# Patient Record
Sex: FEMALE | Race: WHITE | Employment: OTHER | URBAN - METROPOLITAN AREA
[De-identification: names, ages, dates, MRNs, and addresses within clinical notes are randomized per-mention and may not be internally consistent; named-entity substitution may affect disease eponyms.]

---

## 2017-01-05 ENCOUNTER — OFFICE VISIT (OUTPATIENT)
Dept: FAMILY MEDICINE | Facility: CLINIC | Age: 82
End: 2017-01-05

## 2017-01-05 VITALS
OXYGEN SATURATION: 98 % | WEIGHT: 111 LBS | TEMPERATURE: 98.5 F | BODY MASS INDEX: 17.92 KG/M2 | HEART RATE: 64 BPM | DIASTOLIC BLOOD PRESSURE: 90 MMHG | SYSTOLIC BLOOD PRESSURE: 130 MMHG

## 2017-01-05 DIAGNOSIS — I10 BENIGN ESSENTIAL HYPERTENSION: ICD-10-CM

## 2017-01-05 DIAGNOSIS — R26.89 POOR BALANCE: ICD-10-CM

## 2017-01-05 DIAGNOSIS — R00.2 PALPITATIONS: Primary | ICD-10-CM

## 2017-01-05 PROCEDURE — 93000 ELECTROCARDIOGRAM COMPLETE: CPT | Performed by: FAMILY MEDICINE

## 2017-01-05 PROCEDURE — 99214 OFFICE O/P EST MOD 30 MIN: CPT | Performed by: FAMILY MEDICINE

## 2017-01-05 RX ORDER — LISINOPRIL 10 MG/1
10 TABLET ORAL DAILY
Qty: 90 TABLET | Refills: 1 | Status: SHIPPED | OUTPATIENT
Start: 2017-01-05 | End: 2017-03-01

## 2017-01-05 NOTE — MR AVS SNAPSHOT
After Visit Summary   1/5/2017    Juliana Leal    MRN: [id number]           Patient Information     Date Of Birth          2/10/1923        Visit Information        Provider Department      1/5/2017 3:45 PM Gladys Christiansen MD [facility]        Today's Diagnoses     Palpitations    -  1     Encounter for medication refill         Benign essential hypertension            Follow-ups after your visit        Who to contact     If you have questions or need follow up information about today's clinic visit or your schedule please contact [facility] directly at [phone].  Normal or non-critical lab and imaging results will be communicated to you by [employer], letter or phone within 4 business days after the clinic has received the results. If you do not hear from us within 7 days, please contact the clinic through [employer]wel[employer] or phone. If you have a critical or abnormal lab result, we will notify you by phone as soon as possible.  Submit refill requests through Dorm[employer] or call your pharmacy and they will forward the refill request to us. Please allow 3 business days for your refill to be completed.          Additional Information About Your Visit        MyChart Information     [employer] gives you secure access to your electronic health record. If you see a primary care provider, you can also send messages to your care team and make appointments. If you have questions, please call your primary care clinic.  If you do not have a primary care provider, please call [phone] and they will assist you.        Care EveryWhere ID     This is your Care EveryWhere ID. This could be used by other organizations to access your [facility] medical records  [id number]        Your Vitals Were     Pulse Temperature Pulse Oximetry             64 98.5  F (36.9  C) (Oral) 98%          Blood Pressure from Last 3 Encounters:   01/05/17 130/90   12/05/16 150/90   11/08/16 102/76    Weight from Last 3  Encounters:   01/05/17 50.349 kg (111 lb)   12/05/16 51.256 kg (113 lb)   11/08/16 49.986 kg (110 lb 3.2 oz)              We Performed the Following     EKG 12-lead complete w/read - Clinics          Today's Medication Changes          These changes are accurate as of: 1/5/17  5:02 PM.  If you have any questions, ask your nurse or doctor.               These medicines have changed or have updated prescriptions.        Dose/Directions    lisinopril 10 MG tablet   Commonly known as:  PRINIVIL/ZESTRIL   This may have changed:  See the new instructions.   Used for:  Benign essential hypertension   Changed by:  Gladys Christiansen MD        Dose:  10 mg   Take 1 tablet (10 mg) by mouth daily   Quantity:  90 tablet   Refills:  1         Stop taking these medicines if you haven't already. Please contact your care team if you have questions.     metoprolol 100 MG 24 hr tablet   Commonly known as:  TOPROL-XL   Stopped by:  Gladys Christiansen MD                Where to get your medicines      These medications were sent to [facility] [address] [place], [place] [address] AT [facility] [address]  [address], [facility] [address]    Hours:  24-hours Phone:  [phone]    - lisinopril 10 MG tablet             Primary Care Provider Office Phone # Fax #    Davey Hunter [phone]       [facility] [address]  [facility] [place] [address]        Thank you!     Thank you for choosing [facility]  for your care. Our goal is always to provide you with excellent care. Hearing back from our patients is one way we can continue to improve our services. Please take a few minutes to complete the written survey that you may receive in the mail after your visit with us. Thank you!             Your Updated Medication List - Protect others around you: Learn how to safely use, store and throw away your medicines at www.disposemymeds.org.          This list is  accurate as of: 1/5/17  5:02 PM.  Always use your most recent med list.                   Brand Name Dispense Instructions for use    cetirizine 10 MG tablet    zyrTEC     Take 5 mg by mouth daily       hydrochlorothiazide 25 MG tablet    HYDRODIURIL    90 tablet    TAKE 1 TABLET BY MOUTH EVERY DAY       levothyroxine 75 MCG tablet    SYNTHROID/LEVOTHROID    90 tablet    TAKE 1 TABLET BY MOUTH EVERY DAY       lisinopril 10 MG tablet    PRINIVIL/ZESTRIL    90 tablet    Take 1 tablet (10 mg) by mouth daily

## 2017-01-05 NOTE — PROGRESS NOTES
"Problem(s) Oriented visit        SUBJECTIVE:                                                    Juliana Leal is a 89 year old female who presents to clinic today for a few issues. She has long history of itching. She finally pinpointed the cause with eliminating things a bit at a time. She recently stopped her metoprolol and thinks the itching is better. She complains of having occasional palpitations, but they are not long lasting and occasionally feel like a stronger beat. She denies any lightheadedness or dizziness, although she does note that she feels less steady on her feet than she used to. No chest pain or pressure. No dyspnea.      Problem list, Medication list, Allergies, and Medical/Social/Surgical histories reviewed in [facility] and updated as appropriate.   Additional history: as documented    ROS:  C: NEGATIVE for fever, chills, change in weight  I: NEGATIVE for worrisome rashes, moles or lesions  E: NEGATIVE for vision changes or irritation  E/M: NEGATIVE for ear, mouth and throat problems  R: NEGATIVE for significant cough or SOB  B: NEGATIVE for masses, tenderness or discharge  CV: her heart will occasionally race and \"pound\"  GI: NEGATIVE for nausea, abdominal pain, heartburn, or change in bowel habits  : NEGATIVE for frequency, dysuria, or hematuria  M: NEGATIVE for significant arthralgias or myalgia  NEURO: she can feel unsteady, no tremor. She doesn't feel as strong or stable as she had not that long ago.   E: NEGATIVE for temperature intolerance, skin/hair changes  H: NEGATIVE for bleeding problems  P: NEGATIVE for changes in mood or affect    Histories:   Patient Active Problem List   Diagnosis     HTN (hypertension)     Hypothyroid     Migraine     Health Care Home     Vertigo     Headache     Muscle tension headache     Pruritic disorder     Past Surgical History   Procedure Laterality Date     Colectomy       diverticulitis     Incise finger tendon sheath       Repair atrial septal defect   "     Phacoemulsification clear cornea with standard intraocular lens implant  2/29/2012     Procedure:PHACOEMULSIFICATION CLEAR CORNEA WITH STANDARD INTRAOCULAR LENS IMPLANT; RIGHT PHACOEMULSIFICATION CLEAR CORNEA WITH STANDARD INTRAOCULAR LENS IMPLANT; Surgeon:MANJIT LYONS; Location: [facility]     Phacoemulsification clear cornea with standard intraocular lens implant  3/14/2012     Procedure:PHACOEMULSIFICATION CLEAR CORNEA WITH STANDARD INTRAOCULAR LENS IMPLANT; LEFT PHACOEMULSIFICATION CLEAR CORNEA WITH STANDARD INTRAOCULAR LENS IMPLANT ; Surgeon:MANJIT LYONS; Location: [facility]     Hc ligation or biopsy temporal artery  5/10/2012     Procedure:BIOPSY ARTERY TEMPORAL; Surgeon:RUI LOREDO; Location: OR       Social History   Substance Use Topics     Smoking status: Former Smoker     Types: Cigarettes     Quit date: 01/01/1960     Smokeless tobacco: Never Used     Alcohol Use: Yes      Comment:  1 beer maybe in 6 months     History reviewed. No pertinent family history.        OBJECTIVE:                                                    /90 mmHg  Pulse 64  Temp(Src) 98.5  F (36.9  C) (Oral)  Wt 50.349 kg (111 lb)  SpO2 98%  Body mass index is 17.92 kg/(m^2).   APPEARANCE: = Relaxed and in no distress  Conj/Eyelids = noninjected and lids and lashes are without inflammation  PERRLA/Irises = Pupils Round Reactive to Light and Irisis without inflammation  Neck = No anterior or posterior adenopathy appreciated.  Thyroid = Not enlarged and no masses felt  Resp effort = Calm regular breathing  Breath Sounds = Good air movement with no rales or rhonchi in any lung fields  Heart Rate, Rythym, & sounds (no Murm)  = Regular rate and rythym with occasional skipped beats appreciated.  Carotid Art's = Pulses full and equal and no bruits appreciated  Ext (edema) = No pretibial edema noted or elsewhere  Musculsktl: strength is globally diminished at 4/5 in upper and lower extremities  Recent/Remote Memory = Alert  and Oriented x 3  Mood/Affect = Cooperative and interested   Labs Resulted Today:   Results for orders placed or performed in visit on 12/05/16   Vitamin B12 ([facility])   Result Value Ref Range    Vitamin B12 622 211 - 946 pg/mL    Narrative    Performed at:  01 - [facility] [address], [place], [place]  [id number]  : Sloan Fernandez MD, Phone:  [phone]   Sed Rate Westergren ([facility])   Result Value Ref Range    Sed Rate 20 0 - 20 mm/hr   Creatine Kinase Total Serum ([facility])   Result Value Ref Range    CK Total 37 24 - 173 U/L    Narrative    Performed at:  01 - [facility] [address], [place], [place]  [id number]  : Sloan Fernandez MD, Phone:  [phone]   C-Reactive Protein  Quant ([facility])   Result Value Ref Range    C-Reactive Protein 1.8 0.0 - 4.9 mg/L    Narrative    Performed at:  01 - [facility] [address], [place], [place]  [id number]  : Sloan Fernandez MD, Phone:  [phone]     ASSESSMENT/PLAN:                                                        Juliana was seen today for recheck medication and derm problem.    Diagnoses and all orders for this visit:    Palpitations  -     EKG 12-lead complete w/read - Clinics  Sinus arrhythmia noted. She is off her betablocker and prefers to stay off. She will contact us if the palpitations feel worse off this.     Benign essential hypertension  -     lisinopril (PRINIVIL/ZESTRIL) 10 MG tablet; Take 1 tablet (10 mg) by mouth daily, increased from 5 mg now that she is off metoprolol.    Poor balance  -     HOME CARE NURSING REFERRAL for PT to work on strength and balance and for  evaluation of the home for safety.      There are no Patient Instructions on file for this visit.    The following health maintenance items are reviewed in Epic and correct as of today:  Health Maintenance   Topic Date Due     ADVANCE DIRECTIVE PLANNING Q5 YRS (NO INBASKET)  02/10/1941     FALL RISK ASSESSMENT  03/21/2017     INFLUENZA VACCINE  (SYSTEM ASSIGNED)  09/01/2017     TETANUS IMMUNIZATION (SYSTEM ASSIGNED)  11/19/2022     MIGRAINE ACTION PLAN,ONE TIME,NO INBASKET  Completed     PNEUMOCOCCAL  Completed       Gladys Christiansen MD  [facility]  [phone]    For any issues my office # is [phone]

## 2017-01-19 PROCEDURE — G0180 MD CERTIFICATION HHA PATIENT: HCPCS | Performed by: FAMILY MEDICINE

## 2017-01-24 ENCOUNTER — MEDICAL CORRESPONDENCE (OUTPATIENT)
Dept: FAMILY MEDICINE | Facility: CLINIC | Age: 82
End: 2017-01-24

## 2017-01-26 ENCOUNTER — MEDICAL CORRESPONDENCE (OUTPATIENT)
Dept: FAMILY MEDICINE | Facility: CLINIC | Age: 82
End: 2017-01-26

## 2017-03-01 ENCOUNTER — OFFICE VISIT (OUTPATIENT)
Dept: FAMILY MEDICINE | Facility: CLINIC | Age: 82
End: 2017-03-01

## 2017-03-01 VITALS
HEART RATE: 89 BPM | RESPIRATION RATE: 16 BRPM | BODY MASS INDEX: 18.4 KG/M2 | DIASTOLIC BLOOD PRESSURE: 88 MMHG | SYSTOLIC BLOOD PRESSURE: 126 MMHG | OXYGEN SATURATION: 90 % | TEMPERATURE: 97.7 F | WEIGHT: 114 LBS

## 2017-03-01 DIAGNOSIS — R21 RASH: Primary | ICD-10-CM

## 2017-03-01 DIAGNOSIS — L29.9 ITCHING: ICD-10-CM

## 2017-03-01 DIAGNOSIS — I10 BENIGN ESSENTIAL HYPERTENSION: ICD-10-CM

## 2017-03-01 PROCEDURE — 99213 OFFICE O/P EST LOW 20 MIN: CPT | Performed by: FAMILY MEDICINE

## 2017-03-01 RX ORDER — EMOLLIENT BASE
CREAM (GRAM) TOPICAL PRN
COMMUNITY

## 2017-03-01 RX ORDER — LISINOPRIL 10 MG/1
5 TABLET ORAL DAILY
Qty: 90 TABLET | Refills: 1 | COMMUNITY
Start: 2017-03-01 | End: 2017-03-09

## 2017-03-01 NOTE — MR AVS SNAPSHOT
After Visit Summary   3/1/2017    Juliana Leal    MRN: [id number]           Patient Information     Date Of Birth          2/10/1923        Visit Information        Provider Department      3/1/2017 12:45 PM Federico Manning MD [facility]        Today's Diagnoses     Rash    -  1    Benign essential hypertension        Itching           Follow-ups after your visit        Additional Services     [facility] to [facility]       Referral to [facility]    [phone], Fax [phone]    Reason for Referral:  Persistent itching    Please be aware that coverage of these services is subject to the terms and limitations of your health insurance plan.  Call member services at your health plan with any benefit or coverage questions.            Referral to [facility]       Referral to [facility]  Phone:  [phone]  Reason for Referral:  New rash on legs      Please be aware that coverage of these services is subject to the terms and limitations of your health insurance plan.  Call member services at your health plan with any benefit or coverage questions.                  Who to contact     If you have questions or need follow up information about today's clinic visit or your schedule please contact [facility] directly at [phone].  Normal or non-critical lab and imaging results will be communicated to you by MyChart, letter or phone within 4 business days after the clinic has received the results. If you do not hear from us within 7 days, please contact the clinic through MyChart or phone. If you have a critical or abnormal lab result, we will notify you by phone as soon as possible.  Submit refill requests through [employer] or call your pharmacy and they will forward the refill request to us. Please allow 3 business days for your refill to be completed.          Additional  Information About Your Visit        [employer]Room[employer]art Information     [employer] gives you secure access to your electronic health record. If you see a primary care provider, you can also send messages to your care team and make appointments. If you have questions, please call your primary care clinic.  If you do not have a primary care provider, please call [phone] and they will assist you.        Care EveryWhere ID     This is your Care EveryWhere ID. This could be used by other organizations to access your [place] medical records  [id number]        Your Vitals Were     Pulse Temperature Respirations Pulse Oximetry BMI (Body Mass Index)       89 97.7  F (36.5  C) (Oral) 16 90% 18.4 kg/m2        Blood Pressure from Last 3 Encounters:   03/01/17 126/88   01/05/17 130/90   12/05/16 150/90    Weight from Last 3 Encounters:   03/01/17 51.7 kg (114 lb)   01/05/17 50.3 kg (111 lb)   12/05/16 51.3 kg (113 lb)              We Performed the Following     Referral to [facility]     Referral to [facility]          Today's Medication Changes          These changes are accurate as of: 3/1/17  1:17 PM.  If you have any questions, ask your nurse or doctor.               These medicines have changed or have updated prescriptions.        Dose/Directions    lisinopril 10 MG tablet   Commonly known as:  PRINIVIL/ZESTRIL   This may have changed:  how much to take   Used for:  Benign essential hypertension   Changed by:  Federico Manning MD        Dose:  5 mg   Take 0.5 tablets (5 mg) by mouth daily   Quantity:  90 tablet   Refills:  1                Primary Care Provider Office Phone # Fax #    Davey Hunter [phone]       [facility] [address]  [facility] [address]        Thank you!     Thank you for choosing [facility]  for your care. Our goal is always to provide you with excellent care. Hearing back from our patients is one way  we can continue to improve our services. Please take a few minutes to complete the written survey that you may receive in the mail after your visit with us. Thank you!             Your Updated Medication List - Protect others around you: Learn how to safely use, store and throw away your medicines at www.disposemymeds.org.          This list is accurate as of: 3/1/17  1:17 PM.  Always use your most recent med list.                   Brand Name Dispense Instructions for use    cetirizine 10 MG tablet    zyrTEC     Take 5 mg by mouth daily       emollient cream      Apply topically as needed for other (leg rash)       hydrochlorothiazide 25 MG tablet    HYDRODIURIL    90 tablet    TAKE 1 TABLET BY MOUTH EVERY DAY       levothyroxine 75 MCG tablet    SYNTHROID/LEVOTHROID    90 tablet    TAKE 1 TABLET BY MOUTH EVERY DAY       lisinopril 10 MG tablet    PRINIVIL/ZESTRIL    90 tablet    Take 0.5 tablets (5 mg) by mouth daily

## 2017-03-01 NOTE — PROGRESS NOTES
Subjective:  Here to discuss the status of the following problem(s):(Unless noted the problem(s) is/are stable and if applicable the medicine(s) being used is/are causing no side effects or problems.)    CC: Derm Problem (rash on both legs, itching getting worse  - saw dermatologist before) and Medication Problem (possible reaction off zyrtec x 1 day)      1. Benign essential hypertension  htn   Needs refill, no dizziness or lightheadedness    Rash and itching  Long standing itching  Zyrtec 3 months for the itching      Rash on legs for two days  No new soaps or lotions  Red rash not itchy  Not on torso    Tried stopping the HCTZ for itching and no change in itching and legs swelled          ROS:  General:  NEGATIVE for fever, chills, change in weight    Past Medical History   Diagnosis Date     Arthritis      Gout      Headaches      Hypertension      Thyroid disease      Current Outpatient Prescriptions   Medication     emollient (VANICREAM) cream     lisinopril (PRINIVIL/ZESTRIL) 10 MG tablet     cetirizine (ZYRTEC) 10 MG tablet     hydrochlorothiazide (HYDRODIURIL) 25 MG tablet     levothyroxine (SYNTHROID, LEVOTHROID) 75 MCG tablet     [DISCONTINUED] lisinopril (PRINIVIL/ZESTRIL) 10 MG tablet     No current facility-administered medications for this visit.      Facility-Administered Medications Ordered in Other Visits   Medication     lidocaine (XYLOCAINE) 2 % jelly     balanced salts (BSS) ophthalmic solution      mL with EPInephrine 1:1000 (PF) 0.3mL     lidocaine (PF) (XYLOCAINE) 1 % injection     moxifloxacin (VIGAMOX) 0.5 % ophthalmic solution     sod hyaluronate-sod chondroitin-sod hyaluronate (DUOVISC) intra-ocular kit     Lidocaine 1% injection 1 mL     lactated ringers infusion       reports that she quit smoking about 57 years ago. Her smoking use included Cigarettes. She has never used smokeless tobacco. She reports that she drinks alcohol.      EXAM:  BP: 126/88   Pulse: 89      Wt  Readings from Last 2 Encounters:   03/01/17 51.7 kg (114 lb)   01/05/17 50.3 kg (111 lb)       BMI= Body mass index is 18.4 kg/(m^2).    EXAM:  APPEARANCE: = alert and no distress  PERRLA/Irises = Nrl  Oropharynx = Nrl  Resp effort = Nrl  Breath Sounds = Nrl  Heart Rate, Rythym, & sounds (no Murm)  = Nrl  Ext (edema) =  Trace to +1 ankles  SKIN: pos dermatographism  Thighs with macular rash that is spotty over anterior thighs, pink red color no papules blisters or urticaria    Mood/Affect = Nrl    Assessment/Plan:  Juliana was seen today for derm problem and medication problem.    Diagnoses and all orders for this visit:    Rash  -     Referral to [facility]    Benign essential hypertension  bp okay    Itching  -     [facility]erral to [facility] JOE Manning  [facility]

## 2017-03-09 ENCOUNTER — TELEPHONE (OUTPATIENT)
Dept: FAMILY MEDICINE | Facility: CLINIC | Age: 82
End: 2017-03-09

## 2017-03-09 ENCOUNTER — TRANSFERRED RECORDS (OUTPATIENT)
Dept: FAMILY MEDICINE | Facility: CLINIC | Age: 82
End: 2017-03-09

## 2017-03-09 ENCOUNTER — CARE COORDINATION (OUTPATIENT)
Dept: CASE MANAGEMENT | Facility: CLINIC | Age: 82
End: 2017-03-09

## 2017-03-09 NOTE — LETTER
[facility] - CARE MANAGEMENT DEPT  [address] [facility] [address]  [facility] [address]  Phone: [phone]    March 10, 2017  Juliana Leal  [address]   [facility] [address]    Dear Juliana,  I am the Clinic Care Coordinator that works at [facility] [employer]. I wanted to thank you for spending the time to talk with me on Friday 3/10.  Below is a description of what  I can help you with.     The Clinic Care Coordinator role is a Registered Nurse and/or  who understands the health care system. The goal of Clinic Care Coordination is to help you manage your health and improve access to the [facility] system in the most efficient manner.  The Registered Nurse can assist you in meeting your health care goals by providing education, coordinating services, and strengthening the communication among your providers. The  can assist you with financial, behavioral, psychosocial, and chemical dependency and counseling/psychiatric resources.    Please feel free to keep this letter and contact information to contact me at [phone] with any further questions or concerns that may arise. We at [facility] are focused on providing you with the highest-quality healthcare experience possible and that all starts with you.       Sincerely,     Sabrina Polk    Enclosed: I have enclosed a copy of a 24 Hour Access Plan. This has helpful phone numbers for you to call when needed. Please keep this in an easy to access place to use as needed., I have enclosed an Authorization to Discuss Protected Health Information form. Please review, fill out, sign and send back to me so I can make sure we have this on file to be able to talk to family/friends if you would like us to be able to  and an Advanced Care Plan please review with your kids and bring into the clinic to have copied so that your health care professionals know your wishes.  Call with questions!!!

## 2017-03-09 NOTE — LETTER
[facility] - CARE [facility]NAG[facility] DEPT  [address] [facility] [address]  [facility] [address] [phone]      March 10, 2017      Juliana Leal  [address]   [facility] [address]      EMERGENCY CARE PLAN  Presenting Problem Treatment Plan   Questions or concerns during clinic hours I will call the clinic directly:    [facility]  [address] [place] [address] [phone]   Questions or concerns outside clinic hours I will call the after-hours on-call line at [phone]   Patient needs to schedule an appointment I will call the scheduling team during business hours at [phone]   Same day treatment  I will call the clinic first, then urgent care and express care if needed   Clinic Care Coordinators Sabrina Polk SW:  [phone]  Rosa Baker RN:  [phone]   Crisis Services:  Behavioral or Mental Health [facility] (Behavioral Health Providers)   [phone]   Emergency treatment--Immediately CALL [phone]

## 2017-03-10 NOTE — PROGRESS NOTES
3/9/17 Yanira faxed 3/1/17, 1/5/17, 12/5/16 and 11/8/16 office notes faxed to Allergy & Asthma @ [phone]    Arnav Dave,   [facility]  [phone]

## 2017-03-10 NOTE — PROGRESS NOTES
"Clinic Care Coordination Contact  OUTREACH    Referral Information:  Referral Source: PCP  Reason for Contact: Initial Assessment for support services  Care Conference: No     Universal Utilization:   ED Visits in last year: 0  Hospital visits in last year: 0  Last PCP appointment: 03/01/17  Missed Appointments: 0  Concerns: Proper Utilization  Multiple Providers or Specialists: Dermatology    Clinical Concerns:  Current Medical Concerns:   Patient Active Problem List   Diagnosis     HTN (hypertension)     Hypothyroid     Migraine     Health Care Home     Vertigo     Headache     Muscle tension headache     Pruritic disorder       Current Behavioral Concerns: NA    Education Provided to patient: Care Coordination Role, Access Plan, Housekeeping/Chore Agencies   Clinical Pathway: None    Medication Management:  Patient understands and is adherent     Functional Status:  Mobility Status: Independent  Equipment Currently Used at Home: none  Transportation: Pt drives     Psychosocial:  Current living arrangement:: I live in a private home (co-op)  Financial/Insurance: [employer], No financial concerns     Resources and Interventions:  Current Resources:  (NA);  (NA)  PAS Number:  (NA)  Senior Linkage Line Referral Placed:  (NA)  Advanced Care Plans/Directives on file:: No  Referrals Placed:  (NA)     Goals: None established    Patient/Caregiver understanding: ADRIÁN WILLS reached out to patient due to reports from Dermatologist that patient appeared \"a bit overwhelmed with life.\" Pt reports no major concerns at this time. Pt has two children in the area that assist as needed. Pt is still driving and can successfully get to the grocery store, pharmacy, and medical appointments. Pt lives in a co-op. Pt states that she worked with home care briefly but was discharged because there was no more skilled needs. Pt reports experiencing various health ailments such as a rash and swelling in her lip as of late which may have " contributed to her stress level. Pt did request some resources on housekeeping as she does get winded using the vacuum . SW CC will mail care coordination information, Advanced Care Planning materials, MILANA for family members, CC flyer and follow-up in 1-2 weeks to reassess patient's needs.   Frequency of Care Coordination: 2-3 weeks  Upcoming appointment:  (NA)     Plan: Pt to review paperwork. ADRIÁN CC to follow-up in 1-2 weeks.    Sabrina Polk, [facility]  Clinic Care Coordinator  [facility]/ [facility]  [phone]

## 2017-03-14 ENCOUNTER — TELEPHONE (OUTPATIENT)
Dept: FAMILY MEDICINE | Facility: CLINIC | Age: 82
End: 2017-03-14

## 2017-03-14 DIAGNOSIS — I10 HTN (HYPERTENSION): Primary | ICD-10-CM

## 2017-03-14 RX ORDER — DILTIAZEM HYDROCHLORIDE 300 MG/1
300 CAPSULE, COATED, EXTENDED RELEASE ORAL DAILY
Qty: 30 CAPSULE | Refills: 3 | Status: SHIPPED | OUTPATIENT
Start: 2017-03-14 | End: 2017-06-06

## 2017-03-14 NOTE — TELEPHONE ENCOUNTER
Patient calls, seen by allergist 3/9/17 for her chronic hives that she has suffered with for years. Dr. Mahmood spoke with Dr. Goode that day and due to patient's new symptoms of swollen lip, patient's lisinopril was dc'd and Dr. Mahmood recommends avoid ACE's and beta-blockers in future due to this episode of angiodema.     Patient calls today reporting BP is elevated. Last pm 179/100. This aw=942/109 and at emzv=876/97. Patient states between 3/9 and 3/13 BP was running in 150's systolic and can't remember diastolics.  Feeling ok.  Plan: per Dr. Hunter - start diltiazem 300mg qd and f/u 1 month or sooner prn. Patient agrees. Rx sent to pharmacy.    Bette Moeller RN

## 2017-03-22 DIAGNOSIS — Z76.0 ENCOUNTER FOR MEDICATION REFILL: ICD-10-CM

## 2017-03-23 ENCOUNTER — TRANSFERRED RECORDS (OUTPATIENT)
Dept: FAMILY MEDICINE | Facility: CLINIC | Age: 82
End: 2017-03-23

## 2017-03-24 RX ORDER — LEVOTHYROXINE SODIUM 75 UG/1
TABLET ORAL
Qty: 90 TABLET | Refills: 0 | Status: SHIPPED | OUTPATIENT
Start: 2017-03-24 | End: 2017-06-09

## 2017-03-29 ENCOUNTER — OFFICE VISIT (OUTPATIENT)
Dept: FAMILY MEDICINE | Facility: CLINIC | Age: 82
End: 2017-03-29

## 2017-03-29 VITALS
TEMPERATURE: 97.6 F | BODY MASS INDEX: 18.27 KG/M2 | WEIGHT: 113.2 LBS | SYSTOLIC BLOOD PRESSURE: 142 MMHG | OXYGEN SATURATION: 98 % | DIASTOLIC BLOOD PRESSURE: 76 MMHG | HEART RATE: 72 BPM

## 2017-03-29 DIAGNOSIS — I10 ESSENTIAL HYPERTENSION: ICD-10-CM

## 2017-03-29 DIAGNOSIS — R21 RASH AND NONSPECIFIC SKIN ERUPTION: ICD-10-CM

## 2017-03-29 DIAGNOSIS — L29.9 PRURITIC DISORDER: Primary | ICD-10-CM

## 2017-03-29 DIAGNOSIS — L50.3 DERMATOGRAPHIA: ICD-10-CM

## 2017-03-29 DIAGNOSIS — Z23 NEED FOR PNEUMOCOCCAL VACCINATION: ICD-10-CM

## 2017-03-29 PROCEDURE — 99213 OFFICE O/P EST LOW 20 MIN: CPT | Performed by: FAMILY MEDICINE

## 2017-03-29 RX ORDER — MULTIVIT WITH MINERALS/LUTEIN
1 TABLET ORAL DAILY
Status: ON HOLD | COMMUNITY
End: 2019-05-13

## 2017-03-29 RX ORDER — CETIRIZINE HYDROCHLORIDE 10 MG/1
10 TABLET ORAL 2 TIMES DAILY
Qty: 30 TABLET | Status: ON HOLD | COMMUNITY
Start: 2017-03-29 | End: 2019-05-13

## 2017-03-29 RX ORDER — HYDROXYZINE HCL 10 MG/5 ML
1 SOLUTION, ORAL ORAL EVERY EVENING
Refills: 11 | COMMUNITY
Start: 2017-03-23 | End: 2017-06-06

## 2017-03-29 RX ORDER — TRIAMCINOLONE ACETONIDE 1 MG/G
OINTMENT TOPICAL
Refills: 6 | COMMUNITY
Start: 2017-03-07 | End: 2017-04-05

## 2017-03-29 NOTE — PATIENT INSTRUCTIONS
This is a place you can go to be measured for Jobst or other type of compression stockings for edema.     [facility]:          [address], [address]                                    [place] [address]                                    Phone:  [phone]                                    Fax:  [phone]                                    Billing office:  [phone]    STOCKINGS 20 MMHG KNEE HIGH    STOP THE HCTZ

## 2017-03-29 NOTE — PROGRESS NOTES
Subjective:  Here to discuss the status of the following problem(s):(Unless noted the problem(s) is/are stable and if applicable the medicine(s) being used is/are causing no side effects or problems.)    CC: Hives (itching  & burning, scalp, legs) and Medication Problem (???)      1. Pruritic disorder  HIVES  Has seen Dr KOHURY    Once per day at night  Very itchy hurts  It lasts 1-2 hours  Was given the specific regimen of Zyrtec 10 mg twice a day ranitidine 150 mg twice a day and Atarax at night.  She is following that regimen.  She does remain on hydrochlorothiazide which per my discussion with Dr. Khoury is unlikely to be related.    HTN  hctz 25 mg / and diltiazem 300mg  BBLK can make the hives worse     Had angioedema from lisinopril       ROS:  5 point ROS completed and negative except noted above, including Gen, CV, Resp, GI, MS    Past Medical History:   Diagnosis Date     Arthritis      Gout      Headaches      Hypertension      Thyroid disease      Current Outpatient Prescriptions   Medication     ranitidine (ZANTAC) 150 MG tablet     hydrOXYzine (ATARAX) 10 MG/5ML syrup     triamcinolone (KENALOG) 0.1 % ointment     Multiple Vitamins-Minerals (CENTRUM SILVER) per tablet     levothyroxine (SYNTHROID/LEVOTHROID) 75 MCG tablet     diltiazem 300 MG 24 hr capsule     emollient (VANICREAM) cream     cetirizine (ZYRTEC) 10 MG tablet     hydrochlorothiazide (HYDRODIURIL) 25 MG tablet     No current facility-administered medications for this visit.      Facility-Administered Medications Ordered in Other Visits   Medication     lidocaine (XYLOCAINE) 2 % jelly     balanced salts (BSS) ophthalmic solution      mL with EPInephrine 1:1000 (PF) 0.3mL     lidocaine (PF) (XYLOCAINE) 1 % injection     moxifloxacin (VIGAMOX) 0.5 % ophthalmic solution     sod hyaluronate-sod chondroitin-sod hyaluronate (DUOVISC) intra-ocular kit     Lidocaine 1% injection 1 mL     lactated ringers infusion       reports that she quit  smoking about 57 years ago. Her smoking use included Cigarettes. She has never used smokeless tobacco. She reports that she drinks alcohol.      EXAM:  BP: 142/76   Pulse: 72      Wt Readings from Last 2 Encounters:   03/29/17 51.3 kg (113 lb 3.2 oz)   03/01/17 51.7 kg (114 lb)       BMI= Body mass index is 18.27 kg/(m^2).    EXAM:   APPEARANCE: = Nrl  Conj/Eyelids = Nrl  Ears/Nose = Nrl  Neck = Nrl  Resp effort = Nrl  Breath Sounds = Nrl  Heart Rate, Rythym, & sounds (no Murm)  = Nrl  SKIN: no suspicious lesions or rashes, no hives    Ext (edema) = Nrl  Recent/Remote Memory = Nrl  Mood/Affect =  worried    Assessment/Plan:  Juliana was seen today for hives and medication problem.    Diagnoses and all orders for this visit:  (L29.9) Pruritic disorder  (primary encounter diagnosis)  HIVES  LABS RECOMMENDED BY ALLERGIST TO BE DRAWN  TODAY. 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 She swelled last time so I will have her get jobst and follow upn 2-3 weeks        Federico Manning  [facility]

## 2017-03-29 NOTE — MR AVS SNAPSHOT
After Visit Summary   3/29/2017    Juliana Leal    MRN: [id number]           Patient Information     Date Of Birth          2/10/1923        Visit Information        Provider Department      3/29/2017 11:00 AM Federico Manning MD [facility]        Today's Diagnoses     Pruritic disorder    -  1    Rash and nonspecific skin eruption        Dermatographia        Need for pneumococcal vaccination          Care Instructions    This is a place you can go to be measured for Jobst or other type of compression stockings for edema.     [facility]:          [facility]                                    [address] [place] [address], [address]                                    [place] [address]                                    Phone:  [phone]                                    Fax:  [phone]                                    Billing office:  [phone]    STOCKINGS 20 MMHG KNEE HIGH    STOP THE HCTZ          Follow-ups after your visit        Who to contact     If you have questions or need follow up information about today's clinic visit or your schedule please contact [facility] directly at [phone].  Normal or non-critical lab and imaging results will be communicated to you by Celsensehart, letter or phone within 4 business days after the clinic has received the results. If you do not hear from us within 7 days, please contact the clinic through Ancestryt or phone. If you have a critical or abnormal lab result, we will notify you by phone as soon as possible.  Submit refill requests through [employer] or call your pharmacy and they will forward the refill request to us. Please allow 3 business days for your refill to be completed.          Additional Information About Your Visit        [email or web address] Information     [employer] gives you secure access to your electronic health record. If you see a primary care provider, you can also send  messages to your care team and make appointments. If you have questions, please call your primary care clinic.  If you do not have a primary care provider, please call [phone] and they will assist you.        Care EveryWhere ID     This is your Care EveryWhere ID. This could be used by other organizations to access your [place] medical records  [id number]        Your Vitals Were     Pulse Temperature Pulse Oximetry BMI (Body Mass Index)          72 97.6  F (36.4  C) (Oral) 98% 18.27 kg/m2         Blood Pressure from Last 3 Encounters:   03/29/17 142/76   03/01/17 126/88   01/05/17 130/90    Weight from Last 3 Encounters:   03/29/17 51.3 kg (113 lb 3.2 oz)   03/01/17 51.7 kg (114 lb)   01/05/17 50.3 kg (111 lb)              We Performed the Following     C1 Esterase inhibitor total     HCL C1-ESTERASE INHIBITOR FUNCTION          Today's Medication Changes          These changes are accurate as of: 3/29/17 11:37 AM.  If you have any questions, ask your nurse or doctor.               These medicines have changed or have updated prescriptions.        Dose/Directions    cetirizine 10 MG tablet   Commonly known as:  zyrTEC   This may have changed:    - how much to take  - when to take this   Used for:  Need for pneumococcal vaccination        Dose:  10 mg   Take 1 tablet (10 mg) by mouth 2 times daily   Quantity:  30 tablet   Refills:  0         Stop taking these medicines if you haven't already. Please contact your care team if you have questions.     hydrochlorothiazide 25 MG tablet   Commonly known as:  HYDRODIURIL                    Primary Care Provider Office Phone # Fax #    Davey Hunter [phone]       [facility] [address]  [facility] [address]        Thank you!     Thank you for choosing [facility]  for your care. Our goal is always to provide you with excellent care. Hearing back from our patients is one way we can continue to improve our  services. Please take a few minutes to complete the written survey that you may receive in the mail after your visit with us. Thank you!             Your Updated Medication List - Protect others around you: Learn how to safely use, store and throw away your medicines at www.disposemymeds.org.          This list is accurate as of: 3/29/17 11:37 AM.  Always use your most recent med list.                   Brand Name Dispense Instructions for use    CENTRUM SILVER per tablet      Take 1 tablet by mouth daily       cetirizine 10 MG tablet    zyrTEC    30 tablet    Take 1 tablet (10 mg) by mouth 2 times daily       diltiazem 300 MG 24 hr capsule     30 capsule    Take 1 capsule (300 mg) by mouth daily       emollient cream      Apply topically as needed for other (leg rash)       hydrOXYzine 10 MG/5ML syrup    ATARAX     Take 1 mL by mouth every evening With zrtec       levothyroxine 75 MCG tablet    SYNTHROID/LEVOTHROID    90 tablet    TAKE 1 TABLET BY MOUTH EVERY DAY       triamcinolone 0.1 % ointment    KENALOG     APPLY TO BODY RASH BID OVER VANICREAM       ZANTAC 150 MG tablet   Generic drug:  ranitidine      Take 150 mg by mouth 2 times daily

## 2017-04-05 ENCOUNTER — OFFICE VISIT (OUTPATIENT)
Dept: FAMILY MEDICINE | Facility: CLINIC | Age: 82
End: 2017-04-05

## 2017-04-05 VITALS
BODY MASS INDEX: 18.56 KG/M2 | HEART RATE: 72 BPM | SYSTOLIC BLOOD PRESSURE: 130 MMHG | OXYGEN SATURATION: 97 % | WEIGHT: 115 LBS | DIASTOLIC BLOOD PRESSURE: 70 MMHG | TEMPERATURE: 98 F

## 2017-04-05 DIAGNOSIS — J01.90 ACUTE SINUSITIS WITH SYMPTOMS > 10 DAYS: Primary | ICD-10-CM

## 2017-04-05 PROCEDURE — 99213 OFFICE O/P EST LOW 20 MIN: CPT | Performed by: FAMILY MEDICINE

## 2017-04-05 RX ORDER — SULFAMETHOXAZOLE/TRIMETHOPRIM 800-160 MG
1 TABLET ORAL 2 TIMES DAILY
Qty: 20 TABLET | Refills: 0 | Status: SHIPPED | OUTPATIENT
Start: 2017-04-05 | End: 2017-04-05

## 2017-04-05 RX ORDER — HYDROCHLOROTHIAZIDE 25 MG/1
TABLET ORAL
Refills: 0 | COMMUNITY
Start: 2017-03-23 | End: 2017-04-05

## 2017-04-05 RX ORDER — AZITHROMYCIN 250 MG/1
TABLET, FILM COATED ORAL
Qty: 6 TABLET | Refills: 0 | Status: SHIPPED | OUTPATIENT
Start: 2017-04-05 | End: 2017-04-26

## 2017-04-05 NOTE — PROGRESS NOTES
14 days of runny nose, cough and eye irritation, sore throat. No fever or chills. No SOB or chest pain.  Nonsmoker.  ROS: no nausea or vomiting  OBJECTIVE: /70  Pulse 72  Temp 98  F (36.7  C)  Wt 52.2 kg (115 lb)  SpO2 97%  BMI 18.56 kg/m2   NAD except fatigue. TM's OK. Turbinates red and swollen. Pharynx injected. No nodes. Lungs are clear, and cor RRR.  (J01.90) Acute sinusitis with symptoms > 10 days  (primary encounter diagnosis)  Comment:   Plan: sulfamethoxazole-trimethoprim (BACTRIM         DS/SEPTRA DS) 800-160 MG per tablet

## 2017-04-05 NOTE — MR AVS SNAPSHOT
After Visit Summary   4/5/2017    Juliana Leal    MRN: [id number]           Patient Information     Date Of Birth          2/10/1923        Visit Information        Provider Department      4/5/2017 1:45 PM Davey Hunter MD [facility]        Today's Diagnoses     Acute sinusitis with symptoms > 10 days    -  1       Follow-ups after your visit        Who to contact     If you have questions or need follow up information about today's clinic visit or your schedule please contact [facility] directly at [phone].  Normal or non-critical lab and imaging results will be communicated to you by DxO Labshart, letter or phone within 4 business days after the clinic has received the results. If you do not hear from us within 7 days, please contact the clinic through QoL Medst or phone. If you have a critical or abnormal lab result, we will notify you by phone as soon as possible.  Submit refill requests through Taplet or call your pharmacy and they will forward the refill request to us. Please allow 3 business days for your refill to be completed.          Additional Information About Your Visit        MyChart Information     Tap[employer] gives you secure access to your electronic health record. If you see a primary care provider, you can also send messages to your care team and make appointments. If you have questions, please call your primary care clinic.  If you do not have a primary care provider, please call [phone] and they will assist you.        Care EveryWhere ID     This is your Care EveryWhere ID. This could be used by other organizations to access your [place] medical records  [id number]        Your Vitals Were     Pulse Temperature Pulse Oximetry BMI (Body Mass Index)          72 98  F (36.7  C) 97% 18.56 kg/m2         Blood Pressure from Last 3 Encounters:   04/05/17 130/70   03/29/17 142/76   03/01/17 126/88    Weight from Last 3 Encounters:   04/05/17 52.2 kg  (115 lb)   03/29/17 51.3 kg (113 lb 3.2 oz)   03/01/17 51.7 kg (114 lb)              Today, you had the following     No orders found for display         Today's Medication Changes          These changes are accurate as of: 4/5/17  2:11 PM.  If you have any questions, ask your nurse or doctor.               Start taking these medicines.        Dose/Directions    azithromycin 250 MG tablet   Commonly known as:  ZITHROMAX   Used for:  Acute sinusitis with symptoms > 10 days   Started by:  Davey Hunter MD        Two tablets first day, then one tablet daily for four days.   Quantity:  6 tablet   Refills:  0         Stop taking these medicines if you haven't already. Please contact your care team if you have questions.     hydrochlorothiazide 25 MG tablet   Commonly known as:  HYDRODIURIL   Stopped by:  Davey Hunter MD                Where to get your medicines      These medications were sent to [facility] [address] [place] [address] [address] AT [place] [address] [address] [facility] [address]    Hours:  24-hours Phone:  [phone]     azithromycin 250 MG tablet                Primary Care Provider Office Phone # Fax #    Davey Hunter [phone]       [facility] [address]  [facility] [address]        Thank you!     Thank you for choosing [facility]  for your care. Our goal is always to provide you with excellent care. Hearing back from our patients is one way we can continue to improve our services. Please take a few minutes to complete the written survey that you may receive in the mail after your visit with us. Thank you!             Your Updated Medication List - Protect others around you: Learn how to safely use, store and throw away your medicines at www.disposemymeds.org.          This list is accurate as of: 4/5/17  2:11 PM.  Always use your most recent med list.                   Brand Name Dispense  Instructions for use    azithromycin 250 MG tablet    ZITHROMAX    6 tablet    Two tablets first day, then one tablet daily for four days.       CENTRUM SILVER per tablet      Take 1 tablet by mouth daily       cetirizine 10 MG tablet    zyrTEC    30 tablet    Take 1 tablet (10 mg) by mouth 2 times daily       diltiazem 300 MG 24 hr capsule     30 capsule    Take 1 capsule (300 mg) by mouth daily       emollient cream      Apply topically as needed for other (leg rash)       hydrOXYzine 10 MG/5ML syrup    ATARAX     Take 1 mL by mouth every evening Reported on 4/5/2017       levothyroxine 75 MCG tablet    SYNTHROID/LEVOTHROID    90 tablet    TAKE 1 TABLET BY MOUTH EVERY DAY       ZANTAC 150 MG tablet   Generic drug:  ranitidine      Take 150 mg by mouth 2 times daily

## 2017-04-07 LAB
Lab: 30 MG/DL (ref 21–39)
Lab: 95 %MEAN NORMAL

## 2017-04-21 NOTE — PROGRESS NOTES
Order(s) created erroneously. Erroneous order ID: [id number]   Order canceled by: GALINDO GORE   Order cancel date/time: 04/21/2017 1:35 PM

## 2017-04-21 NOTE — PROGRESS NOTES
Order(s) created erroneously. Erroneous order ID: [id number]   Order canceled by: GALINDO GORE   Order cancel date/time: 04/21/2017 1:35 PM

## 2017-04-26 ENCOUNTER — TELEPHONE (OUTPATIENT)
Dept: FAMILY MEDICINE | Facility: CLINIC | Age: 82
End: 2017-04-26

## 2017-04-26 DIAGNOSIS — J01.90 ACUTE SINUSITIS WITH SYMPTOMS > 10 DAYS: ICD-10-CM

## 2017-04-26 RX ORDER — AZITHROMYCIN 250 MG/1
TABLET, FILM COATED ORAL
Qty: 6 TABLET | Refills: 0 | Status: SHIPPED | OUTPATIENT
Start: 2017-04-26 | End: 2017-06-06

## 2017-04-26 NOTE — TELEPHONE ENCOUNTER
Patient calls complains of head/sinus congestion. Was treated 4/5 with zpack and got better but not 100% well. Since traveling over Easter symptoms relapsing and requesting refill on zpack.   Denies chest congestion/cough/fever/ill.   Plan:per Dr. Christiansen (oncall for Dr. Hunter) ok for refill on zpack and RTC if symptoms persist or worsen. Patient agrees and rx sent to pharmacy.  Bette Moeller RN

## 2017-05-02 ENCOUNTER — TRANSFERRED RECORDS (OUTPATIENT)
Dept: FAMILY MEDICINE | Facility: CLINIC | Age: 82
End: 2017-05-02

## 2017-05-26 ENCOUNTER — CARE COORDINATION (OUTPATIENT)
Dept: CASE MANAGEMENT | Facility: CLINIC | Age: 82
End: 2017-05-26

## 2017-05-26 NOTE — PROGRESS NOTES
Clinic Care Coordination Contact  OUTREACH    Referral Information:  Referral Source: PCP  Reason for Contact: Homemaking Resources    Functional Status:  Mobility Status: Independent  Equipment Currently Used at Home: none  Transportation: Family provides     Psychosocial:  Current living arrangement:: I live in a private home (co-op)  Financial/Insurance: [employer], No concerns     Resources and Interventions:  Current Resources: No formal Supports  Advanced Care Plans/Directives on file:: No- Will mail out Honoring Choices packet  Referrals Placed:  Homemaking      Patient/Caregiver understanding: Patient reports that severe itching is causing difficulty in her ability to complete household tasks. Will mail out resources again. Pt continues to work with PCP, Allergist, and Dermatologist to resolve chronic itching.   Frequency of Care Coordination: 2-3 weeks     Plan: ADRIÁN CC to mail out resources and remain available to answer any questions.    HUBER Hoffmann  Clinic Care Coordinator  [facility]/ [facility]  [phone]

## 2017-06-06 ENCOUNTER — OFFICE VISIT (OUTPATIENT)
Dept: FAMILY MEDICINE | Facility: CLINIC | Age: 82
End: 2017-06-06

## 2017-06-06 VITALS
SYSTOLIC BLOOD PRESSURE: 138 MMHG | BODY MASS INDEX: 19.05 KG/M2 | WEIGHT: 118 LBS | TEMPERATURE: 97.8 F | OXYGEN SATURATION: 96 % | HEART RATE: 76 BPM | DIASTOLIC BLOOD PRESSURE: 70 MMHG

## 2017-06-06 DIAGNOSIS — I10 BENIGN ESSENTIAL HYPERTENSION: ICD-10-CM

## 2017-06-06 DIAGNOSIS — I10 ESSENTIAL HYPERTENSION: ICD-10-CM

## 2017-06-06 DIAGNOSIS — L29.9 PRURITIC DISORDER: ICD-10-CM

## 2017-06-06 DIAGNOSIS — E03.9 ACQUIRED HYPOTHYROIDISM: Primary | ICD-10-CM

## 2017-06-06 DIAGNOSIS — R94.6 THYROID FUNCTION TEST ABNORMAL: Primary | ICD-10-CM

## 2017-06-06 PROCEDURE — 36415 COLL VENOUS BLD VENIPUNCTURE: CPT | Performed by: FAMILY MEDICINE

## 2017-06-06 PROCEDURE — 99214 OFFICE O/P EST MOD 30 MIN: CPT | Performed by: FAMILY MEDICINE

## 2017-06-06 RX ORDER — DILTIAZEM HYDROCHLORIDE 300 MG/1
300 CAPSULE, COATED, EXTENDED RELEASE ORAL DAILY
Qty: 90 CAPSULE | Refills: 3 | Status: SHIPPED | OUTPATIENT
Start: 2017-06-06 | End: 2018-06-07

## 2017-06-06 RX ORDER — SPIRONOLACTONE 25 MG/1
25 TABLET ORAL DAILY
Qty: 30 TABLET | Refills: 1 | Status: SHIPPED | OUTPATIENT
Start: 2017-06-06 | End: 2017-06-06

## 2017-06-06 RX ORDER — HYDROCHLOROTHIAZIDE 25 MG/1
TABLET ORAL
Qty: 30 TABLET | Refills: 0 | COMMUNITY
Start: 2017-06-06 | End: 2018-02-01

## 2017-06-06 RX ORDER — SPIRONOLACTONE 25 MG/1
TABLET ORAL
Qty: 90 TABLET | Refills: 1 | Status: SHIPPED | OUTPATIENT
Start: 2017-06-06 | End: 2017-06-13

## 2017-06-06 NOTE — PROGRESS NOTES
Itchy eyes for several weeks. She has been taking Zyrtec for chronic pruritus. She is not using anything for this. She is still taking her HCTZ, as she had foot swelling when she stopped it; so she restarted it. Her worse involvement is her hips to feet. She has no rash, except dermatographism.   Sleep is terrible, because of itching/burning.  Current Outpatient Prescriptions   Medication     hydrochlorothiazide (HYDRODIURIL) 25 MG tablet     Multiple Vitamins-Minerals (CENTRUM SILVER) per tablet     cetirizine (ZYRTEC) 10 MG tablet     levothyroxine (SYNTHROID/LEVOTHROID) 75 MCG tablet     diltiazem 300 MG 24 hr capsule     emollient (VANICREAM) cream     No current facility-administered medications for this visit.      Facility-Administered Medications Ordered in Other Visits   Medication     lidocaine (XYLOCAINE) 2 % jelly     balanced salts (BSS) ophthalmic solution      mL with EPInephrine 1:1000 (PF) 0.3mL     lidocaine (PF) (XYLOCAINE) 1 % injection     moxifloxacin (VIGAMOX) 0.5 % ophthalmic solution     sod hyaluronate-sod chondroitin-sod hyaluronate (DUOVISC) intra-ocular kit     Lidocaine 1% injection 1 mL     lactated ringers infusion      OBJECTIVE: /70  Pulse 76  Temp 97.8  F (36.6  C) (Oral)  Wt 53.5 kg (118 lb)  SpO2 96%  BMI 19.05 kg/m2 anxious, but pleasant. Her insight is OK. No rash seen, no edema.    (E03.9) Acquired hypothyroidism  (primary encounter diagnosis)  Comment:   Plan: TSH ([facility])            (L29.9) Pruritic disorder  Comment: unknown, but I think we need to get her off of HCTZ  Plan: stop HCTZ for a month.    (I10) Benign essential hypertension  Comment:   Plan: spironolactone (ALDACTONE) 25 MG tablet        Hopefully help fluid retention, HTN.  Continue diltiazem 300 MG 24 hr capsule

## 2017-06-06 NOTE — MR AVS SNAPSHOT
After Visit Summary   6/6/2017    Juliana Leal    MRN: [id number]           Patient Information     Date Of Birth          2/10/1923        Visit Information        Provider Department      6/6/2017 9:00 AM Davey Hunter MD [facility]        Today's Diagnoses     Acquired hypothyroidism    -  1    Pruritic disorder        Benign essential hypertension        Essential hypertension           Follow-ups after your visit        Who to contact     If you have questions or need follow up information about today's clinic visit or your schedule please contact [facility] directly at [phone].  Normal or non-critical lab and imaging results will be communicated to you by TaxiPixihart, letter or phone within 4 business days after the clinic has received the results. If you do not hear from us within 7 days, please contact the clinic through BERDt or phone. If you have a critical or abnormal lab result, we will notify you by phone as soon as possible.  Submit refill requests through Pinevio or call your pharmacy and they will forward the refill request to us. Please allow 3 business days for your refill to be completed.          Additional Information About Your Visit        MyChart Information     Pinevio gives you secure access to your electronic health record. If you see a primary care provider, you can also send messages to your care team and make appointments. If you have questions, please call your primary care clinic.  If you do not have a primary care provider, please call [phone] and they will assist you.        Care EveryWhere ID     This is your Care EveryWhere ID. This could be used by other organizations to access your [place] medical records  [id number]        Your Vitals Were     Pulse Temperature Pulse Oximetry BMI (Body Mass Index)          76 97.8  F (36.6  C) (Oral) 96% 19.05 kg/m2         Blood Pressure from Last 3 Encounters:   06/06/17 138/70    04/05/17 130/70   03/29/17 142/76    Weight from Last 3 Encounters:   06/06/17 53.5 kg (118 lb)   04/05/17 52.2 kg (115 lb)   03/29/17 51.3 kg (113 lb 3.2 oz)              We Performed the Following     TSH ([facility])     VENOUS COLLECTION          Today's Medication Changes          These changes are accurate as of: 6/6/17 10:11 AM.  If you have any questions, ask your nurse or doctor.               Start taking these medicines.        Dose/Directions    spironolactone 25 MG tablet   Commonly known as:  ALDACTONE   Used for:  Benign essential hypertension   Started by:  Davey Hunter MD        Dose:  25 mg   Take 1 tablet (25 mg) by mouth daily   Quantity:  30 tablet   Refills:  1            Where to get your medicines      These medications were sent to [facility] [id number] [place] [address] [address] AT [facility]  [address] [facility] [address]    Hours:  24-hours Phone:  [phone]     diltiazem 300 MG 24 hr capsule    spironolactone 25 MG tablet                Primary Care Provider Office Phone # Fax #    Davey Hunter [phone]       [facility] [address]  [facility] [address]        Thank you!     Thank you for choosing [facility]  for your care. Our goal is always to provide you with excellent care. Hearing back from our patients is one way we can continue to improve our services. Please take a few minutes to complete the written survey that you may receive in the mail after your visit with us. Thank you!             Your Updated Medication List - Protect others around you: Learn how to safely use, store and throw away your medicines at www.disposemymeds.org.          This list is accurate as of: 6/6/17 10:11 AM.  Always use your most recent med list.                   Brand Name Dispense Instructions for use    CENTRUM SILVER per tablet      Take 1 tablet by mouth daily       cetirizine 10 MG tablet     zyrTEC    30 tablet    Take 1 tablet (10 mg) by mouth 2 times daily       diltiazem 300 MG 24 hr capsule     90 capsule    Take 1 capsule (300 mg) by mouth daily       emollient cream      Apply topically as needed for other (leg rash)       hydrochlorothiazide 25 MG tablet    HYDRODIURIL    30 tablet    TK 1 T PO QD       levothyroxine 75 MCG tablet    SYNTHROID/LEVOTHROID    90 tablet    TAKE 1 TABLET BY MOUTH EVERY DAY       spironolactone 25 MG tablet    ALDACTONE    30 tablet    Take 1 tablet (25 mg) by mouth daily

## 2017-06-07 LAB — TSH BLD-ACNC: 37.1 UIU/ML (ref 0.45–4.5)

## 2017-06-09 DIAGNOSIS — Z76.0 ENCOUNTER FOR MEDICATION REFILL: ICD-10-CM

## 2017-06-09 LAB — T4 FREE SERPL-MCNC: 0.94 NG/DL (ref 0.82–1.77)

## 2017-06-09 RX ORDER — LEVOTHYROXINE SODIUM 100 UG/1
100 TABLET ORAL DAILY
Qty: 90 TABLET | Refills: 1 | Status: SHIPPED | OUTPATIENT
Start: 2017-06-09 | End: 2017-06-21

## 2017-06-12 ENCOUNTER — TELEPHONE (OUTPATIENT)
Dept: FAMILY MEDICINE | Facility: CLINIC | Age: 82
End: 2017-06-12

## 2017-06-12 DIAGNOSIS — E03.9 ACQUIRED HYPOTHYROIDISM: Primary | ICD-10-CM

## 2017-06-12 NOTE — TELEPHONE ENCOUNTER
----- Message from Davey Hunter MD sent at 6/9/2017  9:36 AM CDT -----  Slightly low thyroid function. Change synthroid to 100 mcg daily, and recheck in 2 months. This has been called in.  Dr. Hunter

## 2017-06-12 NOTE — TELEPHONE ENCOUNTER
Called patient with lab results.  New dose levothyroxine previously sent to pharmacy.  Patient told to RTC in 2 months for repeat TSH level, this order is entered.  Dolores Jenkisn

## 2017-06-13 NOTE — TELEPHONE ENCOUNTER
Patient requests to stop aldactone as she feels she is having side effects.  Per Dr. Hunter ok to stop and f/u as needed.  Dolores Jenkins

## 2017-06-21 ENCOUNTER — TELEPHONE (OUTPATIENT)
Dept: FAMILY MEDICINE | Facility: CLINIC | Age: 82
End: 2017-06-21

## 2017-06-21 DIAGNOSIS — E03.9 ACQUIRED HYPOTHYROIDISM: Primary | ICD-10-CM

## 2017-06-21 DIAGNOSIS — Z76.0 ENCOUNTER FOR MEDICATION REFILL: ICD-10-CM

## 2017-06-21 DIAGNOSIS — E03.9 ACQUIRED HYPOTHYROIDISM: ICD-10-CM

## 2017-06-21 RX ORDER — LEVOTHYROXINE SODIUM 88 UG/1
88 TABLET ORAL DAILY
Qty: 30 TABLET | Refills: 3 | Status: SHIPPED | OUTPATIENT
Start: 2017-06-21 | End: 2017-06-21

## 2017-06-21 NOTE — TELEPHONE ENCOUNTER
Patient called stating that she did not tolerate 100mcg levothyroxine.  She reports extremity weakness, swollen lips, tongue and throat as well as general fatigue.  Patient had previously tolerated 75mcg and dose was increased based on labs.  Per Dr. Hunter ok for patient to take 88mcg and see if this dose is better tolerated.  Patient agrees to try, prescription sent for #30 at patient request.  Dolores Jenkins

## 2017-06-22 NOTE — TELEPHONE ENCOUNTER
Pending Prescriptions:                       Disp   Refills    levothyroxine (SYNTHROID/LEVOTHROID) 88 MC*90 tab*3        Sig: TAKE 1 TABLET(88 MCG) BY MOUTH DAILY    Last OV 6/6/17  TSH 6/6/17  CBC, CMP 9/6/16

## 2017-06-23 RX ORDER — LEVOTHYROXINE SODIUM 88 UG/1
TABLET ORAL
Qty: 90 TABLET | Refills: 3 | Status: SHIPPED | OUTPATIENT
Start: 2017-06-23 | End: 2018-07-06

## 2017-08-14 DIAGNOSIS — Z76.0 ENCOUNTER FOR MEDICATION REFILL: ICD-10-CM

## 2017-08-14 RX ORDER — HYDROCHLOROTHIAZIDE 25 MG/1
TABLET ORAL
Qty: 90 TABLET | Refills: 0 | Status: SHIPPED | OUTPATIENT
Start: 2017-08-14 | End: 2017-12-10

## 2017-09-14 ENCOUNTER — TRANSFERRED RECORDS (OUTPATIENT)
Dept: FAMILY MEDICINE | Facility: CLINIC | Age: 82
End: 2017-09-14

## 2017-09-14 ENCOUNTER — MEDICAL CORRESPONDENCE (OUTPATIENT)
Dept: HEALTH INFORMATION MANAGEMENT | Facility: CLINIC | Age: 82
End: 2017-09-14

## 2017-09-14 ENCOUNTER — TRANSFERRED RECORDS (OUTPATIENT)
Dept: HEALTH INFORMATION MANAGEMENT | Facility: CLINIC | Age: 82
End: 2017-09-14

## 2017-09-20 ENCOUNTER — TRANSFERRED RECORDS (OUTPATIENT)
Dept: FAMILY MEDICINE | Facility: CLINIC | Age: 82
End: 2017-09-20

## 2017-09-21 PROBLEM — L50.1 IDIOPATHIC URTICARIA: Status: ACTIVE | Noted: 2017-09-21

## 2017-09-29 ENCOUNTER — INFUSION THERAPY VISIT (OUTPATIENT)
Dept: INFUSION THERAPY | Facility: CLINIC | Age: 82
End: 2017-09-29
Attending: ALLERGY & IMMUNOLOGY
Payer: MEDICARE

## 2017-09-29 VITALS — HEART RATE: 109 BPM | DIASTOLIC BLOOD PRESSURE: 80 MMHG | SYSTOLIC BLOOD PRESSURE: 149 MMHG

## 2017-09-29 DIAGNOSIS — L50.1 IDIOPATHIC URTICARIA: Primary | ICD-10-CM

## 2017-09-29 PROCEDURE — 96372 THER/PROPH/DIAG INJ SC/IM: CPT

## 2017-09-29 PROCEDURE — 25000128 H RX IP 250 OP 636: Performed by: ALLERGY & IMMUNOLOGY

## 2017-09-29 RX ADMIN — OMALIZUMAB 150 MG: 202.5 INJECTION, SOLUTION SUBCUTANEOUS at 13:05

## 2017-09-29 NOTE — PROGRESS NOTES
"Infusion Nursing Note:  Juliana Leal presents today for 1st Xolair.    Patient seen by provider today: No   present during visit today: Not Applicable.    Note: Discussed purpose of Xolair and potential side effects/potential allergic reaction/purpose of observation period.  Pt/dtr verbalized understanding.     Zyrtec, Benadryl, Hydroxizine, Ranitidine haven't been effective in controlling hives/urticaria.      Pt expressed feeling \"warm in the face\" approx 5 minutes after injection. No visible flushing/redness noted.  Symptom lasted briefly.  No further incident throughout observation period.     Intravenous Access:  No Intravenous access/labs at this visit.    Treatment Conditions:  Not Applicable.      Post Infusion Assessment:  Patient tolerated injection without incident.  Patient observed for 120 minutes post Xolair per protocol.  Site patent and intact, free from redness, edema or discomfort.    Discharge Plan:   Discharge instructions reviewed with: Patient and daughter.  Patient and/or family verbalized understanding of discharge instructions and all questions answered.  Copy of AVS reviewed with patient and/or family.  Patient will return monthly for Xolair injections for next appointment.  Patient discharged in stable condition accompanied by: daughter.  Departure Mode: Ambulatory.    Alva Squires RN                        "

## 2017-09-29 NOTE — MR AVS SNAPSHOT
After Visit Summary   9/29/2017    Juliana Leal    MRN: [id number]           Patient Information     Date Of Birth          2/10/1923        Visit Information        Provider Department      9/29/2017 1:00 PM RH INFUSION CHAIR 8 [facility] Infusion Services        Today's Diagnoses     Idiopathic urticaria    -  1       Follow-ups after your visit        Who to contact     If you have questions or need follow up information about today's clinic visit or your schedule please contact [facility] INFUSION SERVICES directly at [phone].  Normal or non-critical lab and imaging results will be communicated to you by PAAYhart, letter or phone within 4 business days after the clinic has received the results. If you do not hear from us within 7 days, please contact the clinic through Quincy Biosciencet or phone. If you have a critical or abnormal lab result, we will notify you by phone as soon as possible.  Submit refill requests through Euclid Media or call your pharmacy and they will forward the refill request to us. Please allow 3 business days for your refill to be completed.          Additional Information About Your Visit        MyChart Information     Euclid Media gives you secure access to your electronic health record. If you see a primary care provider, you can also send messages to your care team and make appointments. If you have questions, please call your primary care clinic.  If you do not have a primary care provider, please call [phone] and they will assist you.        Care EveryWhere ID     This is your Care EveryWhere ID. This could be used by other organizations to access your [place] medical records  [id number]        Your Vitals Were     Pulse                   109            Blood Pressure from Last 3 Encounters:   09/29/17 149/80   06/06/17 138/70   04/05/17 130/70    Weight from Last 3 Encounters:   06/06/17 53.5 kg (118 lb)   04/05/17 52.2 kg (115 lb)    03/29/17 51.3 kg (113 lb 3.2 oz)              Today, you had the following     No orders found for display       Primary Care Provider Office Phone # Fax #    Davey Hunter [phone] [address] [facility] [address]        Equal Access to Services     [facility]: Hadii aad ku hadasho Soomaali, waaxda luqadaha, qaybta kaalmada adeegyada, waxay idiin hayaan adeeg kharash la'aan . So [facility] [phone].    ATENCIÓN: Si habla español, tiene a workman disposición servicios gratuitos de asistencia lingüística. Llame al [phone].    We comply with applicable federal civil rights laws and [place] laws. We do not discriminate on the basis of race, color, national origin, age, disability, sex, sexual orientation, or gender identity.            Thank you!     Thank you for choosing [facility] INFUSION SERVICES  for your care. Our goal is always to provide you with excellent care. Hearing back from our patients is one way we can continue to improve our services. Please take a few minutes to complete the written survey that you may receive in the mail after your visit with us. Thank you!             Your Updated Medication List - Protect others around you: Learn how to safely use, store and throw away your medicines at www.disposemymeds.org.          This list is accurate as of: 9/29/17  3:00 PM.  Always use your most recent med list.                   Brand Name Dispense Instructions for use Diagnosis    CENTRUM SILVER per tablet      Take 1 tablet by mouth daily        cetirizine 10 MG tablet    zyrTEC    30 tablet    Take 1 tablet (10 mg) by mouth 2 times daily    Need for pneumococcal vaccination       diltiazem 300 MG 24 hr capsule     90 capsule    Take 1 capsule (300 mg) by mouth daily    Essential hypertension       emollient cream      Apply topically as needed for other (leg rash)        * hydrochlorothiazide 25 MG tablet    HYDRODIURIL    30 tablet    TK 1 T  PO QD        * hydrochlorothiazide 25 MG tablet    HYDRODIURIL    90 tablet    TAKE 1 TABLET BY MOUTH EVERY DAY    Encounter for medication refill       levothyroxine 88 MCG tablet    SYNTHROID/LEVOTHROID    90 tablet    TAKE 1 TABLET(88 MCG) BY MOUTH DAILY    Encounter for medication refill       * Notice:  This list has 2 medication(s) that are the same as other medications prescribed for you. Read the directions carefully, and ask your doctor or other care provider to review them with you.

## 2017-10-26 ENCOUNTER — OFFICE VISIT (OUTPATIENT)
Dept: FAMILY MEDICINE | Facility: CLINIC | Age: 82
End: 2017-10-26

## 2017-10-26 VITALS
RESPIRATION RATE: 16 BRPM | TEMPERATURE: 98 F | OXYGEN SATURATION: 98 % | HEART RATE: 80 BPM | WEIGHT: 122 LBS | SYSTOLIC BLOOD PRESSURE: 124 MMHG | DIASTOLIC BLOOD PRESSURE: 84 MMHG | BODY MASS INDEX: 19.69 KG/M2

## 2017-10-26 DIAGNOSIS — H61.23 BILATERAL IMPACTED CERUMEN: Primary | ICD-10-CM

## 2017-10-26 DIAGNOSIS — H91.93 BILATERAL HEARING LOSS, UNSPECIFIED HEARING LOSS TYPE: ICD-10-CM

## 2017-10-26 PROCEDURE — 99213 OFFICE O/P EST LOW 20 MIN: CPT | Mod: 25 | Performed by: FAMILY MEDICINE

## 2017-10-26 PROCEDURE — 69209 REMOVE IMPACTED EAR WAX UNI: CPT | Mod: 50 | Performed by: FAMILY MEDICINE

## 2017-10-26 NOTE — MR AVS SNAPSHOT
After Visit Summary   10/26/2017    Juliana Leal    MRN: [id number]           Patient Information     Date Of Birth          2/10/1923        Visit Information        Provider Department      10/26/2017 1:00 PM Radha Arnold MD [facility]        Today's Diagnoses     Bilateral impacted cerumen    -  1    Bilateral hearing loss, unspecified hearing loss type          Care Instructions    Bilateral ear lavage done for wax. One ear require curette.          Follow-ups after your visit        Follow-up notes from your care team     Return if symptoms worsen or fail to improve.      Your next 10 appointments already scheduled     Oct 30, 2017  1:00 PM CDT   Level 1 with RH INFUSION CHAIR 6   [facility] Infusion Services ([facility])    [facility]  [address]  [address]  [facility] [address] [phone]              Who to contact     If you have questions or need follow up information about today's clinic visit or your schedule please contact [facility] directly at [phone].  Normal or non-critical lab and imaging results will be communicated to you by PerBluehart, letter or phone within 4 business days after the clinic has received the results. If you do not hear from us within 7 days, please contact the clinic through Sanergyt or phone. If you have a critical or abnormal lab result, we will notify you by phone as soon as possible.  Submit refill requests through Off-Grid Solutions or call your pharmacy and they will forward the refill request to us. Please allow 3 business days for your refill to be completed.          Additional Information About Your Visit        PerBluehart Information     Off-Grid Solutions gives you secure access to your electronic health record. If you see a primary care provider, you can also send messages to your care team and make appointments. If you have questions, please call your primary care clinic.  If  you do not have a primary care provider, please call [phone] and they will assist you.        Care EveryWhere ID     This is your Care EveryWhere ID. This could be used by other organizations to access your [place] medical records  [id number]        Your Vitals Were     Pulse Temperature Respirations Pulse Oximetry BMI (Body Mass Index)       80 98  F (36.7  C) (Oral) 16 98% 19.69 kg/m2        Blood Pressure from Last 3 Encounters:   10/26/17 124/84   09/29/17 149/80   06/06/17 138/70    Weight from Last 3 Encounters:   10/26/17 55.3 kg (122 lb)   06/06/17 53.5 kg (118 lb)   04/05/17 52.2 kg (115 lb)              We Performed the Following     Removal Impacted Cerumen Irrigation Unilateral (Ear Wash)        Primary Care Provider Office Phone # Fax #    Davey Hunter [phone] [address] [facility] [address]        Equal Access to Services     [facility] : Hadii aad ku hadasho Soomaali, waaxda luqadaha, qaybta kaalmada adeegyada, waxay idiin hayaan tayler khmyke yancey . So [facility] [phone].    ATENCIÓN: Si habla español, tiene a workman disposición servicios gratuitos de asistencia lingüística. Llame al [phone].    We comply with applicable federal civil rights laws and [place] laws. We do not discriminate on the basis of race, color, national origin, age, disability, sex, sexual orientation, or gender identity.            Thank you!     Thank you for choosing [facility]  for your care. Our goal is always to provide you with excellent care. Hearing back from our patients is one way we can continue to improve our services. Please take a few minutes to complete the written survey that you may receive in the mail after your visit with us. Thank you!             Your Updated Medication List - Protect others around you: Learn how to safely use, store and throw away your medicines at www.disposemymeds.org.          This list is accurate as of: 10/26/17 11:59  PM.  Always use your most recent med list.                   Brand Name Dispense Instructions for use Diagnosis    CENTRUM SILVER per tablet      Take 1 tablet by mouth daily        cetirizine 10 MG tablet    zyrTEC    30 tablet    Take 1 tablet (10 mg) by mouth 2 times daily    Need for pneumococcal vaccination       diltiazem 300 MG 24 hr capsule     90 capsule    Take 1 capsule (300 mg) by mouth daily    Essential hypertension       emollient cream      Apply topically as needed for other (leg rash)        * hydrochlorothiazide 25 MG tablet    HYDRODIURIL    30 tablet    TK 1 T PO QD        * hydrochlorothiazide 25 MG tablet    HYDRODIURIL    90 tablet    TAKE 1 TABLET BY MOUTH EVERY DAY    Encounter for medication refill       levothyroxine 88 MCG tablet    SYNTHROID/LEVOTHROID    90 tablet    TAKE 1 TABLET(88 MCG) BY MOUTH DAILY    Encounter for medication refill       * Notice:  This list has 2 medication(s) that are the same as other medications prescribed for you. Read the directions carefully, and ask your doctor or other care provider to review them with you.

## 2017-10-30 ENCOUNTER — INFUSION THERAPY VISIT (OUTPATIENT)
Dept: INFUSION THERAPY | Facility: CLINIC | Age: 82
End: 2017-10-30
Attending: ALLERGY & IMMUNOLOGY
Payer: MEDICARE

## 2017-10-30 VITALS — DIASTOLIC BLOOD PRESSURE: 71 MMHG | SYSTOLIC BLOOD PRESSURE: 130 MMHG | RESPIRATION RATE: 16 BRPM | HEART RATE: 83 BPM

## 2017-10-30 DIAGNOSIS — L50.1 IDIOPATHIC URTICARIA: Primary | ICD-10-CM

## 2017-10-30 PROCEDURE — 96372 THER/PROPH/DIAG INJ SC/IM: CPT

## 2017-10-30 PROCEDURE — 25000128 H RX IP 250 OP 636: Performed by: ALLERGY & IMMUNOLOGY

## 2017-10-30 RX ADMIN — OMALIZUMAB 150 MG: 202.5 INJECTION, SOLUTION SUBCUTANEOUS at 13:08

## 2017-10-30 NOTE — PROGRESS NOTES
Infusion Nursing Note:  Julianaandrei Garciaford presents today for Xolair.    Patient seen by provider today: No   present during visit today: Not Applicable.    Note: N/A.    Intravenous Access:  No Intravenous access/labs at this visit.    Treatment Conditions:  Not Applicable.      Post Infusion Assessment:  Patient tolerated injection without incident.  Patient observed for 120 minutes post Xolair per protocol.    Discharge Plan:   Discharge instructions reviewed with: Patient.  Patient and/or family verbalized understanding of discharge instructions and all questions answered.  AVS to patient via Pixim.  Patient will return 11/27/17 for next appointment.   Patient discharged in stable condition accompanied by: daughter.  Departure Mode: Ambulatory.    Angi Vargas RN

## 2017-10-30 NOTE — MR AVS SNAPSHOT
After Visit Summary   10/30/2017    Juliana Leal    MRN: [id number]           Patient Information     Date Of Birth          2/10/1923        Visit Information        Provider Department      10/30/2017 1:00 PM RH INFUSION CHAIR 6 [facility] Infusion Services        Today's Diagnoses     Idiopathic urticaria    -  1       Follow-ups after your visit        Your next 10 appointments already scheduled     Nov 27, 2017  1:00 PM CST   Level 3 with RH INFUSION CHAIR 3   [facility] Infusion Services ([facility])    [facility]  [address]  [address]  [facility] [id number] [phone]              Who to contact     If you have questions or need follow up information about today's clinic visit or your schedule please contact [facility] INFUSION SERVICES directly at [phone].  Normal or non-critical lab and imaging results will be communicated to you by Everspringhart, letter or phone within 4 business days after the clinic has received the results. If you do not hear from us within 7 days, please contact the clinic through Everspringhart or phone. If you have a critical or abnormal lab result, we will notify you by phone as soon as possible.  Submit refill requests through UpTap or call your pharmacy and they will forward the refill request to us. Please allow 3 business days for your refill to be completed.          Additional Information About Your Visit        MyChart Information     UpTap gives you secure access to your electronic health record. If you see a primary care provider, you can also send messages to your care team and make appointments. If you have questions, please call your primary care clinic.  If you do not have a primary care provider, please call [phone] and they will assist you.        Care EveryWhere ID     This is your Care EveryWhere ID. This could be used by other organizations to  access your [place] medical records  [id number]        Your Vitals Were     Pulse Respirations                83 16           Blood Pressure from Last 3 Encounters:   10/30/17 130/71   10/26/17 124/84   09/29/17 149/80    Weight from Last 3 Encounters:   10/26/17 55.3 kg (122 lb)   06/06/17 53.5 kg (118 lb)   04/05/17 52.2 kg (115 lb)              Today, you had the following     No orders found for display       Primary Care Provider Office Phone # Fax #    Davey Hunter [phone] [address] [facility] [address]        Equal Access to Services     [facility] : Hadii nathaly ele hadasho Soomaali, waaxda luqadaha, qaybta kaalmada adeegyada, cynthia yancey . So [facility] [phone].    ATENCIÓN: Si habla español, tiene a workman disposición servicios gratuitos de asistencia lingüística. Llame al [phone].    We comply with applicable federal civil rights laws and [place] laws. We do not discriminate on the basis of race, color, national origin, age, disability, sex, sexual orientation, or gender identity.            Thank you!     Thank you for choosing [facility] INFUSION SERVICES  for your care. Our goal is always to provide you with excellent care. Hearing back from our patients is one way we can continue to improve our services. Please take a few minutes to complete the written survey that you may receive in the mail after your visit with us. Thank you!             Your Updated Medication List - Protect others around you: Learn how to safely use, store and throw away your medicines at www.disposemymeds.org.          This list is accurate as of: 10/30/17  3:05 PM.  Always use your most recent med list.                   Brand Name Dispense Instructions for use Diagnosis    CENTRUM SILVER per tablet      Take 1 tablet by mouth daily        cetirizine 10 MG tablet    zyrTEC    30 tablet    Take 1 tablet (10 mg) by mouth 2 times daily     Need for pneumococcal vaccination       diltiazem 300 MG 24 hr capsule     90 capsule    Take 1 capsule (300 mg) by mouth daily    Essential hypertension       emollient cream      Apply topically as needed for other (leg rash)        * hydrochlorothiazide 25 MG tablet    HYDRODIURIL    30 tablet    TK 1 T PO QD        * hydrochlorothiazide 25 MG tablet    HYDRODIURIL    90 tablet    TAKE 1 TABLET BY MOUTH EVERY DAY    Encounter for medication refill       levothyroxine 88 MCG tablet    SYNTHROID/LEVOTHROID    90 tablet    TAKE 1 TABLET(88 MCG) BY MOUTH DAILY    Encounter for medication refill       * Notice:  This list has 2 medication(s) that are the same as other medications prescribed for you. Read the directions carefully, and ask your doctor or other care provider to review them with you.

## 2017-11-27 ENCOUNTER — INFUSION THERAPY VISIT (OUTPATIENT)
Dept: INFUSION THERAPY | Facility: CLINIC | Age: 82
End: 2017-11-27
Attending: ALLERGY & IMMUNOLOGY
Payer: MEDICARE

## 2017-11-27 VITALS
OXYGEN SATURATION: 98 % | SYSTOLIC BLOOD PRESSURE: 129 MMHG | DIASTOLIC BLOOD PRESSURE: 76 MMHG | TEMPERATURE: 98.1 F | RESPIRATION RATE: 16 BRPM | HEART RATE: 76 BPM

## 2017-11-27 DIAGNOSIS — L50.1 IDIOPATHIC URTICARIA: Primary | ICD-10-CM

## 2017-11-27 PROCEDURE — 25000128 H RX IP 250 OP 636: Performed by: ALLERGY & IMMUNOLOGY

## 2017-11-27 PROCEDURE — 96372 THER/PROPH/DIAG INJ SC/IM: CPT

## 2017-11-27 RX ADMIN — OMALIZUMAB 150 MG: 202.5 INJECTION, SOLUTION SUBCUTANEOUS at 13:39

## 2017-11-27 NOTE — PROGRESS NOTES
Infusion Nursing Note:  Juliana Leal presents today for Xolair.    Patient seen by provider today: No   present during visit today: Not Applicable.    Note: Hasn't had any relief from these injections.  Injection in left upper arm.  Next injection will be 30 min      Intravenous Access:  No Intravenous access/labs at this visit.    Treatment Conditions:  Not Applicable.      Post Infusion Assessment:  Patient tolerated injection without incident.  Patient observed for 120 minutes minutes post 1st 3 doses for Xolair per protocol.  Site patent and intact, free from redness, edema or discomfort.    Discharge Plan:   Discharge instructions reviewed with: Patient.  Patient and/or family verbalized understanding of discharge instructions and all questions answered.  Copy of AVS reviewed with patient and/or family.  Patient will return 12/29 for next appointment.  Patient discharged in stable condition accompanied by: self and daughter.  Departure Mode: Ambulatory.    Ella Cornelius RN

## 2017-11-27 NOTE — MR AVS SNAPSHOT
After Visit Summary   11/27/2017    Juliana Leal    MRN: [id number]           Patient Information     Date Of Birth          2/10/1923        Visit Information        Provider Department      11/27/2017 1:00 PM RH INFUSION CHAIR 3 [facility] Infusion [facility]        Today's Diagnoses     Idiopathic urticaria    -  1       Follow-ups after your visit        Your next 10 appointments already scheduled     Dec 29, 2017 11:00 AM CST   Level 1 with RH INFUSION CHAIR 3   [facility] Infusion Services ([facility])    [facility]  [address]  [facility] [id number] [phone]              Who to contact     If you have questions or need follow up information about today's clinic visit or your schedule please contact [facility] INFUSION SERVICES directly at [phone].  Normal or non-critical lab and imaging results will be communicated to you by Dynamic Signalhart, letter or phone within 4 business days after the clinic has received the results. If you do not hear from us within 7 days, please contact the clinic through Dynamic Signalhart or phone. If you have a critical or abnormal lab result, we will notify you by phone as soon as possible.  Submit refill requests through Seeonic or call your pharmacy and they will forward the refill request to us. Please allow 3 business days for your refill to be completed.          Additional Information About Your Visit        MyChart Information     Seeonic gives you secure access to your electronic health record. If you see a primary care provider, you can also send messages to your care team and make appointments. If you have questions, please call your primary care clinic.  If you do not have a primary care provider, please call [phone] and they will assist you.        Care EveryWhere ID     This is your Care EveryWhere ID. This could be used by other organizations to  access your [place] medical records  [id number]        Your Vitals Were     Pulse Temperature Respirations Pulse Oximetry          76 98.1  F (36.7  C) 16 98%         Blood Pressure from Last 3 Encounters:   11/27/17 129/76   10/30/17 130/71   10/26/17 124/84    Weight from Last 3 Encounters:   10/26/17 55.3 kg (122 lb)   06/06/17 53.5 kg (118 lb)   04/05/17 52.2 kg (115 lb)              Today, you had the following     No orders found for display       Primary Care Provider Office Phone # Fax #    Davey Hunter [phone] [address] [facility] [address]        Equal Access to Services     LOGAN GATES : Hadii nathaly lee hadasho Soomaali, waaxda luqadaha, qaybta kaalmada adeegyada, waxjael arreguin hayelaine yancey . So [facility] [phone].    ATENCIÓN: Si habla español, tiene a workman disposición servicios gratuitos de asistencia lingüística. [facility] [phone].    We comply with applicable federal civil rights laws and [place] laws. We do not discriminate on the basis of race, color, national origin, age, disability, sex, sexual orientation, or gender identity.            Thank you!     Thank you for choosing [facility] INFUSION SERVICES  for your care. Our goal is always to provide you with excellent care. Hearing back from our patients is one way we can continue to improve our services. Please take a few minutes to complete the written survey that you may receive in the mail after your visit with us. Thank you!             Your Updated Medication List - Protect others around you: Learn how to safely use, store and throw away your medicines at www.disposemymeds.org.          This list is accurate as of: 11/27/17  3:22 PM.  Always use your most recent med list.                   Brand Name Dispense Instructions for use Diagnosis    CENTRUM SILVER per tablet      Take 1 tablet by mouth daily        cetirizine 10 MG tablet    zyrTEC    30 tablet    Take 1  tablet (10 mg) by mouth 2 times daily    Need for pneumococcal vaccination       diltiazem 300 MG 24 hr capsule     90 capsule    Take 1 capsule (300 mg) by mouth daily    Essential hypertension       emollient cream      Apply topically as needed for other (leg rash)        * hydrochlorothiazide 25 MG tablet    HYDRODIURIL    30 tablet    TK 1 T PO QD        * hydrochlorothiazide 25 MG tablet    HYDRODIURIL    90 tablet    TAKE 1 TABLET BY MOUTH EVERY DAY    Encounter for medication refill       levothyroxine 88 MCG tablet    SYNTHROID/LEVOTHROID    90 tablet    TAKE 1 TABLET(88 MCG) BY MOUTH DAILY    Encounter for medication refill       * Notice:  This list has 2 medication(s) that are the same as other medications prescribed for you. Read the directions carefully, and ask your doctor or other care provider to review them with you.

## 2017-12-28 ENCOUNTER — INFUSION THERAPY VISIT (OUTPATIENT)
Dept: INFUSION THERAPY | Facility: CLINIC | Age: 82
End: 2017-12-28
Attending: ALLERGY & IMMUNOLOGY
Payer: MEDICARE

## 2017-12-28 VITALS
RESPIRATION RATE: 18 BRPM | TEMPERATURE: 97.5 F | HEART RATE: 81 BPM | SYSTOLIC BLOOD PRESSURE: 153 MMHG | OXYGEN SATURATION: 97 % | DIASTOLIC BLOOD PRESSURE: 78 MMHG

## 2017-12-28 DIAGNOSIS — L50.1 IDIOPATHIC URTICARIA: Primary | ICD-10-CM

## 2017-12-28 PROCEDURE — 25000128 H RX IP 250 OP 636: Performed by: ALLERGY & IMMUNOLOGY

## 2017-12-28 PROCEDURE — 96372 THER/PROPH/DIAG INJ SC/IM: CPT

## 2017-12-28 RX ADMIN — OMALIZUMAB 300 MG: 202.5 INJECTION, SOLUTION SUBCUTANEOUS at 13:35

## 2017-12-28 NOTE — PROGRESS NOTES
Infusion Nursing Note:  Juliana Leal presents today for xolair.    Patient seen by provider today: No   present during visit today: Not Applicable.    Note: doesn't feel like the xolair has helped her urticaria.  This is dose 4, dose increased today to 300 mg..    Intravenous Access:  No Intravenous access/labs at this visit.    Treatment Conditions:  Not Applicable.      Post Infusion Assessment:  Patient tolerated injection without incident.  Patient observed for 30 minutes post xolair per protocol.  Site patent and intact, free from redness, edema or discomfort.    Discharge Plan:   Discharge instructions reviewed with: Patient and friend.   Patient has no more appts scheduled;will do so when she leaves.  Patient discharged in stable condition accompanied by: self and friend.  Departure Mode: Ambulatory.    Gayathri Urbina RN

## 2017-12-28 NOTE — MR AVS SNAPSHOT
After Visit Summary   12/28/2017    Juliana Leal    MRN: [id number]           Patient Information     Date Of Birth          2/10/1923        Visit Information        Provider Department      12/28/2017 1:00 PM RH INFUSION CHAIR 7 [facility] Infusion [facility]        Today's Diagnoses     Idiopathic urticaria    -  1       Follow-ups after your visit        Your next 10 appointments already scheduled     Feb 01, 2018  1:00 PM CST   Level 1 with RH INFUSION CHAIR 12   [facility] Infusion Services ([facility])    [facility]  [address]  [facility] [id number] [phone]              Who to contact     If you have questions or need follow up information about today's clinic visit or your schedule please contact [facility] INFUSION SERVICES directly at [phone].  Normal or non-critical lab and imaging results will be communicated to you by [email or web address], letter or phone within 4 business days after the clinic has received the results. If you do not hear from us within 7 days, please contact the clinic through [email or web address] or phone. If you have a critical or abnormal lab result, we will notify you by phone as soon as possible.  Submit refill requests through Zounds or call your pharmacy and they will forward the refill request to us. Please allow 3 business days for your refill to be completed.          Additional Information About Your Visit        MyChart Information     Zounds gives you secure access to your electronic health record. If you see a primary care provider, you can also send messages to your care team and make appointments. If you have questions, please call your primary care clinic.  If you do not have a primary care provider, please call [phone] and they will assist you.        Care EveryWhere ID     This is your Care EveryWhere ID. This could be used by other organizations to  access your [place] medical records  [id number]        Your Vitals Were     Pulse Temperature Respirations Pulse Oximetry          81 97.5  F (36.4  C) (Oral) 18 97%         Blood Pressure from Last 3 Encounters:   12/28/17 153/78   11/27/17 129/76   10/30/17 130/71    Weight from Last 3 Encounters:   10/26/17 55.3 kg (122 lb)   06/06/17 53.5 kg (118 lb)   04/05/17 52.2 kg (115 lb)              Today, you had the following     No orders found for display       Primary Care Provider Office Phone # Fax #    Davey Hunter [phone] [address] [facility] [address]        Equal Access to Services     LOGAN GATES : Hadii nathaly lee hadasho Soomaali, waaxda luqadaha, qaybta kaalmada adeegyada, waxay idiin hayelaine yancey . So [facility] [phone].    ATENCIÓN: Si habla español, tiene a workman disposición servicios gratuitos de asistencia lingüística. [facility] [phone].    We comply with applicable federal civil rights laws and [place] laws. We do not discriminate on the basis of race, color, national origin, age, disability, sex, sexual orientation, or gender identity.            Thank you!     Thank you for choosing [facility] INFUSION SERVICES  for your care. Our goal is always to provide you with excellent care. Hearing back from our patients is one way we can continue to improve our services. Please take a few minutes to complete the written survey that you may receive in the mail after your visit with us. Thank you!             Your Updated Medication List - Protect others around you: Learn how to safely use, store and throw away your medicines at www.disposemymeds.org.          This list is accurate as of: 12/28/17  2:09 PM.  Always use your most recent med list.                   Brand Name Dispense Instructions for use Diagnosis    CENTRUM SILVER per tablet      Take 1 tablet by mouth daily        cetirizine 10 MG tablet    zyrTEC    30 tablet     Take 1 tablet (10 mg) by mouth 2 times daily    Need for pneumococcal vaccination       diltiazem 300 MG 24 hr capsule     90 capsule    Take 1 capsule (300 mg) by mouth daily    Essential hypertension       emollient cream      Apply topically as needed for other (leg rash)        * hydrochlorothiazide 25 MG tablet    HYDRODIURIL    30 tablet    TK 1 T PO QD        * hydrochlorothiazide 25 MG tablet    HYDRODIURIL    90 tablet    TAKE 1 TABLET BY MOUTH EVERY DAY    Encounter for medication refill, Essential hypertension       levothyroxine 88 MCG tablet    SYNTHROID/LEVOTHROID    90 tablet    TAKE 1 TABLET(88 MCG) BY MOUTH DAILY    Encounter for medication refill       * Notice:  This list has 2 medication(s) that are the same as other medications prescribed for you. Read the directions carefully, and ask your doctor or other care provider to review them with you.

## 2018-01-24 ENCOUNTER — OFFICE VISIT (OUTPATIENT)
Dept: FAMILY MEDICINE | Facility: CLINIC | Age: 83
End: 2018-01-24

## 2018-01-24 VITALS
HEART RATE: 72 BPM | WEIGHT: 124.6 LBS | BODY MASS INDEX: 20.11 KG/M2 | DIASTOLIC BLOOD PRESSURE: 82 MMHG | SYSTOLIC BLOOD PRESSURE: 124 MMHG | TEMPERATURE: 97.2 F | RESPIRATION RATE: 30 BRPM

## 2018-01-24 DIAGNOSIS — R20.2 PARESTHESIAS: Primary | ICD-10-CM

## 2018-01-24 PROCEDURE — 99213 OFFICE O/P EST LOW 20 MIN: CPT | Performed by: FAMILY MEDICINE

## 2018-01-24 NOTE — PROGRESS NOTES
Please see previous notes regarding burning paresthesias. She has declined medications for this, as she felt they caused the problem. She is not willing to try. She had tried a short course of gabapentin, but it was not effective, and probably inadequate dosage.  OBJECTIVE: /82  Pulse 72  Temp 97.2  F (36.2  C) (Oral)  Resp 30  Wt 56.5 kg (124 lb 9.6 oz)  BMI 20.11 kg/m2 alert, oriented, anxious.  (R20.2) Paresthesias  (primary encounter diagnosis)  Comment: neuropathy, poss CNS  Plan: trial of Lyrica 50 mg bid for 3 days then tid, poss duloxetine, or haloperidol. Neuro consult?

## 2018-01-24 NOTE — MR AVS SNAPSHOT
After Visit Summary   1/24/2018    Juliana Leal    MRN: [id number]           Patient Information     Date Of Birth          2/10/1923        Visit Information        Provider Department      1/24/2018 2:30 PM Davey Hunter MD [facility]        Today's Diagnoses     Paresthesias    -  1       Follow-ups after your visit        Your next 10 appointments already scheduled     Feb 01, 2018  1:00 PM CST   Level 1 with RH INFUSION CHAIR 12   [facility] Infusion Services ([facility])    [facility]  [address]  [facility] [id number] [phone]              Who to contact     If you have questions or need follow up information about today's clinic visit or your schedule please contact [facility] directly at [phone].  Normal or non-critical lab and imaging results will be communicated to you by MyChart, letter or phone within 4 business days after the clinic has received the results. If you do not hear from us within 7 days, please contact the clinic through Boomrathart or phone. If you have a critical or abnormal lab result, we will notify you by phone as soon as possible.  Submit refill requests through AlphaSights or call your pharmacy and they will forward the refill request to us. Please allow 3 business days for your refill to be completed.          Additional Information About Your Visit        MyChart Information     AlphaSights gives you secure access to your electronic health record. If you see a primary care provider, you can also send messages to your care team and make appointments. If you have questions, please call your primary care clinic.  If you do not have a primary care provider, please call [phone] and they will assist you.        Care EveryWhere ID     This is your Care EveryWhere ID. This could be used by other organizations to access your [place] medical records  [id number]         Your Vitals Were     Pulse Temperature Respirations BMI (Body Mass Index)          72 97.2  F (36.2  C) (Oral) 30 20.11 kg/m2         Blood Pressure from Last 3 Encounters:   01/24/18 124/82   12/28/17 153/78   11/27/17 129/76    Weight from Last 3 Encounters:   01/24/18 56.5 kg (124 lb 9.6 oz)   10/26/17 55.3 kg (122 lb)   06/06/17 53.5 kg (118 lb)              Today, you had the following     No orders found for display       Primary Care Provider Office Phone # Fax #    Davey Hunter [phone] [address]  [facility] [address]        Equal Access to Services     MARIA D GATES : Hadii aad ku hadasho Soomaali, waaxda luqadaha, qaybta kaalmada adeegyada, cynthia yancey . So [facility] [phone].    ATENCIÓN: Si habla español, tiene a workman disposición servicios gratuitos de asistencia lingüística. Llame al [phone].    We comply with applicable federal civil rights laws and [place] laws. We do not discriminate on the basis of race, color, national origin, age, disability, sex, sexual orientation, or gender identity.            Thank you!     Thank you for choosing [facility]  for your care. Our goal is always to provide you with excellent care. Hearing back from our patients is one way we can continue to improve our services. Please take a few minutes to complete the written survey that you may receive in the mail after your visit with us. Thank you!             Your Updated Medication List - Protect others around you: Learn how to safely use, store and throw away your medicines at www.disposemymeds.org.          This list is accurate as of 1/24/18  4:52 PM.  Always use your most recent med list.                   Brand Name Dispense Instructions for use Diagnosis    CENTRUM SILVER per tablet      Take 1 tablet by mouth daily        cetirizine 10 MG tablet    zyrTEC    30 tablet    Take 1 tablet (10 mg) by mouth 2 times daily    Need for  pneumococcal vaccination       diltiazem 300 MG 24 hr capsule     90 capsule    Take 1 capsule (300 mg) by mouth daily    Essential hypertension       emollient cream      Apply topically as needed for other (leg rash)        * hydrochlorothiazide 25 MG tablet    HYDRODIURIL    30 tablet    TK 1 T PO QD        * hydrochlorothiazide 25 MG tablet    HYDRODIURIL    90 tablet    TAKE 1 TABLET BY MOUTH EVERY DAY    Encounter for medication refill, Essential hypertension       levothyroxine 88 MCG tablet    SYNTHROID/LEVOTHROID    90 tablet    TAKE 1 TABLET(88 MCG) BY MOUTH DAILY    Encounter for medication refill       * Notice:  This list has 2 medication(s) that are the same as other medications prescribed for you. Read the directions carefully, and ask your doctor or other care provider to review them with you.

## 2018-02-01 ENCOUNTER — INFUSION THERAPY VISIT (OUTPATIENT)
Dept: INFUSION THERAPY | Facility: CLINIC | Age: 83
End: 2018-02-01
Attending: ALLERGY & IMMUNOLOGY
Payer: MEDICARE

## 2018-02-01 VITALS — TEMPERATURE: 98.3 F | RESPIRATION RATE: 18 BRPM | SYSTOLIC BLOOD PRESSURE: 136 MMHG | DIASTOLIC BLOOD PRESSURE: 77 MMHG

## 2018-02-01 DIAGNOSIS — L50.1 IDIOPATHIC URTICARIA: Primary | ICD-10-CM

## 2018-02-01 PROCEDURE — 96372 THER/PROPH/DIAG INJ SC/IM: CPT

## 2018-02-01 PROCEDURE — 25000128 H RX IP 250 OP 636: Performed by: ALLERGY & IMMUNOLOGY

## 2018-02-01 RX ADMIN — OMALIZUMAB 300 MG: 202.5 INJECTION, SOLUTION SUBCUTANEOUS at 13:54

## 2018-02-01 NOTE — PROGRESS NOTES
Infusion Nursing Note:  Juliana Leal presents today for Xolair.    Patient seen by provider today: No   present during visit today: Not Applicable.    Note: Hives are gone,.    Intravenous Access:  No Intravenous access/labs at this visit.    Treatment Conditions:  Not Applicable.      Post Infusion Assessment:  Patient tolerated injection without incident.  Patient observed for 30 minutes post Xolair per protocol.  Site patent and intact, free from redness, edema or discomfort.    Discharge Plan:   Discharge instructions reviewed with: Patient and Family.  Patient and/or family verbalized understanding of discharge instructions and all questions answered.  Copy of AVS reviewed with patient and/or family.  Patient will return 4 weeks for next appointment.  Patient discharged in stable condition accompanied by: self and daughter.  Departure Mode: Ambulatory.    Ella Cornelius RN

## 2018-02-12 ENCOUNTER — TELEPHONE (OUTPATIENT)
Dept: FAMILY MEDICINE | Facility: CLINIC | Age: 83
End: 2018-02-12

## 2018-02-12 DIAGNOSIS — R20.2 PARESTHESIA: Primary | ICD-10-CM

## 2018-02-12 RX ORDER — PREGABALIN 50 MG/1
CAPSULE ORAL
Qty: 180 CAPSULE | Refills: 3 | COMMUNITY
End: 2018-04-12

## 2018-02-12 NOTE — TELEPHONE ENCOUNTER
Patient called to say she wants a rx sent to [facility] pharmacy for Lyrica.  She had samples and would like rx sent for 1-2 tabs per day.  She can not tolerate taking 3 per day.  Called rx over to [facility].

## 2018-03-08 DIAGNOSIS — Z79.899 ENCOUNTER FOR LONG-TERM (CURRENT) USE OF MEDICATIONS: Primary | ICD-10-CM

## 2018-03-08 DIAGNOSIS — I10 ESSENTIAL HYPERTENSION: ICD-10-CM

## 2018-03-09 PROBLEM — Z79.899 ENCOUNTER FOR LONG-TERM (CURRENT) USE OF MEDICATIONS: Status: ACTIVE | Noted: 2018-03-09

## 2018-03-09 RX ORDER — HYDROCHLOROTHIAZIDE 25 MG/1
TABLET ORAL
Qty: 90 TABLET | Refills: 2 | Status: ON HOLD | OUTPATIENT
Start: 2018-03-09 | End: 2018-08-05

## 2018-03-09 NOTE — TELEPHONE ENCOUNTER
hydrochlorothiazide (HYDRODIURIL) 25 MG tablet   LAST  Med check 6/6/17   last labs(for RX) 9/6/2016  Next  appt scheduled =  none  Liz Goodwin MA    BP Readings from Last 3 Encounters:   02/01/18 136/77   01/24/18 124/82   12/28/17 153/78     Last Basic Metabolic Panel:  Lab Results   Component Value Date     09/06/2016      Lab Results   Component Value Date    POTASSIUM 4.8 09/06/2016     Lab Results   Component Value Date    CHLORIDE 102 09/06/2016     Lab Results   Component Value Date    DINORAH 10.1 09/06/2016     Lab Results   Component Value Date    CO2 27 04/13/2015     Lab Results   Component Value Date    BUN 24 09/06/2016    BUN 20 09/06/2016     Lab Results   Component Value Date    CR 1.23 09/06/2016     Lab Results   Component Value Date     09/06/2016

## 2018-04-12 ENCOUNTER — OFFICE VISIT (OUTPATIENT)
Dept: FAMILY MEDICINE | Facility: CLINIC | Age: 83
End: 2018-04-12

## 2018-04-12 VITALS
BODY MASS INDEX: 20.82 KG/M2 | DIASTOLIC BLOOD PRESSURE: 72 MMHG | HEART RATE: 70 BPM | OXYGEN SATURATION: 97 % | SYSTOLIC BLOOD PRESSURE: 134 MMHG | WEIGHT: 129 LBS | RESPIRATION RATE: 16 BRPM

## 2018-04-12 DIAGNOSIS — L50.1 IDIOPATHIC URTICARIA: Primary | ICD-10-CM

## 2018-04-12 DIAGNOSIS — R60.0 PERIPHERAL EDEMA: ICD-10-CM

## 2018-04-12 DIAGNOSIS — L50.9 HIVES: ICD-10-CM

## 2018-04-12 DIAGNOSIS — G62.9 PERIPHERAL POLYNEUROPATHY: ICD-10-CM

## 2018-04-12 DIAGNOSIS — E03.4 HYPOTHYROIDISM DUE TO ACQUIRED ATROPHY OF THYROID: ICD-10-CM

## 2018-04-12 PROCEDURE — 99214 OFFICE O/P EST MOD 30 MIN: CPT | Performed by: FAMILY MEDICINE

## 2018-04-12 PROCEDURE — 36415 COLL VENOUS BLD VENIPUNCTURE: CPT | Performed by: FAMILY MEDICINE

## 2018-04-12 RX ORDER — TRIAMCINOLONE ACETONIDE 1 MG/G
OINTMENT TOPICAL
Qty: 60 G | Refills: 3 | Status: SHIPPED | OUTPATIENT
Start: 2018-04-12 | End: 2018-07-11

## 2018-04-12 NOTE — PROGRESS NOTES
"Problem(s) Oriented visit        SUBJECTIVE:                                                    Juliana Leal is a 89 year old female who presents to clinic today for hives that have occurred since starting Lyrica. She wants to stop this but wants something in its place for neuropathy. She has a long history of itching and intolerance of multiple medications. She is typically seen by Dr. Hunter. She was started on Lyrica about 2 months ago due to itching on her feet and legs that was attributed to her neuropathy. She complains of her legs feeling like \"they are dead.\" She wonders what more she can do. Her legs are swollen, L>R. She notes that the left has always been more swollen than the right.    She takes levothyroxine and reports that her dose has changed commonly. Her last TSH was 37.1 done in June 2017.      Problem list, Medication list, Allergies, and Medical/Social/Surgical histories reviewed in [facility] and updated as appropriate.   Additional history: as documented    ROS:  5 point ROS completed and negative except noted above, including Gen, CV, Resp, GI, MS      Histories:   Patient Active Problem List   Diagnosis     Migraine     Health Care Home     Vertigo     Muscle tension headache     Pruritic disorder     Essential hypertension     Acquired hypothyroidism     Idiopathic urticaria     Encounter for long-term (current) use of medications     Past Surgical History:   Procedure Laterality Date     COLECTOMY      diverticulitis     HC LIGATION OR BIOPSY TEMPORAL ARTERY  5/10/2012    Procedure:BIOPSY ARTERY TEMPORAL; Surgeon:RUI LOREDO; Location: OR     INCISE FINGER TENDON SHEATH       PHACOEMULSIFICATION CLEAR CORNEA WITH STANDARD INTRAOCULAR LENS IMPLANT  2/29/2012    Procedure:PHACOEMULSIFICATION CLEAR CORNEA WITH STANDARD INTRAOCULAR LENS IMPLANT; RIGHT PHACOEMULSIFICATION CLEAR CORNEA WITH STANDARD INTRAOCULAR LENS IMPLANT; Surgeon:MANJIT LYONS; Location: [facility]     PHACOEMULSIFICATION " CLEAR CORNEA WITH STANDARD INTRAOCULAR LENS IMPLANT  3/14/2012    Procedure:PHACOEMULSIFICATION CLEAR CORNEA WITH STANDARD INTRAOCULAR LENS IMPLANT; LEFT PHACOEMULSIFICATION CLEAR CORNEA WITH STANDARD INTRAOCULAR LENS IMPLANT ; Surgeon:MANJIT LYONS; Location: [facility]     REPAIR ATRIAL SEPTAL DEFECT         Social History   Substance Use Topics     Smoking status: Former Smoker     Types: Cigarettes     Quit date: 1/1/1960     Smokeless tobacco: Never Used     Alcohol use Yes      Comment:  1 beer maybe in 6 months     No family history on file.        OBJECTIVE:                                                    /72  Pulse 70  Resp 16  Wt 58.5 kg (129 lb)  SpO2 97%  BMI 20.82 kg/m2  Body mass index is 20.82 kg/(m^2).   GENERAL APPEARANCE: Alert, no acute distress  EYES: PERRL, EOM normal, conjunctiva and lids normal  NECK: No adenopathy,masses or thyromegaly  RESP: lungs clear to auscultation   CV: normal rate, regular rhythm, no murmur or gallop  MS:   SKIN:  NEURO: Alert, oriented, speech and mentation normal  PSYCH: mentation appears normal, affect and mood normal   Labs Resulted Today:   Results for orders placed or performed in visit on 04/12/18   Comp. Metabolic Panel (14) ([facility])   Result Value Ref Range    Glucose 122 (H) 65 - 99 mg/dL    Urea Nitrogen 43 (H) 10 - 36 mg/dL    Creatinine 1.58 (H) 0.57 - 1.00 mg/dL    eGFR If NonAfricn Am 28 (L) >59 mL/min/1.73    eGFR If Africn Am 32 (L) >59 mL/min/1.73    BUN/Creatinine Ratio 27 12 - 28    Sodium 145 (H) 134 - 144 mmol/L    Potassium 4.6 3.5 - 5.2 mmol/L    Chloride 102 96 - 106 mmol/L    Total CO2 29 (H) 18 - 28 mmol/L    Calcium 10.3 8.7 - 10.3 mg/dL    Protein Total 7.3 6.0 - 8.5 g/dL    Albumin 4.2 3.2 - 4.6 g/dL    Globulin, Total 3.1 1.5 - 4.5 g/dL    A/G Ratio 1.4 1.2 - 2.2    Bilirubin Total 0.2 0.0 - 1.2 mg/dL    Alkaline Phosphatase 69 39 - 117 IU/L    AST 16 0 - 40 IU/L    ALT 8 0 - 32 IU/L    Narrative    Performed at:  01 - [facility]  [place] [address], [place], [place]  [id number]  : Sloan Fernandez MD, Phone:  [phone]   TSH ([facility])   Result Value Ref Range    TSH 1.480 0.450 - 4.500 uIU/mL    Narrative    Performed at:  01 - [facility] [place] [address], [place], [place]  [id number]  : Sloan Fernandez MD, Phone:  [phone]   Thyroxine (T4) Free  Direct  S ([facility])   Result Value Ref Range    T4 Free 1.21 0.82 - 1.77 ng/dL    Narrative    Performed at:  01 - [facility] [place] [address], [place], [place]  [id number]  : Sloan Fernandez MD, Phone:  [phone]   Triiodothyronine (T3) ([facility])   Result Value Ref Range    Triiodothyronine (T3) 79 71 - 180 ng/dL    Narrative    Performed at:  01 - [facility] [place] [address], [place], [place]  [id number]  : Sloan Fernandez MD, Phone:  [phone]     ASSESSMENT/PLAN:                                                        Juliana was seen today for hives.    Diagnoses and all orders for this visit:    Idiopathic urticaria  Continue daily non-sedating antihistamine, hydrate well, lotion skin well, and avoid clothing that is too binding.     Peripheral edema  -     Comp. Metabolic Panel (14) ([facility])  Needs to wear compression stockings regularly. See below.    Peripheral polyneuropathy  -     Comp. Metabolic Panel (14) ([facility])  Options for treatment are limited given her age and intolerance of the Lyrica.     Hypothyroidism due to acquired atrophy of thyroid  -     TSH ([facility])  -     Thyroxine (T4) Free  Direct  S ([facility])  -     Triiodothyronine (T3) ([facility])  Need to verify that levothyroxine dose is correct as this could be contributing to her skin issues.    Hives  -     triamcinolone (KENALOG) 0.1 % ointment; Apply sparingly to affected area three times daily as needed.       Patient Instructions   Recommend using a gardening glove with rubber palms to help put on compression stockings.       The following health maintenance items are  reviewed in Epic and correct as of today:  Health Maintenance   Topic Date Due     ADVANCE DIRECTIVE PLANNING Q5 YRS  02/10/1978     FALL RISK ASSESSMENT  06/06/2018     INFLUENZA VACCINE (SYSTEM ASSIGNED)  09/01/2018     TETANUS IMMUNIZATION (SYSTEM ASSIGNED)  11/19/2022     MIGRAINE ACTION PLAN  Completed     PNEUMOCOCCAL  Completed       Gladys Christiansen MD  [facility]  [phone]    For any issues my office # is [phone]

## 2018-04-12 NOTE — MR AVS SNAPSHOT
After Visit Summary   4/12/2018    Juliana Leal    MRN: [id number]           Patient Information     Date Of Birth          2/10/1923        Visit Information        Provider Department      4/12/2018 3:00 PM Gladys Christiansen MD [facility]        Today's Diagnoses     Idiopathic urticaria    -  1    Peripheral edema        Peripheral polyneuropathy        Hypothyroidism due to acquired atrophy of thyroid        Hives          Care Instructions    Recommend using a gardening glove with rubber palms to help put on compression stockings.           Follow-ups after your visit        Who to contact     If you have questions or need follow up information about today's clinic visit or your schedule please contact [facility] directly at [phone].  Normal or non-critical lab and imaging results will be communicated to you by Downtownhart, letter or phone within 4 business days after the clinic has received the results. If you do not hear from us within 7 days, please contact the clinic through Downtownhart or phone. If you have a critical or abnormal lab result, we will notify you by phone as soon as possible.  Submit refill requests through iSOCO or call your pharmacy and they will forward the refill request to us. Please allow 3 business days for your refill to be completed.          Additional Information About Your Visit        MyChart Information     iSOCO gives you secure access to your electronic health record. If you see a primary care provider, you can also send messages to your care team and make appointments. If you have questions, please call your primary care clinic.  If you do not have a primary care provider, please call [phone] and they will assist you.        Care EveryWhere ID     This is your Care EveryWhere ID. This could be used by other organizations to access your [facility] medical records  [id number]        Your Vitals Were     Pulse Respirations  Pulse Oximetry BMI (Body Mass Index)          70 16 97% 20.82 kg/m2         Blood Pressure from Last 3 Encounters:   04/12/18 134/72   02/01/18 136/77   01/24/18 124/82    Weight from Last 3 Encounters:   04/12/18 58.5 kg (129 lb)   01/24/18 56.5 kg (124 lb 9.6 oz)   10/26/17 55.3 kg (122 lb)              We Performed the Following     Comp. Metabolic Panel (14) ([facility])     Thyroxine (T4) Free  Direct  S ([facility])     Triiodothyronine (T3) ([facility])     TSH ([facility])     VENOUS COLLECTION          Today's Medication Changes          These changes are accurate as of 4/12/18 11:59 PM.  If you have any questions, ask your nurse or doctor.               Start taking these medicines.        Dose/Directions    triamcinolone 0.1 % ointment   Commonly known as:  KENALOG   Used for:  Hives   Started by:  Gladys Christiansen MD        Apply sparingly to affected area three times daily for 14 days.   Quantity:  60 g   Refills:  3         Stop taking these medicines if you haven't already. Please contact your care team if you have questions.     LYRICA 50 MG capsule   Generic drug:  pregabalin   Stopped by:  Gladys Christiansen MD                Where to get your medicines      [facility] [address] [place] [address] [address] AT [facility] [address]  [address] [facility] [address]    Hours:  24-hours Phone:  [phone]     triamcinolone 0.1 % ointment                Primary Care Provider Office Phone # Fax #    Davey Hunter [phone] [address]  [facility] [address]        Equal Access to Services     MARIA D GATES AH: Maegan Sauceda, waaxda luqadaha, qaybta kaalmada oneyda, cynthia krishnamurthy. So [facility] [phone].    ATENCIÓN: Si habla español, tiene a workman disposición servicios gratuitos de asistencia lingüística. Diane al [phone].    We comply with applicable federal civil  rights laws and [place] laws. We do not discriminate on the basis of race, color, national origin, age, disability, sex, sexual orientation, or gender identity.            Thank you!     Thank you for choosing [facility]  for your care. Our goal is always to provide you with excellent care. Hearing back from our patients is one way we can continue to improve our services. Please take a few minutes to complete the written survey that you may receive in the mail after your visit with us. Thank you!             Your Updated Medication List - Protect others around you: Learn how to safely use, store and throw away your medicines at www.disposemymeds.org.          This list is accurate as of 4/12/18 11:59 PM.  Always use your most recent med list.                   Brand Name Dispense Instructions for use Diagnosis    CENTRUM SILVER per tablet      Take 1 tablet by mouth daily        cetirizine 10 MG tablet    zyrTEC    30 tablet    Take 1 tablet (10 mg) by mouth 2 times daily    Need for pneumococcal vaccination       diltiazem 300 MG 24 hr capsule     90 capsule    Take 1 capsule (300 mg) by mouth daily    Essential hypertension       emollient cream      Apply topically as needed for other (leg rash)        hydrochlorothiazide 25 MG tablet    HYDRODIURIL    90 tablet    TAKE 1 TABLET BY MOUTH ONCE DAILY    Essential hypertension, Encounter for long-term (current) use of medications       levothyroxine 88 MCG tablet    SYNTHROID/LEVOTHROID    90 tablet    TAKE 1 TABLET(88 MCG) BY MOUTH DAILY    Encounter for medication refill       triamcinolone 0.1 % ointment    KENALOG    60 g    Apply sparingly to affected area three times daily for 14 days.    Hives

## 2018-04-13 LAB
ALBUMIN SERPL-MCNC: 4.2 G/DL (ref 3.2–4.6)
ALBUMIN/GLOB SERPL: 1.4 {RATIO} (ref 1.2–2.2)
ALP SERPL-CCNC: 69 IU/L (ref 39–117)
ALT SERPL-CCNC: 8 IU/L (ref 0–32)
AST SERPL-CCNC: 16 IU/L (ref 0–40)
BILIRUB SERPL-MCNC: 0.2 MG/DL (ref 0–1.2)
BUN SERPL-MCNC: 43 MG/DL (ref 10–36)
BUN/CREATININE RATIO: 27 (ref 12–28)
CALCIUM SERPL-MCNC: 10.3 MG/DL (ref 8.7–10.3)
CHLORIDE SERPLBLD-SCNC: 102 MMOL/L (ref 96–106)
CREAT SERPL-MCNC: 1.58 MG/DL (ref 0.57–1)
EGFR IF AFRICN AM: 32 ML/MIN/1.73
EGFR IF NONAFRICN AM: 28 ML/MIN/1.73
GLOBULIN, TOTAL: 3.1 G/DL (ref 1.5–4.5)
GLUCOSE SERPL-MCNC: 122 MG/DL (ref 65–99)
POTASSIUM SERPL-SCNC: 4.6 MMOL/L (ref 3.5–5.2)
PROT SERPL-MCNC: 7.3 G/DL (ref 6–8.5)
SODIUM SERPL-SCNC: 145 MMOL/L (ref 134–144)
T3 SERPL-MCNC: 79 NG/DL (ref 71–180)
T4 FREE SERPL-MCNC: 1.21 NG/DL (ref 0.82–1.77)
TOTAL CO2: 29 MMOL/L (ref 18–28)
TSH BLD-ACNC: 1.48 UIU/ML (ref 0.45–4.5)

## 2018-04-16 ENCOUNTER — INFUSION THERAPY VISIT (OUTPATIENT)
Dept: INFUSION THERAPY | Facility: CLINIC | Age: 83
End: 2018-04-16
Attending: ALLERGY & IMMUNOLOGY
Payer: MEDICARE

## 2018-04-16 VITALS — DIASTOLIC BLOOD PRESSURE: 72 MMHG | SYSTOLIC BLOOD PRESSURE: 162 MMHG | HEART RATE: 83 BPM

## 2018-04-16 DIAGNOSIS — L50.1 IDIOPATHIC URTICARIA: Primary | ICD-10-CM

## 2018-04-16 PROCEDURE — 96372 THER/PROPH/DIAG INJ SC/IM: CPT

## 2018-04-16 PROCEDURE — 25000128 H RX IP 250 OP 636: Performed by: ALLERGY & IMMUNOLOGY

## 2018-04-16 RX ADMIN — OMALIZUMAB 300 MG: 202.5 INJECTION, SOLUTION SUBCUTANEOUS at 13:45

## 2018-04-16 NOTE — MR AVS SNAPSHOT
After Visit Summary   4/16/2018    Juliana Leal    MRN: [id number]           Patient Information     Date Of Birth          2/10/1923        Visit Information        Provider Department      4/16/2018 1:00 PM RH INFUSION CHAIR 12 [facility]        Today's Diagnoses     Idiopathic urticaria    -  1       Follow-ups after your visit        Your next 10 appointments already scheduled     May 15, 2018  1:30 PM CDT   Level 1 with RH INFUSION CHAIR 4   [facility] ([facility])    [facility] [place] [facility]  [address]  [facility] [address]   [phone]            Jun 12, 2018  1:30 PM CDT   Level 1 with RH INFUSION CHAIR 2   [facility] ([facility])    [facility] [place] [facility]  [address]  [facility] [address]   [phone]            Jul 10, 2018  1:30 PM CDT   Level 1 with RH INFUSION CHAIR 10   [facility] ([facility])    [facility]  [address]  [facility] [address]   [phone]            Aug 07, 2018  1:30 PM CDT   Level 1 with RH INFUSION CHAIR 10   [facility] ([facility])    [facility]  [address]  [facility] [address]   [phone]              Who to contact     If you have questions or need follow up information about today's clinic visit or your schedule please contact [facility] directly at [phone].  Normal or non-critical lab and imaging results will be communicated to you by MyChart, letter or phone within 4 business days after the clinic has received the results. If you do not hear from us within 7 days, please contact the clinic through MyChart or phone. If you have a critical or abnormal  lab result, we will notify you by phone as soon as possible.  Submit refill requests through [employer] or call your pharmacy and they will forward the refill request to us. Please allow 3 business days for your refill to be completed.          Additional Information About Your Visit        Information Assurancehart Information     [employer] gives you secure access to your electronic health record. If you see a primary care provider, you can also send messages to your care team and make appointments. If you have questions, please call your primary care clinic.  If you do not have a primary care provider, please call [phone] and they will assist you.        Care EveryWhere ID     This is your Care EveryWhere ID. This could be used by other organizations to access your [facility] medical records  [id number]        Your Vitals Were     Pulse                   83            Blood Pressure from Last 3 Encounters:   04/16/18 162/72   04/12/18 134/72   02/01/18 136/77    Weight from Last 3 Encounters:   04/12/18 58.5 kg (129 lb)   01/24/18 56.5 kg (124 lb 9.6 oz)   10/26/17 55.3 kg (122 lb)              Today, you had the following     No orders found for display       Primary Care Provider Office Phone # Fax #    Davey Hunter [phone] [address] [facility] [address]        Equal Access to Services     [facility] HospitalWANG : Hadii aad ku hadasho Soomaali, waaxda luqadaha, qaybta kaalmada adeegyada, waxjael arreguin hayelaine yancey . So [facility] [phone].    ATENCIÓN: Si habla español, tiene a workman disposición servicios gratuitos de asistencia lingüística. Llame al [phone].    We comply with applicable federal civil rights laws and [place] laws. We do not discriminate on the basis of race, color, national origin, age, disability, sex, sexual orientation, or gender identity.            Thank you!     Thank you for choosing [facility]  for your care. Our goal is  always to provide you with excellent care. Hearing back from our patients is one way we can continue to improve our services. Please take a few minutes to complete the written survey that you may receive in the mail after your visit with us. Thank you!             Your Updated Medication List - Protect others around you: Learn how to safely use, store and throw away your medicines at www.disposemymeds.org.          This list is accurate as of 4/16/18  1:27 PM.  Always use your most recent med list.                   Brand Name Dispense Instructions for use Diagnosis    CENTRUM SILVER per tablet      Take 1 tablet by mouth daily        cetirizine 10 MG tablet    zyrTEC    30 tablet    Take 1 tablet (10 mg) by mouth 2 times daily    Need for pneumococcal vaccination       diltiazem 300 MG 24 hr capsule     90 capsule    Take 1 capsule (300 mg) by mouth daily    Essential hypertension       emollient cream      Apply topically as needed for other (leg rash)        hydrochlorothiazide 25 MG tablet    HYDRODIURIL    90 tablet    TAKE 1 TABLET BY MOUTH ONCE DAILY    Essential hypertension, Encounter for long-term (current) use of medications       levothyroxine 88 MCG tablet    SYNTHROID/LEVOTHROID    90 tablet    TAKE 1 TABLET(88 MCG) BY MOUTH DAILY    Encounter for medication refill       triamcinolone 0.1 % ointment    KENALOG    60 g    Apply sparingly to affected area three times daily for 14 days.    Hives

## 2018-04-16 NOTE — PROGRESS NOTES
Infusion Nursing Note:  Julainaandrei Garciaford presents today for Xolair.    Patient seen by provider today: No   present during visit today: Not Applicable.    Note: N/A.    Intravenous Access:  No Intravenous access/labs at this visit.    Treatment Conditions:  Not Applicable.      Post Infusion Assessment:  Patient tolerated injection without incident.  Patient observed for 30 minutes post Xolair per protocol.    Discharge Plan:   Discharge instructions reviewed with: Patient and Family.  Patient and/or family verbalized understanding of discharge instructions and all questions answered.  AVS to patient via Sprout Route.  Patient will return 5/15/18 for next appointment.   Patient discharged in stable condition accompanied by: self.  Departure Mode: Ambulatory.    Angi Vargas RN

## 2018-05-15 ENCOUNTER — ALLIED HEALTH/NURSE VISIT (OUTPATIENT)
Dept: INFUSION THERAPY | Facility: CLINIC | Age: 83
End: 2018-05-15
Attending: ALLERGY & IMMUNOLOGY
Payer: MEDICARE

## 2018-05-15 VITALS
TEMPERATURE: 97.1 F | RESPIRATION RATE: 18 BRPM | HEART RATE: 72 BPM | DIASTOLIC BLOOD PRESSURE: 77 MMHG | SYSTOLIC BLOOD PRESSURE: 127 MMHG | OXYGEN SATURATION: 95 %

## 2018-05-15 DIAGNOSIS — L50.1 IDIOPATHIC URTICARIA: Primary | ICD-10-CM

## 2018-05-15 PROCEDURE — 25000128 H RX IP 250 OP 636: Performed by: ALLERGY & IMMUNOLOGY

## 2018-05-15 PROCEDURE — 96372 THER/PROPH/DIAG INJ SC/IM: CPT

## 2018-05-15 RX ADMIN — OMALIZUMAB 300 MG: 202.5 INJECTION, SOLUTION SUBCUTANEOUS at 13:29

## 2018-05-15 NOTE — PROGRESS NOTES
Infusion Nursing Note:  Juliana Leal presents today for xolair.    Patient seen by provider today: No   present during visit today: Not Applicable.    Note: still has some itching, but is less than previously.    Intravenous Access:  No Intravenous access/labs at this visit.    Treatment Conditions:  Not Applicable.      Post Infusion Assessment:  Patient tolerated injection without incident.  Patient observed for 30 minutes post Xolair per protocol.  Site patent and intact, free from redness, edema or discomfort.    Discharge Plan:   Discharge instructions reviewed with: Patient and Family.  AVS to patient via TravelLineT.  Patient will return 6/12 for next appointment.   Patient discharged in stable condition accompanied by: self and daughter.  Departure Mode: Ambulatory.    Gayathri Urbina RN

## 2018-05-15 NOTE — MR AVS SNAPSHOT
After Visit Summary   5/15/2018    Juliana Leal    MRN: [id number]           Patient Information     Date Of Birth          2/10/1923        Visit Information        Provider Department      5/15/2018 1:30 PM RH LAB DRAW 1 [facility] Infusion [facility]        Today's Diagnoses     Idiopathic urticaria    -  1       Follow-ups after your visit        Your next 10 appointments already scheduled     Jun 12, 2018  1:30 PM CDT   Level 1 with RH INFUSION CHAIR 2   [facility]fusion [facility] ([facility])    [place] [address]  [facility] [address]   [phone]            Jul 10, 2018  1:30 PM CDT   Level 1 with RH INFUSION CHAIR 10   [facility] ([facility])    [facility]  [address]  [facility] [address]   [phone]            Aug 07, 2018  1:30 PM CDT   Level 1 with RH INFUSION CHAIR 10   [facility] ([facility])    [facility]  [address]  [facility] [address]   [phone]              Who to contact     If you have questions or need follow up information about today's clinic visit or your schedule please contact [facility] INFUSION SERVICES directly at [phone].  Normal or non-critical lab and imaging results will be communicated to you by MyChart, letter or phone within 4 business days after the clinic has received the results. If you do not hear from us within 7 days, please contact the clinic through MyChart or phone. If you have a critical or abnormal lab result, we will notify you by phone as soon as possible.  Submit refill requests through Nanameue or call your pharmacy and they will forward the refill request to us. Please allow 3 business days for your refill to be completed.          Additional  Information About Your Visit        MyChart Information     Sendoid gives you secure access to your electronic health record. If you see a primary care provider, you can also send messages to your care team and make appointments. If you have questions, please call your primary care clinic.  If you do not have a primary care provider, please call [phone] and they will assist you.        Care EveryWhere ID     This is your Care EveryWhere ID. This could be used by other organizations to access your [place] medical records  [id number]        Your Vitals Were     Pulse Temperature Respirations Pulse Oximetry          72 97.1  F (36.2  C) (Oral) 18 95%         Blood Pressure from Last 3 Encounters:   05/15/18 127/77   04/16/18 162/72   04/12/18 134/72    Weight from Last 3 Encounters:   04/12/18 58.5 kg (129 lb)   01/24/18 56.5 kg (124 lb 9.6 oz)   10/26/17 55.3 kg (122 lb)              Today, you had the following     No orders found for display       Primary Care Provider Office Phone # Fax #    Davey Hunter [phone] [address] [facility] [address]        Equal Access to Services     MARIA D GATES : Hadii aad ku hadasho Soomaali, waaxda luqadaha, qaybta kaalmada adeegyada, cynthia santanan tayler krishnamurthy. So [facility] [phone].    ATENCIÓN: Si habla español, tiene a workman disposición servicios gratuitos de asistencia lingüística. Llame al [phone].    We comply with applicable federal civil rights laws and [place] laws. We do not discriminate on the basis of race, color, national origin, age, disability, sex, sexual orientation, or gender identity.            Thank you!     Thank you for choosing [facility] INFUSION SERVICES  for your care. Our goal is always to provide you with excellent care. Hearing back from our patients is one way we can continue to improve our services. Please take a few minutes to complete the written survey that you may  receive in the mail after your visit with us. Thank you!             Your Updated Medication List - Protect others around you: Learn how to safely use, store and throw away your medicines at www.disposemymeds.org.          This list is accurate as of 5/15/18  2:33 PM.  Always use your most recent med list.                   Brand Name Dispense Instructions for use Diagnosis    CENTRUM SILVER per tablet      Take 1 tablet by mouth daily        cetirizine 10 MG tablet    zyrTEC    30 tablet    Take 1 tablet (10 mg) by mouth 2 times daily    Need for pneumococcal vaccination       diltiazem 300 MG 24 hr capsule     90 capsule    Take 1 capsule (300 mg) by mouth daily    Essential hypertension       emollient cream      Apply topically as needed for other (leg rash)        hydrochlorothiazide 25 MG tablet    HYDRODIURIL    90 tablet    TAKE 1 TABLET BY MOUTH ONCE DAILY    Essential hypertension, Encounter for long-term (current) use of medications       levothyroxine 88 MCG tablet    SYNTHROID/LEVOTHROID    90 tablet    TAKE 1 TABLET(88 MCG) BY MOUTH DAILY    Encounter for medication refill       triamcinolone 0.1 % ointment    KENALOG    60 g    Apply sparingly to affected area three times daily for 14 days.    Hives

## 2018-06-04 ENCOUNTER — OFFICE VISIT (OUTPATIENT)
Dept: INTERNAL MEDICINE | Facility: CLINIC | Age: 83
End: 2018-06-04
Payer: COMMERCIAL

## 2018-06-04 VITALS
DIASTOLIC BLOOD PRESSURE: 80 MMHG | TEMPERATURE: 97.8 F | HEART RATE: 82 BPM | HEIGHT: 68 IN | BODY MASS INDEX: 19.25 KG/M2 | SYSTOLIC BLOOD PRESSURE: 128 MMHG | RESPIRATION RATE: 16 BRPM | WEIGHT: 127 LBS

## 2018-06-04 DIAGNOSIS — M25.512 CHRONIC PAIN OF BOTH SHOULDERS: ICD-10-CM

## 2018-06-04 DIAGNOSIS — M25.511 CHRONIC PAIN OF BOTH SHOULDERS: ICD-10-CM

## 2018-06-04 DIAGNOSIS — E03.9 ACQUIRED HYPOTHYROIDISM: ICD-10-CM

## 2018-06-04 DIAGNOSIS — G62.9 NEUROPATHY: ICD-10-CM

## 2018-06-04 DIAGNOSIS — I10 ESSENTIAL HYPERTENSION: ICD-10-CM

## 2018-06-04 DIAGNOSIS — N18.30 CKD (CHRONIC KIDNEY DISEASE) STAGE 3, GFR 30-59 ML/MIN (H): ICD-10-CM

## 2018-06-04 DIAGNOSIS — G89.29 CHRONIC PAIN OF BOTH SHOULDERS: ICD-10-CM

## 2018-06-04 LAB
ANION GAP SERPL CALCULATED.3IONS-SCNC: 8 MMOL/L (ref 3–14)
BUN SERPL-MCNC: 35 MG/DL (ref 7–30)
CALCIUM SERPL-MCNC: 10.5 MG/DL (ref 8.5–10.1)
CHLORIDE SERPL-SCNC: 107 MMOL/L (ref 94–109)
CO2 SERPL-SCNC: 27 MMOL/L (ref 20–32)
CREAT SERPL-MCNC: 1.4 MG/DL (ref 0.52–1.04)
ERYTHROCYTE [SEDIMENTATION RATE] IN BLOOD BY WESTERGREN METHOD: 16 MM/H (ref 0–30)
GFR SERPL CREATININE-BSD FRML MDRD: 35 ML/MIN/1.7M2
GLUCOSE SERPL-MCNC: 118 MG/DL (ref 70–99)
POTASSIUM SERPL-SCNC: 4.6 MMOL/L (ref 3.4–5.3)
SODIUM SERPL-SCNC: 142 MMOL/L (ref 133–144)

## 2018-06-04 PROCEDURE — 99203 OFFICE O/P NEW LOW 30 MIN: CPT | Performed by: INTERNAL MEDICINE

## 2018-06-04 PROCEDURE — 36415 COLL VENOUS BLD VENIPUNCTURE: CPT | Performed by: INTERNAL MEDICINE

## 2018-06-04 PROCEDURE — 80048 BASIC METABOLIC PNL TOTAL CA: CPT | Performed by: INTERNAL MEDICINE

## 2018-06-04 PROCEDURE — 85652 RBC SED RATE AUTOMATED: CPT | Performed by: INTERNAL MEDICINE

## 2018-06-04 ASSESSMENT — PAIN SCALES - GENERAL: PAINLEVEL: NO PAIN (0)

## 2018-06-04 NOTE — PROGRESS NOTES
"  SUBJECTIVE:   Juliana Leal is a 89 year old female who presents to clinic today for the following health issues:    Chief Complaint   Patient presents with     [facility], Under the care of Dr. Davey Hunter     Pt's past medical history, family history, habits, medications and allergies were reviewed with the patient today.  . Most recent lab results reviewed with pt. Problem list and histories reviewed & adjusted, as indicated.  Additional history as below:    Review of Systems:  CONSTITUTIONAL: NEGATIVE for fever, chills, change in weight  INTEGUMENTARY/SKIN: NEGATIVE for worrisome rashes, moles or lesions. Hx chronic itching, better with meds and Xolair  EYES: NEGATIVE for vision changes or irritation. Has glasses. Last eye exam > 1 year ago  ENT/MOUTH: NEGATIVE for ear, mouth and throat problems. HAs hearing loss. Has hearing aids  RESP: NEGATIVE for significant cough or SOB with use of Xolair infusions.  followed by allergist   BREAST: NEGATIVE for masses, tenderness or discharge  CV: NEGATIVE for chest pain, palpitations or peripheral edema. BP stable  GI: NEGATIVE for nausea, abdominal pain, heartburn. Rare constipation  : NEGATIVE for frequency, dysuria, or hematuria. Hx CKD  MUSCULOSKELETAL:  POSITIVE for bilateral shoulder pain  and arm pain  since Easter 2018. Bilateral thumb surgery in past   NEURO: NEGATIVE for weakness, dizziness. Numbness in feet bilaterally.  Minimal in hands. Didn't tolerate Lyrica trial before but pt doesn't remember reasons and notes in chart from April 2018 only say that pt had side effects. Had Gabapentin in 2012 that was also stopped with out reason  ENDOCRINE: NEGATIVE for temperature intolerance.  On thyroid replacement. TSH UTD  HEME: NEGATIVE for bleeding problems  PSYCHIATRIC: NEGATIVE for changes in mood or affect    OBJECTIVE:  /80  Pulse 82  Temp 97.8  F (36.6  C) (Oral)  Resp 16  Ht 5' 8\" (1.727 m)  Wt 127 lb (57.6 kg) " " BMI 19.31 kg/m2   Estimated body mass index is 19.31 kg/(m^2) as calculated from the following:    Height as of this encounter: 5' 8\" (1.727 m).    Weight as of this encounter: 127 lb (57.6 kg).  Eye: PERRL, EOMI  HENT: ear canals and TM's normal and nose and mouth without ulcers or lesions   Neck: no adenopathy. Thyroid normal to palpation. No bruits  Pulm: Lungs clear to auscultation   CV: Regular rates and rhythm  GI: Soft, nontender, Normal active bowel sounds, No hepatosplenomegaly or masses palpable  Ext: Peripheral pulses intact. Trace BLE ankle  edema.  Mild tenderness to abduction bilateral shoulders to 90 .  No tenderness to internal/external rotation  Neuro: Normal strength and tone, sensory exam  Minimally reduced to LTS distal BLE    Assessment/Plan: (See plan discussion below for further details)  1. CKD (chronic kidney disease) stage 3, GFR 30-59 ml/min  Lab as ordered to assess current status. Counseled to avoid NSAIDS  - Basic metabolic panel       2. Essential hypertension  Controlled with current meds    3. Acquired hypothyroidism  TSH normal April 2018. Repeat lab 1 year later. Continue meds  - TSH with free T4 reflex; Future    4. Chronic pain of both shoulders  Likely MSK related. Pt declines ortho or PT referral. ESR lab ordered to r/o PMR component  - ESR: Erythrocyte sedimentation rate    5. Neuropathy  Intolerant of Lyrica but reaction unknown. Pt declines other med at this time. Possible future trial of Nottriptyline. Pt declines for now. Prior B12 level normal    Plan discussion:  Continue current meds for now  Labs today as ordered  Continue Xolair infusions per allergist   Possible trial of Nortriptyline in future. Intolerant of Lyrica (reaction unknown) and previously on Gabapentin that was also stopped for unclear reason per chart       Erick Ruano MD  Internal Medicine Department  [facility]        "

## 2018-06-04 NOTE — MR AVS SNAPSHOT
After Visit Summary   6/4/2018    Juliana Leal    MRN: [id number]           Patient Information     Date Of Birth          2/10/1923        Visit Information        Provider Department      6/4/2018 1:30 PM Erick Ruano MD [facility]        Today's Diagnoses     CKD (chronic kidney disease) stage 3, GFR 30-59 ml/min        Essential hypertension        Acquired hypothyroidism        Chronic pain of both shoulders        Neuropathy           Follow-ups after your visit        Your next 10 appointments already scheduled     Jul 10, 2018  1:30 PM CDT   Level 1 with RH INFUSION CHAIR 10   [facility] Infusion Services ([facility])    [facility]  [address]  [facility] [id number] [phone]            Aug 07, 2018  1:30 PM CDT   Level 1 with RH INFUSION CHAIR 10   [facility] ([facility])    [facility]  [address]  [facility] [id number] [phone]              Who to contact     If you have questions or need follow up information about today's clinic visit or your schedule please contact [facility] directly at [phone].  Normal or non-critical lab and imaging results will be communicated to you by MyChart, letter or phone within 4 business days after the clinic has received the results. If you do not hear from us within 7 days, please contact the clinic through MyChart or phone. If you have a critical or abnormal lab result, we will notify you by phone as soon as possible.  Submit refill requests through [employer] or call your pharmacy and they will forward the refill request to us. Please allow 3 business days for your refill to be completed.          Additional Information About Your Visit        Fatsomahart Information     [employer] gives you secure access to your electronic health record. If  "you see a primary care provider, you can also send messages to your care team and make appointments. If you have questions, please call your primary care clinic.  If you do not have a primary care provider, please call [phone] and they will assist you.        Care EveryWhere ID     This is your Care EveryWhere ID. This could be used by other organizations to access your [place] medical records  [id number]        Your Vitals Were     Pulse Temperature Respirations Height BMI (Body Mass Index)       82 97.8  F (36.6  C) (Oral) 16 5' 8\" (1.727 m) 19.31 kg/m2        Blood Pressure from Last 3 Encounters:   06/12/18 147/83   06/04/18 128/80   05/15/18 127/77    Weight from Last 3 Encounters:   06/12/18 129 lb 12.8 oz (58.9 kg)   06/04/18 127 lb (57.6 kg)   04/12/18 129 lb (58.5 kg)              We Performed the Following     Basic metabolic panel     ESR: Erythrocyte sedimentation rate        Primary Care Provider Office Phone # Fax #    Davey Hunter [phone]       [address] [facility] [place] [place] [address]        Equal Access to Services     LOGAN GATES AH: Hadii nathaly lee hadasho Soomaali, waaxda luqadaha, qaybta kaalmada adeegyada, cynthia santanan tayler krishnamurthy. So [facility] [phone].    ATENCIÓN: Si habla español, tiene a workman disposición servicios gratuitos de asistencia lingüística. Jordiname al [phone].    We comply with applicable federal civil rights laws and [place] laws. We do not discriminate on the basis of race, color, national origin, age, disability, sex, sexual orientation, or gender identity.            Thank you!     Thank you for choosing [facility]  for your care. Our goal is always to provide you with excellent care. Hearing back from our patients is one way we can continue to improve our services. Please take a few minutes to complete the written survey that you may receive in the mail after your visit with us. Thank you!             Your " Updated Medication List - Protect others around you: Learn how to safely use, store and throw away your medicines at www.disposemymeds.org.          This list is accurate as of 6/4/18 11:59 PM.  Always use your most recent med list.                   Brand Name Dispense Instructions for use Diagnosis    CENTRUM SILVER per tablet      Take 1 tablet by mouth daily        cetirizine 10 MG tablet    zyrTEC    30 tablet    Take 1 tablet (10 mg) by mouth 2 times daily    Need for pneumococcal vaccination       emollient cream      Apply topically as needed for other (leg rash)        hydrochlorothiazide 25 MG tablet    HYDRODIURIL    90 tablet    TAKE 1 TABLET BY MOUTH ONCE DAILY    Essential hypertension, Encounter for long-term (current) use of medications       levothyroxine 88 MCG tablet    SYNTHROID/LEVOTHROID    90 tablet    TAKE 1 TABLET(88 MCG) BY MOUTH DAILY    Encounter for medication refill       triamcinolone 0.1 % ointment    KENALOG    60 g    Apply sparingly to affected area three times daily for 14 days.    Hives

## 2018-06-04 NOTE — LETTER
"[facility]  [address] [address]  [phone]      6/13/2018       Juliana Leal  [address]   [facility] [address]        Dear Juliana,  Here are your most recent lab results. Unless commented on below, mild variation of results  outside the normal range are not clinically signicant.    Resulted Orders   Basic metabolic panel   Result Value Ref Range    Sodium 142 133 - 144 mmol/L    Potassium 4.6 3.4 - 5.3 mmol/L    Chloride 107 94 - 109 mmol/L    Carbon Dioxide 27 20 - 32 mmol/L    Anion Gap 8 3 - 14 mmol/L    Glucose 118 (H) 70 - 99 mg/dL    Urea Nitrogen 35 (H) 7 - 30 mg/dL    Creatinine 1.40 (H) 0.52 - 1.04 mg/dL    GFR Estimate 35 (L) >60 mL/min/1.7m2      Comment:      Non  GFR Calc    GFR Estimate If Black 42 (L) >60 mL/min/1.7m2      Comment:       GFR Calc    Calcium 10.5 (H) 8.5 - 10.1 mg/dL   ESR: Erythrocyte sedimentation rate   Result Value Ref Range    Sed Rate 16 0 - 30 mm/h       Glucose/Sugar (nonfasting), Potassium and Sed rate (marker of inflammation) lab results were normal.   Calcium lab results were  mildly elevated. This is likely related to some dehydration. Use of hydrochlorothiazide can also raise the calcium level but you have had normal calcium levels in the past while taking hydrochlorothiazde  Kidney function lab results were still abnormal but have improved compared to previous studies.  No evidence of polymyalgia rheumatica as the cause of your shoulder pain. 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  AS we discussed by phone tonight:  Increase water intake by 2 glasses per day  If you are taking any calcium supplements, them stop them  Continue other medications  Call our appointment desk at [phone] to schedule a \"lab only\" to have your Basic Metabolic Panel rechecked " non-fasting in 1 month.  If your shoulders remain bothersome, then I would recommend you be seen at [facility] in [place]. Their telephone number is  [phone]    If you have further questions/concerns regarding the results, I would ask that you bring them to your next follow-up appointment with me and I would be happy to review them with you further.      Sincerely,      Erick Ruano MD  Internal Medicine

## 2018-06-07 DIAGNOSIS — I10 ESSENTIAL HYPERTENSION: ICD-10-CM

## 2018-06-08 ENCOUNTER — TELEPHONE (OUTPATIENT)
Dept: INTERNAL MEDICINE | Facility: CLINIC | Age: 83
End: 2018-06-08

## 2018-06-08 RX ORDER — DILTIAZEM HYDROCHLORIDE 300 MG/1
CAPSULE, EXTENDED RELEASE ORAL
Qty: 90 CAPSULE | Refills: 0 | Status: SHIPPED | OUTPATIENT
Start: 2018-06-08 | End: 2018-09-11

## 2018-06-08 NOTE — TELEPHONE ENCOUNTER
Reason for Call:  Request for results:    Name of test or procedure: blood tests    Date of test of procedure: Monday 6/4/18    Location of the test or procedure: [facility] to leave the result message on voice mail or with a family member? Not Applicable no voicemail    Phone number Patient can be reached at:  Home number on file [phone] (home)    Additional comments: Call as soon as possible    Call taken on 6/8/2018 at 12:37 PM by Danielle Martins

## 2018-06-12 ENCOUNTER — INFUSION THERAPY VISIT (OUTPATIENT)
Dept: INFUSION THERAPY | Facility: CLINIC | Age: 83
End: 2018-06-12
Attending: ALLERGY & IMMUNOLOGY
Payer: MEDICARE

## 2018-06-12 VITALS
SYSTOLIC BLOOD PRESSURE: 147 MMHG | BODY MASS INDEX: 19.74 KG/M2 | WEIGHT: 129.8 LBS | DIASTOLIC BLOOD PRESSURE: 83 MMHG | HEART RATE: 79 BPM | RESPIRATION RATE: 16 BRPM | TEMPERATURE: 98 F

## 2018-06-12 DIAGNOSIS — L50.1 IDIOPATHIC URTICARIA: Primary | ICD-10-CM

## 2018-06-12 PROCEDURE — 25000128 H RX IP 250 OP 636: Performed by: ALLERGY & IMMUNOLOGY

## 2018-06-12 PROCEDURE — 96372 THER/PROPH/DIAG INJ SC/IM: CPT

## 2018-06-12 RX ADMIN — OMALIZUMAB 300 MG: 202.5 INJECTION, SOLUTION SUBCUTANEOUS at 13:39

## 2018-06-12 NOTE — MR AVS SNAPSHOT
After Visit Summary   6/12/2018    Juliana Leal    MRN: [id number]           Patient Information     Date Of Birth          2/10/1923        Visit Information        Provider Department      6/12/2018 1:30 PM RH INFUSION CHAIR 2 [facility] Infusion Services        Today's Diagnoses     Idiopathic urticaria    -  1       Follow-ups after your visit        Your next 10 appointments already scheduled     Jul 10, 2018  1:30 PM CDT   Level 1 with RH INFUSION CHAIR 10   [facility] Infusion [facility] ([facility])    [facility]  [address]  [facility] [address]   [phone]            Aug 07, 2018  1:30 PM CDT   Level 1 with RH INFUSION CHAIR 10   [facility] Infusion [facility] ([facility])    [facility]  [address]  [facility] [address] [phone]              Who to contact     If you have questions or need follow up information about today's clinic visit or your schedule please contact [facility] INFUSION SERVICES directly at [phone].  Normal or non-critical lab and imaging results will be communicated to you by Couchbasehart, letter or phone within 4 business days after the clinic has received the results. If you do not hear from us within 7 days, please contact the clinic through Couchbasehart or phone. If you have a critical or abnormal lab result, we will notify you by phone as soon as possible.  Submit refill requests through [employer] or call your pharmacy and they will forward the refill request to us. Please allow 3 business days for your refill to be completed.          Additional Information About Your Visit        Couchbasehart Information     [employer] gives you secure access to your electronic health record. If you see a primary care provider, you can also send messages to your care team and make appointments. If you have  questions, please call your primary care clinic.  If you do not have a primary care provider, please call [phone] and they will assist you.        Care EveryWhere ID     This is your Care EveryWhere ID. This could be used by other organizations to access your [place] medical records  [id number]        Your Vitals Were     Pulse Temperature Respirations BMI (Body Mass Index)          79 98  F (36.7  C) (Oral) 16 19.74 kg/m2         Blood Pressure from Last 3 Encounters:   06/12/18 147/83   06/04/18 128/80   05/15/18 127/77    Weight from Last 3 Encounters:   06/12/18 58.9 kg (129 lb 12.8 oz)   06/04/18 57.6 kg (127 lb)   04/12/18 58.5 kg (129 lb)              Today, you had the following     No orders found for display       Primary Care Provider Office Phone # Fax #    Davey Hunter [phone] [address] [facility] [address]        Equal Access to Services     [facility]: Hadii aad ku hadasho Soomaali, waaxda luqadaha, qaybta kaalmada adeegyada, waxjael arreguin hayelaine yancey . So [facility] [phone].    ATENCIÓN: Si habla español, tiene a workman disposición servicios gratuitos de asistencia lingüística. Llame al [phone].    We comply with applicable federal civil rights laws and [place] laws. We do not discriminate on the basis of race, color, national origin, age, disability, sex, sexual orientation, or gender identity.            Thank you!     Thank you for choosing [facility] INFUSION SERVICES  for your care. Our goal is always to provide you with excellent care. Hearing back from our patients is one way we can continue to improve our services. Please take a few minutes to complete the written survey that you may receive in the mail after your visit with us. Thank you!             Your Updated Medication List - Protect others around you: Learn how to safely use, store and throw away your medicines at www.disposemymeds.org.          This  list is accurate as of 6/12/18  2:30 PM.  Always use your most recent med list.                   Brand Name Dispense Instructions for use Diagnosis    CARTIA  MG 24 hr capsule   Generic drug:  diltiazem     90 capsule    TAKE 1 CAPSULE(300 MG) BY MOUTH DAILY    Essential hypertension       CENTRUM SILVER per tablet      Take 1 tablet by mouth daily        cetirizine 10 MG tablet    zyrTEC    30 tablet    Take 1 tablet (10 mg) by mouth 2 times daily    Need for pneumococcal vaccination       emollient cream      Apply topically as needed for other (leg rash)        hydrochlorothiazide 25 MG tablet    HYDRODIURIL    90 tablet    TAKE 1 TABLET BY MOUTH ONCE DAILY    Essential hypertension, Encounter for long-term (current) use of medications       levothyroxine 88 MCG tablet    SYNTHROID/LEVOTHROID    90 tablet    TAKE 1 TABLET(88 MCG) BY MOUTH DAILY    Encounter for medication refill       triamcinolone 0.1 % ointment    KENALOG    60 g    Apply sparingly to affected area three times daily for 14 days.    Hives

## 2018-06-12 NOTE — PROGRESS NOTES
Infusion Nursing Note:  Juliana Leal presents today for xolair.    Patient seen by provider today: No   present during visit today: Not Applicable.    Note: Idiopathic urticaria resolved. Occasional itching. Much improved. Injection into left arm today.    Intravenous Access:  No Intravenous access/labs at this visit.    Treatment Conditions:  Not Applicable.      Post Infusion Assessment:  Patient tolerated injection without incident.  Site patent and intact, free from redness, edema or discomfort.  Patient observed for 30 min post xolair per protocol.    Discharge Plan:   Discharge instructions reviewed with: Patient and family.  Patient and/or family verbalized understanding of discharge instructions and all questions answered.  Patient discharged in stable condition accompanied by: .  Departure Mode: Ambulatory.    Sharon Do RN

## 2018-07-06 DIAGNOSIS — Z76.0 ENCOUNTER FOR MEDICATION REFILL: ICD-10-CM

## 2018-07-06 RX ORDER — LEVOTHYROXINE SODIUM 88 UG/1
TABLET ORAL
Qty: 90 TABLET | Refills: 0 | Status: SHIPPED | OUTPATIENT
Start: 2018-07-06 | End: 2018-10-01

## 2018-07-10 ENCOUNTER — INFUSION THERAPY VISIT (OUTPATIENT)
Dept: INFUSION THERAPY | Facility: CLINIC | Age: 83
End: 2018-07-10
Attending: ALLERGY & IMMUNOLOGY
Payer: MEDICARE

## 2018-07-10 VITALS
SYSTOLIC BLOOD PRESSURE: 145 MMHG | RESPIRATION RATE: 16 BRPM | DIASTOLIC BLOOD PRESSURE: 82 MMHG | TEMPERATURE: 98.2 F | HEART RATE: 70 BPM

## 2018-07-10 DIAGNOSIS — L50.1 IDIOPATHIC URTICARIA: Primary | ICD-10-CM

## 2018-07-10 PROCEDURE — 25000128 H RX IP 250 OP 636: Performed by: ALLERGY & IMMUNOLOGY

## 2018-07-10 PROCEDURE — 96372 THER/PROPH/DIAG INJ SC/IM: CPT

## 2018-07-10 RX ADMIN — OMALIZUMAB 300 MG: 202.5 INJECTION, SOLUTION SUBCUTANEOUS at 13:44

## 2018-07-10 NOTE — MR AVS SNAPSHOT
After Visit Summary   7/10/2018    Juliana Leal    MRN: [id number]           Patient Information     Date Of Birth          2/10/1923        Visit Information        Provider Department      7/10/2018 1:30 PM RH INFUSION CHAIR 10 [facility]fusion [facility]        Today's Diagnoses     Idiopathic urticaria    -  1       Follow-ups after your visit        Your next 10 appointments already scheduled     Aug 07, 2018  1:30 PM CDT   Level 1 with RH INFUSION CHAIR 10   [facility] Infusion Services ([facility])    [facility]  [address]  [facility] [address] [phone]              Who to contact     If you have questions or need follow up information about today's clinic visit or your schedule please contact [facility] INFUSION [facility] directly at [phone].  Normal or non-critical lab and imaging results will be communicated to you by Servato Corphart, letter or phone within 4 business days after the clinic has received the results. If you do not hear from us within 7 days, please contact the clinic through Servato Corphart or phone. If you have a critical or abnormal lab result, we will notify you by phone as soon as possible.  Submit refill requests through CARDFREE or call your pharmacy and they will forward the refill request to us. Please allow 3 business days for your refill to be completed.          Additional Information About Your Visit        MyChart Information     CARDFREE gives you secure access to your electronic health record. If you see a primary care provider, you can also send messages to your care team and make appointments. If you have questions, please call your primary care clinic.  If you do not have a primary care provider, please call [phone] and they will assist you.        Care EveryWhere ID     This is your Care EveryWhere ID. This could be used by other organizations to  access your [place] medical records  [id number]        Your Vitals Were     Pulse Temperature Respirations             70 98.2  F (36.8  C) (Oral) 16          Blood Pressure from Last 3 Encounters:   07/10/18 145/82   06/12/18 147/83   06/04/18 128/80    Weight from Last 3 Encounters:   06/12/18 58.9 kg (129 lb 12.8 oz)   06/04/18 57.6 kg (127 lb)   04/12/18 58.5 kg (129 lb)              Today, you had the following     No orders found for display       Primary Care Provider Office Phone # Fax #    Erick Ruano [phone]       [address] [facility] [address]        Equal Access to Services     LOGAN G[place] : Hadii aad ku hadasho Soomaali, waaxda luqadaha, qaybta kaalmada adeegyada, cynthia krishnamurthy. So [facility] [phone].    ATENCIÓN: Si habla español, tiene a workman disposición servicios gratuitos de asistencia lingüística. [facility] [phone].    We comply with applicable federal civil rights laws and [place] laws. We do not discriminate on the basis of race, color, national origin, age, disability, sex, sexual orientation, or gender identity.            Thank you!     Thank you for choosing [facility] INFUSION SERVICES  for your care. Our goal is always to provide you with excellent care. Hearing back from our patients is one way we can continue to improve our services. Please take a few minutes to complete the written survey that you may receive in the mail after your visit with us. Thank you!             Your Updated Medication List - Protect others around you: Learn how to safely use, store and throw away your medicines at www.disposemymeds.org.          This list is accurate as of 7/10/18  3:49 PM.  Always use your most recent med list.                   Brand Name Dispense Instructions for use Diagnosis    CARTIA  MG 24 hr capsule   Generic drug:  diltiazem     90 capsule    TAKE 1 CAPSULE(300 MG) BY MOUTH DAILY    Essential  hypertension       CENTRUM SILVER per tablet      Take 1 tablet by mouth daily        cetirizine 10 MG tablet    zyrTEC    30 tablet    Take 1 tablet (10 mg) by mouth 2 times daily    Need for pneumococcal vaccination       emollient cream      Apply topically as needed for other (leg rash)        hydrochlorothiazide 25 MG tablet    HYDRODIURIL    90 tablet    TAKE 1 TABLET BY MOUTH ONCE DAILY    Essential hypertension, Encounter for long-term (current) use of medications       levothyroxine 88 MCG tablet    SYNTHROID/LEVOTHROID    90 tablet    TAKE 1 TABLET BY MOUTH EVERY DAY    Encounter for medication refill       triamcinolone 0.1 % ointment    KENALOG    60 g    Apply sparingly to affected area three times daily for 14 days.    Hives

## 2018-07-10 NOTE — PROGRESS NOTES
Infusion Nursing Note:  Juliana Leal presents today for Xolair.    Patient seen by provider today: No   present during visit today: Not Applicable.    Note: Juliana denies any problems .2 shots in left arm    Intravenous Access:  No Intravenous access/labs at this visit.    Treatment Conditions:  Not Applicable.      Post Infusion Assessment:  Patient tolerated injection without incident.  Patient observed for 30 minutes post xolair per protocol.  Blood return noted pre and post infusion.    Discharge Plan:   Discharge instructions reviewed with: Patient and Family.  Patient and/or family verbalized understanding of discharge instructions and all questions answered.  Copy of AVS reviewed with patient and/or family.  Patient will return 8/7 for next appointment.  Patient discharged in stable condition accompanied by: self.  Departure Mode: Ambulatory.    Ella Cornelius RN

## 2018-07-11 ENCOUNTER — OFFICE VISIT (OUTPATIENT)
Dept: URGENT CARE | Facility: URGENT CARE | Age: 83
End: 2018-07-11
Payer: COMMERCIAL

## 2018-07-11 VITALS
WEIGHT: 129.06 LBS | DIASTOLIC BLOOD PRESSURE: 89 MMHG | TEMPERATURE: 97.6 F | SYSTOLIC BLOOD PRESSURE: 170 MMHG | HEART RATE: 78 BPM | BODY MASS INDEX: 19.62 KG/M2

## 2018-07-11 DIAGNOSIS — R39.15 URINARY URGENCY: Primary | ICD-10-CM

## 2018-07-11 LAB
ALBUMIN UR-MCNC: NEGATIVE MG/DL
APPEARANCE UR: CLEAR
BACTERIA #/AREA URNS HPF: ABNORMAL /HPF
BILIRUB UR QL STRIP: NEGATIVE
CASTS #/AREA URNS LPF: ABNORMAL /LPF (ref 0–2)
COLOR UR AUTO: YELLOW
GLUCOSE UR STRIP-MCNC: NEGATIVE MG/DL
HGB UR QL STRIP: NEGATIVE
KETONES UR STRIP-MCNC: NEGATIVE MG/DL
LEUKOCYTE ESTERASE UR QL STRIP: NEGATIVE
NITRATE UR QL: NEGATIVE
NON-SQ EPI CELLS #/AREA URNS LPF: ABNORMAL /LPF
PH UR STRIP: 5.5 PH (ref 5–7)
RBC #/AREA URNS AUTO: ABNORMAL /HPF
SOURCE: ABNORMAL
SP GR UR STRIP: <=1.005 (ref 1–1.03)
UROBILINOGEN UR STRIP-ACNC: 0.2 EU/DL (ref 0.2–1)
WBC #/AREA URNS AUTO: ABNORMAL /HPF

## 2018-07-11 PROCEDURE — 87086 URINE CULTURE/COLONY COUNT: CPT | Performed by: PHYSICIAN ASSISTANT

## 2018-07-11 PROCEDURE — 81001 URINALYSIS AUTO W/SCOPE: CPT | Performed by: PHYSICIAN ASSISTANT

## 2018-07-11 PROCEDURE — 99213 OFFICE O/P EST LOW 20 MIN: CPT | Performed by: PHYSICIAN ASSISTANT

## 2018-07-11 NOTE — PROGRESS NOTES
SUBJECTIVE:   Juliana Leal is a 89 year old female who  presents today for a possible UTI. Symptoms of urgency and frequency have been going on since last night.    Hematuria no.  sudden onset and mild.  There is no history of fever, chills, nausea or vomiting.  No history of vaginal discharge. This patient does have a history of urinary tract infections.  Patient denies long duration, rigors, flank pain, temperature > 101 degrees F. and Vomiting, significant nausea or diarrhea.       SH: patient is going up north to [place] to the cabin with her children and grandchildren for the next week.  Leaves tomorrow.    Please call daughter or granddaughter's cell with urine culture results.         Past Medical History:   Diagnosis Date     Angioedema 2017    Due to ACE      Arthritis      Gout      Headaches      Hives 02/2017    Dr KHOURY, avoid BBLK due to this condition     Hypertension      Thyroid disease      Patient Active Problem List   Diagnosis     Migraine     Health Care Home     Vertigo     Muscle tension headache     Pruritic disorder     Essential hypertension     Acquired hypothyroidism     Idiopathic urticaria     Social History   Substance Use Topics     Smoking status: Former Smoker     Types: Cigarettes     Quit date: 1/1/1960     Smokeless tobacco: Never Used     Alcohol use Yes      Comment:  1 beer maybe in 6 months       ROS:   CONSTITUTIONAL:NEGATIVE for fever, chills, change in weight  INTEGUMENTARY/SKIN: NEGATIVE for worrisome rashes, moles or lesions  : as per HPI  Review of systems negative except as stated above.    OBJECTIVE:  /89  Pulse 78  Temp 97.6  F (36.4  C) (Oral)  Wt 129 lb 1 oz (58.5 kg)  BMI 19.62 kg/m2  GENERAL APPEARANCE: healthy, alert and no distress  ABDOMEN:  soft, nontender, no HSM or masses and bowel sounds normal  BACK: No CVA tenderness  SKIN: no suspicious lesions or rashes    (R39.15) Urinary urgency  (primary encounter diagnosis)  Comment:   Plan: UA with  Microscopic reflex to Culture, Urine         Culture Aerobic Bacterial            Urine culture is pending and will be back in 3 days.      Patient expresses understanding and agreement with the assessment and plan as above.

## 2018-07-11 NOTE — PATIENT INSTRUCTIONS
(R39.15) Urinary urgency  (primary encounter diagnosis)  Comment:   Plan: UA with Microscopic reflex to Culture, Urine         Culture Aerobic Bacterial            Urine culture is pending and will be back in 3 days.

## 2018-07-11 NOTE — MR AVS SNAPSHOT
After Visit Summary   7/11/2018    Juliana Leal    MRN: [id number]           Patient Information     Date Of Birth          2/10/1923        Visit Information        Provider Department      7/11/2018 12:25 PM Gayathri Nava PA-C [facility]        Today's Diagnoses     Urinary urgency    -  1      Care Instructions    (R39.15) Urinary urgency  (primary encounter diagnosis)  Comment:   Plan: UA with Microscopic reflex to Culture, Urine         Culture Aerobic Bacterial            Urine culture is pending and will be back in 3 days.                  Follow-ups after your visit        Your next 10 appointments already scheduled     Aug 07, 2018  1:30 PM CDT   Level 1 with RH INFUSION CHAIR 10   [facility] Infusion Services ([facility])    [facility]  [address]  [facility] [address] [phone]              Who to contact     If you have questions or need follow up information about today's clinic visit or your schedule please contact [facility] directly at [phone].  Normal or non-critical lab and imaging results will be communicated to you by Ataxionhart, letter or phone within 4 business days after the clinic has received the results. If you do not hear from us within 7 days, please contact the clinic through Picapicat or phone. If you have a critical or abnormal lab result, we will notify you by phone as soon as possible.  Submit refill requests through Yones or call your pharmacy and they will forward the refill request to us. Please allow 3 business days for your refill to be completed.          Additional Information About Your Visit        MyChart Information     Yones gives you secure access to your electronic health record. If you see a primary care provider, you can also send messages to your care team and make appointments. If you have questions,  please call your primary care clinic.  If you do not have a primary care provider, please call [phone] and they will assist you.        Care EveryWhere ID     This is your Care EveryWhere ID. This could be used by other organizations to access your [place] medical records  [id number]        Your Vitals Were     Pulse Temperature BMI (Body Mass Index)             78 97.6  F (36.4  C) (Oral) 19.62 kg/m2          Blood Pressure from Last 3 Encounters:   07/11/18 170/89   07/10/18 145/82   06/12/18 147/83    Weight from Last 3 Encounters:   07/11/18 129 lb 1 oz (58.5 kg)   06/12/18 129 lb 12.8 oz (58.9 kg)   06/04/18 127 lb (57.6 kg)              We Performed the Following     UA with Microscopic reflex to Culture     Urine Culture Aerobic Bacterial          Today's Medication Changes          These changes are accurate as of 7/11/18  1:44 PM.  If you have any questions, ask your nurse or doctor.               Stop taking these medicines if you haven't already. Please contact your care team if you have questions.     triamcinolone 0.1 % ointment   Commonly known as:  KENALOG   Stopped by:  Gayathri Nava, PA-C                    Primary Care Provider Office Phone # Fax #    Erick Ruano [phone]       [address] [facility] [address]        Equal Access to Services     [facility]: Hadii nathaly ku hadasho Solarryali, waaxda luqadaha, qaybta kaalmada oneyda, cynthia yancey . So [facility] [phone].    ATENCIÓN: Si habla español, tiene a workman disposición servicios gratuitos de asistencia lingüística. Llame al [phone].    We comply with applicable federal civil rights laws and [place] laws. We do not discriminate on the basis of race, color, national origin, age, disability, sex, sexual orientation, or gender identity.            Thank you!     Thank you for choosing [facility]  for your care. Our goal is always to provide  you with excellent care. Hearing back from our patients is one way we can continue to improve our services. Please take a few minutes to complete the written survey that you may receive in the mail after your visit with us. Thank you!             Your Updated Medication List - Protect others around you: Learn how to safely use, store and throw away your medicines at www.disposemymeds.org.          This list is accurate as of 7/11/18  1:44 PM.  Always use your most recent med list.                   Brand Name Dispense Instructions for use Diagnosis    CARTIA  MG 24 hr capsule   Generic drug:  diltiazem     90 capsule    TAKE 1 CAPSULE(300 MG) BY MOUTH DAILY    Essential hypertension       CENTRUM SILVER per tablet      Take 1 tablet by mouth daily        cetirizine 10 MG tablet    zyrTEC    30 tablet    Take 1 tablet (10 mg) by mouth 2 times daily    Need for pneumococcal vaccination       emollient cream      Apply topically as needed for other (leg rash)        hydrochlorothiazide 25 MG tablet    HYDRODIURIL    90 tablet    TAKE 1 TABLET BY MOUTH ONCE DAILY    Essential hypertension, Encounter for long-term (current) use of medications       levothyroxine 88 MCG tablet    SYNTHROID/LEVOTHROID    90 tablet    TAKE 1 TABLET BY MOUTH EVERY DAY    Encounter for medication refill

## 2018-07-12 LAB
BACTERIA SPEC CULT: NORMAL
SPECIMEN SOURCE: NORMAL

## 2018-07-23 ENCOUNTER — OFFICE VISIT (OUTPATIENT)
Dept: INTERNAL MEDICINE | Facility: CLINIC | Age: 83
End: 2018-07-23
Payer: COMMERCIAL

## 2018-07-23 VITALS
BODY MASS INDEX: 19.01 KG/M2 | HEART RATE: 88 BPM | WEIGHT: 125 LBS | SYSTOLIC BLOOD PRESSURE: 120 MMHG | RESPIRATION RATE: 16 BRPM | OXYGEN SATURATION: 98 % | DIASTOLIC BLOOD PRESSURE: 70 MMHG | TEMPERATURE: 97.7 F

## 2018-07-23 DIAGNOSIS — K21.9 GASTROESOPHAGEAL REFLUX DISEASE, ESOPHAGITIS PRESENCE NOT SPECIFIED: Primary | ICD-10-CM

## 2018-07-23 DIAGNOSIS — N18.30 CKD (CHRONIC KIDNEY DISEASE) STAGE 3, GFR 30-59 ML/MIN (H): ICD-10-CM

## 2018-07-23 PROCEDURE — 99214 OFFICE O/P EST MOD 30 MIN: CPT | Performed by: INTERNAL MEDICINE

## 2018-07-23 NOTE — PROGRESS NOTES
"  SUBJECTIVE:   Juliana Leal is a 89 year old female who presents to clinic today for the following health issues:      ED/UC Followup:    Facility:  [facility]  Date of visit: 07/11/2018  Reason for visit: Urinary Urgency  Current Status: A lot of issues, urine is fine, but there is some symptoms of Acid Reflux     Pt's past medical history, family history, habits, medications and allergies were reviewed with the patient today.  See snap shot for  HCM status. Most recent lab results reviewed with pt. Problem list and histories reviewed & adjusted, as indicated.  Additional history as below:    Seen in UC 7/11 with urine frequency. UA neg for infection.  HAS nocturia x1. Using Depends   Has one cup coffee  Per day  BP and thyroid  well controlled with meds. Hx CKD  With stable GFR. Labs UTD and due again  In Dec for BMP  Had some gassiness 2 weeks ago. Took Rolaids and then got  Some pain in chest after taking the pill. Ended up sitting up that night  Since then, has been passing flatulence the past 10 days and better some since got home 2 days ago from cabin.  Taking TUMS will help  Has long walk in hallway to get to apt.   No chest pain or shortness of breath  with walking that multiple times per day including today  Chest worse with tomato juice   No cough.    No F/C   No dysphagia with swallowing toast for breakfast today  No discomfort in chest or epigastric area today       Additional ROS:   Constitutional, HEENT, Cardiovascular, Pulmonary, GI and , Neuro, MSK and Psych review of systems/symptoms are otherwise negative or unchanged from previous, except as noted above.      OBJECTIVE:  /70  Pulse 88  Temp 97.7  F (36.5  C) (Oral)  Resp 16  Wt 125 lb (56.7 kg)  SpO2 98%  BMI 19.01 kg/m2   Estimated body mass index is 19.01 kg/(m^2) as calculated from the following:    Height as of 6/4/18: 5' 8\" (1.727 m).    Weight as of this encounter: 125 lb (56.7 kg).    Neck: no adenopathy. Thyroid " normal to palpation. No bruits  Pulm: Lungs clear to auscultation   CV: Regular rates and rhythm  GI: Soft, thin, nontender, Normal active bowel sounds, No hepatosplenomegaly or masses palpable  Ext: Peripheral pulses intact. No edema.  Neuro: Normal strength and tone, sensory exam grossly normal    Assessment/Plan: (See plan discussion below for further details)  1. Gastroesophageal reflux disease, esophagitis presence not specified  See plan discussion below.   - omeprazole (PRILOSEC) 20 MG CR capsule; Take 1 capsule (20 mg) by mouth daily  Dispense: 30 capsule; Refill: 0    2. CKD (chronic kidney disease) stage 3, GFR 30-59 ml/min  Has tammy stable. Repeat lab in December    Plan discussion:   Omeprazole 20mg capsule,  1 capsule daily in AM about 30 min before breakfast for acid in stomach for 30 days.   If chest  symptoms not improved with the Omeprazole or get better but come back when stopping the medication, then contact clinic   Flu shot in the Fall  Nonfasting kidney lab in December         Erick Ruano MD  Internal Medicine Department  [facility]

## 2018-07-23 NOTE — MR AVS SNAPSHOT
After Visit Summary   7/23/2018    Juliana Leal    MRN: [id number]           Patient Information     Date Of Birth          2/10/1923        Visit Information        Provider Department      7/23/2018 10:00 AM Erick Ruano MD [facility]        Care Instructions     Omeprazole 20mg capsule,  1 capsule daily in AM about 30 min before breakfast for acid in stomach for 30 days.   If chest  symptoms not improved with the Omeprazole or get better but come back when stopping the medication, then contact clinic   Flu shot in the Fall  Nonfasting kidney lab in December          Follow-ups after your visit        Your next 10 appointments already scheduled     Aug 07, 2018  1:30 PM CDT   Level 1 with RH INFUSION CHAIR 10   [facility] Infusion Services ([facility])    [facility]  [address]  [facility] [address] [phone]              Who to contact     If you have questions or need follow up information about today's clinic visit or your schedule please contact [facility] directly at [phone].  Normal or non-critical lab and imaging results will be communicated to you by Lab7 Systemshart, letter or phone within 4 business days after the clinic has received the results. If you do not hear from us within 7 days, please contact the clinic through Lab7 Systemshart or phone. If you have a critical or abnormal lab result, we will notify you by phone as soon as possible.  Submit refill requests through MATIvision or call your pharmacy and they will forward the refill request to us. Please allow 3 business days for your refill to be completed.          Additional Information About Your Visit        MyChart Information     MATIvision gives you secure access to your electronic health record. If you see a primary care provider, you can also send messages to your care team and make appointments. If you have  questions, please call your primary care clinic.  If you do not have a primary care provider, please call [phone] and they will assist you.        Care EveryWhere ID     This is your Care EveryWhere ID. This could be used by other organizations to access your [place] medical records  [id number]        Your Vitals Were     Pulse Temperature Respirations Pulse Oximetry BMI (Body Mass Index)       88 97.7  F (36.5  C) (Oral) 16 98% 19.01 kg/m2        Blood Pressure from Last 3 Encounters:   07/23/18 120/70   07/11/18 170/89   07/10/18 145/82    Weight from Last 3 Encounters:   07/23/18 125 lb (56.7 kg)   07/11/18 129 lb 1 oz (58.5 kg)   06/12/18 129 lb 12.8 oz (58.9 kg)              Today, you had the following     No orders found for display       Primary Care Provider Office Phone # Fax #    Erick Ruano [phone]       [address] [place] [address]        Equal Access to Services     [facility]: Hadii aad ku hadasho Soomaali, waaxda luqadaha, qaybta kaalmada adeegyada, cynthia yancey . So [facility] [phone].    ATENCIÓN: Si habla español, tiene a workman disposición servicios gratuitos de asistencia lingüística. Llame al [phone].    We comply with applicable federal civil rights laws and [place] laws. We do not discriminate on the basis of race, color, national origin, age, disability, sex, sexual orientation, or gender identity.            Thank you!     Thank you for choosing [facility]  for your care. Our goal is always to provide you with excellent care. Hearing back from our patients is one way we can continue to improve our services. Please take a few minutes to complete the written survey that you may receive in the mail after your visit with us. Thank you!             Your Updated Medication List - Protect others around you: Learn how to safely use, store and throw away your medicines at www.disposemymeds.org.           This list is accurate as of 7/23/18 10:38 AM.  Always use your most recent med list.                   Brand Name Dispense Instructions for use Diagnosis    CARTIA  MG 24 hr capsule   Generic drug:  diltiazem     90 capsule    TAKE 1 CAPSULE(300 MG) BY MOUTH DAILY    Essential hypertension       CENTRUM SILVER per tablet      Take 1 tablet by mouth daily        cetirizine 10 MG tablet    zyrTEC    30 tablet    Take 1 tablet (10 mg) by mouth 2 times daily    Need for pneumococcal vaccination       emollient cream      Apply topically as needed for other (leg rash)        hydrochlorothiazide 25 MG tablet    HYDRODIURIL    90 tablet    TAKE 1 TABLET BY MOUTH ONCE DAILY    Essential hypertension, Encounter for long-term (current) use of medications       levothyroxine 88 MCG tablet    SYNTHROID/LEVOTHROID    90 tablet    TAKE 1 TABLET BY MOUTH EVERY DAY    Encounter for medication refill

## 2018-07-23 NOTE — PATIENT INSTRUCTIONS
Omeprazole 20mg capsule,  1 capsule daily in AM about 30 min before breakfast for acid in stomach for 30 days.   If chest  symptoms not improved with the Omeprazole or get better but come back when stopping the medication, then contact clinic   Flu shot in the Fall  Nonfasting kidney lab in December

## 2018-08-01 ENCOUNTER — OFFICE VISIT (OUTPATIENT)
Dept: URGENT CARE | Facility: URGENT CARE | Age: 83
End: 2018-08-01
Payer: COMMERCIAL

## 2018-08-01 VITALS
OXYGEN SATURATION: 96 % | TEMPERATURE: 98.2 F | BODY MASS INDEX: 18.84 KG/M2 | DIASTOLIC BLOOD PRESSURE: 82 MMHG | WEIGHT: 123.9 LBS | HEART RATE: 99 BPM | SYSTOLIC BLOOD PRESSURE: 142 MMHG

## 2018-08-01 DIAGNOSIS — K12.2 UVULITIS: Primary | ICD-10-CM

## 2018-08-01 DIAGNOSIS — R35.0 URINARY FREQUENCY: ICD-10-CM

## 2018-08-01 DIAGNOSIS — R50.9 FEVER AND CHILLS: ICD-10-CM

## 2018-08-01 LAB
ALBUMIN UR-MCNC: ABNORMAL MG/DL
APPEARANCE UR: CLEAR
BACTERIA #/AREA URNS HPF: ABNORMAL /HPF
BILIRUB UR QL STRIP: ABNORMAL
CASTS #/AREA URNS LPF: ABNORMAL /LPF (ref 0–2)
COLOR UR AUTO: YELLOW
DEPRECATED S PYO AG THROAT QL EIA: NORMAL
GLUCOSE UR STRIP-MCNC: NEGATIVE MG/DL
HGB UR QL STRIP: ABNORMAL
KETONES UR STRIP-MCNC: NEGATIVE MG/DL
LEUKOCYTE ESTERASE UR QL STRIP: NEGATIVE
NITRATE UR QL: NEGATIVE
NON-SQ EPI CELLS #/AREA URNS LPF: ABNORMAL /LPF
PH UR STRIP: 6 PH (ref 5–7)
RBC #/AREA URNS AUTO: ABNORMAL /HPF
SOURCE: ABNORMAL
SP GR UR STRIP: 1.01 (ref 1–1.03)
SPECIMEN SOURCE: NORMAL
UROBILINOGEN UR STRIP-ACNC: 0.2 EU/DL (ref 0.2–1)
WBC #/AREA URNS AUTO: ABNORMAL /HPF

## 2018-08-01 PROCEDURE — 99214 OFFICE O/P EST MOD 30 MIN: CPT | Performed by: PHYSICIAN ASSISTANT

## 2018-08-01 PROCEDURE — 87086 URINE CULTURE/COLONY COUNT: CPT | Performed by: PHYSICIAN ASSISTANT

## 2018-08-01 PROCEDURE — 81001 URINALYSIS AUTO W/SCOPE: CPT | Performed by: PHYSICIAN ASSISTANT

## 2018-08-01 PROCEDURE — 87880 STREP A ASSAY W/OPTIC: CPT | Performed by: PHYSICIAN ASSISTANT

## 2018-08-01 PROCEDURE — 87081 CULTURE SCREEN ONLY: CPT | Mod: 59 | Performed by: PHYSICIAN ASSISTANT

## 2018-08-01 RX ORDER — CEFUROXIME AXETIL 500 MG/1
500 TABLET ORAL 2 TIMES DAILY
Qty: 20 TABLET | Refills: 0 | Status: SHIPPED | OUTPATIENT
Start: 2018-08-01 | End: 2018-08-09

## 2018-08-01 NOTE — PATIENT INSTRUCTIONS
(K12.2) Uvulitis  (primary encounter diagnosis)  Comment:   Plan: cefuroxime (CEFTIN) 500 MG tablet            (R50.9) Fever and chills  Comment:   Plan: Strep, Rapid Screen, UA with Microscopic reflex        to Culture, Urine Culture Aerobic Bacterial,         Beta strep group A culture            (R35.0) Urinary frequency  Comment:   Plan: cefuroxime (CEFTIN) 500 MG tablet            Salt water gargles.      Tylenol as needed.

## 2018-08-01 NOTE — MR AVS SNAPSHOT
After Visit Summary   8/1/2018    Juliana Leal    MRN: [id number]           Patient Information     Date Of Birth          2/10/1923        Visit Information        Provider Department      8/1/2018 9:20 AM Gayathri Nava PA-C [facility]        Today's Diagnoses     Uvulitis    -  1    Fever and chills        Urinary frequency          Care Instructions    (K12.2) Uvulitis  (primary encounter diagnosis)  Comment:   Plan: cefuroxime (CEFTIN) 500 MG tablet            (R50.9) Fever and chills  Comment:   Plan: Strep, Rapid Screen, UA with Microscopic reflex        to Culture, Urine Culture Aerobic Bacterial,         Beta strep group A culture            (R35.0) Urinary frequency  Comment:   Plan: cefuroxime (CEFTIN) 500 MG tablet            Salt water gargles.      Tylenol as needed.              Follow-ups after your visit        Your next 10 appointments already scheduled     Aug 07, 2018  1:30 PM CDT   Level 1 with  INFUSION CHAIR 10   [facility] ([facility])    [facility]  [address]  [facility] [address]  [facility] [id number] [phone]              Who to contact     If you have questions or need follow up information about today's clinic visit or your schedule please contact [facility] directly at [phone].  Normal or non-critical lab and imaging results will be communicated to you by MyChart, letter or phone within 4 business days after the clinic has received the results. If you do not hear from us within 7 days, please contact the clinic through MyChart or phone. If you have a critical or abnormal lab result, we will notify you by phone as soon as possible.  Submit refill requests through [employer] or call your pharmacy and they will forward the refill request to us. Please allow 3 business days for your refill to be completed.          Additional  Information About Your Visit        [email or web address] Information     [employer] gives you secure access to your electronic health record. If you see a primary care provider, you can also send messages to your care team and make appointments. If you have questions, please call your primary care clinic.  If you do not have a primary care provider, please call [phone] and they will assist you.        Care EveryWhere ID     This is your Care EveryWhere ID. This could be used by other organizations to access your [place] medical records  [id number]        Your Vitals Were     Pulse Temperature Pulse Oximetry BMI (Body Mass Index)          99 98.2  F (36.8  C) (Oral) 96% 18.84 kg/m2         Blood Pressure from Last 3 Encounters:   08/01/18 142/82   07/23/18 120/70   07/11/18 170/89    Weight from Last 3 Encounters:   08/01/18 123 lb 14.4 oz (56.2 kg)   07/23/18 125 lb (56.7 kg)   07/11/18 129 lb 1 oz (58.5 kg)              We Performed the Following     Beta strep group A culture     Strep, Rapid Screen     UA with Microscopic reflex to Culture     Urine Culture Aerobic Bacterial          Today's Medication Changes          These changes are accurate as of 8/1/18 11:44 AM.  If you have any questions, ask your nurse or doctor.               Start taking these medicines.        Dose/Directions    cefuroxime 500 MG tablet   Commonly known as:  CEFTIN   Used for:  Uvulitis, Urinary frequency   Started by:  Gayathri Nava PA-C        Dose:  500 mg   Take 1 tablet (500 mg) by mouth 2 times daily   Quantity:  20 tablet   Refills:  0            Where to get your medicines      These medications were sent to [facility] [address]  [address] [address]     Phone:  [phone]     cefuroxime 500 MG tablet                Primary Care Provider Office Phone # Fax #    Erick Ruano [phone]       [address] [place] [address]        Equal  Access to Services     [facility]: Hadii aad ku hadnaeandrew Andreephoenix, wajereda luqadaha, qaybta kaalmacynthia new. So [facility] [phone].    ATENCIÓN: Si habla español, tiene a workman disposición servicios gratuitos de asistencia lingüística. Llame al [phone].    We comply with applicable federal civil rights laws and [place] laws. We do not discriminate on the basis of race, color, national origin, age, disability, sex, sexual orientation, or gender identity.            Thank you!     Thank you for choosing [facility]  for your care. Our goal is always to provide you with excellent care. Hearing back from our patients is one way we can continue to improve our services. Please take a few minutes to complete the written survey that you may receive in the mail after your visit with us. Thank you!             Your Updated Medication List - Protect others around you: Learn how to safely use, store and throw away your medicines at www.disposemymeds.org.          This list is accurate as of 8/1/18 11:44 AM.  Always use your most recent med list.                   Brand Name Dispense Instructions for use Diagnosis    CARTIA  MG 24 hr capsule   Generic drug:  diltiazem     90 capsule    TAKE 1 CAPSULE(300 MG) BY MOUTH DAILY    Essential hypertension       cefuroxime 500 MG tablet    CEFTIN    20 tablet    Take 1 tablet (500 mg) by mouth 2 times daily    Uvulitis, Urinary frequency       CENTRUM SILVER per tablet      Take 1 tablet by mouth daily        cetirizine 10 MG tablet    zyrTEC    30 tablet    Take 1 tablet (10 mg) by mouth 2 times daily    Need for pneumococcal vaccination       emollient cream      Apply topically as needed for other (leg rash)        hydrochlorothiazide 25 MG tablet    HYDRODIURIL    90 tablet    TAKE 1 TABLET BY MOUTH ONCE DAILY    Essential hypertension, Encounter for long-term (current) use of medications        levothyroxine 88 MCG tablet    SYNTHROID/LEVOTHROID    90 tablet    TAKE 1 TABLET BY MOUTH EVERY DAY    Encounter for medication refill       omeprazole 20 MG CR capsule    priLOSEC    30 capsule    Take 1 capsule (20 mg) by mouth daily    Gastroesophageal reflux disease, esophagitis presence not specified

## 2018-08-01 NOTE — PROGRESS NOTES
SUBJECTIVE:   Juliana Leal is a 89 year old female presenting with a chief complaint of   1) sore throat for the past week, worsening in the past 2 days, very painful to swallow.   2) slight runny nose  3) fatigue and just not feeling well in the past few days, slightly off balance, no headache.    4) bilateral ear discomfort.    5) some urinary frequency.      Onset of symptoms was as above.    Course of illness is worsening.    Severity moderate  Current and Associated symptoms: as above  Treatment measures tried include none.  Predisposing factors include h/o UTI.    SH: patient was recently on vacation with her grandchildren    Past Medical History:   Diagnosis Date     Angioedema 2017    Due to ACE      Arthritis      CKD (chronic kidney disease) stage 3, GFR 30-59 ml/min 7/23/2018     Gout      Headaches      Hives 02/2017    Dr KHOURY, avoid BBLK due to this condition     Hypertension      Thyroid disease      Patient Active Problem List   Diagnosis     Health Care Home     Muscle tension headache     Essential hypertension     Acquired hypothyroidism     Idiopathic urticaria     CKD (chronic kidney disease) stage 3, GFR 30-59 ml/min     Social History   Substance Use Topics     Smoking status: Former Smoker     Types: Cigarettes     Quit date: 1/1/1960     Smokeless tobacco: Never Used     Alcohol use Yes      Comment:  1 beer maybe in 6 months       ROS:  CONSTITUTIONAL:NEGATIVE for fever, chills, change in weight  INTEGUMENTARY/SKIN: NEGATIVE for worrisome rashes, moles or lesions  ENT/MOUTH: as per HPI  RESP:NEGATIVE for significant cough or SOB  CV: NEGATIVE for chest pain, palpitations or peripheral edema  GI: NEGATIVE for nausea, abdominal pain, heartburn, or change in bowel habits  : as per HPI  MUSCULOSKELETAL: NEGATIVE for significant arthralgias or myalgia  NEURO: NEGATIVE for weakness, dizziness or paresthesias  Review of systems is erythematous and ednegative except as stated  above.    OBJECTIVE  :/82  Pulse 99  Temp 98.2  F (36.8  C) (Oral)  Wt 123 lb 14.4 oz (56.2 kg)  SpO2 96%  BMI 18.84 kg/m2  GENERAL APPEARANCE: healthy, alert and no distress  EYES: EOMI,  PERRL, conjunctiva clear  HENT: ear canals and TM's normal.  Nose and mouth without ulcers, erythema or lesions  HENT: uvula is erythematous and edematous  NECK: supple, with anterior cervical lymphadenopathy  RESP: lungs clear to auscultation - no rales, rhonchi or wheezes  CV: regular rates and rhythm, normal S1 S2, no murmur noted  ABDOMEN:  soft, nontender, no HSM or masses and bowel sounds normal  NEURO: Normal strength and tone, sensory exam grossly normal,  normal speech and mentation  SKIN: no suspicious lesions or rashes    (K12.2) Uvulitis  (primary encounter diagnosis)  Comment:   Plan: cefuroxime (CEFTIN) 500 MG tablet            (R50.9) Fever and chills  Comment:   Plan: Strep, Rapid Screen, UA with Microscopic reflex        to Culture, Urine Culture Aerobic Bacterial,         Beta strep group A culture            (R35.0) Urinary frequency  Comment:   Plan: cefuroxime (CEFTIN) 500 MG tablet            Salt water gargles.      Tylenol as needed.      F/U with PCP should symptoms persist or worsen.      Patient expresses understanding and agreement with the assessment and plan as above.

## 2018-08-02 LAB
BACTERIA SPEC CULT: NO GROWTH
BACTERIA SPEC CULT: NORMAL
SPECIMEN SOURCE: NORMAL
SPECIMEN SOURCE: NORMAL

## 2018-08-03 ENCOUNTER — APPOINTMENT (OUTPATIENT)
Dept: CT IMAGING | Facility: CLINIC | Age: 83
End: 2018-08-03
Attending: EMERGENCY MEDICINE
Payer: MEDICARE

## 2018-08-03 ENCOUNTER — TELEPHONE (OUTPATIENT)
Dept: INTERNAL MEDICINE | Facility: CLINIC | Age: 83
End: 2018-08-03

## 2018-08-03 ENCOUNTER — HOSPITAL ENCOUNTER (OUTPATIENT)
Facility: CLINIC | Age: 83
Setting detail: OBSERVATION
Discharge: HOME | End: 2018-08-05
Attending: EMERGENCY MEDICINE | Admitting: HOSPITALIST
Payer: MEDICARE

## 2018-08-03 DIAGNOSIS — R53.1 GENERALIZED WEAKNESS: ICD-10-CM

## 2018-08-03 DIAGNOSIS — J02.9 PHARYNGITIS, UNSPECIFIED ETIOLOGY: ICD-10-CM

## 2018-08-03 DIAGNOSIS — E86.0 DEHYDRATION: ICD-10-CM

## 2018-08-03 DIAGNOSIS — N17.9 AKI (ACUTE KIDNEY INJURY) (H): ICD-10-CM

## 2018-08-03 LAB
ANION GAP SERPL CALCULATED.3IONS-SCNC: 10 MMOL/L (ref 3–14)
BASOPHILS # BLD AUTO: 0 10E9/L (ref 0–0.2)
BASOPHILS NFR BLD AUTO: 0.3 %
BUN SERPL-MCNC: 39 MG/DL (ref 7–30)
CALCIUM SERPL-MCNC: 9.1 MG/DL (ref 8.5–10.1)
CHLORIDE SERPL-SCNC: 100 MMOL/L (ref 94–109)
CO2 SERPL-SCNC: 26 MMOL/L (ref 20–32)
CREAT SERPL-MCNC: 1.82 MG/DL (ref 0.52–1.04)
DIFFERENTIAL METHOD BLD: NORMAL
EOSINOPHIL # BLD AUTO: 0.2 10E9/L (ref 0–0.7)
EOSINOPHIL NFR BLD AUTO: 2.7 %
ERYTHROCYTE [DISTWIDTH] IN BLOOD BY AUTOMATED COUNT: 13.8 % (ref 10–15)
GFR SERPL CREATININE-BSD FRML MDRD: 26 ML/MIN/1.7M2
GLUCOSE SERPL-MCNC: 170 MG/DL (ref 70–99)
HCT VFR BLD AUTO: 41.5 % (ref 35–47)
HGB BLD-MCNC: 14 G/DL (ref 11.7–15.7)
IMM GRANULOCYTES # BLD: 0.1 10E9/L (ref 0–0.4)
IMM GRANULOCYTES NFR BLD: 0.8 %
LACTATE BLD-SCNC: 2.2 MMOL/L (ref 0.7–2)
LACTATE SERPL-SCNC: 1.2 MMOL/L (ref 0.4–2)
LYMPHOCYTES # BLD AUTO: 0.8 10E9/L (ref 0.8–5.3)
LYMPHOCYTES NFR BLD AUTO: 9.1 %
MCH RBC QN AUTO: 31.1 PG (ref 26.5–33)
MCHC RBC AUTO-ENTMCNC: 33.7 G/DL (ref 31.5–36.5)
MCV RBC AUTO: 92 FL (ref 78–100)
MONOCYTES # BLD AUTO: 0.8 10E9/L (ref 0–1.3)
MONOCYTES NFR BLD AUTO: 9.4 %
NEUTROPHILS # BLD AUTO: 6.9 10E9/L (ref 1.6–8.3)
NEUTROPHILS NFR BLD AUTO: 77.7 %
NRBC # BLD AUTO: 0 10*3/UL
NRBC BLD AUTO-RTO: 0 /100
PLATELET # BLD AUTO: 235 10E9/L (ref 150–450)
POTASSIUM SERPL-SCNC: 3.9 MMOL/L (ref 3.4–5.3)
RBC # BLD AUTO: 4.5 10E12/L (ref 3.8–5.2)
SODIUM SERPL-SCNC: 136 MMOL/L (ref 133–144)
WBC # BLD AUTO: 8.8 10E9/L (ref 4–11)

## 2018-08-03 PROCEDURE — 80048 BASIC METABOLIC PNL TOTAL CA: CPT | Performed by: EMERGENCY MEDICINE

## 2018-08-03 PROCEDURE — 99285 EMERGENCY DEPT VISIT HI MDM: CPT | Mod: 25

## 2018-08-03 PROCEDURE — A9270 NON-COVERED ITEM OR SERVICE: HCPCS | Mod: GY | Performed by: PHYSICIAN ASSISTANT

## 2018-08-03 PROCEDURE — 99219 ZZC INITIAL OBSERVATION CARE,LEVL II: CPT | Performed by: PHYSICIAN ASSISTANT

## 2018-08-03 PROCEDURE — 25000125 ZZHC RX 250: Performed by: PHYSICIAN ASSISTANT

## 2018-08-03 PROCEDURE — G0378 HOSPITAL OBSERVATION PER HR: HCPCS

## 2018-08-03 PROCEDURE — 96361 HYDRATE IV INFUSION ADD-ON: CPT

## 2018-08-03 PROCEDURE — 25000128 H RX IP 250 OP 636: Performed by: PHYSICIAN ASSISTANT

## 2018-08-03 PROCEDURE — 25000128 H RX IP 250 OP 636: Performed by: EMERGENCY MEDICINE

## 2018-08-03 PROCEDURE — 96360 HYDRATION IV INFUSION INIT: CPT

## 2018-08-03 PROCEDURE — 25000132 ZZH RX MED GY IP 250 OP 250 PS 637: Mod: GY | Performed by: PHYSICIAN ASSISTANT

## 2018-08-03 PROCEDURE — 83605 ASSAY OF LACTIC ACID: CPT | Mod: 91 | Performed by: EMERGENCY MEDICINE

## 2018-08-03 PROCEDURE — 85025 COMPLETE CBC W/AUTO DIFF WBC: CPT | Performed by: EMERGENCY MEDICINE

## 2018-08-03 PROCEDURE — 70490 CT SOFT TISSUE NECK W/O DYE: CPT

## 2018-08-03 RX ORDER — ONDANSETRON 4 MG/1
4 TABLET, ORALLY DISINTEGRATING ORAL EVERY 6 HOURS PRN
Status: DISCONTINUED | OUTPATIENT
Start: 2018-08-03 | End: 2018-08-05 | Stop reason: HOSPADM

## 2018-08-03 RX ORDER — LEVOTHYROXINE SODIUM 88 UG/1
88 TABLET ORAL DAILY
Status: DISCONTINUED | OUTPATIENT
Start: 2018-08-04 | End: 2018-08-05 | Stop reason: HOSPADM

## 2018-08-03 RX ORDER — ACETAMINOPHEN 500 MG
500 TABLET ORAL 2 TIMES DAILY PRN
COMMUNITY
End: 2019-04-30

## 2018-08-03 RX ORDER — SODIUM CHLORIDE 9 MG/ML
INJECTION, SOLUTION INTRAVENOUS CONTINUOUS
Status: ACTIVE | OUTPATIENT
Start: 2018-08-03 | End: 2018-08-04

## 2018-08-03 RX ORDER — CEFUROXIME AXETIL 250 MG/1
500 TABLET ORAL 2 TIMES DAILY WITH MEALS
Status: DISCONTINUED | OUTPATIENT
Start: 2018-08-03 | End: 2018-08-05 | Stop reason: HOSPADM

## 2018-08-03 RX ORDER — LIDOCAINE 40 MG/G
CREAM TOPICAL
Status: DISCONTINUED | OUTPATIENT
Start: 2018-08-03 | End: 2018-08-05 | Stop reason: HOSPADM

## 2018-08-03 RX ORDER — NALOXONE HYDROCHLORIDE 0.4 MG/ML
.1-.4 INJECTION, SOLUTION INTRAMUSCULAR; INTRAVENOUS; SUBCUTANEOUS
Status: DISCONTINUED | OUTPATIENT
Start: 2018-08-03 | End: 2018-08-05 | Stop reason: HOSPADM

## 2018-08-03 RX ORDER — DEXAMETHASONE SODIUM PHOSPHATE 10 MG/ML
10 INJECTION, SOLUTION INTRAMUSCULAR; INTRAVENOUS ONCE
Status: COMPLETED | OUTPATIENT
Start: 2018-08-03 | End: 2018-08-03

## 2018-08-03 RX ORDER — ACETAMINOPHEN 325 MG/1
650 TABLET ORAL EVERY 4 HOURS PRN
Status: DISCONTINUED | OUTPATIENT
Start: 2018-08-03 | End: 2018-08-05 | Stop reason: HOSPADM

## 2018-08-03 RX ORDER — POLYETHYLENE GLYCOL 3350 17 G/17G
17 POWDER, FOR SOLUTION ORAL DAILY PRN
Status: DISCONTINUED | OUTPATIENT
Start: 2018-08-03 | End: 2018-08-05 | Stop reason: HOSPADM

## 2018-08-03 RX ORDER — HYDROCHLOROTHIAZIDE 25 MG/1
25 TABLET ORAL DAILY
Status: DISCONTINUED | OUTPATIENT
Start: 2018-08-04 | End: 2018-08-04

## 2018-08-03 RX ORDER — ONDANSETRON 2 MG/ML
4 INJECTION INTRAMUSCULAR; INTRAVENOUS EVERY 6 HOURS PRN
Status: DISCONTINUED | OUTPATIENT
Start: 2018-08-03 | End: 2018-08-05 | Stop reason: HOSPADM

## 2018-08-03 RX ORDER — AMOXICILLIN 250 MG
1 CAPSULE ORAL 2 TIMES DAILY PRN
Status: DISCONTINUED | OUTPATIENT
Start: 2018-08-03 | End: 2018-08-05 | Stop reason: HOSPADM

## 2018-08-03 RX ORDER — AMOXICILLIN 250 MG
2 CAPSULE ORAL 2 TIMES DAILY PRN
Status: DISCONTINUED | OUTPATIENT
Start: 2018-08-03 | End: 2018-08-05 | Stop reason: HOSPADM

## 2018-08-03 RX ADMIN — LIDOCAINE HYDROCHLORIDE 30 ML: 20 SOLUTION ORAL; TOPICAL at 16:59

## 2018-08-03 RX ADMIN — SODIUM CHLORIDE 1000 ML: 9 INJECTION, SOLUTION INTRAVENOUS at 12:12

## 2018-08-03 RX ADMIN — SODIUM CHLORIDE: 9 INJECTION, SOLUTION INTRAVENOUS at 17:05

## 2018-08-03 RX ADMIN — CEFUROXIME AXETIL 500 MG: 250 TABLET, FILM COATED ORAL at 18:34

## 2018-08-03 RX ADMIN — DEXAMETHASONE SODIUM PHOSPHATE 10 MG: 10 INJECTION, SOLUTION INTRAMUSCULAR; INTRAVENOUS at 12:11

## 2018-08-03 RX ADMIN — SODIUM CHLORIDE, POTASSIUM CHLORIDE, SODIUM LACTATE AND CALCIUM CHLORIDE 1000 ML: 600; 310; 30; 20 INJECTION, SOLUTION INTRAVENOUS at 14:19

## 2018-08-03 ASSESSMENT — ENCOUNTER SYMPTOMS
SORE THROAT: 1
WEAKNESS: 1
FEVER: 1
NAUSEA: 0
TROUBLE SWALLOWING: 1
UNEXPECTED WEIGHT CHANGE: 1
SINUS PAIN: 1
SHORTNESS OF BREATH: 0
VOMITING: 0
DIAPHORESIS: 1

## 2018-08-03 NOTE — ED NOTES
"[facility]  ED Nurse Handoff Report    Juliana Leal is a 89 year old female   ED Chief complaint: No chief complaint on file.  . ED Diagnosis:   Final diagnoses:   None     Allergies:   Allergies   Allergen Reactions     Lisinopril Other (See Comments) and Anaphylaxis     Swelling around mouth     Prednisone Unknown     \"every side effect listed\"     Allegra [Fexofenadine] Swelling     Amoxicillin Itching     Cipro [Quinolones] Itching     Codeine Sulfate Other (See Comments)     Felt out of it when took it yrs ago     Iodine [Gnp Iodides Decolorized]      Lyrica [Pregabalin]      Reaction unknown     Norvasc [Amlodipine Besylate]      Mouth felt funny     Tramadol      Felt loopy     Zaditor Other (See Comments) and Swelling     Tongue swelled  Didn't feel right  Eye drops     Penicillins Rash       Code Status: Full Code  Activity level - Baseline/Home:  Stand with Assist. Activity Level - Current:   Stand with Assist. Lift room needed: No. Bariatric: No   Needed: No   Isolation: No. Infection: Not Applicable.     Vital Signs:   Vitals:    08/03/18 1300 08/03/18 1315 08/03/18 1416 08/03/18 1421   BP: 120/75 (!) 102/91 122/73    Pulse:       Resp:       Temp:       TempSrc:       SpO2: 94% 94% 95% 92%   Weight:       Height:           Cardiac Rhythm:  ,      Pain level: 0-10 Pain Scale: 8  Patient confused: No. Patient Falls Risk: Yes.   Elimination Status: Yet to void   Patient Report - Initial Complaint: Pain/infection of throat. Focused Assessment: Juliana Leal is a 89 year old female with a medical history of chronic kidney disease, hypertension, and thyroid disease who presents with sore throat. The patient reports an onset of sore throat about 1 week ago and was seen at Urgent care 2 days ago for worsening pain. Rapid strep and urinalysis obtained, and were negative.  She was diagnosed with uvulitis and discharged home with a prescription for cefuroxime. She is currently on day 3 " "of cefuroxime. However patient continues to experience persistent throat pain with associated sinus, bilateral ear pain, jaw pain, and difficulty swallowing. She also notes that she lost about 6 lbs and had a fever of approximately 100F, where she woke up one morning \"in sweats.\" She only has been taking cefuroxime, and states she has been too weak to get to her other medications. Denies nausea, vomiting, shortness of breath, dental pain when eating. Patient lives independently. Daughters note patient has been experiencing bilateral ear pain since having cerumen removal 4 months ago.    Tests Performed:   Labs Ordered and Resulted from Time of ED Arrival Up to the Time of Departure from the ED   BASIC METABOLIC PANEL - Abnormal; Notable for the following:        Result Value    Glucose 170 (*)     Urea Nitrogen 39 (*)     Creatinine 1.82 (*)     GFR Estimate 26 (*)     GFR Estimate If Black 31 (*)     All other components within normal limits   LACTIC ACID WHOLE BLOOD - Abnormal; Notable for the following:     Lactic Acid 2.2 (*)     All other components within normal limits   CBC WITH PLATELETS DIFFERENTIAL   LACTIC ACID   PERIPHERAL IV CATHETER   CARDIAC CONTINUOUS MONITORING   PULSE OXIMETRY NURSING   . Abnormal Results: see results.   Treatments provided: IVF, Decadron po  Family Comments: Daughter and granddaughter at bedside.   OBS brochure/video discussed/provided to patient:  Yes  ED Medications:   Medications   lactated ringers BOLUS 1,000 mL (1,000 mLs Intravenous New Bag 8/3/18 1419)   0.9% sodium chloride BOLUS (0 mLs Intravenous Stopped 8/3/18 1418)   dexamethasone PF (DECADRON) injection 10 mg (10 mg Intravenous Given 8/3/18 1211)     Drips infusing:  Yes  For the majority of the shift, the patient's behavior Green. Interventions performed were n/a.     Severe Sepsis OR Septic Shock Diagnosis Present: No      ED Nurse Name/Phone Number: Sergio Hong,   2:24 PM    RECEIVING UNIT ED HANDOFF " REVIEW    Above ED Nurse Handoff Report was reviewed: Yes  Reviewed by: Mariella Drew on August 3, 2018 at 3:30 PM

## 2018-08-03 NOTE — H&P
[facility] Outpatient / Observation Unit  History and Physical Exam     Juliana Leal MRN# [id number]   YOB: 1923 Age: 89 year old      Date of Admission:  8/3/2018    Primary care provider: Erick Ruano          Assessment:   Juliana Leal is a 89 year old female with a PMH significant for HTN, CKD and hypothyroidism, who presents with severe sore throat, poor PO intake and dehydration.   Work up in the ED reveals: VSS. BMP shows an elevated Cr of 1.82 and elevated BUN of 39, glucose 170, electrolytes are normal. Lactic acid is elevated at 2.2. CBC is unremarkable. UA from 2 days ago was not concerning for infection and urine culture was negative. She was seen in  2 days ago for a sore throat and strep culture was negative. CT of the neck was obtained today and showed nothing acute.   Patient will be registered to Observation for further evaluation and symptom management.     1. Acute on chronic kidney disease - due to dehydration. Baseline Cr 1.2-1.4, its 1.82 today. Poor PO intake for several days due to pharyngitis. IVFs and supportive cares  2. Pharyngitis - non specific viral process. Seen at  2 days ago, strep negative, started on Ceftin. CT of the neck negative for abscess or significant inflammation. Although likely a viral process, will complete abx course since this has already been started. Continue supportive cares.   3. HTN - on hydrochlorothiazide and diltiazem, resume with parameters  4. Hypothyroidism - resume home meds         Plan:     1. [place] to Observation  2. Obtain orthostatics  3. IV hydration with Normal saline, @, 100 ml/hr  4. Supportive care with anti-emetics, pain meds PRN  5. Regular diet as tolerated  6. Throat lozenges and chloraseptic spray PRN, trial PO lidocaine  7. Follow labs  8. DVT prophylaxis: pt at low risk, encourage ambulation.    9. Continue antibiotics                  Chief Complaint:   Pharyngitis, weakness         History of Present Illness:   Juliana TREADWELL  Elvis is a 89 year old female with a PMH significant for HTN, CKD and hypothyroidism, who presents with severe sore throat, poor PO intake and dehydration.  Patient reports 1 week of sore throat and sinus congestion.  Her sore throat has been severe enough that it has been difficult or painful to swallow, therefore she has had poor p.o. intake.  She presented to urgent care 2 days ago for evaluation and strep culture was negative.  She has become weak due to dehydration.  She endorses a fever of over 100 but unsure of the exact number.  She denies chest pain, shortness of breath, abdominal pain, nausea, vomiting, diarrhea, constipation or dysuria.  Of note, she does live independently and does fairly well at baseline.            Past Medical History:     Past Medical History:   Diagnosis Date     Angioedema 2017    Due to ACE      Arthritis      CKD (chronic kidney disease) stage 3, GFR 30-59 ml/min 7/23/2018     Gout      Headaches      Hives 02/2017    Dr KHOURY, avoid BBLK due to this condition     Hypertension      Thyroid disease                Past Surgical History:     Past Surgical History:   Procedure Laterality Date     COLECTOMY      diverticulitis     HC LIGATION OR BIOPSY TEMPORAL ARTERY  5/10/2012    Procedure:BIOPSY ARTERY TEMPORAL; Surgeon:RUI LOREDO; Location: OR     INCISE FINGER TENDON SHEATH       PHACOEMULSIFICATION CLEAR CORNEA WITH STANDARD INTRAOCULAR LENS IMPLANT  2/29/2012    Procedure:PHACOEMULSIFICATION CLEAR CORNEA WITH STANDARD INTRAOCULAR LENS IMPLANT; RIGHT PHACOEMULSIFICATION CLEAR CORNEA WITH STANDARD INTRAOCULAR LENS IMPLANT; Surgeon:MANJIT LYONS; Location: EC     PHACOEMULSIFICATION CLEAR CORNEA WITH STANDARD INTRAOCULAR LENS IMPLANT  3/14/2012    Procedure:PHACOEMULSIFICATION CLEAR CORNEA WITH STANDARD INTRAOCULAR LENS IMPLANT; LEFT PHACOEMULSIFICATION CLEAR CORNEA WITH STANDARD INTRAOCULAR LENS IMPLANT ; Surgeon:MANJIT LYONS; Location: EC     REPAIR ATRIAL  "SEPTAL DEFECT                 Social History:     Social History     Social History     Marital status:      Spouse name: N/A     Number of children: N/A     Years of education: N/A     Occupational History     Not on file.     Social History Main Topics     Smoking status: Former Smoker     Types: Cigarettes     Quit date: 1/1/1960     Smokeless tobacco: Never Used     Alcohol use Yes      Comment:  1 beer maybe in 6 months     Drug use: Not on file     Sexual activity: Not on file     Other Topics Concern     Not on file     Social History Narrative               Family History:   Reviewed and non contributory          Allergies:      Allergies   Allergen Reactions     Lisinopril Other (See Comments) and Anaphylaxis     Swelling around mouth     Prednisone Unknown     \"every side effect listed\"     Allegra [Fexofenadine] Swelling     Amoxicillin Itching     Cipro [Quinolones] Itching     Codeine Sulfate Other (See Comments)     Felt out of it when took it yrs ago     Iodine [Gnp Iodides Decolorized]      Lyrica [Pregabalin]      Reaction unknown     Norvasc [Amlodipine Besylate]      Mouth felt funny     Tramadol      Felt loopy     Zaditor Other (See Comments) and Swelling     Tongue swelled  Didn't feel right  Eye drops     Penicillins Rash               Medications:     Prior to Admission medications    Medication Sig Last Dose Taking? Auth Provider   CARTIA  MG 24 hr capsule TAKE 1 CAPSULE(300 MG) BY MOUTH DAILY  Yes Radha Arnold MD   cefuroxime (CEFTIN) 500 MG tablet Take 1 tablet (500 mg) by mouth 2 times daily  Yes Gayathri Nava, PA-C   cetirizine (ZYRTEC) 10 MG tablet Take 1 tablet (10 mg) by mouth 2 times daily  Yes Federico Manning MD   emollient (VANICREAM) cream Apply topically as needed for other (leg rash)  Yes Reported, Patient   hydrochlorothiazide (HYDRODIURIL) 25 MG tablet TAKE 1 TABLET BY MOUTH ONCE DAILY  Yes Davey Hunter MD   levothyroxine " "(SYNTHROID/LEVOTHROID) 88 MCG tablet TAKE 1 TABLET BY MOUTH EVERY DAY  Yes Yue Wooten MD   Multiple Vitamins-Minerals (CENTRUM SILVER) per tablet Take 1 tablet by mouth daily  Yes Reported, Patient   omeprazole (PRILOSEC) 20 MG CR capsule Take 1 capsule (20 mg) by mouth daily  Yes Erick Ruano MD              Review of Systems:   A Comprehensive greater than 10 system review of systems was carried out.  Pertinent positives and negatives are noted above.  Otherwise negative for contributory information.     Constitutional, neuro, ENT, endocrine, pulmonary, cardiac, gastrointestinal, genitourinary, musculoskeletal, integument and psychiatric systems are negative, except as otherwise noted.         Physical Exam:   Blood pressure 122/73, pulse 90, temperature 97.7  F (36.5  C), temperature source Oral, resp. rate 20, height 1.702 m (5' 7\"), weight 55.8 kg (123 lb), SpO2 92 %, not currently breastfeeding.  Orthostatic Vitals: pending    GENERAL:  Comfortable.  PSYCH: pleasant, oriented, No acute distress.  HEENT:  Atraumatic, normocephalic. Normal conjunctiva, normal hearing, and oropharynx is normal.  NECK:  Supple, no neck vein distention  HEART:  Normal S1, S2 with murmur, no pericardial rub, gallops or S3 or S4.  LUNGS:  Clear to auscultation, normal Respiratory effort. No wheezing, rales or ronchi.  GI:  Soft, normal bowel sounds. Non-tender, non distended.   EXTREMITIES:  No pedal edema, +2 pulses bilateral and equal.  SKIN:  Dry to touch, No rash, wound or ulcerations.  NEUROLOGIC:  CN 2-12 intact, BL 5/5 symmetric upper and lower extremity strength, sensation is intact with no focal deficits.              Data:       Recent Labs  Lab 08/03/18  1146   WBC 8.8   HGB 14.0   HCT 41.5   MCV 92          Recent Labs  Lab 08/03/18  1146      POTASSIUM 3.9   CHLORIDE 100   CO2 26   ANIONGAP 10   *   BUN 39*   CR 1.82*   GFRESTIMATED 26*   GFRESTBLACK 31*   DINORAH 9.1       Recent Labs  Lab " 08/03/18  1417 08/03/18  1146   LACT 1.2 2.2*         Recent Results (from the past 48 hour(s))   CT Soft Tissue Neck w/o Contrast    Narrative    CT SCAN OF THE NECK WITHOUT CONTRAST  8/3/2018 1:43 PM     HISTORY: sore throat worsening despite antibiotics;     TECHNIQUE:  Axial images and coronal reformations. No IV contrast.  Radiation dose for this scan was reduced using automated exposure  control, adjustment of the mA and/or kV according to patient size, or  iterative reconstruction technique.    COMPARISON: None.    FINDINGS: Longus colli muscles appear negative. Styloid processes are  normal in length.  Visualized sinuses, nasopharynx and orbits: Normal.      Tongue, oral cavity and oropharynx:  Normal.      Hypopharynx: Normal.      Larynx and trachea: Normal.      Thyroid: Normal.    Submandibular glands: There is a tiny calcification in the hilum of  the right submandibular gland. There is fatty infiltration of the left  submandibular gland.      Parotid glands: Normal.        Lymph nodes: Normal.      Vasculature: Mild atherosclerotic plaque is seen at the left carotid  bifurcation.      Upper mediastinum and lungs: Fibrocalcific changes are seen in the  lung apices. This would be compatible with a previous granulomatous  infection..      Bones: Normal.      Impression    IMPRESSION:     1. No acute neck pathology is identified. No evidence for any soft  tissue abscess. The palatine tonsils are normal in size.    2. Tiny nonobstructing calculus in the right submandibular gland.  3. Atrophic appearing left subdural mandibular gland.    MD Denia LO PA-C

## 2018-08-03 NOTE — ED PROVIDER NOTES
"  History     Chief Complaint:  Sore throat    The history is provided by the patient and a relative (and daughter, granddaughter).      Juliana Leal is a 89 year old female with a history of hypertension who lives independently who presents with sore throat. The patient sore throat for about 1 week. She was seen at Urgent Care 2 days ago for worsening pain. Rapid strep and urinalysis obtained, and were negative.  She was diagnosed with uvulitis and discharged home with a prescription for cefuroxime which she has been taking. However patient continues to experience persistent throat pain with associated sinus and face/jaw pain, bilateral otalgia, and difficulty swallowing secondary to pain. She also notes that she lost about 6 lbs due to decreased PO intake and has had a fever of approximately 100F, noting she woke up one morning \"in sweats.\" She only has been taking cefuroxime, and states she has feeling been too weak to get up to take any analgesics. Denies nausea, vomiting, shortness of breath, tooth pain.      Allergies:  Lisinopril  Prednisone  Allegra  Amoxicillin  Cipro  Codeine sulfate  Iodine  Lyrica  Norvasc  Tramadol  Zaditor  Penicillins      Medications:    Cartia XT  Zyrtec  Hydrodiuril  Synthroid/Levothroid  Centrum silver  Prilosec   Ceftin - as prescribed 2 days ago    Past Medical History:    Arthritis  Chronic kidney disease  Gout  Hypertension  Thyroid disease     Past Surgical History:    Colectomy  HC ligation   Incise finger tendon sheath  Phacoemulsification clear cornea with standard intraocular lens implant x2  Repair atrial septal defect     Family History:    History reviewed. No pertinent family history.      Social History:  Smoking status: Former smoker, 1960  Alcohol use: Yes   Marital Status:    Lives alone  PCP: Erick Ruano   Daughter and granddaughter at bedside.      Review of Systems   Constitutional: Positive for diaphoresis, fever and unexpected weight change.   HENT: " "Positive for ear pain, sinus pain, sore throat and trouble swallowing. Negative for dental problem.    Respiratory: Negative for shortness of breath.    Gastrointestinal: Negative for nausea and vomiting.   Neurological: Positive for weakness (generalized).   All other systems reviewed and are negative.    Physical Exam   Patient Vitals for the past 24 hrs:   BP Temp Temp src Pulse Heart Rate Resp SpO2 Height Weight   08/03/18 1530 115/67 - - - 75 - 91 % - -   08/03/18 1515 133/71 - - - - - - - -   08/03/18 1500 (!) 128/93 - - - 78 - 94 % - -   08/03/18 1445 (!) 156/124 - - - - - - - -   08/03/18 1430 131/71 - - - 75 - 93 % - -   08/03/18 1421 - - - - 75 - 92 % - -   08/03/18 1416 122/73 - - - 78 - 95 % - -   08/03/18 1315 (!) 102/91 - - - - - 94 % - -   08/03/18 1300 120/75 - - - 81 - 94 % - -   08/03/18 1245 117/80 - - - 77 - 91 % - -   08/03/18 1230 125/77 - - - 82 - 93 % - -   08/03/18 1215 120/90 - - - 93 - 91 % - -   08/03/18 1200 124/78 - - - 107 - - - -   08/03/18 1145 109/76 - - - - - - - -   08/03/18 1131 (!) 123/99 97.7  F (36.5  C) Oral 90 90 20 99 % 1.702 m (5' 7\") 55.8 kg (123 lb)   08/03/18 1130 - - - - - - 95 % - -      Physical Exam  General: Well-developed and well-nourished. Well appearing elderly  woman. Cooperative.  Head:  Atraumatic.  Eyes:  Conjunctivae, lids, and sclerae are normal.  ENT:    Normal nose. Somewhat dry mucous membranes.  Notably erythematous posterior oropharynx without edema or exudates.  No focal swelling or evidence of abscess.  Visualized portions of TMs clear bilaterally.  Portions obscured by cerumen.  Neck:  Supple. Normal range of motion.  CV:  Regular rate and rhythm. Normal heart sounds with no murmurs, rubs, or gallops detected.  Resp:  No respiratory distress. Clear to auscultation bilaterally without decreased breath sounds, wheezing, rales, or rhonchi.  GI:  Soft. Non-distended. Non-tender.    MS:  Normal ROM. No bilateral lower extremity " edema.  Skin:  Warm. Non-diaphoretic. No pallor.  Neuro:  Awake. A&Ox3. Normal strength.  Psych: Normal mood and affect. Normal speech.  Vitals reviewed.    Emergency Department Course   Imaging:  Radiographic findings were communicated with the patient who voiced understanding of the findings.  CT Soft Tissue Neck w/o Contrast  1. No acute neck pathology is identified. No evidence for any soft  tissue abscess. The palatine tonsils are normal in size.    2. Tiny nonobstructing calculus in the right submandibular gland.  3. Atrophic appearing left subdural mandibular gland.  As read by Radiology.     Laboratory:  CBC: WNL (WBC 8.8, HGB 14.0, )  BMP: Glucose 170 (H), BUN 39 (H), Creatinine 1.82 (H), GFR estimate 26 (L)  Lactic acid (1146): 2.2 (H)  Lactic acid (1417): 1.2    Interventions:  1211: Decadron 10 mg  IV  1212: NS 1L IV Bolus   1419: Lactated ringers bolus 1L IV      Emergency Department Course:  1146: IV inserted and blood drawn for basic laboratory. Lactic acid obtained. Results as noted above.    Past medical records, nursing notes, and vitals reviewed.  1150: I performed an exam of the patient and obtained history, as documented above.    Patient was given the above interventions while here in the emergency department.   1221: Lactic acid returned elevated at 2.2.   The patient was sent for a CT soft tissue neck while in the emergency department, findings above.   1400: Patient is feeling a little better, but she is agreeable to stay for observation.   1419: Lactated ringers bolus 1L IV given here while in the emergency department.  1424: I discussed the case with Denia Ohara PA-C, hospitalist service, who accepts the patient for further care, monitoring, and treatment.     Impression & Plan    CMS Diagnoses: The Lactic acid level is elevated due to dehydration, at this time there is no sign of severe sepsis or septic shock.    Medical Decision Making:  Juliana is a 89-year-old female who lives  [place] who presents with sore throat.  Patient was seen for this pain in Urgent Care 2 days ago and had a negative rapid strep.  She was started on Ceftin and recommended to use salt water gargles and Tylenol.  However, since that time patient has had worsening of symptoms.  She has significant throat pain causing a odynophagia resulting in decreased PO intake.  Reportedly she has lost 6 pounds and she describes generalized weakness such that she cannot even walk to get pain medications.  She does state, however, that she has been taking her antibiotics.  Her exam is overall reassuring.  She is well-appearing and has no hypotension or tachycardia.  Her posterior oropharynx is very erythematous and irritated though there is no edema, exudate, or evidence of focal infection/abscess.  She was given PO Decadron and IV fluids during her workup.  In fact, on repeat evaluation she states she is already feeling improved and family agrees she seems improved. There is no leukocytosis and patient's electrolytes are overall reassuring.  However, she does have a creatinine of 1.82 increased from her baseline of 1.2-1.5 due to decreased PO intake causing dehydration.  Initial lactic acid is elevated at 2.2 which is likely also due to dehydration.  There is no evidence of severe sepsis or septic shock. No indication for IV antibiotics. After 1 L bolus, lactate improved to 1.2 and a second liter was initiated.  Fortunately, CT of the neck performed without contrast secondary to her SHANAE, reveals no focal abscess or other concerning pathologies. Pharyngitis is likely viral in etiology. No suppurative infection. However, I believe she requires admission to the hospital given her pharyngitis has resulted in generalized weakness, weight loss, SHANAE, and dehydration - particularly given her age.   On reevaluation, as stated above, she states she is feeling improved though she agrees with plan for admission stating she is concerned  about her weakness.  I answered all the patient and family's questions and they verbalized understanding.  I discussed patient's case with Denia Ohara, hospitalist PA, who accepts admission and has no further orders.    Diagnosis:    ICD-10-CM   1. Pharyngitis, unspecified etiology J02.9   2. SHANAE (acute kidney injury) (H) N17.9   3. Dehydration E86.0   4. Generalized weakness R53.1     Disposition:  Admitted to the hospitalist service.    Amanda Edmonds  8/3/2018   [facility] EMERGENCY DEPARTMENT  I, Amanda Edmonds, am serving as a scribe at 11:50 AM on 8/3/2018 to document services personally performed by Ruth Lozada MD based on my observations and the provider's statements to me.       Ruth Lozada MD  08/03/18 210

## 2018-08-03 NOTE — IP AVS SNAPSHOT
[facility] Observation Department    [address]    [facility] [address]    Phone:  [phone]                                       After Visit Summary   8/3/2018    Juliana eLal    MRN: [id number]           After Visit Summary Signature Page     I have received my discharge instructions, and my questions have been answered. I have discussed any challenges I see with this plan with the nurse or doctor.    ..........................................................................................................................................  Patient/Patient Representative Signature      ..........................................................................................................................................  Patient Representative Print Name and Relationship to Patient    ..................................................               ................................................  Date                                            Time    ..........................................................................................................................................  Reviewed by Signature/Title    ...................................................              ..............................................  Date                                                            Time

## 2018-08-03 NOTE — PLAN OF CARE
Problem: Patient Care Overview  Goal: Plan of Care/Patient Progress Review  Outcome: No Change  OBSERVATION patient IN TIME: 1625    ROOM # 213-2    Living Situation (if not independent, order SW consult): [facility]  Facility name:  : [facility] [phone] daughter    Activity level at baseline: Independent   Activity level on admit: SBA      Patient registered to observation; given Patient Bill of Rights; given the opportunity to ask questions about observation status and their plan of care.  Patient has been oriented to the observation room, bathroom and call light is in place.    Discussed discharge goals and expectations with patient/family.

## 2018-08-03 NOTE — TELEPHONE ENCOUNTER
Juliana Leal is a 89 year old female who calls with follow-up from urgent care visit on wed 8/1.    NURSING ASSESSMENT:  Description:  Pt's daughter is calling asking about the lab results that were done in  on Wed 8/1. Pt was seen for fever, chills, urinary frequency. And started on cefuroxime (CEFTIN) 500 MG tablet.  provider diagnosed her with Uvulitis but to f/u if symptoms got worse. Daughter says that pt's symptoms have gotten worse. She continues to have a fever and chills. She is very weak and is unable to get out of bed by herself. She has a lot of pain and pressure in her head and she feels dizzy and lightheaded. Due to worsening of symptoms, daughter is going to bring pt in to the ED to be seen for additional evaluation and treatment.    MEDICATIONS:   cefuroxime (CEFTIN) 500 MG tablet - BID (started Wed for urinary frequency).      RECOMMENDED DISPOSITION:  To ED, another person to drive - daughter is bringing pt in to the ED to be seen.  Will comply with recommendation: Yes  If further questions/concerns or if symptoms do not improve, worsen or new symptoms develop, call your PCP or [facility] as soon as possible.      Guideline used:  Telephone Triage Protocols for Nurses, Fifth Edition, Gayathri Contreras RN

## 2018-08-03 NOTE — IP AVS SNAPSHOT
MRN:[id number]                      After Visit Summary   8/3/2018    Juliana Leal    MRN: [id number]           Thank you!     Thank you for choosing [facility] for your care. Our goal is always to provide you with excellent care. Hearing back from our patients is one way we can continue to improve our services. Please take a few minutes to complete the written survey that you may receive in the mail after you visit. If you would like to speak to someone directly about your visit please contact Patient Relations at [phone]. Thank you!          Patient Information     Date Of Birth          2/10/1923        About your hospital stay     You were admitted on:  August 3, 2018 You last received care in the:  [facility] Observation Department    You were discharged on:  August 5, 2018        Reason for your hospital stay       You were admitted for concerns of acute kidney injury and sore throat. We treated you with IV fluids and your creatinine improved. Your sore throat also improved. We would like you to complete your course of of antibiotics ending on 8/10 (you will have a few pills left in the bottle).     Given your higher creatinine over the last few months we do not believe your Hydrochlorothiazide is effective and would like you to stop this. Your blood pressure was good here despite not getting it so you can follow up with your primary care doctor if you need another medication.                  Who to Call     For medical emergencies, please call [phone].  For non-urgent questions about your medical care, please call your primary care provider or clinic, [phone]          Attending Provider     Provider Specialty    Ruth Lozada MD Emergency Medicine    Hector Mcpherson MD Internal Medicine       Primary Care Provider Office Phone # Fax #    Erick Ruano [phone]      After Care Instructions     Activity       Your activity upon  "discharge: activity as tolerated            Diet       Follow this diet upon discharge: Regular                  Follow-up Appointments     Follow-up and recommended labs and tests        Follow up with primary care provider, Erick Ruano, within 7 days to evaluate medication change and for hospital follow- up.  The following labs/tests are recommended: check blood pressure and creatinine level.                  Your next 10 appointments already scheduled     Aug 07, 2018  1:30 PM CDT   Level 1 with RH INFUSION CHAIR 10   [facility] ([facility])    [facility]  [address]  [facility] [address]   [phone]                         Pending Results     No orders found from 8/1/2018 to 8/4/2018.            Statement of Approval     Ordered          08/05/18 1034  I have reviewed and agree with all the recommendations and orders detailed in this document.  EFFECTIVE NOW     Approved and electronically signed by:  Audrey Mejia PA-C             Admission Information     Date & Time Provider Department Dept. Phone    8/3/2018 Hector Mcpherson MD [facility] Observation Department [phone]      Your Vitals Were     Blood Pressure Pulse Temperature Respirations Height Weight    135/65 (BP Location: Left arm) 68 97.3  F (36.3  C) (Oral) 24 1.702 m (5' 7\") 55.8 kg (123 lb)    Pulse Oximetry BMI (Body Mass Index)                96% 19.26 kg/m2          MyChart Information     [employer] gives you secure access to your electronic health record. If you see a primary care provider, you can also send messages to your care team and make appointments. If you have questions, please call your primary care clinic.  If you do not have a primary care provider, please call [phone] and they will assist you.        Care EveryWhere ID     This is your Care EveryWhere ID. This could be used by other organizations to access " your [facility] medical records  [id number]        Equal Access to Services     LOGAN GATES : Hadii nathaly Sauceda, wajereda justen, qaybta kaalmapaulo call, cynthia krishnamurthy. So [facility] [phone].    ATENCIÓN: Si habla español, tiene a workman disposición servicios gratuitos de asistencia lingüística. Llame al [phone].    We comply with applicable federal civil rights laws and [place] laws. We do not discriminate on the basis of race, color, national origin, age, disability, sex, sexual orientation, or gender identity.               Review of your medicines      CONTINUE these medicines which have NOT CHANGED        Dose / Directions    acetaminophen 500 MG tablet   Commonly known as:  TYLENOL        Dose:  500 mg   Take 500 mg by mouth 2 times daily as needed for mild pain   Refills:  0       CARTIA  MG 24 hr capsule   Used for:  Essential hypertension   Generic drug:  diltiazem        TAKE 1 CAPSULE(300 MG) BY MOUTH DAILY   Quantity:  90 capsule   Refills:  0       cefuroxime 500 MG tablet   Commonly known as:  CEFTIN   Used for:  Uvulitis, Urinary frequency        Dose:  500 mg   Take 1 tablet (500 mg) by mouth 2 times daily   Quantity:  20 tablet   Refills:  0       CENTRUM SILVER per tablet        Dose:  1 tablet   Take 1 tablet by mouth daily   Refills:  0       cetirizine 10 MG tablet   Commonly known as:  zyrTEC   Used for:  Need for pneumococcal vaccination        Dose:  10 mg   Take 1 tablet (10 mg) by mouth 2 times daily   Quantity:  30 tablet   Refills:  0       emollient cream        Apply topically as needed for other (leg rash)   Refills:  0       levothyroxine 88 MCG tablet   Commonly known as:  SYNTHROID/LEVOTHROID   Used for:  Encounter for medication refill        TAKE 1 TABLET BY MOUTH EVERY DAY   Quantity:  90 tablet   Refills:  0       omeprazole 20 MG CR capsule   Commonly known as:  priLOSEC   Used for:  Gastroesophageal reflux disease,  esophagitis presence not specified        Dose:  20 mg   Take 1 capsule (20 mg) by mouth daily   Quantity:  30 capsule   Refills:  0         STOP taking     hydrochlorothiazide 25 MG tablet   Commonly known as:  HYDRODIURIL                    Protect others around you: Learn how to safely use, store and throw away your medicines at www.disposemymeds.org.             Medication List: This is a list of all your medications and when to take them. Check marks below indicate your daily home schedule. Keep this list as a reference.      Medications           Morning Afternoon Evening Bedtime As Needed    acetaminophen 500 MG tablet   Commonly known as:  TYLENOL   Take 500 mg by mouth 2 times daily as needed for mild pain                                   CARTIA  MG 24 hr capsule   TAKE 1 CAPSULE(300 MG) BY MOUTH DAILY   Last time this was given:  300 mg on 8/5/2018  8:44 AM   Generic drug:  diltiazem                                   cefuroxime 500 MG tablet   Commonly known as:  CEFTIN   Take 1 tablet (500 mg) by mouth 2 times daily   Last time this was given:  500 mg on 8/5/2018  8:44 AM                                      CENTRUM SILVER per tablet   Take 1 tablet by mouth daily                                   cetirizine 10 MG tablet   Commonly known as:  zyrTEC   Take 1 tablet (10 mg) by mouth 2 times daily                                      emollient cream   Apply topically as needed for other (leg rash)                                   levothyroxine 88 MCG tablet   Commonly known as:  SYNTHROID/LEVOTHROID   TAKE 1 TABLET BY MOUTH EVERY DAY   Last time this was given:  88 mcg on 8/5/2018  8:44 AM                                   omeprazole 20 MG CR capsule   Commonly known as:  priLOSEC   Take 1 capsule (20 mg) by mouth daily   Last time this was given:  20 mg on 8/5/2018  8:44 AM                                             More Information        Self-Care for Sore Throats    Sore throats happen for many  reasons, such as colds, allergies, and infections caused by viruses or bacteria. In any case, your throat becomes red and sore. Your goal for self-care is to reduce your discomfort while giving your throat a chance to heal.  Moisten and soothe your throat  Tips include the following:    Try a sip of water first thing after waking up.    Keep your throat moist by drinking 6 or more glasses of clear liquids every day.    Run a cool-air humidifier in your room overnight.    Avoid cigarette smoke.     Suck on throat lozenges, cough drops, hard candy, ice chips, or frozen fruit-juice bars. Use the sugar-free versions if your diet or medical condition requires them.  Gargle to ease irritation  Gargling every hour or 2 can ease irritation. Try gargling with 1 of these solutions:    1/4 teaspoon of salt in 1/2 cup of warm water    An over-the-counter anesthetic gargle  Use medicine for more relief  Over-the-counter medicine can reduce sore throat symptoms. Ask your pharmacist if you have questions about which medicine to use:    Ease pain with anesthetic sprays. Aspirin or an aspirin substitute also helps. Remember, never give aspirin to anyone 18 or younger, or if you are already taking blood thinners.     For sore throats caused by allergies, try antihistamines to block the allergic reaction.    Remember: unless a sore throat is caused by a bacterial infection, antibiotics won t help you.  Prevent future sore throats  Prevention tips include the following:    Stop smoking or reduce contact with secondhand smoke. Smoke irritates the tender throat lining.    Limit contact with pets and with allergy-causing substances, such as pollen and mold.    When you re around someone with a sore throat or cold, wash your hands often to keep viruses or bacteria from spreading.    Don t strain your vocal cords.  Call your healthcare provider  Contact your healthcare provider if you have:    A temperature over 101 F (38.3 C)    White  spots on the throat    Great difficulty swallowing    Trouble breathing    A skin rash    Recent exposure to someone else with strep bacteria    Severe hoarseness and swollen glands in the neck or jaw   Date Last Reviewed: 8/1/2016 2000-2017 [employer]. [address] [place] [address]. All rights reserved. This information is not intended as a substitute for professional medical care. Always follow your healthcare professional's instructions.                When You Have a Sore Throat    A sore throat can be painful. There are many reasons why you may have a sore throat. Your healthcare provider will work with you to find the cause of your sore throat. He or she will also find the best treatment for you.  What causes a sore throat?  Sore throats can be caused or worsened by:    Cold or flu viruses    Bacteria    Irritants such as tobacco smoke or air pollution    Acid reflux  A healthy throat  The tonsils are on the sides of the throat near the base of the tongue. They collect viruses and bacteria and help fight infection. The throat (pharynx) is the passage for air. Mucus from the nasal cavity also moves down the passage.  An inflamed throat  The tonsils and pharynx can become inflamed due to a cold or flu virus. Postnasal drip (excess mucus draining from the nasal cavity) can irritate the throat. It can also make the throat or tonsils more likely to be infected by bacteria. Severe, untreated tonsillitis in children or adults can cause a pocket of pus (abscess) to form near the tonsil.  Your evaluation  A medical evaluation can help find the cause of your sore throat. It can also help your healthcare provider choose the best treatment for you. The evaluation may include a health history, physical exam, and diagnostic tests.  Health history  Your healthcare provider may ask you the following:    How long has the sore throat lasted and how have you been treating it?    Do you have any  other symptoms, such as body aches, fever, or cough?    Does your sore throat recur? If so, how often? How many days of school or work have you missed because of a sore throat?    Do you have trouble eating or swallowing?    Have you been told that you snore or have other sleep problems?    Do you have bad breath?    Do you cough up bad-tasting mucus?  Physical exam  During the exam, your healthcare provider checks your ears, nose, and throat for problems. He or she also checks for swelling in the neck, and may listen to your chest.  Possible tests  Other tests your healthcare provider may perform include:    A throat swab to check for bacteria such as streptococcus (the bacteria that causes strep throat)    A blood test to check for mononucleosis (a viral infection)    A chest X-ray to rule out pneumonia, especially if you have a cough  Treating a sore throat  Treatment depends on many factors. What is the likely cause? Is the problem recent? Does it keep coming back? In many cases, the best thing to do is to treat the symptoms, rest, and let the problem heal itself. Antibiotics may help clear up some bacterial infections. For cases of severe or recurring tonsillitis, the tonsils may need to be removed.  Relieving your symptoms    Don t smoke, and avoid secondhand smoke.    For children, try throat sprays or Popsicles. Adults and older children may try lozenges.    Drink warm liquids to soothe the throat and help thin mucus. Avoid alcohol, spicy foods, and acidic drinks such as orange juice. These can irritate the throat.    Gargle with warm saltwater (1 teaspoon of salt to 8 ounces of warm water).    Use a humidifier to keep air moist and relieve throat dryness.    Try over-the-counter pain relievers such as acetaminophen or ibuprofen. Use as directed, and don t exceed the recommended dose. Don t give aspirin to children.   Are antibiotics needed?  If your sore throat is due to a bacterial infection, antibiotics  "may speed healing and prevent complications. Although group A streptococcus (\"strep throat\" or GAS) is the major treatable infection for a sore throat, GAS causes only 5% to 15% of sore throats in adults who seek medical care. Most sore throats are caused by cold or flu viruses. And antibiotics don t treat viral illness. In fact, using antibiotics when they re not needed may produce bacteria that are harder to kill. Your healthcare provider will prescribe antibiotics only if he or she thinks they are likely to help.  If antibiotics are prescribed  Take the medicine exactly as directed. Be sure to finish your prescription even if you re feeling better. And be sure to ask your healthcare provider or pharmacist what side effects are common and what to do about them.  Is surgery needed?  In some cases, tonsils need to be removed. This is often done as outpatient (same-day) surgery. Your healthcare provider may advise removing the tonsils in cases of:    Several severe bouts of tonsillitis in a year.  Severe  episodes include those that lead to missed days of school or work, or that need to be treated with antibiotics.    Tonsillitis that causes breathing problems during sleep    Tonsillitis caused by food particles collecting in pouches in the tonsils (cryptic tonsillitis)  Call your healthcare provider if any of the following occur:    Symptoms worsen, or new symptoms develop.    Swollen tonsils make breathing difficult.    The pain is severe enough to keep you from drinking liquids.    A skin rash, hives, or wheezing develops. Any of these could signal an allergic reaction to antibiotics.    Symptoms don t improve within a week.    Symptoms don t improve within 2 to 3 days of starting antibiotics.   Date Last Reviewed: 10/1/2016    8388-0802 [employer]. [address] [place] [address], [place] [address]. All rights reserved. This information is not intended as a substitute for professional medical care. Always " follow your healthcare professional's instructions.

## 2018-08-03 NOTE — ED TRIAGE NOTES
"Presents with ongoing \"throat pain.\" Recently started on Ceftin on Monday for uvulitis. Throat appears bright red and irritated. Family at bedside.   "

## 2018-08-03 NOTE — PHARMACY-ADMISSION MEDICATION HISTORY
Admission medication history interview status for this patient is complete. See [facility] admission navigator for allergy information, prior to admission medications and immunization status.     Medication history interview source(s): Patient and Family  Medication history resources (including written lists, pill bottles, clinic record): [facility] Care Everywhere, [employer] fill history, patient's written list.     Primary pharmacy: [place], [place]  [phone]    Changes made to PTA medication list:  Added: Tylenol 500mg  Deleted: None.  Changed: None.     Actions taken by pharmacist (provider contacted, etc): Sticky note to provider notifying completion of med rec.     Additional medication history information: Patient has completed 3 days of therapy with Cefuroxime. She has been taking Omeprazole at home, but is planning to stop taking.     Medication reconciliation/reorder completed by provider prior to medication history? No.    Do you take OTC medications (eg tylenol, ibuprofen, fish oil, eye/ear drops, etc)? Yes - see list.     For patients on insulin therapy: N/A      Prior to Admission medications    Medication Sig Last Dose Taking? Auth Provider   acetaminophen (TYLENOL) 500 MG tablet Take 500 mg by mouth 2 times daily as needed for mild pain 8/2/2018 at AM Yes Unknown, Entered By History   CARTIA  MG 24 hr capsule TAKE 1 CAPSULE(300 MG) BY MOUTH DAILY 8/3/2018 at AM Yes Radha Arnold MD   cefuroxime (CEFTIN) 500 MG tablet Take 1 tablet (500 mg) by mouth 2 times daily 8/3/2018 at AM Yes Gayathri Nava, PA-C   cetirizine (ZYRTEC) 10 MG tablet Take 1 tablet (10 mg) by mouth 2 times daily 8/3/2018 at AM Yes Federico Manning MD   emollient (VANICREAM) cream Apply topically as needed for other (leg rash)  at PRN Yes Reported, Patient   hydrochlorothiazide (HYDRODIURIL) 25 MG tablet TAKE 1 TABLET BY MOUTH ONCE DAILY 8/3/2018 at AM Yes Davey Hunter MD   levothyroxine  (SYNTHROID/LEVOTHROID) 88 MCG tablet TAKE 1 TABLET BY MOUTH EVERY DAY 8/3/2018 at AM Yes Yue Wooten MD   Multiple Vitamins-Minerals (CENTRUM SILVER) per tablet Take 1 tablet by mouth daily 8/3/2018 at AM Yes Reported, Patient   omeprazole (PRILOSEC) 20 MG CR capsule Take 1 capsule (20 mg) by mouth daily 8/3/2018 at AM Yes Erick Ruano MD Keegan T. Ilenda, PharmD./PGY-1 Resident

## 2018-08-04 LAB
ANION GAP SERPL CALCULATED.3IONS-SCNC: 5 MMOL/L (ref 3–14)
BUN SERPL-MCNC: 33 MG/DL (ref 7–30)
CALCIUM SERPL-MCNC: 8.2 MG/DL (ref 8.5–10.1)
CHLORIDE SERPL-SCNC: 108 MMOL/L (ref 94–109)
CO2 SERPL-SCNC: 27 MMOL/L (ref 20–32)
CREAT SERPL-MCNC: 1.3 MG/DL (ref 0.52–1.04)
GFR SERPL CREATININE-BSD FRML MDRD: 38 ML/MIN/1.7M2
GLUCOSE SERPL-MCNC: 155 MG/DL (ref 70–99)
POTASSIUM SERPL-SCNC: 3.7 MMOL/L (ref 3.4–5.3)
SODIUM SERPL-SCNC: 140 MMOL/L (ref 133–144)

## 2018-08-04 PROCEDURE — A9270 NON-COVERED ITEM OR SERVICE: HCPCS | Mod: GY | Performed by: PHYSICIAN ASSISTANT

## 2018-08-04 PROCEDURE — 96361 HYDRATE IV INFUSION ADD-ON: CPT

## 2018-08-04 PROCEDURE — 36415 COLL VENOUS BLD VENIPUNCTURE: CPT | Performed by: PHYSICIAN ASSISTANT

## 2018-08-04 PROCEDURE — 80048 BASIC METABOLIC PNL TOTAL CA: CPT | Performed by: PHYSICIAN ASSISTANT

## 2018-08-04 PROCEDURE — 99225 ZZC SUBSEQUENT OBSERVATION CARE,LEVEL II: CPT | Performed by: PHYSICIAN ASSISTANT

## 2018-08-04 PROCEDURE — G0378 HOSPITAL OBSERVATION PER HR: HCPCS

## 2018-08-04 PROCEDURE — 25000132 ZZH RX MED GY IP 250 OP 250 PS 637: Mod: GY | Performed by: PHYSICIAN ASSISTANT

## 2018-08-04 RX ADMIN — Medication 1 LOZENGE: at 08:53

## 2018-08-04 RX ADMIN — LEVOTHYROXINE SODIUM 88 MCG: 88 TABLET ORAL at 08:53

## 2018-08-04 RX ADMIN — OMEPRAZOLE 20 MG: 20 CAPSULE, DELAYED RELEASE ORAL at 08:53

## 2018-08-04 RX ADMIN — DILTIAZEM HYDROCHLORIDE 300 MG: 180 CAPSULE, COATED, EXTENDED RELEASE ORAL at 08:53

## 2018-08-04 RX ADMIN — CEFUROXIME AXETIL 500 MG: 250 TABLET, FILM COATED ORAL at 08:53

## 2018-08-04 RX ADMIN — HYDROCHLOROTHIAZIDE 25 MG: 25 TABLET ORAL at 08:53

## 2018-08-04 RX ADMIN — CEFUROXIME AXETIL 500 MG: 250 TABLET, FILM COATED ORAL at 17:48

## 2018-08-04 NOTE — PLAN OF CARE
Problem: Patient Care Overview  Goal: Plan of Care/Patient Progress Review  Outcome: No Change  A&Ox4, up SBA, tolerating regular diet, lidocaine given for sore throat, heart sounds- WNL, lungs- clear, Bowel sounds- active, voiding without difficulty, 1 BM this shift, IVF, patient having some burping/belching when trying to swallow    Provider notified of swallow difficulties

## 2018-08-04 NOTE — PLAN OF CARE
Problem: Patient Care Overview  Goal: Plan of Care/Patient Progress Review  Outcome: No Change  PRIMARY DIAGNOSIS: Dehydration/Sore throat  OUTPATIENT/OBSERVATION GOALS TO BE MET BEFORE DISCHARGE:  1. ADLs back to baseline: Yes    2. Activity and level of assistance: Up with standby assistance.    3. Pain status: Having some throat pain-declining interventions     4. Return to near baseline physical activity: Yes     Discharge Planner Nurse   Safe discharge environment identified: Yes  Barriers to discharge: Yes       Entered by: Zeinab Herbert 08/04/2018 5:17 AM     A&Ox4, VSS. C/o throat soreness but declining interventions at this time. Reports having more discomfort/soreness to R side of throat going up to R ear. Also continues to c/o swallowing issues- more so having intermittent burping during & after drinking liquids. Up with SBA in room, saline locked. Will continue to monitor.      Please review provider order for any additional goals.   Nurse to notify provider when observation goals have been met and patient is ready for discharge.

## 2018-08-04 NOTE — PLAN OF CARE
Problem: Patient Care Overview  Goal: Plan of Care/Patient Progress Review  Outcome: No Change  PRIMARY DIAGNOSIS: Dehydration/Sore throat  OUTPATIENT/OBSERVATION GOALS TO BE MET BEFORE DISCHARGE:  1. ADLs back to baseline: Yes    2. Activity and level of assistance: Up with standby assistance.    3. Pain status: Having some throat pain-declining interventions     4. Return to near baseline physical activity: Yes     Discharge Planner Nurse   Safe discharge environment identified: Yes  Barriers to discharge: Yes       Entered by: Zeinab Herbert 08/04/2018 1:58 AM     A&Ox4, VSS on RA. C/o throat soreness but declining interventions at this time. Reports having more discomfort/soreness to R side of throat going up to R ear. Also continues to c/o swallowing issues- more so having intermittent burping during & after drinking liquids. CT neck- nothing acute. On Ceftin BID. Up with SBA in room, IVF infusing. Labs in AM. Will continue to monitor.      Please review provider order for any additional goals.   Nurse to notify provider when observation goals have been met and patient is ready for discharge.

## 2018-08-04 NOTE — PLAN OF CARE
Problem: Dysphagia (Adult)  Goal: Identify Related Risk Factors and Signs and Symptoms  Related risk factors and signs and symptoms are identified upon initiation of [employer] Clinical Practice Guideline (CPG).   Outcome: Improving  PRIMARY DIAGNOSIS: GENERALIZED WEAKNESS    OUTPATIENT/OBSERVATION GOALS TO BE MET BEFORE DISCHARGE  1. Orthostatic performed: N/A    2. Tolerating PO medications: Yes    3. Return to near baseline physical activity: Yes    4. Cleared for discharge by consultants (if involved): Yes    Discharge Planner Nurse   Safe discharge environment identified: Yes  Barriers to discharge: Yes       Entered by: Vivienne Zamorano 08/04/2018 9:12 AM     Please review provider order for any additional goals.   Nurse to notify provider when observation goals have been met and patient is ready for discharge.

## 2018-08-04 NOTE — PLAN OF CARE
Problem: Patient Care Overview  Goal: Plan of Care/Patient Progress Review  Outcome: No Change  PRIMARY DIAGNOSIS: Dehydration/Sore throat  OUTPATIENT/OBSERVATION GOALS TO BE MET BEFORE DISCHARGE:  1. ADLs back to baseline: Yes    2. Activity and level of assistance: Up with standby assistance.    3. Pain status: Pain free.    4. Return to near baseline physical activity: Yes     Discharge Planner Nurse   Safe discharge environment identified: Yes  Barriers to discharge: Yes       Entered by: Humberto Flores 08/03/2018 9:52 PM     RN - VSS. Pt denying pain. Appears to have intermittent difficulty with swallowing - stating she constantly feels urge to burp. Pt observed to be taking frequent small sips/swallows of fluid with medication administration. Pt up with SBA in room. Appers to be sleeping between cares. IVF infusing.     Please review provider order for any additional goals.   Nurse to notify provider when observation goals have been met and patient is ready for discharge.

## 2018-08-04 NOTE — PROGRESS NOTES
[facility]  Hospitalist Progress Note  Audrey Mejia PA-C 08/04/2018    Reason for Stay (Diagnosis): Weakness and pharyngitis         Assessment and Plan:      Summary of Stay: Juliana Leal is a 89 year old female admitted on 8/3/2018 with severe sore throat, poor oral intake, dehydration and acute kidney injury.  She had a negative strep throat test as well as negative CT imaging of her throat.  She reports improvement in her throat pain but continues to feel weak and is hesitant to return home as she has no family available should she need help.  She is eating and drinking now and we will plan for discharge tomorrow as long as she is feeling well.    Problem List:   1. Pharyngitis-likely a viral pharyngitis given her negative strep throat screen and her negative CT scan of her neck.  She had been placed on Ceftin by an urgent care a couple of days ago so we will complete this course as prescribed.  She is using Cepacol lozenges and I encouraged her family to bring lemon and honey to put in hot water for her.  She is improving her oral intake.  2. Acute on chronic kidney disease-likely related to #1.  Her baseline creatinine is 1.2-1.4 and on admission was 1.82.  Her creatinine is at baseline now and she is drinking well so will discontinue fluids at this time  3. Hypertension-she takes hydrochlorothiazide and diltiazem at home.  Per pharmacy hydrochlorothiazide is rather ineffective with a creatinine clearance as low as hers.  Therefore I will discontinue this and will continue with just diltiazem alone.  4. Hypothyroidism-resume home medications    DVT Prophylaxis: Ambulate every shift  Code Status: Full Code  Discharge Dispo: Anticipate discharge tomorrow morning          Interval History (Subjective):      She is feeling much better this morning with improvement in her sore throat.  She is able to eat and drink with only some discomfort.  She still feels weak and is hesitant to return  "home as she lives on her own.  She and her family are both requesting that she is to more night.                  Physical Exam:      Last Vital Signs:  /65 (BP Location: Left arm)  Pulse 79  Temp 97  F (36.1  C) (Oral)  Resp 16  Ht 1.702 m (5' 7\")  Wt 55.8 kg (123 lb)  SpO2 95%  BMI 19.26 kg/m2      Intake/Output Summary (Last 24 hours) at 08/04/18 1436  Last data filed at 08/04/18 0318   Gross per 24 hour   Intake               80 ml   Output                0 ml   Net               80 ml       Constitutional: Awake, alert, cooperative, no apparent distress   Respiratory: Clear to auscultation bilaterally, no crackles or wheezing   Cardiovascular: Regular rate and rhythm, normal S1 and S2, and no murmur noted   Abdomen: Normal bowel sounds, soft, non-distended, non-tender   Skin: No rashes, no cyanosis, dry to touch   Neuro: Alert and oriented x3, no weakness, numbness, memory loss   Extremities: No edema, normal range of motion   Other(s):        All other systems: Negative          Medications:      All current medications were reviewed with changes reflected in problem list.         Data:      All new lab and imaging data was reviewed.   Labs:    Recent Labs  Lab 08/04/18  0611      POTASSIUM 3.7   CHLORIDE 108   CO2 27   ANIONGAP 5   *   BUN 33*   CR 1.30*   GFRESTIMATED 38*   GFRESTBLACK 46*   DINORAH 8.2*       Recent Labs  Lab 08/03/18  1146   WBC 8.8   HGB 14.0   HCT 41.5   MCV 92          Recent Labs  Lab 08/01/18  1052   COLOR Yellow   APPEARANCE Clear   URINEGLC Negative   URINEBILI Small*   URINEKETONE Negative   SG 1.015   UBLD Trace*   URINEPH 6.0   PROTEIN Trace*   UROBILINOGEN 0.2   NITRITE Negative   LEUKEST Negative   RBCU O - 2   WBCU 0 - 5      Imaging:   No results found for this or any previous visit (from the past 24 hour(s)).    "

## 2018-08-05 VITALS
DIASTOLIC BLOOD PRESSURE: 65 MMHG | TEMPERATURE: 97.3 F | HEIGHT: 67 IN | OXYGEN SATURATION: 96 % | RESPIRATION RATE: 24 BRPM | SYSTOLIC BLOOD PRESSURE: 135 MMHG | HEART RATE: 68 BPM | WEIGHT: 123 LBS | BODY MASS INDEX: 19.3 KG/M2

## 2018-08-05 LAB
ANION GAP SERPL CALCULATED.3IONS-SCNC: 5 MMOL/L (ref 3–14)
BUN SERPL-MCNC: 46 MG/DL (ref 7–30)
CALCIUM SERPL-MCNC: 8.6 MG/DL (ref 8.5–10.1)
CHLORIDE SERPL-SCNC: 108 MMOL/L (ref 94–109)
CO2 SERPL-SCNC: 26 MMOL/L (ref 20–32)
CREAT SERPL-MCNC: 1.4 MG/DL (ref 0.52–1.04)
GFR SERPL CREATININE-BSD FRML MDRD: 35 ML/MIN/1.7M2
GLUCOSE SERPL-MCNC: 150 MG/DL (ref 70–99)
POTASSIUM SERPL-SCNC: 4.1 MMOL/L (ref 3.4–5.3)
SODIUM SERPL-SCNC: 139 MMOL/L (ref 133–144)

## 2018-08-05 PROCEDURE — A9270 NON-COVERED ITEM OR SERVICE: HCPCS | Mod: GY | Performed by: PHYSICIAN ASSISTANT

## 2018-08-05 PROCEDURE — 36415 COLL VENOUS BLD VENIPUNCTURE: CPT | Performed by: PHYSICIAN ASSISTANT

## 2018-08-05 PROCEDURE — 99217 ZZC OBSERVATION CARE DISCHARGE: CPT | Performed by: PHYSICIAN ASSISTANT

## 2018-08-05 PROCEDURE — G0378 HOSPITAL OBSERVATION PER HR: HCPCS

## 2018-08-05 PROCEDURE — 25000132 ZZH RX MED GY IP 250 OP 250 PS 637: Mod: GY | Performed by: PHYSICIAN ASSISTANT

## 2018-08-05 PROCEDURE — 80048 BASIC METABOLIC PNL TOTAL CA: CPT | Performed by: PHYSICIAN ASSISTANT

## 2018-08-05 RX ADMIN — DILTIAZEM HYDROCHLORIDE 300 MG: 180 CAPSULE, COATED, EXTENDED RELEASE ORAL at 08:44

## 2018-08-05 RX ADMIN — OMEPRAZOLE 20 MG: 20 CAPSULE, DELAYED RELEASE ORAL at 08:44

## 2018-08-05 RX ADMIN — CEFUROXIME AXETIL 500 MG: 250 TABLET, FILM COATED ORAL at 08:44

## 2018-08-05 RX ADMIN — LEVOTHYROXINE SODIUM 88 MCG: 88 TABLET ORAL at 08:44

## 2018-08-05 NOTE — PLAN OF CARE
"Problem: Patient Care Overview  Goal: Plan of Care/Patient Progress Review  Outcome: No Change  Problem: Patient Care Overview  Goal: Plan of Care/Patient Progress Review  Outcome: Improving  PRIMARY DIAGNOSIS:Dehydration/Sore throat  OUTPATIENT/OBSERVATION GOALS TO BE MET BEFORE DISCHARGE:  1. Pain Status: Improved-controlled with oral pain medications.     2. Return to near baseline physical activity: Yes     3. Cleared for discharge by consultants (if involved): Yes     Discharge Planner Nurse   Safe discharge environment identified: Yes  Barriers to discharge: No       Entered by: Eda Grier 08/05/2018     /71 (BP Location: Left arm)  Pulse 80  Temp 96.3  F (35.7  C) (Oral)  Resp 20  Ht 1.702 m (5' 7\")  Wt 55.8 kg (123 lb)  SpO2 94%  BMI 19.26 kg/m2  Pt resting comfortably. Denies pain, nausea or vomiting this shift. Voiding without difficulty. Bowel sounds active, lung sounds clear. Pt up to bathroom with SBA. Will continue to monitor.  Please review provider order for any additional goals.   Nurse to notify provider when observation goals have been met and patient is ready for discharge.      "

## 2018-08-05 NOTE — PLAN OF CARE
Problem: Patient Care Overview  Goal: Plan of Care/Patient Progress Review  Outcome: Improving  PRIMARY DIAGNOSIS: GENERALIZED WEAKNESS: sore throat    OUTPATIENT/OBSERVATION GOALS TO BE MET BEFORE DISCHARGE  1. Orthostatic performed: NA                                  2. Tolerating PO medications: Yes    3. Return to near baseline physical activity: Yes    4. Cleared for discharge by consultants (if involved): Yes    Discharge Planner Nurse   Safe discharge environment identified: Yes  Barriers to discharge: Yes       Entered by: Vivienne Zamorano 08/05/2018 10:33 AM     Please review provider order for any additional goals.   Nurse to notify provider when observation goals have been met and patient is ready for discharge.

## 2018-08-05 NOTE — DISCHARGE SUMMARY
Discharge Summary  Hospitalist Service    Juliana Leal MRN# [id number]   YOB: 1923 Age: 89 year old     Date of Admission:  8/3/2018  Date of Discharge:  8/5/2018  Admitting Physician: Hector Mcpherson MD  Discharge Physician: Audrey Mejia PA-C  Discharging Service: Hospitalist Service     Primary Provider: Erick Runao  Primary Care Physician Phone Number: [phone]         Discharge Diagnoses/Problem Oriented Hospital Course (Providers):    Juliana Leal was admitted on 8/3/2018 by Hector Mcpherson MD and I would refer you to their history and physical.  Juliana was admitted with complaints of weakness and sore throat.  She was recently seen in her urgent care and started on an oral antibiotic for her sore throat.  Her strep test in the ED was negative and she had a CT of the soft tissue in her neck that was negative for abscess.  She was noted in lab work to have acute kidney injury likely related to dehydration.  She was treated supportively with throat lozenges and IV fluids with improvement in her pain.  We continued her oral antibiotic since she had already been started on this.  She did feel her strength had improved after 2 nights of admission and was cleared for discharge.  Of note the pharmacist recommended stopping her hydrochlorothiazide as her creatinine clearance has been under 30 for quite some time so this was discontinued and she will follow-up with her primary care provider.  The following problems were addressed during her hospitalization:    1.  Pharyngitis-likely viral given a negative strep test and negative CT imaging of the soft tissue in her neck.  She was placed on an oral antibiotic previously by an urgent care and we will complete the course of this.  Her throat pain did improve and she was able to eat and drink.    2.  Acute kidney injury-likely related to #1 and not eating or drinking well.  Her hydrochlorothiazide was held and she was given IV fluids with  improvement of her creatinine to her baseline.  She was instructed to follow-up with her primary care doctor for further lab testing.    3.  Hypertension-on admission her hydrochlorothiazide was held given her kidney injury.  Pharmacy recommended discontinuing this altogether as her creatinine clearance was less than 30 and it is ineffective in the situation.  Since her blood pressure was reasonably controlled without it we did not start any new medications and she will follow-up with her primary care provider    Her care was discussed with her daughter yesterday but we did not touch base today.  She did not express concerns to the nursing staff prior to the patient's discharge but I will be available to speak to this week as well.         Code Status:      Full Code        Brief Hospital Stay Summary Sent Home With Patient in AVS:        Reason for your hospital stay       You were admitted for concerns of acute kidney injury and sore throat. We   treated you with IV fluids and your creatinine improved. Your sore throat   also improved. We would like you to complete your course of of antibiotics   ending on 8/10 (you will have a few pills left in the bottle).     Given your higher creatinine over the last few months we do not believe   your Hydrochlorothiazide is effective and would like you to stop this.   Your blood pressure was good here despite not getting it so you can follow   up with your primary care doctor if you need another medication.                                  Pending Results:        Unresulted Labs Ordered in the Past 30 Days of this Admission     No orders found from 6/4/2018 to 8/4/2018.            Discharge Instructions and Follow-Up:      Follow-up Appointments     Follow-up and recommended labs and tests        Follow up with primary care provider, Erick Ruano, within 7 days to   evaluate medication change and for hospital follow- up.  The following   labs/tests are recommended: check  "blood pressure and creatinine level.                      Discharge Disposition:      Discharged to home         Discharge Medications:        Current Discharge Medication List      CONTINUE these medications which have NOT CHANGED    Details   acetaminophen (TYLENOL) 500 MG tablet Take 500 mg by mouth 2 times daily as needed for mild pain      CARTIA  MG 24 hr capsule TAKE 1 CAPSULE(300 MG) BY MOUTH DAILY  Qty: 90 capsule, Refills: 0    Associated Diagnoses: Essential hypertension      cefuroxime (CEFTIN) 500 MG tablet Take 1 tablet (500 mg) by mouth 2 times daily  Qty: 20 tablet, Refills: 0    Associated Diagnoses: Uvulitis; Urinary frequency      cetirizine (ZYRTEC) 10 MG tablet Take 1 tablet (10 mg) by mouth 2 times daily  Qty: 30 tablet    Associated Diagnoses: Need for pneumococcal vaccination      emollient (VANICREAM) cream Apply topically as needed for other (leg rash)      levothyroxine (SYNTHROID/LEVOTHROID) 88 MCG tablet TAKE 1 TABLET BY MOUTH EVERY DAY  Qty: 90 tablet, Refills: 0    Associated Diagnoses: Encounter for medication refill      Multiple Vitamins-Minerals (CENTRUM SILVER) per tablet Take 1 tablet by mouth daily      omeprazole (PRILOSEC) 20 MG CR capsule Take 1 capsule (20 mg) by mouth daily  Qty: 30 capsule, Refills: 0    Associated Diagnoses: Gastroesophageal reflux disease, esophagitis presence not specified         STOP taking these medications       hydrochlorothiazide (HYDRODIURIL) 25 MG tablet Comments:   Reason for Stopping:                 Allergies:         Allergies   Allergen Reactions     Lisinopril Other (See Comments) and Anaphylaxis     Swelling around mouth     Prednisone Unknown     \"every side effect listed\"     Allegra [Fexofenadine] Swelling     Amoxicillin Itching     Cipro [Quinolones] Itching     Codeine Sulfate Other (See Comments)     Felt out of it when took it yrs ago     Iodine [Gnp Iodides Decolorized]      Lyrica [Pregabalin]      Reaction unknown     " "Norvasc [Amlodipine Besylate]      Mouth felt funny     Tramadol      Felt loopy     Zaditor Other (See Comments) and Swelling     Tongue swelled  Didn't feel right  Eye drops     Penicillins Rash           Consultations This Hospital Stay:      No consultations were requested during this admission         Condition and Physical on Discharge:      Discharge condition: Stable   Vitals: Blood pressure 131/78, pulse 98, temperature 96  F (35.6  C), temperature source Oral, resp. rate 24, height 1.702 m (5' 7\"), weight 55.8 kg (123 lb), SpO2 97 %, not currently breastfeeding.     Constitutional:  Alert and oriented ×3   Lungs:  Clear to auscultation bilaterally   Cardiovascular:  Regular rate and rhythm with no murmurs heard on auscultation   Abdomen:  Bowel sounds are present with no tenderness to palpation   Skin:  No rash or open sores   Other:          Discharge Time:      Less than 30 minutes.        Image Results From This Hospital Stay (For Non-EPIC Providers):        Results for orders placed or performed during the hospital encounter of 08/03/18   CT Soft Tissue Neck w/o Contrast    Narrative    CT SCAN OF THE NECK WITHOUT CONTRAST  8/3/2018 1:43 PM     HISTORY: sore throat worsening despite antibiotics;     TECHNIQUE:  Axial images and coronal reformations. No IV contrast.  Radiation dose for this scan was reduced using automated exposure  control, adjustment of the mA and/or kV according to patient size, or  iterative reconstruction technique.    COMPARISON: None.    FINDINGS: Longus colli muscles appear negative. Styloid processes are  normal in length.  Visualized sinuses, nasopharynx and orbits: Normal.      Tongue, oral cavity and oropharynx:  Normal.      Hypopharynx: Normal.      Larynx and trachea: Normal.      Thyroid: Normal.    Submandibular glands: There is a tiny calcification in the hilum of  the right submandibular gland. There is fatty infiltration of the left  submandibular gland.      Parotid " glands: Normal.        Lymph nodes: Normal.      Vasculature: Mild atherosclerotic plaque is seen at the left carotid  bifurcation.      Upper mediastinum and lungs: Fibrocalcific changes are seen in the  lung apices. This would be compatible with a previous granulomatous  infection..      Bones: Normal.      Impression    IMPRESSION:     1. No acute neck pathology is identified. No evidence for any soft  tissue abscess. The palatine tonsils are normal in size.    2. Tiny nonobstructing calculus in the right submandibular gland.  3. Atrophic appearing left subdural mandibular gland.    KAREL MENDOZA MD           Most Recent Lab Results In EPIC (For Non-EPIC Providers):    Most Recent 3 CBC's:  Recent Labs   Lab Test  08/03/18   1146  09/06/16   1140  04/29/15   1500   WBC  8.8  8.1  6.7   HGB  14.0  13.3  13.4   MCV  92  88.4  90.8   PLT  235  214  196      Most Recent 3 BMP's:  Recent Labs   Lab Test  08/05/18   0602  08/04/18   0611  08/03/18   1146   NA  139  140  136   POTASSIUM  4.1  3.7  3.9   CHLORIDE  108  108  100   CO2  26  27  26   BUN  46*  33*  39*   CR  1.40*  1.30*  1.82*   ANIONGAP  5  5  10   DINORAH  8.6  8.2*  9.1   GLC  150*  155*  170*     Most Recent 3 Troponin's:No lab results found.  Most Recent 3 INR's:  Recent Labs   Lab Test  04/12/15   0640  05/09/12   0620   INR  1.02  1.00     Most Recent 2 LFT's:  Recent Labs   Lab Test  04/12/18   1659  09/06/16   1124   AST  16  17   ALT  8  9   ALKPHOS  69  59   BILITOTAL  0.2  0.4     Most Recent Cholesterol Panel:  Recent Labs   Lab Test  05/09/12   1953   CHOL  198   LDL  121   HDL  50   TRIG  134     Most Recent 6 Bacteria Isolates From Any Culture (See EPIC Reports for Culture Details):  Recent Labs   Lab Test  08/01/18   1130  08/01/18   1052  07/11/18   1339   CULT  No beta hemolytic Streptococcus Group A isolated  No growth  <10,000 colonies/mL  mixed urogenital sera       Most Recent TSH, T4 and HgbA1c:   Recent Labs   Lab Test  04/12/18   1659    05/09/12 1953   T4  1.21   < >   --    A1C   --    --   5.8    < > = values in this interval not displayed.

## 2018-08-05 NOTE — PLAN OF CARE
Problem: Dysphagia (Adult)  Goal: Identify Related Risk Factors and Signs and Symptoms  Related risk factors and signs and symptoms are identified upon initiation of Human Response Clinical Practice Guideline (CPG).   Outcome: Improving  Discharge teaching done, questions answered. Iv removed-cdi, personal belongings left with pt. Left via private car    OBSERVATION patient END time: 1200

## 2018-08-05 NOTE — PLAN OF CARE
"Problem: Patient Care Overview  Goal: Plan of Care/Patient Progress Review  Outcome: Improving  PRIMARY DIAGNOSIS: Dehydration/Sore throat  OUTPATIENT/OBSERVATION GOALS TO BE MET BEFORE DISCHARGE:  1. ADLs back to baseline: Yes     2. Activity and level of assistance: Up with standby assistance and walker     3. Pain status: Having some throat pain-declining interventions      4. Return to near baseline physical activity: Yes      Discharge Planner Nurse   Safe discharge environment identified: Yes  Barriers to discharge: Yes    /75 (BP Location: Left arm)  Pulse 73  Temp 96.3  F (35.7  C) (Oral)  Resp 16  Ht 1.702 m (5' 7\")  Wt 55.8 kg (123 lb)  SpO2 96%  BMI 19.26 kg/m2      A&Ox4, VSS. C/o throat soreness (3/10)but declining interventions.  Continues to belch/burp after eating and drinking. Up with walker and SBA in room and ambulating in [place].  Voiding without difficulty. Saline locked. Will continue to monitor and provide supportive cares.     Please review provider order for any additional goals.   Nurse to notify provider when observation goals have been met and patient is ready for discharge.      "

## 2018-08-05 NOTE — PLAN OF CARE
"Problem: Patient Care Overview  Goal: Plan of Care/Patient Progress Review  Outcome: Improving  PRIMARY DIAGNOSIS:Dehydration/Sore throat  OUTPATIENT/OBSERVATION GOALS TO BE MET BEFORE DISCHARGE:  1. Pain Status: Improved-controlled with oral pain medications.    2. Return to near baseline physical activity: Yes    3. Cleared for discharge by consultants (if involved): Yes    Discharge Planner Nurse   Safe discharge environment identified: Yes  Barriers to discharge: No       Entered by: Eda Grier 08/05/2018 12:50 AM      Pt denied need for pain meds. Unable to assess pain level, pt refused to put hearing aids in, reported \"Im already in bed and I am just fine, Ill be sleeping if you need me.\" Saline locked. Up stand by assist. Will continue to monitor.  Please review provider order for any additional goals.   Nurse to notify provider when observation goals have been met and patient is ready for discharge.      "

## 2018-08-06 ENCOUNTER — TELEPHONE (OUTPATIENT)
Dept: INTERNAL MEDICINE | Facility: CLINIC | Age: 83
End: 2018-08-06

## 2018-08-06 NOTE — TELEPHONE ENCOUNTER
"Hospital/TCU/ED for chronic condition Discharge Protocol    \"Hi, my name is Kalyani Alfonso, a registered nurse, and I am calling from [facility].  I am calling to follow up and see how things are going for you after your recent emergency visit/hospital/TCU stay.\"    Tell me how you are doing now that you are home?\" I am doing ok, better than I was in the hospital.      Discharge Instructions    \"Let's review your discharge instructions.  What is/are the follow-up recommendations?  Pt. Response: I am supposed to follow up with Dr. Ruano soon.    \"Has an appointment with your primary care provider been scheduled?\"   No (schedule appointment)--scheduled for 8/9    \"When you see the provider, I would recommend that you bring your medications with you.\"    Medications    \"Tell me what changed about your medicines when you discharged?\"    Changes to chronic meds?    0-1    \"What questions do you have about your medications?\"    None     New diagnoses of heart failure, COPD, diabetes, or MI?    No              Medication reconciliation completed? Yes  Was MTM referral placed (*Make sure to put transitions as reason for referral)?   No    Call Summary    \"What questions or concerns do you have about your recent visit and your follow-up care?\"     none    \"If you have questions or things don't continue to improve, we encourage you contact us through the main clinic number (give number).  Even if the clinic is not open, triage nurses are available 24/7 to help you.     We would like you to know that our clinic has extended hours (provide information).  We also have urgent care (provide details on closest location and hours/contact info)\"      \"Thank you for your time and take care!\"             "

## 2018-08-07 ENCOUNTER — TELEPHONE (OUTPATIENT)
Dept: NURSING | Facility: CLINIC | Age: 83
End: 2018-08-07

## 2018-08-07 ENCOUNTER — APPOINTMENT (OUTPATIENT)
Dept: GENERAL RADIOLOGY | Facility: CLINIC | Age: 83
End: 2018-08-07
Attending: EMERGENCY MEDICINE
Payer: MEDICARE

## 2018-08-07 ENCOUNTER — HOSPITAL ENCOUNTER (EMERGENCY)
Facility: CLINIC | Age: 83
Discharge: HOME | End: 2018-08-07
Attending: EMERGENCY MEDICINE | Admitting: EMERGENCY MEDICINE
Payer: MEDICARE

## 2018-08-07 VITALS
RESPIRATION RATE: 18 BRPM | OXYGEN SATURATION: 99 % | DIASTOLIC BLOOD PRESSURE: 78 MMHG | TEMPERATURE: 98.3 F | SYSTOLIC BLOOD PRESSURE: 123 MMHG | HEART RATE: 92 BPM

## 2018-08-07 DIAGNOSIS — R50.9 FEVER, UNSPECIFIED FEVER CAUSE: ICD-10-CM

## 2018-08-07 DIAGNOSIS — M62.81 MUSCLE WEAKNESS (GENERALIZED): ICD-10-CM

## 2018-08-07 LAB
ALBUMIN UR-MCNC: NEGATIVE MG/DL
ANION GAP SERPL CALCULATED.3IONS-SCNC: 7 MMOL/L (ref 3–14)
APPEARANCE UR: NORMAL
BASOPHILS # BLD AUTO: 0 10E9/L (ref 0–0.2)
BASOPHILS NFR BLD AUTO: 0.4 %
BILIRUB UR QL STRIP: NEGATIVE
BUN SERPL-MCNC: 37 MG/DL (ref 7–30)
CALCIUM SERPL-MCNC: 8.8 MG/DL (ref 8.5–10.1)
CHLORIDE SERPL-SCNC: 101 MMOL/L (ref 94–109)
CO2 SERPL-SCNC: 28 MMOL/L (ref 20–32)
COLOR UR AUTO: YELLOW
CREAT SERPL-MCNC: 1.51 MG/DL (ref 0.52–1.04)
DIFFERENTIAL METHOD BLD: NORMAL
EOSINOPHIL # BLD AUTO: 0.5 10E9/L (ref 0–0.7)
EOSINOPHIL NFR BLD AUTO: 4.3 %
ERYTHROCYTE [DISTWIDTH] IN BLOOD BY AUTOMATED COUNT: 13.9 % (ref 10–15)
GFR SERPL CREATININE-BSD FRML MDRD: 32 ML/MIN/1.7M2
GLUCOSE SERPL-MCNC: 131 MG/DL (ref 70–99)
GLUCOSE UR STRIP-MCNC: NEGATIVE MG/DL
HCT VFR BLD AUTO: 41.9 % (ref 35–47)
HGB BLD-MCNC: 13.9 G/DL (ref 11.7–15.7)
HGB UR QL STRIP: NEGATIVE
HYALINE CASTS #/AREA URNS LPF: 1 /LPF (ref 0–2)
IMM GRANULOCYTES # BLD: 0.4 10E9/L (ref 0–0.4)
IMM GRANULOCYTES NFR BLD: 3.8 %
KETONES UR STRIP-MCNC: NEGATIVE MG/DL
LEUKOCYTE ESTERASE UR QL STRIP: NEGATIVE
LYMPHOCYTES # BLD AUTO: 1 10E9/L (ref 0.8–5.3)
LYMPHOCYTES NFR BLD AUTO: 9.2 %
MCH RBC QN AUTO: 30.7 PG (ref 26.5–33)
MCHC RBC AUTO-ENTMCNC: 33.2 G/DL (ref 31.5–36.5)
MCV RBC AUTO: 93 FL (ref 78–100)
MONOCYTES # BLD AUTO: 0.9 10E9/L (ref 0–1.3)
MONOCYTES NFR BLD AUTO: 8.5 %
NEUTROPHILS # BLD AUTO: 8.2 10E9/L (ref 1.6–8.3)
NEUTROPHILS NFR BLD AUTO: 73.8 %
NITRATE UR QL: NEGATIVE
NRBC # BLD AUTO: 0 10*3/UL
NRBC BLD AUTO-RTO: 0 /100
PH UR STRIP: 6 PH (ref 5–7)
PLATELET # BLD AUTO: 299 10E9/L (ref 150–450)
POTASSIUM SERPL-SCNC: 4 MMOL/L (ref 3.4–5.3)
RBC # BLD AUTO: 4.53 10E12/L (ref 3.8–5.2)
RBC #/AREA URNS AUTO: 1 /HPF (ref 0–2)
SODIUM SERPL-SCNC: 136 MMOL/L (ref 133–144)
SOURCE: NORMAL
SP GR UR STRIP: 1.01 (ref 1–1.03)
UROBILINOGEN UR STRIP-MCNC: 0 MG/DL (ref 0–2)
WBC # BLD AUTO: 11 10E9/L (ref 4–11)
WBC #/AREA URNS AUTO: 1 /HPF (ref 0–5)

## 2018-08-07 PROCEDURE — 81001 URINALYSIS AUTO W/SCOPE: CPT | Performed by: EMERGENCY MEDICINE

## 2018-08-07 PROCEDURE — 85025 COMPLETE CBC W/AUTO DIFF WBC: CPT | Performed by: EMERGENCY MEDICINE

## 2018-08-07 PROCEDURE — 80048 BASIC METABOLIC PNL TOTAL CA: CPT | Performed by: EMERGENCY MEDICINE

## 2018-08-07 PROCEDURE — 99284 EMERGENCY DEPT VISIT MOD MDM: CPT | Mod: 25

## 2018-08-07 PROCEDURE — 71046 X-RAY EXAM CHEST 2 VIEWS: CPT

## 2018-08-07 ASSESSMENT — ENCOUNTER SYMPTOMS
FEVER: 1
WEAKNESS: 1
VOMITING: 0

## 2018-08-07 NOTE — PROGRESS NOTES
SUBJECTIVE:   Juliana Leal is a 89 year old female who presents to clinic today for the following health issues:    Chief Complaint   Patient presents with     FU Hospitalization     Questioning     if Patient should be placed back on HCTZ     Hospital Follow-up Visit:    Hospital/Nursing Home/IP Rehab Facility: [facility]  Date of Admission: 08/03/2018  Date of Discharge: 08/05/2018  Reason(s) for Admission: Acute Kidney Injury  Tele contact 8/6/18            Problems taking medications regularly:  None       Medication changes since discharge: None       Problems adhering to non-medication therapy:  None    Summary of hospitalization:  [facility] discharge summary reviewed  Diagnostic Tests/Treatments reviewed.  Follow up needed: labs  Other Healthcare Providers Involved in Patient s Care:         None  Update since discharge: improved.     Post Discharge Medication Reconciliation: discharge medications reconciled and changed, per note/orders (see AVS).  Plan of care communicated with patient and family     Coding guidelines for this visit:  Type of Medical   Decision Making Face-to-Face Visit       within 7 Days of discharge Face-to-Face Visit        within 14 days of discharge   Moderate Complexity 30043 [id number]   High Complexity 84703 [id number]            Hospital discharge summary reviewed. Part of the summary as below:      Date of Admission:                                8/3/2018  Date of Discharge:                                8/5/2018  Admitting Physician:                   Hector Mcpherson MD  Discharge Physician:                   Audrey Mejia PA-C  Discharging Service:                   Hospitalist Service    Juliana Leal was admitted on 8/3/2018 by Hector Mcpherson MD and I would refer you to their history and physical.  Juliana was admitted with complaints of weakness and sore throat.  She was recently seen in her urgent care and started on an oral antibiotic for her  sore throat.  Her strep test in the ED was negative and she had a CT of the soft tissue in her neck that was negative for abscess.  She was noted in lab work to have acute kidney injury likely related to dehydration.  She was treated supportively with throat lozenges and IV fluids with improvement in her pain.  We continued her oral antibiotic since she had already been started on this.  She did feel her strength had improved after 2 nights of admission and was cleared for discharge.  Of note the pharmacist recommended stopping her hydrochlorothiazide as her creatinine clearance has been under 30 for quite some time so this was discontinued and she will follow-up with her primary care provider.  The following problems were addressed during her hospitalization:     1.  Pharyngitis-likely viral given a negative strep test and negative CT imaging of the soft tissue in her neck.  She was placed on an oral antibiotic previously by an urgent care and we will complete the course of this.  Her throat pain did improve and she was able to eat and drink.     2.  Acute kidney injury-likely related to #1 and not eating or drinking well.  Her hydrochlorothiazide was held and she was given IV fluids with improvement of her creatinine to her baseline.  She was instructed to follow-up with her primary care doctor for further lab testing.     3.  Hypertension-on admission her hydrochlorothiazide was held given her kidney injury.  Pharmacy recommended discontinuing this altogether as her creatinine clearance was less than 30 and it is ineffective in the situation.  Since her blood pressure was reasonably controlled without it we did not start any new medications and she will follow-up with her primary care provider      Pt's past medical history, family history, habits, medications and allergies were reviewed with the patient today.  See snap shot for  HCM status. Most recent lab results reviewed with pt. Problem list and histories  "reviewed & adjusted, as indicated.  Additional history as below:    Patient no longer has a sore throat.  Has some generalized weakness which is improving.  Has been doing some walking for exercise.  Denies chest pain or shortness of breath.  No abdominal pain.  Trying to drink more fluids.  Currently off of hydrochlorothiazide.     Additional ROS:   Constitutional, HEENT, Cardiovascular, Pulmonary, GI and , Neuro, MSK and Psych review of systems/symptoms are otherwise negative or unchanged from previous, except as noted above.      OBJECTIVE:  /82  Pulse 88  Temp 98.2  F (36.8  C) (Oral)  Resp 16  Wt 125 lb (56.7 kg)  SpO2 98%  BMI 19.58 kg/m2   Estimated body mass index is 19.58 kg/(m^2) as calculated from the following:    Height as of 8/3/18: 5' 7\" (1.702 m).    Weight as of this encounter: 125 lb (56.7 kg).  Eye: PERRL, EOMI  HENT: ear canals and TM's normal and nose and mouth without ulcers or lesions. No OP  erythema   Neck: no adenopathy. Thyroid normal to palpation. No bruits  Pulm: Lungs clear to auscultation   CV: Regular rates and rhythm  GI: Soft, nontender, Normal active bowel sounds, No hepatosplenomegaly or masses palpable  Ext: Peripheral pulses intact. Mild BLE edema.  Neuro: Normal  tone, sensory exam grossly normal. Strength globally 4+/5 in UE and LE bilaterally    Assessment/Plan: (See plan discussion below for further details)  1. Throat pain  Resolved.  Appears that she was mostly viral related but antibiotic therapy was continued still.  Will discontinue cefuroxime    2. CKD (chronic kidney disease) stage 3, GFR 30-59 ml/min  Improving with hydration.  Recheck 2 weeks  - Basic metabolic panel; Future    3. Generalized muscle weakness  Improving.  Patient increase protein intake in her diet.  Given specific leg strengthening exercises to perform in addition to walking.  Patient declines need for physical therapy in addition at this time    4. Essential hypertension  Blood " pressure okay overall.  Remain off of hydrochlorothiazide.    Plan discussion:   Stop Cefuroxime   Increase protein in diet (eggs, meats, peanut butter, yogurst. Boost/Ensure, etc)   Exercise for legs twice a day (10-15 reps)  Walking as able  Continue hydration. 2-3 glasses of water per day if not drinking other liquids   remain off of Hydrochlorothiazide   Nonfasting kidney lab in 2 weeks  Finish 30 day course of Omeprazole for acid/gassiness and then try stopping. If recurrent problems, let me know       Erick Ruano MD  Internal Medicine Department  [facility]

## 2018-08-07 NOTE — ED AVS SNAPSHOT
[facility] Emergency Department    [address] [place] [address]    Phone:  [phone]    Fax:  [phone]                                       Juliana Leal   MRN: [id number]    Department:  [facility] Emergency Department   Date of Visit:  8/7/2018           Patient Information     Date Of Birth          2/10/1923        Your diagnoses for this visit were:     Muscle weakness (generalized)     Fever, unspecified fever cause        You were seen by Humberto Dumont MD.      Follow-up Information     Follow up with Erick Ruano MD. Schedule an appointment as soon as possible for a visit in 2 days.    Specialty:  Internal Medicine    Why:  As needed, If symptoms worsen    Contact information:    [address] [facility] [address]  [phone]          Discharge Instructions         Febrile Illness with Uncertain Cause (Adult)  You have a fever, but the cause is unknown. A fever is a natural reaction of the body to an illness such as infection due to a virus or bacteria. Sometimes other conditions such as cancer or immune diseases can cause fever, especially if the fever has lasted for more than a week or 2. In most cases, the temperature itself is not harmful. It actually helps the body fight infections. A fever does not need to be treated unless you feel very uncomfortable.  Sometimes a fever can be an early sign of a more serious infection, so make sure to follow up if your condition worsens.  Home care  Unless given other instructions by your healthcare provider, follow these guidelines when caring for yourself at home.  General care    If your symptoms are not severe, rest at home for the first 2 to 3 days. When you resume activity, don't let yourself get too tired.    For your overall health, don't smoke. Also avoid being exposed to secondhand smoke.    Your appetite may be poor, so a light diet is fine. Avoid dehydration by drinking 6 to 8 glasses of  fluids per day (such as water, soft drinks, sports drinks, juices, tea, or soup). If you have congestion, extra fluids will help loosen secretions in the nose and lungs.  Medicines    You can take acetaminophen or ibuprofen for pain or to lower your temperature, unless you were given a different medicine to use. (Note: If you have chronic liver or kidney disease or have ever had a stomach ulcer or gastrointestinal bleeding, talk with your healthcare provider before using these medicines. Also talk to your provider if you are taking medicine to prevent blood clots.) Aspirin should never be given to anyone younger than 18 years of age who is ill with a viral infection or fever. It may cause severe liver or brain damage.    If you were given antibiotics for an infection, take them until they are used up, or your healthcare provider tells you to stop. It is important to finish the antibiotics even though you feel better. This is to make sure the infection has cleared. Be aware that antibiotics are not usually given for a viral infection or a fever with an unknown cause.    Over-the-counter medicines will not shorten the duration of the illness. However, they may be helpful for the following symptoms: cough, sore throat, or nasal and sinus congestion. Ask your pharmacist for product suggestions. (Note: Don't use decongestants if you have high blood pressure.)  Follow-up care  Follow up with your healthcare provider, or as advised.    If a culture or other lab tests were done, you will be notified if your treatment needs to be changed. You can call as directed for the results.    If X-rays, a CT, or an ultrasound were done, a specialist will review them. You will be notified of any findings that may affect your care.  Call 911  Call 911 if any of these occur:    Trouble breathing or swallowing, or wheezing    Chest pain    Confusion    Extreme drowsiness or trouble awakening    Fainting or loss of consciousness    Rapid  heart rate    Low blood pressure    Vomiting blood, or large amounts of blood in stool    Seizure  When to seek medical advice  Call your healthcare provider right away if any of these occur:    Cough with lots of colored sputum (mucus) or blood in your sputum    Severe headache    Face, neck, throat, or ear pain    Feeling drowsy    Abdominal pain    Repeated vomiting or diarrhea    Joint pain or a new rash    Burning when urinating    Fever of 100.4 F (38 C) or higher, or as directed by your healthcare provider    Feeling weak or dizzy  Date Last Reviewed: 11/1/2017 2000-2017 [employer]. [address] [place] [address]. All rights reserved. This information is not intended as a substitute for professional medical care. Always follow your healthcare professional's instructions.          Weakness with Uncertain Cause  Based on your exam today, the exact cause of your weakness is not certain. But your weakness does not seem to be a sign of a serious illness at this time. Keep an eye on your symptoms and get medical advice as instructed below.  Home care    Rest at home today. Don't over-exert yourself.    Take any medicine as prescribed.    For the next few days, drink extra fluids (unless your healthcare provider wants you to restrict fluids for other reasons). Don't skip meals.    Unless otherwise directed, continue to take any prescription medicines.    Contact your healthcare provider if you have any questions or concerns.  Follow-up care  Follow up with your healthcare provider, or as advised.  When to seek medical advice  Call your healthcare provider right away for any of the following:    Symptoms get worse    Symptoms don't start getting better within 2 days    Fever of 100.4  F (38  C) or higher, or as directed by your healthcare provider  Call 911  Call 911 for any of these:    Chest, arm, neck, jaw, or upper back pain    Trouble breathing    Numbness or weakness of the face,  one arm, or one leg    Slurred speech, confusion, or trouble speaking, walking, or seeing    Blood in vomit or stool (black or red color)    Loss of consciousness    Severe headache  Date Last Reviewed: 10/1/2017    3318-8167 [employer]. [address] [place] [address]. All rights reserved. This information is not intended as a substitute for professional medical care. Always follow your healthcare professional's instructions.          Your next 10 appointments already scheduled     Aug 09, 2018  2:00 PM CDT   Office Visit with Erick Ruano MD   [facility] ([facility])    [address] [address] [phone]           Bring a current list of meds and any records pertaining to this visit. For Physicals, please bring immunization records and any forms needing to be filled out. Please arrive 10 minutes early to complete paperwork.              24 Hour Appointment Hotline       To make an appointment at any [facility], call [phone] ([phone]). If you don't have a family doctor or clinic, we will help you find one. [facility] are conveniently located to serve the needs of you and your family.             Review of your medicines      Our records show that you are taking the medicines listed below. If these are incorrect, please call your family doctor or clinic.        Dose / Directions Last dose taken    acetaminophen 500 MG tablet   Commonly known as:  TYLENOL   Dose:  500 mg        Take 500 mg by mouth 2 times daily as needed for mild pain   Refills:  0        CARTIA  MG 24 hr capsule   Quantity:  90 capsule   Generic drug:  diltiazem        TAKE 1 CAPSULE(300 MG) BY MOUTH DAILY   Refills:  0        cefuroxime 500 MG tablet   Commonly known as:  CEFTIN   Dose:  500 mg   Quantity:  20 tablet        Take 1 tablet (500 mg) by mouth 2 times daily   Refills:  0        CENTRUM SILVER per tablet    Dose:  1 tablet        Take 1 tablet by mouth daily   Refills:  0        cetirizine 10 MG tablet   Commonly known as:  zyrTEC   Dose:  10 mg   Quantity:  30 tablet        Take 1 tablet (10 mg) by mouth 2 times daily   Refills:  0        emollient cream        Apply topically as needed for other (leg rash)   Refills:  0        levothyroxine 88 MCG tablet   Commonly known as:  SYNTHROID/LEVOTHROID   Quantity:  90 tablet        TAKE 1 TABLET BY MOUTH EVERY DAY   Refills:  0        omeprazole 20 MG CR capsule   Commonly known as:  priLOSEC   Dose:  20 mg   Quantity:  30 capsule        Take 1 capsule (20 mg) by mouth daily   Refills:  0                Procedures and tests performed during your visit     Basic metabolic panel    CBC with platelets differential    UA reflex to Microscopic and Culture    XR Chest 2 Views      Orders Needing Specimen Collection     None      Pending Results     No orders found from 8/5/2018 to 8/8/2018.            Pending Culture Results     No orders found from 8/5/2018 to 8/8/2018.            Pending Results Instructions     If you had any lab results that were not finalized at the time of your Discharge, you can call the ED Lab Result RN at [phone]. You will be contacted by this team for any positive Lab results or changes in treatment. The nurses are available 7 days a week from 10A to 6:30P.  You can leave a message 24 hours per day and they will return your call.        Test Results From Your Hospital Stay        8/7/2018  3:45 PM      Component Results     Component Value Ref Range & Units Status    WBC 11.0 4.0 - 11.0 10e9/L Final    RBC Count 4.53 3.8 - 5.2 10e12/L Final    Hemoglobin 13.9 11.7 - 15.7 g/dL Final    Hematocrit 41.9 35.0 - 47.0 % Final    MCV 93 78 - 100 fl Final    MCH 30.7 26.5 - 33.0 pg Final    MCHC 33.2 31.5 - 36.5 g/dL Final    RDW 13.9 10.0 - 15.0 % Final    Platelet Count 299 150 - 450 10e9/L Final    Diff Method Automated Method  Final    % Neutrophils  73.8 % Final    % Lymphocytes 9.2 % Final    % Monocytes 8.5 % Final    % Eosinophils 4.3 % Final    % Basophils 0.4 % Final    % Immature Granulocytes 3.8 % Final    Nucleated RBCs 0 0 /100 Final    Absolute Neutrophil 8.2 1.6 - 8.3 10e9/L Final    Absolute Lymphocytes 1.0 0.8 - 5.3 10e9/L Final    Absolute Monocytes 0.9 0.0 - 1.3 10e9/L Final    Absolute Eosinophils 0.5 0.0 - 0.7 10e9/L Final    Absolute Basophils 0.0 0.0 - 0.2 10e9/L Final    Abs Immature Granulocytes 0.4 0 - 0.4 10e9/L Final    Absolute Nucleated RBC 0.0  Final         8/7/2018  3:57 PM      Component Results     Component Value Ref Range & Units Status    Sodium 136 133 - 144 mmol/L Final    Potassium 4.0 3.4 - 5.3 mmol/L Final    Chloride 101 94 - 109 mmol/L Final    Carbon Dioxide 28 20 - 32 mmol/L Final    Anion Gap 7 3 - 14 mmol/L Final    Glucose 131 (H) 70 - 99 mg/dL Final    Urea Nitrogen 37 (H) 7 - 30 mg/dL Final    Creatinine 1.51 (H) 0.52 - 1.04 mg/dL Final    GFR Estimate 32 (L) >60 mL/min/1.7m2 Final    Non  GFR Calc    GFR Estimate If Black 39 (L) >60 mL/min/1.7m2 Final    African American GFR Calc    Calcium 8.8 8.5 - 10.1 mg/dL Final         8/7/2018  4:49 PM      Component Results     Component Value Ref Range & Units Status    Color Urine Yellow  Final    Appearance Urine Slightly Cloudy  Final    Glucose Urine Negative NEG^Negative mg/dL Final    Bilirubin Urine Negative NEG^Negative Final    Ketones Urine Negative NEG^Negative mg/dL Final    Specific Gravity Urine 1.010 1.003 - 1.035 Final    Blood Urine Negative NEG^Negative Final    pH Urine 6.0 5.0 - 7.0 pH Final    Protein Albumin Urine Negative NEG^Negative mg/dL Final    Urobilinogen mg/dL 0.0 0.0 - 2.0 mg/dL Final    Nitrite Urine Negative NEG^Negative Final    Leukocyte Esterase Urine Negative NEG^Negative Final    Source Midstream Urine  Final    RBC Urine 1 0 - 2 /HPF Final    WBC Urine 1 0 - 5 /HPF Final    Hyaline Casts 1 0 - 2 /LPF Final          8/7/2018  3:54 PM      Narrative     CHEST TWO VIEWS  8/7/2018 3:49 PM     HISTORY: Fever      COMPARISON: 5/8/2012    FINDINGS: Aortic calcification, ectasia, and mild tortuosity. Minimal  left basilar atelectasis        Impression     IMPRESSION: No acute consolidation.    BLAKE AUSTIN MD                Clinical Quality Measure: Blood Pressure Screening     Your blood pressure was checked while you were in the emergency department today. The last reading we obtained was  BP: 98/63 . Please read the guidelines below about what these numbers mean and what you should do about them.  If your systolic blood pressure (the top number) is less than 120 and your diastolic blood pressure (the bottom number) is less than 80, then your blood pressure is normal. There is nothing more that you need to do about it.  If your systolic blood pressure (the top number) is 120-139 or your diastolic blood pressure (the bottom number) is 80-89, your blood pressure may be higher than it should be. You should have your blood pressure rechecked within a year by a primary care provider.  If your systolic blood pressure (the top number) is 140 or greater or your diastolic blood pressure (the bottom number) is 90 or greater, you may have high blood pressure. High blood pressure is treatable, but if left untreated over time it can put you at risk for heart attack, stroke, or kidney failure. You should have your blood pressure rechecked by a primary care provider within the next 4 weeks.  If your provider in the emergency department today gave you specific instructions to follow-up with your doctor or provider even sooner than that, you should follow that instruction and not wait for up to 4 weeks for your follow-up visit.        Thank you for choosing [facility]       Thank you for choosing [facility] for your care. Our goal is always to provide you with excellent care. Hearing back from our patients is one way we can continue to improve our  services. Please take a few minutes to complete the written survey that you may receive in the mail after you visit with us. Thank you!        [employer] Information     [employer] gives you secure access to your electronic health record. If you see a primary care provider, you can also send messages to your care team and make appointments. If you have questions, please call your primary care clinic.  If you do not have a primary care provider, please call [phone] and they will assist you.        Care EveryWhere ID     This is your Care EveryWhere ID. This could be used by other organizations to access your [facility] medical records  [id number]        Equal Access to Services     LOGAN GATES : Maegan Sauceda, magali campuzano, cynthia argueta. So [facility] [phone].    ATENCIÓN: Si habla español, tiene a workman disposición servicios gratuitos de asistencia lingüística. Llame al [phone].    We comply with applicable federal civil rights laws and [place] laws. We do not discriminate on the basis of race, color, national origin, age, disability, sex, sexual orientation, or gender identity.            After Visit Summary       This is your record. Keep this with you and show to your community pharmacist(s) and doctor(s) at your next visit.

## 2018-08-07 NOTE — ED PROVIDER NOTES
History     Chief Complaint:  Generalized weakness    HPI   Juliana Leal is a 89 year old female who presents with her daughter with concern for weakness. The patient's daughter states that the patient has been having difficulty standing up and feels weak. The patient states she is unable to stand for over 30 seconds. The patient denies emesis, or any other concerns here in the ER today. Of note, the patient was seen in the ER two days ago for a sore throat and was given antibiotics.     Allergies:  Lisinopril  Prednisone  Allegra  Amoxicillin  Cipro  Codeine  Iodines  Lyrica  Norvasc  Tramadol  Zaditor  Penicillins    Medications:    Cartia  Cefuroxime  Zyrtec  Vanicream  Levothyroxine  Omeprazole    Past Medical History:    Angioedema  Arthritis  CKD  Gout   Headaches  Hives  HTN  Thyroid disease    Past Surgical History:    Repair atrial defect  Lens implant  Biopsy temporal artery  Colectomy      Family History:    No past pertinent family history.    Social History:  Marital Status:   [5]  Former smoker: quit 1960  Alc: rare     Review of Systems   Constitutional: Positive for fever.   Gastrointestinal: Negative for vomiting.   Neurological: Positive for weakness.   All other systems reviewed and are negative.      Physical Exam     Patient Vitals for the past 24 hrs:   BP Temp Temp src Pulse Heart Rate Resp SpO2   08/07/18 1714 123/78 - - 92 - 18 99 %   08/07/18 1708 - - - - - - 99 %   08/07/18 1707 123/78 - - - - - -   08/07/18 1645 - - - - - - 96 %   08/07/18 1615 - - - - - - 96 %   08/07/18 1602 - - - - - - 93 %   08/07/18 1530 - - - - - - 95 %   08/07/18 1436 98/63 98.3  F (36.8  C) Temporal - 97 18 100 %         Physical Exam  Constitutional: Patient is well appearing. No distress.  Head: Atraumatic.  Mouth/Throat: Oropharynx is clear and moist. No oropharyngeal exudate.  Eyes: Conjunctivae and EOM are normal. No scleral icterus.  Neck: Normal range of motion. Neck supple.   Cardiovascular:  Normal rate, regular rhythm, normal heart sounds and intact distal pulses.   Pulmonary/Chest: Breath sounds normal. No respiratory distress.  Abdominal: Soft. Bowel sounds are normal. No distension. No tenderness. No rebound or guarding.   Musculoskeletal: Normal range of motion. No edema or tenderness.   Neurological: Alert and orientated to person, place, and time. No observable focal neuro deficit  Skin: Warm and dry. No rash noted. Not diaphoretic.       Emergency Department Course     Imaging:  Radiographic findings were communicated with the patient who voiced understanding of the findings.    XR Chest 2 views:   No acute consolidation As per radiology.     Laboratory:    CBC: WBC: 11.0, HGB: 13.9, PLT: 299  BMP: Glucose 131 (H), BUN: 37 (H), GFR estimate: 32 L), o/w WNL (Creatinine: 1.51 (H))    UA with Microscopic: All WNL      Emergency Department Course:  Nursing notes and vitals reviewed. (5535) I performed an exam of the patient as documented above.     IV inserted. Medicine administered as documented above. Blood drawn. This was sent to the lab for further testing, results above.    The patient was sent for a chest x-ray while in the emergency department, findings above.     (9017) I rechecked the patient and discussed the results of her workup thus far.     The patient provided a urine sample here in the emergency department. This was sent for laboratory testing, findings above.     Findings and plan explained to the Patient. Patient discharged home with instructions regarding supportive care, medications, and reasons to return. The importance of close follow-up was reviewed.     I personally reviewed the laboratory results with the Patient and answered all related questions prior to discharge.      Impression & Plan      Medical Decision Making:  Juliana Leal is a 89 year old female who presents for evaluation of weakness.  Associated symptoms today have included fever.  The workup here in the  emergency room was to evaluate for infections, metabolic, electrolyte, neurologic, muscle or cardiovascular causes.  Of note, there are no signs of pneumonia or UTI causing the symptoms.  In addition, I do not believe symptoms are due to CVA, TIA, myasthesia gravis, myopathy or acute coronary syndromes and there are no indications at this point for a further workup with a benign history, exam and laboratory tests.  Doubt spinal pathology or other central etiology of symptoms.  They were ambulated in ED and did well.  I offered admission but I do I believe supportive outpatient management is also possible nad indicated; they want to go home will have them followup with their primary care doctor in 1-2 days for a recheck. Return for any additional symptoms or worsening weakness.      Diagnosis:    ICD-10-CM    1. Muscle weakness (generalized) M62.81    2. Fever, unspecified fever cause R50.9        Disposition:  discharged to home    Discharge Medications:  Discharge Medication List as of 8/7/2018  5:06 PM        Scribe Disclosure:  I, Sivakumar Sanchez, am serving as a scribe on 8/7/2018 at 3:15 PM to personally document services performed by Humberto Dumont MD based on my observations and the provider's statements to me.       Sivakumar Sanchez  8/7/2018   [facility] EMERGENCY DEPARTMENT       Humberto Dumont MD  08/07/18 5357

## 2018-08-07 NOTE — DISCHARGE INSTRUCTIONS
Febrile Illness with Uncertain Cause (Adult)  You have a fever, but the cause is unknown. A fever is a natural reaction of the body to an illness such as infection due to a virus or bacteria. Sometimes other conditions such as cancer or immune diseases can cause fever, especially if the fever has lasted for more than a week or 2. In most cases, the temperature itself is not harmful. It actually helps the body fight infections. A fever does not need to be treated unless you feel very uncomfortable.  Sometimes a fever can be an early sign of a more serious infection, so make sure to follow up if your condition worsens.  Home care  Unless given other instructions by your healthcare provider, follow these guidelines when caring for yourself at home.  General care    If your symptoms are not severe, rest at home for the first 2 to 3 days. When you resume activity, don't let yourself get too tired.    For your overall health, don't smoke. Also avoid being exposed to secondhand smoke.    Your appetite may be poor, so a light diet is fine. Avoid dehydration by drinking 6 to 8 glasses of fluids per day (such as water, soft drinks, sports drinks, juices, tea, or soup). If you have congestion, extra fluids will help loosen secretions in the nose and lungs.  Medicines    You can take acetaminophen or ibuprofen for pain or to lower your temperature, unless you were given a different medicine to use. (Note: If you have chronic liver or kidney disease or have ever had a stomach ulcer or gastrointestinal bleeding, talk with your healthcare provider before using these medicines. Also talk to your provider if you are taking medicine to prevent blood clots.) Aspirin should never be given to anyone younger than 18 years of age who is ill with a viral infection or fever. It may cause severe liver or brain damage.    If you were given antibiotics for an infection, take them until they are used up, or your healthcare provider tells you  to stop. It is important to finish the antibiotics even though you feel better. This is to make sure the infection has cleared. Be aware that antibiotics are not usually given for a viral infection or a fever with an unknown cause.    Over-the-counter medicines will not shorten the duration of the illness. However, they may be helpful for the following symptoms: cough, sore throat, or nasal and sinus congestion. Ask your pharmacist for product suggestions. (Note: Don't use decongestants if you have high blood pressure.)  Follow-up care  Follow up with your healthcare provider, or as advised.    If a culture or other lab tests were done, you will be notified if your treatment needs to be changed. You can call as directed for the results.    If X-rays, a CT, or an ultrasound were done, a specialist will review them. You will be notified of any findings that may affect your care.  Call 911  Call 911 if any of these occur:    Trouble breathing or swallowing, or wheezing    Chest pain    Confusion    Extreme drowsiness or trouble awakening    Fainting or loss of consciousness    Rapid heart rate    Low blood pressure    Vomiting blood, or large amounts of blood in stool    Seizure  When to seek medical advice  Call your healthcare provider right away if any of these occur:    Cough with lots of colored sputum (mucus) or blood in your sputum    Severe headache    Face, neck, throat, or ear pain    Feeling drowsy    Abdominal pain    Repeated vomiting or diarrhea    Joint pain or a new rash    Burning when urinating    Fever of 100.4 F (38 C) or higher, or as directed by your healthcare provider    Feeling weak or dizzy  Date Last Reviewed: 11/1/2017 2000-2017 [employer]. [address] [place] [address], [place] [address]. All rights reserved. This information is not intended as a substitute for professional medical care. Always follow your healthcare professional's instructions.          Weakness with Uncertain  Cause  Based on your exam today, the exact cause of your weakness is not certain. But your weakness does not seem to be a sign of a serious illness at this time. Keep an eye on your symptoms and get medical advice as instructed below.  Home care    Rest at home today. Don't over-exert yourself.    Take any medicine as prescribed.    For the next few days, drink extra fluids (unless your healthcare provider wants you to restrict fluids for other reasons). Don't skip meals.    Unless otherwise directed, continue to take any prescription medicines.    Contact your healthcare provider if you have any questions or concerns.  Follow-up care  Follow up with your healthcare provider, or as advised.  When to seek medical advice  Call your healthcare provider right away for any of the following:    Symptoms get worse    Symptoms don't start getting better within 2 days    Fever of 100.4  F (38  C) or higher, or as directed by your healthcare provider  Call 911  Call 911 for any of these:    Chest, arm, neck, jaw, or upper back pain    Trouble breathing    Numbness or weakness of the face, one arm, or one leg    Slurred speech, confusion, or trouble speaking, walking, or seeing    Blood in vomit or stool (black or red color)    Loss of consciousness    Severe headache  Date Last Reviewed: 10/1/2017    4035-9674 [employer]. [address] [place] [address], [place] [address]. All rights reserved. This information is not intended as a substitute for professional medical care. Always follow your healthcare professional's instructions.

## 2018-08-07 NOTE — ED AVS SNAPSHOT
[facility] Emergency Department    [address]    [facility] [address]    Phone:  [phone]    Fax:  [phone]                                       Juliana Leal   MRN: [id number]    Department:  [facility] Emergency Department   Date of Visit:  8/7/2018           After Visit Summary Signature Page     I have received my discharge instructions, and my questions have been answered. I have discussed any challenges I see with this plan with the nurse or doctor.    ..........................................................................................................................................  Patient/Patient Representative Signature      ..........................................................................................................................................  Patient Representative Print Name and Relationship to Patient    ..................................................               ................................................  Date                                            Time    ..........................................................................................................................................  Reviewed by Signature/Title    ...................................................              ..............................................  Date                                                            Time

## 2018-08-07 NOTE — TELEPHONE ENCOUNTER
"Juliana Leal is a 89 year old female. Daughter calls reporting patient symptoms.     NURSING ASSESSMENT:  Description:  New generalized weakness today. Patient cannot even feed herself. Can hardly walk. Also has fever of 100. On antibiotics, says until August 11th. Patient reports that her head feels so stuffed up that she feels it will \"blow up\". Throat has improved. Unsure how much she is drinking, was dehydrated in hospital. Unsure if SOB.  Precip. factors:  Discharged from hospital 8/5  Associated symptoms:  See above  Allergies:   Allergies   Allergen Reactions     Lisinopril Other (See Comments) and Anaphylaxis     Swelling around mouth     Prednisone Unknown     \"every side effect listed\"     Allegra [Fexofenadine] Swelling     Amoxicillin Itching     Cipro [Quinolones] Itching     Codeine Sulfate Other (See Comments)     Felt out of it when took it yrs ago     Iodine [Gnp Iodides Decolorized]      Lyrica [Pregabalin]      Reaction unknown     Norvasc [Amlodipine Besylate]      Mouth felt funny     Tramadol      Felt loopy     Zaditor Other (See Comments) and Swelling     Tongue swelled  Didn't feel right  Eye drops     Penicillins Rash       RECOMMENDED DISPOSITION:  To ED, another person to drive - or call 911 if unable to safely get in car  Will comply with recommendation: Yes  If further questions/concerns or if symptoms do not improve, worsen or new symptoms develop, call your PCP or [facility] as soon as possible.      Guideline used:  Telephone Triage Protocols for Nurses, Fifth Edition, Gayathri Jay RN    "

## 2018-08-07 NOTE — ED TRIAGE NOTES
Oral temp of 101 at home. Has been on antibiotics for one week; seen here Friday for raw throat. Patient also with weakness; normally she is fully ambulatory and today she is having trouble even standing up.

## 2018-08-09 ENCOUNTER — OFFICE VISIT (OUTPATIENT)
Dept: INTERNAL MEDICINE | Facility: CLINIC | Age: 83
End: 2018-08-09
Payer: COMMERCIAL

## 2018-08-09 VITALS
RESPIRATION RATE: 16 BRPM | TEMPERATURE: 98.2 F | SYSTOLIC BLOOD PRESSURE: 122 MMHG | BODY MASS INDEX: 19.58 KG/M2 | DIASTOLIC BLOOD PRESSURE: 82 MMHG | OXYGEN SATURATION: 98 % | HEART RATE: 88 BPM | WEIGHT: 125 LBS

## 2018-08-09 DIAGNOSIS — N18.30 CKD (CHRONIC KIDNEY DISEASE) STAGE 3, GFR 30-59 ML/MIN (H): ICD-10-CM

## 2018-08-09 DIAGNOSIS — R07.0 THROAT PAIN: ICD-10-CM

## 2018-08-09 DIAGNOSIS — I10 ESSENTIAL HYPERTENSION: Primary | ICD-10-CM

## 2018-08-09 DIAGNOSIS — M62.81 GENERALIZED MUSCLE WEAKNESS: ICD-10-CM

## 2018-08-09 PROCEDURE — 99495 TRANSJ CARE MGMT MOD F2F 14D: CPT | Performed by: INTERNAL MEDICINE

## 2018-08-09 NOTE — MR AVS SNAPSHOT
After Visit Summary   8/9/2018    Juliana Leal    MRN: [id number]           Patient Information     Date Of Birth          2/10/1923        Visit Information        Provider Department      8/9/2018 2:00 PM Erick Ruano MD [facility]        Care Instructions     Stop Cefuroxime   Increase protein in diet (eggs, meats, peanut butter, yogurst. Boost/Ensure, etc)   Exercise for legs twice a day (10-15 reps)  Walking as able  Continue hydration. 2-3 glasses of water per day if not drinking other liquids   remain off hf Hydrochlorothiazide   Nonfasting kidney lab in 2 weeks  Finish 30 day course of Omeprazole for acid/gassiness and then try stopping. If recurrent problems, let me know          Follow-ups after your visit        Who to contact     If you have questions or need follow up information about today's clinic visit or your schedule please contact [facility] directly at [phone].  Normal or non-critical lab and imaging results will be communicated to you by CareSharehart, letter or phone within 4 business days after the clinic has received the results. If you do not hear from us within 7 days, please contact the clinic through [employer] or phone. If you have a critical or abnormal lab result, we will notify you by phone as soon as possible.  Submit refill requests through [employer] or call your pharmacy and they will forward the refill request to us. Please allow 3 business days for your refill to be completed.          Additional Information About Your Visit        CareSharehart Information     [employer] gives you secure access to your electronic health record. If you see a primary care provider, you can also send messages to your care team and make appointments. If you have questions, please call your primary care clinic.  If you do not have a primary care provider, please call [phone] and they will assist you.        Care EveryWhere ID     This is your  Care EveryWhere ID. This could be used by other organizations to access your [facility] medical records  [id number]        Your Vitals Were     Pulse Temperature Respirations Pulse Oximetry BMI (Body Mass Index)       88 98.2  F (36.8  C) (Oral) 16 98% 19.58 kg/m2        Blood Pressure from Last 3 Encounters:   08/09/18 120/86   08/07/18 123/78   08/05/18 135/65    Weight from Last 3 Encounters:   08/09/18 125 lb (56.7 kg)   08/03/18 123 lb (55.8 kg)   08/01/18 123 lb 14.4 oz (56.2 kg)              Today, you had the following     No orders found for display         Today's Medication Changes          These changes are accurate as of 8/9/18  2:39 PM.  If you have any questions, ask your nurse or doctor.               Stop taking these medicines if you haven't already. Please contact your care team if you have questions.     cefuroxime 500 MG tablet   Commonly known as:  CEFTIN   Stopped by:  Erick Ruano MD                    Primary Care Provider Office Phone # Fax #    Erick Ruano [phone]       [address] [place] [address]        Equal Access to Services     MARIA D GATES : Hadjenna lealo [place], waaxda luqadaha, qaybta kaalmada adeguanakitoyada, cynthia krishnamurthy. So [facility] [phone].    ATENCIÓN: Si habla español, tiene a workman disposición servicios gratuitos de asistencia lingüística. Llame al [phone].    We comply with applicable federal civil rights laws and [place] laws. We do not discriminate on the basis of race, color, national origin, age, disability, sex, sexual orientation, or gender identity.            Thank you!     Thank you for choosing [facility]  for your care. Our goal is always to provide you with excellent care. Hearing back from our patients is one way we can continue to improve our services. Please take a few minutes to complete the written survey that you may receive in the mail after your visit with us.  Thank you!             Your Updated Medication List - Protect others around you: Learn how to safely use, store and throw away your medicines at www.disposemymeds.org.          This list is accurate as of 8/9/18  2:39 PM.  Always use your most recent med list.                   Brand Name Dispense Instructions for use Diagnosis    acetaminophen 500 MG tablet    TYLENOL     Take 500 mg by mouth 2 times daily as needed for mild pain        CARTIA  MG 24 hr capsule   Generic drug:  diltiazem     90 capsule    TAKE 1 CAPSULE(300 MG) BY MOUTH DAILY    Essential hypertension       CENTRUM SILVER per tablet      Take 1 tablet by mouth daily        cetirizine 10 MG tablet    zyrTEC    30 tablet    Take 1 tablet (10 mg) by mouth 2 times daily    Need for pneumococcal vaccination       emollient cream      Apply topically as needed for other (leg rash)        levothyroxine 88 MCG tablet    SYNTHROID/LEVOTHROID    90 tablet    TAKE 1 TABLET BY MOUTH EVERY DAY    Encounter for medication refill       omeprazole 20 MG CR capsule    priLOSEC    30 capsule    Take 1 capsule (20 mg) by mouth daily    Gastroesophageal reflux disease, esophagitis presence not specified

## 2018-08-09 NOTE — PATIENT INSTRUCTIONS
Stop Cefuroxime   Increase protein in diet (eggs, meats, peanut butter, yogurst. Boost/Ensure, etc)   Exercise for legs twice a day (10-15 reps)  Walking as able  Continue hydration. 2-3 glasses of water per day if not drinking other liquids   remain off hf Hydrochlorothiazide   Nonfasting kidney lab in 2 weeks  Finish 30 day course of Omeprazole for acid/gassiness and then try stopping. If recurrent problems, let me know

## 2018-08-14 ENCOUNTER — ALLIED HEALTH/NURSE VISIT (OUTPATIENT)
Dept: INFUSION THERAPY | Facility: CLINIC | Age: 83
End: 2018-08-14
Attending: ALLERGY & IMMUNOLOGY
Payer: MEDICARE

## 2018-08-14 VITALS
RESPIRATION RATE: 16 BRPM | DIASTOLIC BLOOD PRESSURE: 78 MMHG | HEART RATE: 88 BPM | SYSTOLIC BLOOD PRESSURE: 114 MMHG | TEMPERATURE: 98.2 F | OXYGEN SATURATION: 98 %

## 2018-08-14 DIAGNOSIS — L50.1 IDIOPATHIC URTICARIA: Primary | ICD-10-CM

## 2018-08-14 PROCEDURE — 96372 THER/PROPH/DIAG INJ SC/IM: CPT

## 2018-08-14 PROCEDURE — 25000128 H RX IP 250 OP 636: Performed by: ALLERGY & IMMUNOLOGY

## 2018-08-14 RX ADMIN — OMALIZUMAB 300 MG: 202.5 INJECTION, SOLUTION SUBCUTANEOUS at 09:51

## 2018-08-14 NOTE — PROGRESS NOTES
Infusion Nursing Note:  Julianaandrei Garciaford presents today for xolair.    Patient seen by provider today: No   present during visit today: Not Applicable.    Note: N/A.    Intravenous Access:  No Intravenous access/labs at this visit.    Treatment Conditions:  Not Applicable.      Post Infusion Assessment:  Patient observed for 30 minutes post xolair per protocol.    Discharge Plan:   Patient declined prescription refills.  Discharge instructions reviewed with: Patient.  Patient and/or family verbalized understanding of discharge instructions and all questions answered.  Copy of AVS reviewed with patient and/or family.  Patient will return 9/11/18 for next appointment.  Patient discharged in stable condition accompanied by: son.  Departure Mode: Ambulatory.    Jovanna Vasquez RN

## 2018-08-14 NOTE — MR AVS SNAPSHOT
After Visit Summary   8/14/2018    Juliana Leal    MRN: [id number]           Patient Information     Date Of Birth          2/10/1923        Visit Information        Provider Department      8/14/2018 10:00 AM RH LAB DRAW 1 [facility]        Today's Diagnoses     Idiopathic urticaria    -  1       Follow-ups after your visit        Your next 10 appointments already scheduled     Sep 11, 2018  1:30 PM CDT   injection with RH LAB DRAW 1   [facility] ([facility])    [facility]  [address]  [facility] [id number] [phone]              Who to contact     If you have questions or need follow up information about today's clinic visit or your schedule please contact [facility] directly at [phone].  Normal or non-critical lab and imaging results will be communicated to you by Opeeplhart, letter or phone within 4 business days after the clinic has received the results. If you do not hear from us within 7 days, please contact the clinic through Opeeplhart or phone. If you have a critical or abnormal lab result, we will notify you by phone as soon as possible.  Submit refill requests through Rayn or call your pharmacy and they will forward the refill request to us. Please allow 3 business days for your refill to be completed.          Additional Information About Your Visit        MyChart Information     Rayn gives you secure access to your electronic health record. If you see a primary care provider, you can also send messages to your care team and make appointments. If you have questions, please call your primary care clinic.  If you do not have a primary care provider, please call [phone] and they will assist you.        Care EveryWhere ID     This is your Care EveryWhere ID. This could be used by other organizations to access your  [place] medical records  [id number]        Your Vitals Were     Pulse Temperature Respirations Pulse Oximetry          88 98.2  F (36.8  C) (Oral) 16 98%         Blood Pressure from Last 3 Encounters:   08/14/18 114/78   08/09/18 122/82   08/07/18 123/78    Weight from Last 3 Encounters:   08/09/18 56.7 kg (125 lb)   08/03/18 55.8 kg (123 lb)   08/01/18 56.2 kg (123 lb 14.4 oz)              Today, you had the following     No orders found for display       Primary Care Provider Office Phone # Fax #    Erick Ruano [phone]       [address] [facility] [address]        Equal Access to Services     LOGAN GATES : Maegan bryant [place], waaxda luqadaha, qaybta kaalmada adeguanakitoyada, cynthia yancey . So [facility] [phone].    ATENCIÓN: Si habla español, tiene a workman disposición servicios gratuitos de asistencia lingüística. [facility] [phone].    We comply with applicable federal civil rights laws and [place] laws. We do not discriminate on the basis of race, color, national origin, age, disability, sex, sexual orientation, or gender identity.            Thank you!     Thank you for choosing [facility]  for your care. Our goal is always to provide you with excellent care. Hearing back from our patients is one way we can continue to improve our services. Please take a few minutes to complete the written survey that you may receive in the mail after your visit with us. Thank you!             Your Updated Medication List - Protect others around you: Learn how to safely use, store and throw away your medicines at www.disposemymeds.org.          This list is accurate as of 8/14/18 12:43 PM.  Always use your most recent med list.                   Brand Name Dispense Instructions for use Diagnosis    acetaminophen 500 MG tablet    TYLENOL     Take 500 mg by mouth 2 times daily as needed for mild pain        CARTIA  MG 24 hr  capsule   Generic drug:  diltiazem     90 capsule    TAKE 1 CAPSULE(300 MG) BY MOUTH DAILY    Essential hypertension       CENTRUM SILVER per tablet      Take 1 tablet by mouth daily        cetirizine 10 MG tablet    zyrTEC    30 tablet    Take 1 tablet (10 mg) by mouth 2 times daily    Need for pneumococcal vaccination       emollient cream      Apply topically as needed for other (leg rash)        levothyroxine 88 MCG tablet    SYNTHROID/LEVOTHROID    90 tablet    TAKE 1 TABLET BY MOUTH EVERY DAY    Encounter for medication refill       omeprazole 20 MG CR capsule    priLOSEC    30 capsule    Take 1 capsule (20 mg) by mouth daily    Gastroesophageal reflux disease, esophagitis presence not specified

## 2018-08-20 ENCOUNTER — OFFICE VISIT (OUTPATIENT)
Dept: INTERNAL MEDICINE | Facility: CLINIC | Age: 83
End: 2018-08-20
Payer: COMMERCIAL

## 2018-08-20 VITALS
WEIGHT: 124 LBS | TEMPERATURE: 97.8 F | OXYGEN SATURATION: 98 % | HEART RATE: 88 BPM | SYSTOLIC BLOOD PRESSURE: 120 MMHG | DIASTOLIC BLOOD PRESSURE: 78 MMHG | BODY MASS INDEX: 19.42 KG/M2 | RESPIRATION RATE: 16 BRPM

## 2018-08-20 DIAGNOSIS — R09.81 NASAL CONGESTION: ICD-10-CM

## 2018-08-20 DIAGNOSIS — L50.9 URTICARIA: Primary | ICD-10-CM

## 2018-08-20 DIAGNOSIS — K21.9 GASTROESOPHAGEAL REFLUX DISEASE, ESOPHAGITIS PRESENCE NOT SPECIFIED: ICD-10-CM

## 2018-08-20 PROCEDURE — 99214 OFFICE O/P EST MOD 30 MIN: CPT | Performed by: INTERNAL MEDICINE

## 2018-08-20 RX ORDER — LORAZEPAM 0.5 MG/1
TABLET ORAL
Qty: 15 TABLET | Refills: 0 | Status: SHIPPED | OUTPATIENT
Start: 2018-08-20 | End: 2018-10-01

## 2018-08-20 NOTE — PROGRESS NOTES
SUBJECTIVE:   Juliana Leal is a 89 year old female who presents to clinic today for the following health issues:    Chief Complaint   Patient presents with     Hives     x 1 week, continuous itching     Cough     Patient has an intermitent cough, where there are times she coughs up phlegm     Pt's past medical history, family history, habits, medications and allergies were reviewed with the patient today.  See snap shot for  HCM status. Most recent lab results reviewed with pt. Problem list and histories reviewed & adjusted, as indicated.  Additional history as below:    Patient has a history of chronic idiopathic urticaria.  Followed by allergist (dr Mahmood).  Patient has been getting Xolair infusions for this with last 1 week ago.  The symptoms have been poorly responsive to antihistamine therapy.  Patient was previously given a trial of hydroxyzine according to the chart but felt like she was drunk after taking the medication.  Patient already on Zyrtec.  Was given antibiotics earlier this month but has been off of them for a couple weeks and had no reaction of the skin when she was on the antibiotic.  Patient denies no other recent changes in diet, soaps, detergents.  Rash itches and flares in different parts of the body.  Patient denies acute shortness of breath.  Gets occasional cough with postnasal drainage occurs.  Nasal drainage is clear.  Improved some with saline nasal spray.  Patient tried stopping omeprazole recently and then had significant acid reflux and burning symptoms in her lower esophagus.  Patient restarted taking omeprazole again and symptoms resolved.  These were not related to exertion.  Patient's skin was not particularly flared more when having his symptoms compared to now.  Patient denies caffeine.  Patient denies sensation of throat tightening.   Intolerant to previous prednisone trials per chart     Additional ROS:   Constitutional, HEENT, Cardiovascular, Pulmonary, GI and , Neuro,  "MSK and Psych review of systems/symptoms are otherwise negative or unchanged from previous, except as noted above.      OBJECTIVE:  /78  Pulse 88  Temp 97.8  F (36.6  C) (Oral)  Resp 16  Wt 124 lb (56.2 kg)  SpO2 98%  BMI 19.42 kg/m2   Estimated body mass index is 19.42 kg/(m^2) as calculated from the following:    Height as of 8/3/18: 5' 7\" (1.702 m).    Weight as of this encounter: 124 lb (56.2 kg).    HENT: ear canals and TM's normal and nose and mouth without ulcers or lesions.  Nasal mucosa mildly edematous.  Sinuses nontender  Neck: no adenopathy. Thyroid normal to palpation. No bruits. No stridor  Pulm: Lungs clear to auscultation. No wheezes or rhonchi.  Speaking easily.   CV: Regular rates and rhythm  GI: Soft, thin, nontender, Normal active bowel sounds, No hepatosplenomegaly or masses palpable  Ext: Peripheral pulses intact. No edema.   Skin: Dermographism erythema marks scattered on chest and arms.  No vesicles.  No increased warmth    Assessment/Plan: (See plan discussion below for further details)  1. Urticaria   Short-term lorazepam to help calm down patient's anxiety with itching symptoms until she can contact her allergist tomorrow.  Warned that it may cause fatigue  - LORazepam (ATIVAN) 0.5 MG tablet; 1/2-1 tab twice a day for itching/anxiety  Dispense: 15 tablet; Refill: 0    2. Gastroesophageal reflux disease, esophagitis presence not specified  Patient has recurrence when off of medication.  Continue omeprazole 20 mg daily  - omeprazole (PRILOSEC) 20 MG CR capsule; Take 1 capsule (20 mg) by mouth daily  Dispense: 90 capsule; Refill: 1    3. Nasal congestion  Steroid nasal spray daily at bedtime to help reduce nasal congestion.  Continue saline nasal spray in the morning as needed  - triamcinolone (NASACORT) 55 MCG/ACT inhaler; Spray 2 sprays into both nostrils daily  Dispense: 16.5 mL; Refill: 1    Plan discussion:   Contact Dr Mahmood's office tomorrow re:  skin itching flair   " Lorazepam 0.5mg tab, 1/2-1 tab twice a day as needed for stress reaction with itching of skin. May cause some fatigue  Continue Omeprazole 20mg capsule, 1 capsule in AM for acid reflux/burning in chest  Nasacort 1-2 sprays each nostril daily at bedtime as needed for nasal congestion.  Saline nasal spray during the day as needed       Erick Ruano MD  Internal Medicine Department  [facility]

## 2018-08-20 NOTE — MR AVS SNAPSHOT
After Visit Summary   8/20/2018    Juliana Leal    MRN: [id number]           Patient Information     Date Of Birth          2/10/1923        Visit Information        Provider Department      8/20/2018 4:00 PM Erick Ruano MD [facility]        Today's Diagnoses     Urticaria    -  1    Gastroesophageal reflux disease, esophagitis presence not specified          Care Instructions     Contact Dr Mahmood's office tomorrow re:  skin itching flair   Lorazepam 0.5mg tab, 1/2-1 tab twice a day as needed for stress reaction with itching of skin, May cause some fatigue  Continue Omeprazole 20mg capsule, 1 capsule in AM for acid reflux/burning in chest          Follow-ups after your visit        Your next 10 appointments already scheduled     Sep 11, 2018  1:30 PM CDT   injection with RH LAB DRAW 1   [facility] ([facility])    [facility]  [address]  [facility] [id number] [phone]              Who to contact     If you have questions or need follow up information about today's clinic visit or your schedule please contact [facility] directly at [phone].  Normal or non-critical lab and imaging results will be communicated to you by MyChart, letter or phone within 4 business days after the clinic has received the results. If you do not hear from us within 7 days, please contact the clinic through MyChart or phone. If you have a critical or abnormal lab result, we will notify you by phone as soon as possible.  Submit refill requests through Socitive or call your pharmacy and they will forward the refill request to us. Please allow 3 business days for your refill to be completed.          Additional Information About Your Visit        MyChart Information     Socitive gives you secure access to your electronic health record. If you see a primary care provider, you can also  send messages to your care team and make appointments. If you have questions, please call your primary care clinic.  If you do not have a primary care provider, please call [phone] and they will assist you.        Care EveryWhere ID     This is your Care EveryWhere ID. This could be used by other organizations to access your [facility] medical records  [id number]        Your Vitals Were     Pulse Temperature Respirations Pulse Oximetry BMI (Body Mass Index)       88 97.8  F (36.6  C) (Oral) 16 98% 19.42 kg/m2        Blood Pressure from Last 3 Encounters:   08/20/18 120/78   08/14/18 114/78   08/09/18 122/82    Weight from Last 3 Encounters:   08/20/18 124 lb (56.2 kg)   08/09/18 125 lb (56.7 kg)   08/03/18 123 lb (55.8 kg)              Today, you had the following     No orders found for display         Today's Medication Changes          These changes are accurate as of 8/20/18  4:43 PM.  If you have any questions, ask your nurse or doctor.               Start taking these medicines.        Dose/Directions    LORazepam 0.5 MG tablet   Commonly known as:  ATIVAN   Used for:  Urticaria   Started by:  Erick Ruano MD        1/2-1 tab twice a day for itching/anxiety   Quantity:  15 tablet   Refills:  0            Where to get your medicines      These medications were sent to [facility] [address] [place] [address] [address] AT [place] [address], [facility] [address]     Phone:  [phone]     omeprazole 20 MG CR capsule         Some of these will need a paper prescription and others can be bought over the counter.  Ask your nurse if you have questions.     Bring a paper prescription for each of these medications     LORazepam 0.5 MG tablet                Primary Care Provider Office Phone # Fax #    Erick Ruano [phone]       [address] [facility] [address]        Equal Access to Services     LOGAN GATES AH: magali Castro  justen ernestovadimtaylor randycynthia herrera ah. So [facility] [phone].    ATENCIÓN: Si renata cantu, tiene a workman disposición servicios gratuitos de asistencia lingüística. Diane al [phone].    We comply with applicable federal civil rights laws and [place] laws. We do not discriminate on the basis of race, color, national origin, age, disability, sex, sexual orientation, or gender identity.            Thank you!     Thank you for choosing [facility]  for your care. Our goal is always to provide you with excellent care. Hearing back from our patients is one way we can continue to improve our services. Please take a few minutes to complete the written survey that you may receive in the mail after your visit with us. Thank you!             Your Updated Medication List - Protect others around you: Learn how to safely use, store and throw away your medicines at www.disposemymeds.org.          This list is accurate as of 8/20/18  4:43 PM.  Always use your most recent med list.                   Brand Name Dispense Instructions for use Diagnosis    acetaminophen 500 MG tablet    TYLENOL     Take 500 mg by mouth 2 times daily as needed for mild pain        CARTIA  MG 24 hr capsule   Generic drug:  diltiazem     90 capsule    TAKE 1 CAPSULE(300 MG) BY MOUTH DAILY    Essential hypertension       CENTRUM SILVER per tablet      Take 1 tablet by mouth daily        cetirizine 10 MG tablet    zyrTEC    30 tablet    Take 1 tablet (10 mg) by mouth 2 times daily    Need for pneumococcal vaccination       emollient cream      Apply topically as needed for other (leg rash)        levothyroxine 88 MCG tablet    SYNTHROID/LEVOTHROID    90 tablet    TAKE 1 TABLET BY MOUTH EVERY DAY    Encounter for medication refill       LORazepam 0.5 MG tablet    ATIVAN    15 tablet    1/2-1 tab twice a day for itching/anxiety    Urticaria       omeprazole 20 MG CR capsule    priLOSEC     90 capsule    Take 1 capsule (20 mg) by mouth daily    Gastroesophageal reflux disease, esophagitis presence not specified

## 2018-08-20 NOTE — PATIENT INSTRUCTIONS
Contact Dr Mahmood's office tomorrow re:  skin itching flair   Lorazepam 0.5mg tab, 1/2-1 tab twice a day as needed for stress reaction with itching of skin. May cause some fatigue  Continue Omeprazole 20mg capsule, 1 capsule in AM for acid reflux/burning in chest  Nasacort 1-2 sprays each nostril daily at bedtime as needed for nasal congestion.  Saline nasal spray during the day as needed

## 2018-08-22 RX ORDER — TRIAMCINOLONE ACETONIDE 55 UG/1
2 SPRAY, METERED NASAL DAILY
Qty: 16.5 ML | Refills: 1
Start: 2018-08-22

## 2018-09-07 DIAGNOSIS — I10 ESSENTIAL HYPERTENSION: ICD-10-CM

## 2018-09-07 RX ORDER — DILTIAZEM HYDROCHLORIDE 300 MG/1
CAPSULE, EXTENDED RELEASE ORAL
Qty: 90 CAPSULE | Refills: 0
Start: 2018-09-07

## 2018-09-10 DIAGNOSIS — I10 ESSENTIAL HYPERTENSION: ICD-10-CM

## 2018-09-10 RX ORDER — DILTIAZEM HYDROCHLORIDE 300 MG/1
CAPSULE, EXTENDED RELEASE ORAL
Qty: 90 CAPSULE | Refills: 0 | Status: CANCELLED | OUTPATIENT
Start: 2018-09-10

## 2018-09-11 ENCOUNTER — ALLIED HEALTH/NURSE VISIT (OUTPATIENT)
Dept: INFUSION THERAPY | Facility: CLINIC | Age: 83
End: 2018-09-11
Attending: ALLERGY & IMMUNOLOGY
Payer: MEDICARE

## 2018-09-11 VITALS
DIASTOLIC BLOOD PRESSURE: 82 MMHG | RESPIRATION RATE: 16 BRPM | HEART RATE: 89 BPM | TEMPERATURE: 96.9 F | SYSTOLIC BLOOD PRESSURE: 141 MMHG | OXYGEN SATURATION: 96 %

## 2018-09-11 DIAGNOSIS — L50.1 IDIOPATHIC URTICARIA: Primary | ICD-10-CM

## 2018-09-11 DIAGNOSIS — I10 ESSENTIAL HYPERTENSION: ICD-10-CM

## 2018-09-11 PROCEDURE — 96372 THER/PROPH/DIAG INJ SC/IM: CPT

## 2018-09-11 PROCEDURE — 25000128 H RX IP 250 OP 636: Performed by: ALLERGY & IMMUNOLOGY

## 2018-09-11 RX ORDER — DILTIAZEM HYDROCHLORIDE 300 MG/1
CAPSULE, COATED, EXTENDED RELEASE ORAL
Qty: 90 CAPSULE | Refills: 1 | Status: SHIPPED | OUTPATIENT
Start: 2018-09-11 | End: 2019-03-11

## 2018-09-11 RX ADMIN — OMALIZUMAB 300 MG: 202.5 INJECTION, SOLUTION SUBCUTANEOUS at 13:51

## 2018-09-11 ASSESSMENT — PAIN SCALES - GENERAL: PAINLEVEL: NO PAIN (0)

## 2018-09-11 NOTE — MR AVS SNAPSHOT
After Visit Summary   9/11/2018    Juliana Leal    MRN: [id number]           Patient Information     Date Of Birth          2/10/1923        Visit Information        Provider Department      9/11/2018 1:30 PM RH LAB DRAW 1 [facility]        Today's Diagnoses     Idiopathic urticaria    -  1       Follow-ups after your visit        Your next 10 appointments already scheduled     Oct 09, 2018  1:30 PM CDT   Level 1 with RH INFUSION CHAIR 1   [facility] ([facility])    [facility]  [address]  [facility] [id number] [phone]              Who to contact     If you have questions or need follow up information about today's clinic visit or your schedule please contact [facility] INF[facility]ION [facility] directly at [phone].  Normal or non-critical lab and imaging results will be communicated to you by Union Collegehart, letter or phone within 4 business days after the clinic has received the results. If you do not hear from us within 7 days, please contact the clinic through Union Collegehart or phone. If you have a critical or abnormal lab result, we will notify you by phone as soon as possible.  Submit refill requests through Stampt or call your pharmacy and they will forward the refill request to us. Please allow 3 business days for your refill to be completed.          Additional Information About Your Visit        MyChart Information     Stampt gives you secure access to your electronic health record. If you see a primary care provider, you can also send messages to your care team and make appointments. If you have questions, please call your primary care clinic.  If you do not have a primary care provider, please call [phone] and they will assist you.        Care EveryWhere ID     This is your Care EveryWhere ID. This could be used by other organizations to access your  [place] medical records  [id number]        Your Vitals Were     Pulse Temperature Respirations Pulse Oximetry          89 96.9  F (36.1  C) 16 96%         Blood Pressure from Last 3 Encounters:   09/11/18 141/82   08/20/18 120/78   08/14/18 114/78    Weight from Last 3 Encounters:   08/20/18 56.2 kg (124 lb)   08/09/18 56.7 kg (125 lb)   08/03/18 55.8 kg (123 lb)              Today, you had the following     No orders found for display       Primary Care Provider Office Phone # Fax #    Erick Ruano [phone]       [address] [place] [address]        Equal Access to Services     LOGAN GATES : Maegan lealo [place], waaxda luqadaha, qaybta kaalmada adeegyapaulo, cynthia yancey . So [facility] [phone].    ATENCIÓN: Si habla español, tiene a workman disposición servicios gratuitos de asistencia lingüística. [facility] [phone].    We comply with applicable federal civil rights laws and [place] laws. We do not discriminate on the basis of race, color, national origin, age, disability, sex, sexual orientation, or gender identity.            Thank you!     Thank you for choosing [facility]  for your care. Our goal is always to provide you with excellent care. Hearing back from our patients is one way we can continue to improve our services. Please take a few minutes to complete the written survey that you may receive in the mail after your visit with us. Thank you!             Your Updated Medication List - Protect others around you: Learn how to safely use, store and throw away your medicines at www.disposemymeds.org.          This list is accurate as of 9/11/18  3:05 PM.  Always use your most recent med list.                   Brand Name Dispense Instructions for use Diagnosis    acetaminophen 500 MG tablet    TYLENOL     Take 500 mg by mouth 2 times daily as needed for mild pain        CARTIA  MG 24 hr capsule   Generic  drug:  diltiazem     90 capsule    TAKE 1 CAPSULE(300 MG) BY MOUTH DAILY    Essential hypertension       CENTRUM SILVER per tablet      Take 1 tablet by mouth daily        cetirizine 10 MG tablet    zyrTEC    30 tablet    Take 1 tablet (10 mg) by mouth 2 times daily    Need for pneumococcal vaccination       emollient cream      Apply topically as needed for other (leg rash)        levothyroxine 88 MCG tablet    SYNTHROID/LEVOTHROID    90 tablet    TAKE 1 TABLET BY MOUTH EVERY DAY    Encounter for medication refill       LORazepam 0.5 MG tablet    ATIVAN    15 tablet    1/2-1 tab twice a day for itching/anxiety    Urticaria       omeprazole 20 MG CR capsule    priLOSEC    90 capsule    Take 1 capsule (20 mg) by mouth daily    Gastroesophageal reflux disease, esophagitis presence not specified       triamcinolone 55 MCG/ACT inhaler    NASACORT    16.5 mL    Spray 2 sprays into both nostrils daily    Nasal congestion

## 2018-09-11 NOTE — PROGRESS NOTES
Infusion Nursing Note:  Julianaandrei Garciaford presents today for Xolair.    Patient seen by provider today: No   present during visit today: Not Applicable.    Note: Denies itching.  Feels like she is having more sinus congestion/drainage.    Intravenous Access:  No Intravenous access/labs at this visit.    Treatment Conditions:  Not Applicable.      Post Infusion Assessment:  Patient tolerated injection without incident.  Patient observed for 30 minutes post Xolair per protocol.    Discharge Plan:   Discharge instructions reviewed with: Patient.  Patient discharged in stable condition accompanied by: self.  Departure Mode: Ambulatory.  Next injection scheduled for 10/9/18.    HARRISON SHEPPARD RN

## 2018-09-11 NOTE — TELEPHONE ENCOUNTER
"Requested Prescriptions   Pending Prescriptions Disp Refills     diltiazem (CARTIA XT) 300 MG 24 hr capsule 90 capsule 0    Calcium Channel Blockers Protocol  Failed    9/11/2018  3:36 PM       Failed - Blood pressure under 140/90 in past 12 months    BP Readings from Last 3 Encounters:   09/11/18 141/82   08/20/18 120/78   08/14/18 114/78                Failed - Normal serum creatinine on file in past 12 months    Recent Labs   Lab Test  08/07/18   1528   CR  1.51*            Passed - Normal ALT in past 12 months    Recent Labs   Lab Test  04/12/18   1659   ALT  8            Passed - Recent (12 mo) or future (30 days) visit within the authorizing provider's specialty    Patient had office visit in the last 12 months or has a visit in the next 30 days with authorizing provider or within the authorizing provider's specialty.  See \"Patient Info\" tab in inbasket, or \"Choose Columns\" in Meds & Orders section of the refill encounter.           Passed - Patient is age 18 or older       Passed - No active pregnancy on record       Passed - No positive pregnancy test in past 12 months        Routing refill request to provider for review/approval because:  Labs out of range:  Creatinine, blood pressure          "

## 2018-09-11 NOTE — TELEPHONE ENCOUNTER
Requested Prescriptions   Pending Prescriptions Disp Refills     diltiazem (CARTIA XT) 300 MG 24 hr capsule 90 capsule 0    There is no refill protocol information for this order

## 2018-09-11 NOTE — TELEPHONE ENCOUNTER
Reason for Call:  Medication or medication refill:    Do you use a [facility]?  Name of the pharmacy and phone number for the current request:  [facility] [address] - [phone] Fax [phone]    Name of the medication requested  High blood pressure medicine     Other request please asap patient leaving town tomorrow morning she is completely out of her medication    Can we leave a detailed message on this number? YES    Phone number patient can be reached at: Cell number on file:    Telephone Information:   Mobile [phone]       Best Time anytime asap    Call taken on 9/11/2018 at 1:30 PM by Ella Holder

## 2018-09-11 NOTE — TELEPHONE ENCOUNTER
"Requested Prescriptions   Pending Prescriptions Disp Refills     CARTIA  MG 24 hr capsule [Pharmacy Med Name: CARTIA (DILTIAZEM) 300MGXT CAPS]  Last Written Prescription Date:  06/08/2018  Last Fill Quantity: 90,  # refills: 0   Last Office Visit: 4/12/2018   Future Office Visit:      90 capsule 0     Sig: TAKE 1 CAPSULE(300 MG) BY MOUTH DAILY    Calcium Channel Blockers Protocol  Failed    9/10/2018 11:03 AM       Failed - Normal serum creatinine on file in past 12 months    Recent Labs   Lab Test  08/07/18   1528   CR  1.51*            Passed - Blood pressure under 140/90 in past 12 months    BP Readings from Last 3 Encounters:   08/20/18 120/78   08/14/18 114/78   08/09/18 122/82                Passed - Normal ALT in past 12 months    Recent Labs   Lab Test  04/12/18   1659   ALT  8            Passed - Recent (12 mo) or future (30 days) visit within the authorizing provider's specialty    Patient had office visit in the last 12 months or has a visit in the next 30 days with authorizing provider or within the authorizing provider's specialty.  See \"Patient Info\" tab in inbasket, or \"Choose Columns\" in Meds & Orders section of the refill encounter.           Passed - Patient is age 18 or older       Passed - No active pregnancy on record       Passed - No positive pregnancy test in past 12 months          "

## 2018-10-01 ENCOUNTER — OFFICE VISIT (OUTPATIENT)
Dept: INTERNAL MEDICINE | Facility: CLINIC | Age: 83
End: 2018-10-01
Payer: COMMERCIAL

## 2018-10-01 VITALS
RESPIRATION RATE: 16 BRPM | HEART RATE: 64 BPM | DIASTOLIC BLOOD PRESSURE: 80 MMHG | OXYGEN SATURATION: 95 % | SYSTOLIC BLOOD PRESSURE: 132 MMHG | WEIGHT: 126 LBS | TEMPERATURE: 97.7 F | BODY MASS INDEX: 19.73 KG/M2

## 2018-10-01 DIAGNOSIS — L50.8 CHRONIC URTICARIA: ICD-10-CM

## 2018-10-01 DIAGNOSIS — E03.9 ACQUIRED HYPOTHYROIDISM: ICD-10-CM

## 2018-10-01 PROCEDURE — 99214 OFFICE O/P EST MOD 30 MIN: CPT | Performed by: INTERNAL MEDICINE

## 2018-10-01 RX ORDER — GABAPENTIN 100 MG/1
CAPSULE ORAL
Qty: 120 CAPSULE | Refills: 11 | Status: SHIPPED | OUTPATIENT
Start: 2018-10-01 | End: 2019-03-19

## 2018-10-01 RX ORDER — LEVOTHYROXINE SODIUM 88 UG/1
88 TABLET ORAL DAILY
Qty: 90 TABLET | Refills: 2 | Status: ON HOLD | OUTPATIENT
Start: 2018-10-01 | End: 2019-05-13

## 2018-10-01 NOTE — MR AVS SNAPSHOT
After Visit Summary   10/1/2018    Juliana Leal    MRN: [id number]           Patient Information     Date Of Birth          2/10/1923        Visit Information        Provider Department      10/1/2018 11:00 AM Erick Ruano MD [facility]        Today's Diagnoses     Acquired hypothyroidism    -  1    Chronic urticaria          Care Instructions     Gabapentin 100mg capsule, 1 capsule at bedtime for itching/neuroapthy. If not helping after 3 days and not having side effects with the medication, then increase to 2 capsules at bedtime.  If not better after 1 week and no side effects, then continue the bedtime Gabapentin and  also start taking Gabapentin 1 capsule in AM and if not better 3 days later, then increase to 2 capsules in AM  Call update to clinic nurse [phone] in 3 weeks with dose of Gabapentin being used and dose response. Call earlier if side effects with Gabapentin   Continue Levothyroxine   Thyroid lab in  April 2019  Continue other medications including Xolair          Follow-ups after your visit        Your next 10 appointments already scheduled     Oct 09, 2018  1:30 PM CDT   Level 1 with RH INFUSION CHAIR 1   [facility] ([facility])    [facility]  [address]  [address]  [facility] [id number] [phone]              Who to contact     If you have questions or need follow up information about today's clinic visit or your schedule please contact [facility] directly at [phone].  Normal or non-critical lab and imaging results will be communicated to you by MyChart, letter or phone within 4 business days after the clinic has received the results. If you do not hear from us within 7 days, please contact the clinic through MyChart or phone. If you have a critical or abnormal lab result, we will notify you by phone as soon as possible.  Submit refill  requests through [employer] or call your pharmacy and they will forward the refill request to us. Please allow 3 business days for your refill to be completed.          Additional Information About Your Visit        [employer] Information     [employer] gives you secure access to your electronic health record. If you see a primary care provider, you can also send messages to your care team and make appointments. If you have questions, please call your primary care clinic.  If you do not have a primary care provider, please call [phone] and they will assist you.        Care EveryWhere ID     This is your Care EveryWhere ID. This could be used by other organizations to access your [place] medical records  [id number]        Your Vitals Were     Pulse Temperature Respirations Pulse Oximetry BMI (Body Mass Index)       64 97.7  F (36.5  C) (Oral) 16 95% 19.73 kg/m2        Blood Pressure from Last 3 Encounters:   10/01/18 132/80   09/11/18 141/82   08/20/18 120/78    Weight from Last 3 Encounters:   10/01/18 126 lb (57.2 kg)   08/20/18 124 lb (56.2 kg)   08/09/18 125 lb (56.7 kg)              Today, you had the following     No orders found for display         Today's Medication Changes          These changes are accurate as of 10/1/18 11:51 AM.  If you have any questions, ask your nurse or doctor.               Start taking these medicines.        Dose/Directions    gabapentin 100 MG capsule   Commonly known as:  NEURONTIN   Used for:  Chronic urticaria   Started by:  Erick Ruano MD        1-2 capsules twice a day   Quantity:  120 capsule   Refills:  11         These medicines have changed or have updated prescriptions.        Dose/Directions    levothyroxine 88 MCG tablet   Commonly known as:  SYNTHROID/LEVOTHROID   This may have changed:  See the new instructions.   Used for:  Acquired hypothyroidism   Changed by:  Erick Ruano MD        Dose:  88 mcg   Take 1 tablet (88 mcg) by mouth daily   Quantity:  90 tablet    Refills:  2            Where to get your medicines      These medications were sent to [id number] [address] AT [facility] [address], [facility] [address]     Phone:  [phone]     gabapentin 100 MG capsule    levothyroxine 88 MCG tablet                Primary Care Provider Office Phone # Fax #    Erick Ruano -[phone] [address] [facility] [address]        Equal Access to Services     [facility] : Hadii aad ku hadasho Soomaali, waaxda luqadaha, qaybta kaalmada adeegyada, waxay idiin hayaan adeeg karen yancey . So [facility] [place] [phone].    ATENCIÓN: Si habla español, tiene a workman disposición servicios gratuitos de asistencia lingüística. [facility] [phone].    We comply with applicable federal civil rights laws and [place] laws. We do not discriminate on the basis of race, color, national origin, age, disability, sex, sexual orientation, or gender identity.            Thank you!     Thank you for choosing [facility]  for your care. Our goal is always to provide you with excellent care. Hearing back from our patients is one way we can continue to improve our services. Please take a few minutes to complete the written survey that you may receive in the mail after your visit with us. Thank you!             Your Updated Medication List - Protect others around you: Learn how to safely use, store and throw away your medicines at www.disposemymeds.org.          This list is accurate as of 10/1/18 11:51 AM.  Always use your most recent med list.                   Brand Name Dispense Instructions for use Diagnosis    acetaminophen 500 MG tablet    TYLENOL     Take 500 mg by mouth 2 times daily as needed for mild pain        CENTRUM SILVER per tablet      Take 1 tablet by mouth daily        cetirizine 10 MG tablet    zyrTEC    30 tablet    Take 1 tablet (10 mg) by mouth 2 times daily    Need for  pneumococcal vaccination       diltiazem 300 MG 24 hr capsule    CARTIA XT    90 capsule    TAKE 1 CAPSULE(300 MG) BY MOUTH DAILY    Essential hypertension       emollient cream      Apply topically as needed for other (leg rash)        gabapentin 100 MG capsule    NEURONTIN    120 capsule    1-2 capsules twice a day    Chronic urticaria       levothyroxine 88 MCG tablet    SYNTHROID/LEVOTHROID    90 tablet    Take 1 tablet (88 mcg) by mouth daily    Acquired hypothyroidism       omeprazole 20 MG CR capsule    priLOSEC    90 capsule    Take 1 capsule (20 mg) by mouth daily    Gastroesophageal reflux disease, esophagitis presence not specified       triamcinolone 55 MCG/ACT inhaler    NASACORT    16.5 mL    Spray 2 sprays into both nostrils daily    Nasal congestion

## 2018-10-01 NOTE — PROGRESS NOTES
SUBJECTIVE:   Juliana Leal is a 89 year old female who presents to clinic today for the following health issues:    Chief Complaint   Patient presents with     Derm Problem     Refill Medication     Levothyroxine and Ativan       Derm Problem  Onset: a long time really bad, last 2 weeeks    Description:   Location: all over  Character: painful, burning  Itching (Pruritis): YES    Progression of Symptoms:  Worsening, constant    Accompanying Signs & Symptoms:  Fever: no   Body aches or joint pain: yes  Sore throat symptoms: no   Recent cold symptoms: no     History:   Previous similar rash: no rash itchy    Precipitating factors:   Exposure to similar rash: no   New exposures: None   Recent travel: no     Alleviating factors:  none    Pt's past medical history, family history, habits, medications and allergies were reviewed with the patient today.  See snap shot for  HCM status. Most recent lab results reviewed with pt. Problem list and histories reviewed & adjusted, as indicated.  Additional history as below:  Patient continues on Xolair   infusions for idiopathic urticaria.  Patient on antihistamine already.  Has not responded to previous topical steroids or prednisone.  Was given a temporary prescription for lorazepam when I last saw her to help reduce some anxiety stress from the itching until she could see her allergist.  Patient inquiring about having a Lorazepam refill.  This did calm her anxiety related to the itching but did not help the itching much.  Patient due for thyroid medication refill.  Thyroid function from April normal.  Denies chest pain, shortness of breath, abdominal pain.  Has not seen actual rash on her skin despite the itching symptoms..  Per patient, has been worked up for different environmental allergies without clear cause.  BM stable.  Continue current medication.  Repeat labs s normal. No diarrhea  History normal liver labs    Additional ROS:   Constitutional, HEENT,  "Cardiovascular, Pulmonary, GI and , Neuro, MSK and Psych review of systems/symptoms are otherwise negative or unchanged from previous, except as noted above.      OBJECTIVE:  /80  Pulse 64  Temp 97.7  F (36.5  C) (Oral)  Resp 16  Wt 126 lb (57.2 kg)  SpO2 95%  BMI 19.73 kg/m2   Estimated body mass index is 19.73 kg/(m^2) as calculated from the following:    Height as of 8/3/18: 5' 7\" (1.702 m).    Weight as of this encounter: 126 lb (57.2 kg).      Neck: no adenopathy. Thyroid normal to palpation. No bruits  Pulm: Lungs clear to auscultation   CV: Regular rates and rhythm  GI: Soft, nontender, Normal active bowel sounds, No hepatosplenomegaly or masses palpable  Ext: Peripheral pulses intact. No edema.  Neuro: Normal strength and tone, sensory exam grossly normal  Skin:  few scattered  excoriation marks from scratching.  No actual rash seen on the skin    Assessment/Plan: (See plan discussion below for further details)  1. Chronic urticaria  Etiology  unclear.  Symptoms persisted despite antihistamine and Xolair therapy. Will try gabapentin to see if can turn down the skin nerve perception of itching since no clear rash occurring on skin. Pt using moisturizer soaps like Dove and avoiding hot showers.   - gabapentin (NEURONTIN) 100 MG capsule; 1-2 capsules twice a day  Dispense: 120 capsule; Refill: 11    2. Acquired hypothyroidism  Thyroid function at goal.  Continue current medication.  Repeat labs spring 2019  - levothyroxine (SYNTHROID/LEVOTHROID) 88 MCG tablet; Take 1 tablet (88 mcg) by mouth daily  Dispense: 90 tablet; Refill: 2    Plan discussion:   Gabapentin 100mg capsule, 1 capsule at bedtime for itching/neuroapthy. If not helping after 3 days and not having side effects with the medication, then increase to 2 capsules at bedtime.  If not better after 1 week and no side effects, then continue the bedtime Gabapentin and  also start taking Gabapentin 1 capsule in AM and if not better 3 days " later, then increase to 2 capsules in AM  Call update to clinic nurse [phone] in 3 weeks with dose of Gabapentin being used and dose response. Call earlier if side effects with Gabapentin   Continue Levothyroxine   Thyroid lab in  April 2019  Continue other medications including Xolair       Erick Ruano MD  Internal Medicine Department  [facility]

## 2018-10-01 NOTE — PATIENT INSTRUCTIONS
Gabapentin 100mg capsule, 1 capsule at bedtime for itching/neuroapthy. If not helping after 3 days and not having side effects with the medication, then increase to 2 capsules at bedtime.  If not better after 1 week and no side effects, then continue the bedtime Gabapentin and  also start taking Gabapentin 1 capsule in AM and if not better 3 days later, then increase to 2 capsules in AM  Call update to clinic nurse [phone] in 3 weeks with dose of Gabapentin being used and dose response. Call earlier if side effects with Gabapentin   Continue Levothyroxine   Thyroid lab in  April 2019  Continue other medications including Xolair

## 2018-10-09 ENCOUNTER — INFUSION THERAPY VISIT (OUTPATIENT)
Dept: INFUSION THERAPY | Facility: CLINIC | Age: 83
End: 2018-10-09
Attending: ALLERGY & IMMUNOLOGY
Payer: MEDICARE

## 2018-10-09 VITALS
SYSTOLIC BLOOD PRESSURE: 124 MMHG | HEART RATE: 68 BPM | TEMPERATURE: 97.4 F | OXYGEN SATURATION: 95 % | DIASTOLIC BLOOD PRESSURE: 78 MMHG | RESPIRATION RATE: 16 BRPM

## 2018-10-09 DIAGNOSIS — L50.1 IDIOPATHIC URTICARIA: Primary | ICD-10-CM

## 2018-10-09 PROCEDURE — 96372 THER/PROPH/DIAG INJ SC/IM: CPT

## 2018-10-09 PROCEDURE — 25000128 H RX IP 250 OP 636: Performed by: ALLERGY & IMMUNOLOGY

## 2018-10-09 RX ADMIN — OMALIZUMAB 150 MG: 202.5 INJECTION, SOLUTION SUBCUTANEOUS at 13:31

## 2018-10-09 NOTE — PROGRESS NOTES
Infusion Nursing Note:  Juliana Leal presents today for xolair.    Patient seen by provider today: No   present during visit today: Not Applicable.    Note: Pt was getting 300mg, now only prescribed 150mg. She would like to stop xolair eventually. She did talk with the staff who works with Dr. Mahmood, and has an appointment with them on 10/15/18. She will discuss future dosages at that time.    Intravenous Access:  No Intravenous access/labs at this visit.    Treatment Conditions:  Not Applicable.      Post Infusion Assessment:  Patient observed for 30 minutes post xolair per protocol.    Discharge Plan:   Patient declined prescription refills.  Discharge instructions reviewed with: Patient.  Patient and/or family verbalized understanding of discharge instructions and all questions answered.  Copy of AVS reviewed with patient and/or family.  Patient will return 11/6/18 for next appointment.  Patient discharged in stable condition accompanied by: daughter.  Departure Mode: Ambulatory.    Jovanna Vasquez RN

## 2018-10-09 NOTE — MR AVS SNAPSHOT
After Visit Summary   10/9/2018    Juliana Leal    MRN: [id number]           Patient Information     Date Of Birth          2/10/1923        Visit Information        Provider Department      10/9/2018 1:30 PM RH INFUSION CHAIR 1 [facility]        Today's Diagnoses     Idiopathic urticaria    -  1       Follow-ups after your visit        Your next 10 appointments already scheduled     Nov 06, 2018  1:30 PM CST   Level 1 with RH INFUSION CHAIR 7   [facility] ([facility])    [facility]  [address]  [facility] [id number] [phone]              Who to contact     If you have questions or need follow up information about today's clinic visit or your schedule please contact [facility] INF[facility]ION [facility] directly at [phone].  Normal or non-critical lab and imaging results will be communicated to you by [employer], letter or phone within 4 business days after the clinic has received the results. If you do not hear from us within 7 days, please contact the clinic through [employer] or phone. If you have a critical or abnormal lab result, we will notify you by phone as soon as possible.  Submit refill requests through [employer] or call your pharmacy and they will forward the refill request to us. Please allow 3 business days for your refill to be completed.          Additional Information About Your Visit        MyChart Information     [employer] gives you secure access to your electronic health record. If you see a primary care provider, you can also send messages to your care team and make appointments. If you have questions, please call your primary care clinic.  If you do not have a primary care provider, please call [phone] and they will assist you.        Care EveryWhere ID     This is your Care EveryWhere ID. This could be used by other organizations to access  your [facility] medical records  [id number]        Your Vitals Were     Pulse Temperature Respirations Pulse Oximetry          68 97.4  F (36.3  C) (Oral) 16 95%         Blood Pressure from Last 3 Encounters:   10/09/18 124/78   10/01/18 132/80   09/11/18 141/82    Weight from Last 3 Encounters:   10/01/18 57.2 kg (126 lb)   08/20/18 56.2 kg (124 lb)   08/09/18 56.7 kg (125 lb)              Today, you had the following     No orders found for display       Primary Care Provider Office Phone # Fax #    Erick Ruano -[phone]       [address] [place] [address]        Equal Access to Services     LOGAN [place]ATES : Maegan lealo Soalrryali, waaxda luqadaha, qaybta kaalmada adeegyada, cynthia krishnamurthy. So [facility] [phone].    ATENCIÓN: Si habla español, tiene a workman disposición servicios gratuitos de asistencia lingüística. [facility] [phone].    We comply with applicable federal civil rights laws and [place] laws. We do not discriminate on the basis of race, color, national origin, age, disability, sex, sexual orientation, or gender identity.            Thank you!     Thank you for choosing [facility] INFUSION SERVICES  for your care. Our goal is always to provide you with excellent care. Hearing back from our patients is one way we can continue to improve our services. Please take a few minutes to complete the written survey that you may receive in the mail after your visit with us. Thank you!             Your Updated Medication List - Protect others around you: Learn how to safely use, store and throw away your medicines at www.disposemymeds.org.          This list is accurate as of 10/9/18  2:03 PM.  Always use your most recent med list.                   Brand Name Dispense Instructions for use Diagnosis    acetaminophen 500 MG tablet    TYLENOL     Take 500 mg by mouth 2 times daily as needed for mild pain        CENTRUM SILVER per tablet       Take 1 tablet by mouth daily        cetirizine 10 MG tablet    zyrTEC    30 tablet    Take 1 tablet (10 mg) by mouth 2 times daily    Need for pneumococcal vaccination       diltiazem 300 MG 24 hr capsule    CARTIA XT    90 capsule    TAKE 1 CAPSULE(300 MG) BY MOUTH DAILY    Essential hypertension       emollient cream      Apply topically as needed for other (leg rash)        gabapentin 100 MG capsule    NEURONTIN    120 capsule    1-2 capsules twice a day    Chronic urticaria       levothyroxine 88 MCG tablet    SYNTHROID/LEVOTHROID    90 tablet    Take 1 tablet (88 mcg) by mouth daily    Acquired hypothyroidism       omeprazole 20 MG CR capsule    priLOSEC    90 capsule    Take 1 capsule (20 mg) by mouth daily    Gastroesophageal reflux disease, esophagitis presence not specified       triamcinolone 55 MCG/ACT inhaler    NASACORT    16.5 mL    Spray 2 sprays into both nostrils daily    Nasal congestion

## 2018-10-12 ENCOUNTER — OFFICE VISIT (OUTPATIENT)
Dept: INTERNAL MEDICINE | Facility: CLINIC | Age: 83
End: 2018-10-12
Payer: COMMERCIAL

## 2018-10-12 VITALS
BODY MASS INDEX: 19.89 KG/M2 | OXYGEN SATURATION: 94 % | TEMPERATURE: 97.6 F | DIASTOLIC BLOOD PRESSURE: 80 MMHG | SYSTOLIC BLOOD PRESSURE: 122 MMHG | HEART RATE: 82 BPM | WEIGHT: 127 LBS | RESPIRATION RATE: 16 BRPM

## 2018-10-12 DIAGNOSIS — L50.1 IDIOPATHIC URTICARIA: ICD-10-CM

## 2018-10-12 DIAGNOSIS — J06.9 UPPER RESPIRATORY TRACT INFECTION, UNSPECIFIED TYPE: Primary | ICD-10-CM

## 2018-10-12 LAB
DEPRECATED S PYO AG THROAT QL EIA: NORMAL
SPECIMEN SOURCE: NORMAL

## 2018-10-12 PROCEDURE — 87081 CULTURE SCREEN ONLY: CPT | Performed by: INTERNAL MEDICINE

## 2018-10-12 PROCEDURE — 87880 STREP A ASSAY W/OPTIC: CPT | Performed by: INTERNAL MEDICINE

## 2018-10-12 PROCEDURE — 99214 OFFICE O/P EST MOD 30 MIN: CPT | Performed by: INTERNAL MEDICINE

## 2018-10-12 RX ORDER — AZITHROMYCIN 250 MG/1
TABLET, FILM COATED ORAL
Qty: 6 TABLET | Refills: 0 | Status: SHIPPED | OUTPATIENT
Start: 2018-10-12 | End: 2018-11-06

## 2018-10-12 NOTE — MR AVS SNAPSHOT
After Visit Summary   10/12/2018    Juliana Leal    MRN: [id number]           Patient Information     Date Of Birth          2/10/1923        Visit Information        Provider Department      10/12/2018 11:00 AM Erick Ruano MD [facility][1]        Today's Diagnoses     Throat pain    -  1      Care Instructions     Azithromycin 2 tabs today. Then 1 tab daily for 4 days (antibiotic)  Mucinex 12 hr, 1 tab twice a day as needed for congestion/loose cough   If future dry cough, then may use future Delsym OTC 2 tsp twice a day as needed  Saline nasal spray, humidity to keep sinuses flowing   Cepachol lozenges or warm liquids to sooth the throat as needed          Follow-ups after your visit        Your next 10 appointments already scheduled     Nov 06, 2018  1:30 PM CST   Level 1 with  INFUSION CHAIR 7   [facility] ([facility])    [facility]  [address]  [facility] [id number] [phone]              Who to contact     If you have questions or need follow up information about today's clinic visit or your schedule please contact [facility] directly at [phone].  Normal or non-critical lab and imaging results will be communicated to you by MyChart, letter or phone within 4 business days after the clinic has received the results. If you do not hear from us within 7 days, please contact the clinic through MyChart or phone. If you have a critical or abnormal lab result, we will notify you by phone as soon as possible.  Submit refill requests through Waitsup or call your pharmacy and they will forward the refill request to us. Please allow 3 business days for your refill to be completed.          Additional Information About Your Visit        MyChart Information     [employer]up gives you secure access to your electronic health record. If you see a primary care provider,  you can also send messages to your care team and make appointments. If you have questions, please call your primary care clinic.  If you do not have a primary care provider, please call [phone] and they will assist you.        Care EveryWhere ID     This is your Care EveryWhere ID. This could be used by other organizations to access your [place] medical records  [id number]        Your Vitals Were     Pulse Temperature Respirations Pulse Oximetry BMI (Body Mass Index)       82 97.6  F (36.4  C) (Oral) 16 94% 19.89 kg/m2        Blood Pressure from Last 3 Encounters:   10/12/18 122/80   10/09/18 124/78   10/01/18 132/80    Weight from Last 3 Encounters:   10/12/18 127 lb (57.6 kg)   10/01/18 126 lb (57.2 kg)   08/20/18 124 lb (56.2 kg)              We Performed the Following     Beta strep group A culture     Rapid strep screen        Primary Care Provider Office Phone # Fax #    Erick Ruano [phone]       [address] [place] [address]        Equal Access to Services     MARIA D GATES : Hadii aad ku hadasho Soomaali, waaxda luqadaha, qaybta kaalmada adeguanakitoyada, cynthia yancey . So [facility] [phone].    ATENCIÓN: Si habla español, tiene a workman disposición servicios gratuitos de asistencia lingüística. Ll[facility] [phone].    We comply with applicable federal civil rights laws and [place] laws. We do not discriminate on the basis of race, color, national origin, age, disability, sex, sexual orientation, or gender identity.            Thank you!     Thank you for choosing [facility][1]  for your care. Our goal is always to provide you with excellent care. Hearing back from our patients is one way we can continue to improve our services. Please take a few minutes to complete the written survey that you may receive in the mail after your visit with us. Thank you!             Your Updated Medication List - Protect others around you: Learn how  to safely use, store and throw away your medicines at www.disposemymeds.org.          This list is accurate as of 10/12/18 11:28 AM.  Always use your most recent med list.                   Brand Name Dispense Instructions for use Diagnosis    acetaminophen 500 MG tablet    TYLENOL     Take 500 mg by mouth 2 times daily as needed for mild pain        CENTRUM SILVER per tablet      Take 1 tablet by mouth daily        cetirizine 10 MG tablet    zyrTEC    30 tablet    Take 1 tablet (10 mg) by mouth 2 times daily    Need for pneumococcal vaccination       diltiazem 300 MG 24 hr capsule    CARTIA XT    90 capsule    TAKE 1 CAPSULE(300 MG) BY MOUTH DAILY    Essential hypertension       emollient cream      Apply topically as needed for other (leg rash)        gabapentin 100 MG capsule    NEURONTIN    120 capsule    1-2 capsules twice a day    Chronic urticaria       levothyroxine 88 MCG tablet    SYNTHROID/LEVOTHROID    90 tablet    Take 1 tablet (88 mcg) by mouth daily    Acquired hypothyroidism       omeprazole 20 MG CR capsule    priLOSEC    90 capsule    Take 1 capsule (20 mg) by mouth daily    Gastroesophageal reflux disease, esophagitis presence not specified       triamcinolone 55 MCG/ACT inhaler    NASACORT    16.5 mL    Spray 2 sprays into both nostrils daily    Nasal congestion

## 2018-10-12 NOTE — PROGRESS NOTES
"  SUBJECTIVE:   Juliana Leal is a 89 year old female who presents to clinic today for the following health issues:    Chief Complaint   Patient presents with     URI       Acute Illness   Acute illness concerns: Sore Throat  Onset: x 2 DAYS    Fever: YES    Chills/Sweats: YES    Headache (location?): YES    Sinus Pressure:YES    Conjunctivitis:  no    Ear Pain: YES: bilateral    Rhinorrhea: no     Congestion: YES    Sore Throat: YES     Cough: YES    Wheeze: no     Decreased Appetite: no    Nausea: no    Vomiting: no    Diarrhea:  no    Dysuria/Freq.: no    Fatigue/Achiness: YES    Sick/Strep Exposure: no     Therapies Tried and outcome: NONE      Pt's past medical history, family history, habits, medications and allergies were reviewed with the patient today.  See snap shot for  HCM status. Most recent lab results reviewed with pt. Problem list and histories reviewed & adjusted, as indicated.  Additional history as below:    Mild fever last night.  Other sx as above. Seem to be worsening some per pt. Denies shortness of breath. Eating OK  Skin itching much better since started Gabapentin. Taking 1 gabapentin capsule per day     Additional ROS:   Constitutional, HEENT, Cardiovascular, Pulmonary, GI and , Neuro, MSK and Psych review of systems/symptoms are otherwise negative or unchanged from previous, except as noted above.      OBJECTIVE:  /80  Pulse 82  Temp 97.6  F (36.4  C) (Oral)  Resp 16  Wt 127 lb (57.6 kg)  SpO2 94%  BMI 19.89 kg/m2   Estimated body mass index is 19.89 kg/(m^2) as calculated from the following:    Height as of 8/3/18: 5' 7\" (1.702 m).    Weight as of this encounter: 127 lb (57.6 kg).     HENT: ear canals and TM's normal and nose and mouth without ulcers or lesions.  Nasal mucosa edematous with some yellowish nasal discharge.  Mild tenderness bilateral maxillary sinuses.  Oropharynx minimally erythematous without exudate  Neck: no adenopathy. Thyroid normal to palpation. No " bruits  Pulm: Lungs clear to auscultation  posteriorly.  Mild anterior rhonchi which clear with cough  CV: Regular rates and rhythm  GI: Soft, nontender, Normal active bowel sounds, No hepatosplenomegaly or masses palpable  Ext: Peripheral pulses intact. No edema.     Assessment/Plan: (See plan discussion below for further details)  1. Upper respiratory tract infection, unspecified type  Given age and worsening of symptoms, will treat with antibiotic therapy.  Symptoms with absence of fever and significant myalgias not suggestive of influenza  - Rapid strep screen  - Beta strep group A culture  - azithromycin (ZITHROMAX) 250 MG tablet; Two tablets first day, then one tablet daily for four days.  Dispense: 6 tablet; Refill: 0    2. Idiopathic urticaria   Improved with use of gabapentin    Plan discussion:  Rapid strep test sent by nurse prior to MD seeing pt.  Rapid test neg   Azithromycin 2 tabs today. Then 1 tab daily for 4 days (antibiotic)  Mucinex 12 hr, 1 tab twice a day as needed for congestion/loose cough   If future dry cough, then may use future Delsym OTC 2 tsp twice a day as needed  Saline nasal spray, humidity to keep sinuses flowing   Cepachol lozenges or warm liquids to sooth the throat as needed  Continue gabapentin       Erick Ruano MD  Internal Medicine Department  [facility]

## 2018-10-12 NOTE — PATIENT INSTRUCTIONS
Azithromycin 2 tabs today. Then 1 tab daily for 4 days (antibiotic)  Mucinex 12 hr, 1 tab twice a day as needed for congestion/loose cough   If future dry cough, then may use future Delsym OTC 2 tsp twice a day as needed  Saline nasal spray, humidity to keep sinuses flowing   Cepachol lozenges or warm liquids to sooth the throat as needed

## 2018-10-13 LAB
BACTERIA SPEC CULT: NORMAL
SPECIMEN SOURCE: NORMAL

## 2018-10-15 ENCOUNTER — TRANSFERRED RECORDS (OUTPATIENT)
Dept: HEALTH INFORMATION MANAGEMENT | Facility: CLINIC | Age: 83
End: 2018-10-15

## 2018-11-06 ENCOUNTER — INFUSION THERAPY VISIT (OUTPATIENT)
Dept: INFUSION THERAPY | Facility: CLINIC | Age: 83
End: 2018-11-06
Attending: ALLERGY & IMMUNOLOGY
Payer: MEDICARE

## 2018-11-06 VITALS
TEMPERATURE: 97.6 F | RESPIRATION RATE: 16 BRPM | DIASTOLIC BLOOD PRESSURE: 88 MMHG | SYSTOLIC BLOOD PRESSURE: 136 MMHG | OXYGEN SATURATION: 96 % | HEART RATE: 79 BPM

## 2018-11-06 DIAGNOSIS — L50.1 IDIOPATHIC URTICARIA: Primary | ICD-10-CM

## 2018-11-06 PROCEDURE — 96372 THER/PROPH/DIAG INJ SC/IM: CPT

## 2018-11-06 PROCEDURE — 25000128 H RX IP 250 OP 636: Performed by: ALLERGY & IMMUNOLOGY

## 2018-11-06 RX ADMIN — OMALIZUMAB 150 MG: 202.5 INJECTION, SOLUTION SUBCUTANEOUS at 14:00

## 2018-11-06 NOTE — PROGRESS NOTES
Infusion Nursing Note:  Julianaandrei Garciaford presents today for Xolair.    Patient seen by provider today: No   present during visit today: Not Applicable.    Note: N/A.    Intravenous Access:  No Intravenous access/labs at this visit.    Treatment Conditions:  Not Applicable.      Post Infusion Assessment:  Patient tolerated injection without incident.    Discharge Plan:   Patient declined prescription refills.  Copy of AVS reviewed with patient and/or family.  Patient will return 12/4/18 for next appointment.  Patient discharged in stable condition accompanied by: family member.  Departure Mode: Ambulatory.    Alva Rojas RN

## 2018-11-06 NOTE — MR AVS SNAPSHOT
After Visit Summary   11/6/2018    Juliana Leal    MRN: [id number]           Patient Information     Date Of Birth          2/10/1923        Visit Information        Provider Department      11/6/2018 1:30 PM RH INFUSION CHAIR 9 [facility]         Follow-ups after your visit        Your next 10 appointments already scheduled     Nov 06, 2018  1:30 PM CST   Level 1 with RH INFUSION CHAIR 9   [facility] ([facility])    [facility]  [address]  [facility] [id number] [phone]            Dec 04, 2018  1:30 PM CST   Level 1 with RH INFUSION CHAIR 12   [facility] ([facility])    [facility]  [address]  [facility] [id number] [phone]              Who to contact     If you have questions or need follow up information about today's clinic visit or your schedule please contact [facility] directly at [phone].  Normal or non-critical lab and imaging results will be communicated to you by Musicshakehart, letter or phone within 4 business days after the clinic has received the results. If you do not hear from us within 7 days, please contact the clinic through Musicshakehart or phone. If you have a critical or abnormal lab result, we will notify you by phone as soon as possible.  Submit refill requests through Everfi or call your pharmacy and they will forward the refill request to us. Please allow 3 business days for your refill to be completed.          Additional Information About Your Visit        MyChart Information     [employer]fi gives you secure access to your electronic health record. If you see a primary care provider, you can also send messages to your care team and make appointments. If you have questions, please call your primary care clinic.  If you do not  have a primary care provider, please call [phone] and they will assist you.        Care EveryWhere ID     This is your Care EveryWhere ID. This could be used by other organizations to access your [place] medical records  [id number]         Blood Pressure from Last 3 Encounters:   10/12/18 122/80   10/09/18 124/78   10/01/18 132/80    Weight from Last 3 Encounters:   10/12/18 57.6 kg (127 lb)   10/01/18 57.2 kg (126 lb)   08/20/18 56.2 kg (124 lb)              Today, you had the following     No orders found for display       Primary Care Provider Office Phone # Fax #    Erick Ruano [phone]       [address] [place] [address]        Equal Access to Services     MARIA D [facility] : Hadii aad ku hadasho [place], waaxda luqadaha, qaybta kaalmada adevera, cynthia yancey . So [facility] [phone].    ATENCIÓN: Si habla español, tiene a workman disposición servicios gratuitos de asistencia lingüística. [facility] [phone].    We comply with applicable federal civil rights laws and [place] laws. We do not discriminate on the basis of race, color, national origin, age, disability, sex, sexual orientation, or gender identity.            Thank you!     Thank you for choosing [facility]  for your care. Our goal is always to provide you with excellent care. Hearing back from our patients is one way we can continue to improve our services. Please take a few minutes to complete the written survey that you may receive in the mail after your visit with us. Thank you!             Your Updated Medication List - Protect others around you: Learn how to safely use, store and throw away your medicines at www.disposemymeds.org.          This list is accurate as of 11/6/18  1:19 PM.  Always use your most recent med list.                   Brand Name Dispense Instructions for use Diagnosis    acetaminophen 500 MG tablet    TYLENOL     Take 500 mg by  mouth 2 times daily as needed for mild pain        azithromycin 250 MG tablet    ZITHROMAX    6 tablet    Two tablets first day, then one tablet daily for four days.    Upper respiratory tract infection, unspecified type       CENTRUM SILVER per tablet      Take 1 tablet by mouth daily        cetirizine 10 MG tablet    zyrTEC    30 tablet    Take 1 tablet (10 mg) by mouth 2 times daily    Need for pneumococcal vaccination       diltiazem 300 MG 24 hr capsule    CARTIA XT    90 capsule    TAKE 1 CAPSULE(300 MG) BY MOUTH DAILY    Essential hypertension       emollient cream      Apply topically as needed for other (leg rash)        gabapentin 100 MG capsule    NEURONTIN    120 capsule    1-2 capsules twice a day    Chronic urticaria       levothyroxine 88 MCG tablet    SYNTHROID/LEVOTHROID    90 tablet    Take 1 tablet (88 mcg) by mouth daily    Acquired hypothyroidism       omeprazole 20 MG CR capsule    priLOSEC    90 capsule    Take 1 capsule (20 mg) by mouth daily    Gastroesophageal reflux disease, esophagitis presence not specified       triamcinolone 55 MCG/ACT inhaler    NASACORT    16.5 mL    Spray 2 sprays into both nostrils daily    Nasal congestion

## 2018-11-27 ENCOUNTER — OFFICE VISIT (OUTPATIENT)
Dept: INTERNAL MEDICINE | Facility: CLINIC | Age: 83
End: 2018-11-27
Payer: COMMERCIAL

## 2018-11-27 VITALS
OXYGEN SATURATION: 97 % | TEMPERATURE: 97.8 F | DIASTOLIC BLOOD PRESSURE: 87 MMHG | RESPIRATION RATE: 16 BRPM | WEIGHT: 126 LBS | HEART RATE: 86 BPM | SYSTOLIC BLOOD PRESSURE: 134 MMHG | BODY MASS INDEX: 19.73 KG/M2

## 2018-11-27 DIAGNOSIS — N18.30 CKD (CHRONIC KIDNEY DISEASE) STAGE 3, GFR 30-59 ML/MIN (H): ICD-10-CM

## 2018-11-27 DIAGNOSIS — J06.9 UPPER RESPIRATORY TRACT INFECTION, UNSPECIFIED TYPE: Primary | ICD-10-CM

## 2018-11-27 LAB
ANION GAP SERPL CALCULATED.3IONS-SCNC: 7 MMOL/L (ref 3–14)
BUN SERPL-MCNC: 37 MG/DL (ref 7–30)
CALCIUM SERPL-MCNC: 10.1 MG/DL (ref 8.5–10.1)
CHLORIDE SERPL-SCNC: 107 MMOL/L (ref 94–109)
CO2 SERPL-SCNC: 24 MMOL/L (ref 20–32)
CREAT SERPL-MCNC: 1.52 MG/DL (ref 0.52–1.04)
GFR SERPL CREATININE-BSD FRML MDRD: 32 ML/MIN/1.7M2
GLUCOSE SERPL-MCNC: 104 MG/DL (ref 70–99)
POTASSIUM SERPL-SCNC: 4.6 MMOL/L (ref 3.4–5.3)
SODIUM SERPL-SCNC: 138 MMOL/L (ref 133–144)

## 2018-11-27 PROCEDURE — 99214 OFFICE O/P EST MOD 30 MIN: CPT | Performed by: INTERNAL MEDICINE

## 2018-11-27 PROCEDURE — 36415 COLL VENOUS BLD VENIPUNCTURE: CPT | Performed by: INTERNAL MEDICINE

## 2018-11-27 PROCEDURE — 80048 BASIC METABOLIC PNL TOTAL CA: CPT | Performed by: INTERNAL MEDICINE

## 2018-11-27 RX ORDER — AZITHROMYCIN 250 MG/1
TABLET, FILM COATED ORAL
Qty: 6 TABLET | Refills: 0 | Status: SHIPPED | OUTPATIENT
Start: 2018-11-27 | End: 2019-01-08

## 2018-11-27 RX ORDER — CEFUROXIME AXETIL 500 MG/1
500 TABLET ORAL DAILY
Qty: 10 TABLET | Refills: 0 | Status: SHIPPED | OUTPATIENT
Start: 2018-11-27 | End: 2019-01-08

## 2018-11-27 NOTE — PROGRESS NOTES
"  SUBJECTIVE:   Juliana Leal is a 89 year old female who presents to clinic today for the following health issues:    Chief Complaint   Patient presents with     URI       Acute Illness   Acute illness concerns: Cough  Onset: 2 to 3 days    Fever: no    Chills/Sweats: no    Headache (location?): YES-CONGESTION    Sinus Pressure:no    Conjunctivitis:  YES: left    Ear Pain: no    Rhinorrhea: yes -green drainage    Congestion: YES    Sore Throat: no     Cough: YES-productive of green sputum    Wheeze: no    Decreased Appetite: no    Nausea: no    Vomiting: no    Diarrhea:  no    Dysuria/Freq.: no    Fatigue/Achiness: no    Sick/Strep Exposure: no     Therapies Tried and outcome: Tylenol and Mucinex       Pt's past medical history, family history, habits, medications and allergies were reviewed with the patient today.  See snap shot for  HCM status. Most recent lab results reviewed with pt. Problem list and histories reviewed & adjusted, as indicated.  Additional history as below:    Patient presents here today with her son.  Upper respiratory symptoms as above.  No fever.  Has greenish nasal congestion and also green productive cough.  Slight shortness of breath.  Patient appetite is reduced some but she states she knows she has to eat also has been taking in normal quantities of food and liquids.  Denies weakness.  Able to do ADLs okay.  Hx CKD and due for f/u labs    Additional ROS:   Constitutional, HEENT, Cardiovascular, Pulmonary, GI and , Neuro, MSK and Psych review of systems/symptoms are otherwise negative or unchanged from previous, except as noted above.      OBJECTIVE:  /87  Pulse 86  Temp 97.8  F (36.6  C) (Oral)  Resp 16  Wt 126 lb (57.2 kg)  SpO2 97%  BMI 19.73 kg/m2   Estimated body mass index is 19.73 kg/(m^2) as calculated from the following:    Height as of 8/3/18: 5' 7\" (1.702 m).    Weight as of this encounter: 126 lb (57.2 kg).     HENT: ear canals and TM's normal and nose and " mouth without ulcers or lesions.  Bilateral maxillary sinuses mildly tender to palpation.  Greenish nasal discharge present  Neck: no adenopathy. Thyroid normal to palpation. No bruits  Pulm: Clear posterior lower lung fields.  No wheeze.  Mild anterior rhonchi which clear with cough.  CV: Regular rates and rhythm  GI: Soft, nontender, Normal active bowel sounds   Ext: Peripheral pulses intact. No edema.  Neuro: Normal mental status.  Strength grossly normal in extremities    Assessment/Plan: (See plan discussion below for further details)  1. Upper respiratory tract infection, unspecified type  Given age and other medical issues, feel appropriate to treat with antibiotic therapy.  With both sinus and lower respiratory tract symptoms, will cover more broadly with combination macrolide and cephalosporin given quinolone intolerance.  See plan discussion below  - azithromycin (ZITHROMAX) 250 MG tablet; Two tablets first day, then one tablet daily for four days.  Dispense: 6 tablet; Refill: 0  - cefuroxime (CEFTIN) 500 MG tablet; Take 1 tablet (500 mg) by mouth daily  Dispense: 10 tablet; Refill: 0    2. CKD (chronic kidney disease) stage 3, GFR 30-59 ml/min (H)  Due for follow-up lab studies.  Previously stable  - Basic metabolic panel      Plan discussion:   Azithromycin 2 tabs today, then 1 tab daily for 4 days (antibiotic)   cefuroxime 500mg tab, 1 tab daily for 10 days  Plain Mucinex  600mg 12 hour version, 1 tab twice a day as needed for productive cough  If dry tickle cough or need to suppress sough at night to help sleep, then use Delsym 2 tsp twice a day as needed  Warm liquids  Lab as ordered       Erick Ruano MD  Internal Medicine Department  [facility]

## 2018-11-27 NOTE — PATIENT INSTRUCTIONS
Azithromycin 2 tabs today, then 1 tab daily for 4 days (antibiotic)   cefuroxime 500mg tab, 1 tab daily for 10 days  Plain Mucinex  600mg 12 hour version, 1 tab twice a day as needed for productive cough  If dry tickle cough or need to suppress sough at night to help sleep, then use Delsym 2 tsp twice a day as needed  Warm liquids  Lab as ordered

## 2018-11-27 NOTE — MR AVS SNAPSHOT
After Visit Summary   11/27/2018    Juliana eLal    MRN: [id number]           Patient Information     Date Of Birth          2/10/1923        Visit Information        Provider Department      11/27/2018 11:00 AM Erick Ruano MD [facility]        Today's Diagnoses     CKD (chronic kidney disease) stage 3, GFR 30-59 ml/min (H)          Care Instructions     Azithromycin 2 tabs today, then 1 tab daily for 4 days (antibiotic)   cefuroxime 500mg tab, 1 tab daily for 10 days  Plain Mucinex  600mg 12 hour version, 1 tab twice a day as needed for productive cough  If dry tickle cough or need to suppress sough at night to help sleep, then use Delsym 2 tsp twice a day as needed  Warm liquids  Lab as ordered          Follow-ups after your visit        Your next 10 appointments already scheduled     Dec 04, 2018  1:00 PM CST   Level 1 with RH INFUSION CHAIR 11   [facility] ([facility])    [facility]  [address]  [facility] [address]  [facility] [address] [phone]              Who to contact     If you have questions or need follow up information about today's clinic visit or your schedule please contact [facility] directly at [phone].  Normal or non-critical lab and imaging results will be communicated to you by MyChart, letter or phone within 4 business days after the clinic has received the results. If you do not hear from us within 7 days, please contact the clinic through MyChart or phone. If you have a critical or abnormal lab result, we will notify you by phone as soon as possible.  Submit refill requests through SNAPCARD or call your pharmacy and they will forward the refill request to us. Please allow 3 business days for your refill to be completed.          Additional Information About Your Visit        [employer] Information     S[employer] gives you secure access to your electronic  health record. If you see a primary care provider, you can also send messages to your care team and make appointments. If you have questions, please call your primary care clinic.  If you do not have a primary care provider, please call [phone] and they will assist you.        Care EveryWhere ID     This is your Care EveryWhere ID. This could be used by other organizations to access your [place] medical records  [id number]        Your Vitals Were     Pulse Temperature Respirations Pulse Oximetry BMI (Body Mass Index)       86 97.8  F (36.6  C) (Oral) 16 97% 19.73 kg/m2        Blood Pressure from Last 3 Encounters:   11/27/18 134/87   11/06/18 136/88   10/12/18 122/80    Weight from Last 3 Encounters:   11/27/18 126 lb (57.2 kg)   10/12/18 127 lb (57.6 kg)   10/01/18 126 lb (57.2 kg)              We Performed the Following     Basic metabolic panel        Primary Care Provider Office Phone # Fax #    Erick Ruano [phone]       [address] [place] [id number]        Equal Access to Services     [facility]: Hadii aad ku hadasho Soomaali, waaxda luqadaha, qaybta kaalmada adeegyada, cynthia arreguin haybridgetn tayler yancey . So [facility] [phone].    ATENCIÓN: Si habla español, tiene a workman disposición servicios gratuitos de asistencia lingüística. Llame al [phone].    We comply with applicable federal civil rights laws and [place] laws. We do not discriminate on the basis of race, color, national origin, age, disability, sex, sexual orientation, or gender identity.            Thank you!     Thank you for choosing [facility]  for your care. Our goal is always to provide you with excellent care. Hearing back from our patients is one way we can continue to improve our services. Please take a few minutes to complete the written survey that you may receive in the mail after your visit with us. Thank you!             Your Updated Medication List - Protect others  around you: Learn how to safely use, store and throw away your medicines at www.disposemymeds.org.          This list is accurate as of 11/27/18 11:49 AM.  Always use your most recent med list.                   Brand Name Dispense Instructions for use Diagnosis    acetaminophen 500 MG tablet    TYLENOL     Take 500 mg by mouth 2 times daily as needed for mild pain        cetirizine 10 MG tablet    zyrTEC    30 tablet    Take 1 tablet (10 mg) by mouth 2 times daily    Need for pneumococcal vaccination       diltiazem 300 MG 24 hr capsule    CARTIA XT    90 capsule    TAKE 1 CAPSULE(300 MG) BY MOUTH DAILY    Essential hypertension       emollient cream      Apply topically as needed for other (leg rash)        gabapentin 100 MG capsule    NEURONTIN    120 capsule    1-2 capsules twice a day    Chronic urticaria       levothyroxine 88 MCG tablet    SYNTHROID/LEVOTHROID    90 tablet    Take 1 tablet (88 mcg) by mouth daily    Acquired hypothyroidism       multivitamin tablet      Take 1 tablet by mouth daily        omeprazole 20 MG DR capsule    priLOSEC    90 capsule    Take 1 capsule (20 mg) by mouth daily    Gastroesophageal reflux disease, esophagitis presence not specified       triamcinolone 55 MCG/ACT nasal aerosol    NASACORT    16.5 mL    Spray 2 sprays into both nostrils daily    Nasal congestion

## 2018-12-04 ENCOUNTER — INFUSION THERAPY VISIT (OUTPATIENT)
Dept: INFUSION THERAPY | Facility: CLINIC | Age: 83
End: 2018-12-04
Attending: ALLERGY & IMMUNOLOGY
Payer: MEDICARE

## 2018-12-04 VITALS
HEART RATE: 80 BPM | OXYGEN SATURATION: 97 % | BODY MASS INDEX: 19.73 KG/M2 | RESPIRATION RATE: 16 BRPM | TEMPERATURE: 97.8 F | SYSTOLIC BLOOD PRESSURE: 134 MMHG | WEIGHT: 125.99 LBS | DIASTOLIC BLOOD PRESSURE: 82 MMHG

## 2018-12-04 DIAGNOSIS — L50.1 IDIOPATHIC URTICARIA: Primary | ICD-10-CM

## 2018-12-04 PROCEDURE — 25000128 H RX IP 250 OP 636: Performed by: ALLERGY & IMMUNOLOGY

## 2018-12-04 PROCEDURE — 96372 THER/PROPH/DIAG INJ SC/IM: CPT

## 2018-12-04 RX ADMIN — OMALIZUMAB 150 MG: 202.5 INJECTION, SOLUTION SUBCUTANEOUS at 13:30

## 2018-12-04 NOTE — PROGRESS NOTES
Infusion Nursing Note:  Juliana Leal presents today for xolair.    Patient seen by provider today: No   present during visit today: Not Applicable.    Note: Xolair given subcutaneous in left arm.    Intravenous Access:  No Intravenous access/labs at this visit.    Treatment Conditions:  Not Applicable.      Post Infusion Assessment:  Patient tolerated injection without incident.    Discharge Plan:   Patient declined prescription refills.  Discharge instructions reviewed with: Patient.  Patient and/or family verbalized understanding of discharge instructions and all questions answered.  Copy of AVS reviewed with patient and/or family.  Patient will return 1/2/19 for next appointment.  Patient discharged in stable condition accompanied by: self.  Departure Mode: Ambulatory.    Jovanna Vasquez RN

## 2018-12-22 ENCOUNTER — MYC MEDICAL ADVICE (OUTPATIENT)
Dept: INTERNAL MEDICINE | Facility: CLINIC | Age: 83
End: 2018-12-22

## 2018-12-22 DIAGNOSIS — N18.30 CKD (CHRONIC KIDNEY DISEASE) STAGE 3, GFR 30-59 ML/MIN (H): Primary | ICD-10-CM

## 2018-12-22 DIAGNOSIS — E03.9 ACQUIRED HYPOTHYROIDISM: ICD-10-CM

## 2019-01-02 ENCOUNTER — INFUSION THERAPY VISIT (OUTPATIENT)
Dept: INFUSION THERAPY | Facility: CLINIC | Age: 84
End: 2019-01-02
Attending: ALLERGY & IMMUNOLOGY
Payer: COMMERCIAL

## 2019-01-02 VITALS
OXYGEN SATURATION: 97 % | DIASTOLIC BLOOD PRESSURE: 82 MMHG | RESPIRATION RATE: 16 BRPM | TEMPERATURE: 97.9 F | HEART RATE: 69 BPM | SYSTOLIC BLOOD PRESSURE: 144 MMHG

## 2019-01-02 DIAGNOSIS — L50.1 IDIOPATHIC URTICARIA: Primary | ICD-10-CM

## 2019-01-02 PROCEDURE — 96372 THER/PROPH/DIAG INJ SC/IM: CPT

## 2019-01-02 PROCEDURE — 25000128 H RX IP 250 OP 636: Performed by: ALLERGY & IMMUNOLOGY

## 2019-01-02 RX ADMIN — OMALIZUMAB 150 MG: 202.5 INJECTION, SOLUTION SUBCUTANEOUS at 13:34

## 2019-01-02 NOTE — PROGRESS NOTES
Infusion Nursing Note:  Julianaandrei Garciaford presents today for Xolair.    Patient seen by provider today: No   present during visit today: Not Applicable.    Note: N/A.    Intravenous Access:  No Intravenous access/labs at this visit.    Treatment Conditions:  Not Applicable.      Post Infusion Assessment:  Patient tolerated injection without incident.  Patient observed for 30 minutes post Xolair per protocol.    Discharge Plan:   Discharge instructions reviewed with: Patient and Family.  Patient and/or family verbalized understanding of discharge instructions and all questions answered.  AVS to patient via [employer].  Patient will return 1/30/18 for next appointment.   Patient discharged in stable condition accompanied by: son.  Departure Mode: Ambulatory.    Opal Valdez RN

## 2019-01-08 ENCOUNTER — OFFICE VISIT (OUTPATIENT)
Dept: INTERNAL MEDICINE | Facility: CLINIC | Age: 84
End: 2019-01-08
Payer: COMMERCIAL

## 2019-01-08 VITALS
DIASTOLIC BLOOD PRESSURE: 86 MMHG | BODY MASS INDEX: 20.79 KG/M2 | SYSTOLIC BLOOD PRESSURE: 138 MMHG | HEIGHT: 65 IN | WEIGHT: 124.8 LBS | RESPIRATION RATE: 16 BRPM | TEMPERATURE: 97.8 F | HEART RATE: 80 BPM

## 2019-01-08 DIAGNOSIS — N18.30 CKD (CHRONIC KIDNEY DISEASE) STAGE 3, GFR 30-59 ML/MIN (H): ICD-10-CM

## 2019-01-08 DIAGNOSIS — J01.90 ACUTE SINUSITIS TREATED WITH ANTIBIOTICS IN THE PAST 60 DAYS: Primary | ICD-10-CM

## 2019-01-08 DIAGNOSIS — I10 ESSENTIAL HYPERTENSION: ICD-10-CM

## 2019-01-08 PROCEDURE — 99214 OFFICE O/P EST MOD 30 MIN: CPT | Performed by: PHYSICIAN ASSISTANT

## 2019-01-08 RX ORDER — DOXYCYCLINE HYCLATE 100 MG
100 TABLET ORAL 2 TIMES DAILY
Qty: 20 TABLET | Refills: 0 | Status: SHIPPED | OUTPATIENT
Start: 2019-01-08 | End: 2019-01-31

## 2019-01-08 ASSESSMENT — MIFFLIN-ST. JEOR: SCORE: 961.97

## 2019-01-08 NOTE — PROGRESS NOTES
"  SUBJECTIVE:   Juliana Leal is a 89 year old female who presents to clinic today for the following health issues:      Acute Illness   Acute illness concerns: Sinus Pressure/Cough   Onset: x1 month     Fever: no     Chills/Sweats: no     Headache (location?): no     Sinus Pressure:YES    Conjunctivitis:  No    Watery eyes    Ear Pain: no    Rhinorrhea: thick discolored drainage   Yellow bloody in the am     Congestion: YES- head     Sore Throat: YES- slightly -      Cough: YES mild only     Wheeze: no     Decreased Appetite: no     Nausea: no     Vomiting: no     Diarrhea:  no     Dysuria/Freq.: no     Fatigue/Achiness: YES- fatigued     Sick/Strep Exposure: no     Therapies Tried and outcome: delsym-with some relief mucinex with no relief    Patient seen at the end of November for this. At that time had some lung/respiratory symptoms too. States only mild cough now.  States only a 2-3 day improvement in symptoms after the 10 day of antibiotics.   Complains of a lot of sinus pressure watery eyes, and post nasal drainage causing sore throat     -------------------------------------    Problem list and histories reviewed & adjusted, as indicated.  Additional history: as documented    Labs reviewed in EPIC    Reviewed and updated as needed this visit by clinical staff  Tobacco  Allergies  Meds       Reviewed and updated as needed this visit by Provider  Allergies  Meds         ROS:  Constitutional, HEENT, cardiovascular, pulmonary, gi and gu systems are negative, except as otherwise noted.    OBJECTIVE:     /86 (BP Location: Left arm, Patient Position: Chair, Cuff Size: Adult Regular)   Pulse 80   Temp 97.8  F (36.6  C) (Oral)   Resp 16   Ht 1.651 m (5' 5\")   Wt 56.6 kg (124 lb 12.8 oz)   BMI 20.77 kg/m    Body mass index is 20.77 kg/m .  GENERAL: healthy, alert and no distress  HENT: normal cephalic/atraumatic, both ears: clear effusion, nose and mouth without ulcers or lesions, nasal mucosa " edematous , rhinorrhea yellow, bloody and thick, oropharynx clear, oral mucous membranes moist and sinuses: maxillary, ethmoid tenderness on bilateral  NECK: no adenopathy, no asymmetry, masses, or scars and thyroid normal to palpation  RESP: lungs clear to auscultation - no rales, rhonchi or wheezes  CV: regular rates and rhythm and normal S1 S2, no S3 or S4  SKIN: no suspicious lesions or rashes    Diagnostic Test Results:  none     ASSESSMENT/PLAN:             1. Acute sinusitis treated with antibiotics in the past 60 days    - doxycycline hyclate (VIBRA-TABS) 100 MG tablet; Take 1 tablet (100 mg) by mouth 2 times daily for 10 days  Dispense: 20 tablet; Refill: 0    2. CKD (chronic kidney disease) stage 3, GFR 30-59 ml/min (H)      3. Essential hypertension  Stable       Patient Instructions   Saline rinse to help with drainage.            Cara Mercado PA-C  [facility]

## 2019-01-31 ENCOUNTER — OFFICE VISIT (OUTPATIENT)
Dept: INTERNAL MEDICINE | Facility: CLINIC | Age: 84
End: 2019-01-31
Payer: COMMERCIAL

## 2019-01-31 VITALS
RESPIRATION RATE: 16 BRPM | SYSTOLIC BLOOD PRESSURE: 120 MMHG | BODY MASS INDEX: 20.48 KG/M2 | TEMPERATURE: 97.7 F | WEIGHT: 123.1 LBS | HEART RATE: 100 BPM | DIASTOLIC BLOOD PRESSURE: 70 MMHG | OXYGEN SATURATION: 98 %

## 2019-01-31 DIAGNOSIS — J31.0 CHRONIC RHINITIS: Primary | ICD-10-CM

## 2019-01-31 DIAGNOSIS — J01.90 ACUTE SINUSITIS TREATED WITH ANTIBIOTICS IN THE PAST 60 DAYS: ICD-10-CM

## 2019-01-31 PROCEDURE — 99214 OFFICE O/P EST MOD 30 MIN: CPT | Performed by: INTERNAL MEDICINE

## 2019-01-31 RX ORDER — DOXYCYCLINE HYCLATE 100 MG
100 TABLET ORAL 2 TIMES DAILY
Qty: 20 TABLET | Refills: 0 | Status: SHIPPED | OUTPATIENT
Start: 2019-01-31 | End: 2019-02-27

## 2019-01-31 NOTE — PATIENT INSTRUCTIONS
- Take the antibiotic as prescribed.  (Prescription has been sent to your pharmacy)    - Use the Nasacort nasal spray nightly, before bed.  (Available over-the-counter)  - Perform the nasal saline rinse (Neti Pot) at least once daily, right before giving yourself the medicated nasal spray.

## 2019-01-31 NOTE — PROGRESS NOTES
SUBJECTIVE:   Juliana Leal is a 89 year old female who presents to clinic today for the following health issues:      RESPIRATORY SYMPTOMS      Duration: November off and on    Description  nasal congestion and mucus (bloody in the morning) throat/salivia glands, earaches, headaches    Severity: Only pain is in head    Accompanying signs and symptoms: None    History (predisposing factors):  none    Precipitating or alleviating factors: None    Therapies tried and outcome:  Nasacort, Zyrtec, with some relief          Problem list and histories reviewed & adjusted, as indicated.  Additional history: as documented    Has had difficulty with chronic sinus congestion, sinus drainage, postnasal drip for last several months.  In the last several days this has been complicated by sore throat.  She never has fever or chills, sometimes is difficult to breathe through nose.    Has been treated with 2 separate courses of antibiotics for sinusitis since November.  In late November he received a course of Zithromax and cefuroxime, then was treated with doxycycline in early January after presenting with continued symptoms.  Each course of antibiotic provided some temporary relief.  She has used nasal steroid spray intermittently, never on chronic schedule basis.    Reviewed and updated as needed this visit by clinical staff  Tobacco  Allergies  Meds  Problems  Med Hx  Surg Hx  Fam Hx       Reviewed and updated as needed this visit by Provider  Tobacco  Allergies  Meds  Problems  Med Hx  Surg Hx  Fam Hx         ROS:  Constitutional, HEENT, cardiovascular, pulmonary, GI, , musculoskeletal, neuro, skin, endocrine and psych systems are negative, except as otherwise noted.    OBJECTIVE:     /70   Pulse 100   Temp 97.7  F (36.5  C) (Oral)   Resp 16   Wt 55.8 kg (123 lb 1.6 oz)   SpO2 98%   BMI 20.48 kg/m    Body mass index is 20.48 kg/m .  GENERAL: healthy, alert and no distress  HENT: Significant nasal  mucosal congestion with clear rhinorrhea no maxillary tenderness.  Oropharynx mildly erythematous without exudate, postnasal drip clearly visible.  NECK: no adenopathy, no asymmetry, masses, or scars and thyroid normal to palpation  RESP: lungs clear to auscultation - no rales, rhonchi or wheezes  CV: regular rate and rhythm, normal S1 S2, no S3 or S4, no murmur, click or rub, no peripheral edema and peripheral pulses strong  ABDOMEN: soft, nontender, no hepatosplenomegaly, no masses and bowel sounds normal  MS: no gross musculoskeletal defects noted, no edema        ASSESSMENT/PLAN:     1. Chronic rhinitis  We will treat with one more course of antibiotics for sinusitis as below, but most importantly need to institute scheduled longer-term therapy for chronic rhinitis.  Start daily nasal steroid spray and continue at least the next 3-4 weeks, also recommended daily high-volume saline lavage.    2. Acute sinusitis treated with antibiotics in the past 60 days    - doxycycline hyclate (VIBRA-TABS) 100 MG tablet; Take 1 tablet (100 mg) by mouth 2 times daily  Dispense: 20 tablet; Refill: 0        Wolfgang Jacob MD  [facility]

## 2019-02-05 ENCOUNTER — INFUSION THERAPY VISIT (OUTPATIENT)
Dept: INFUSION THERAPY | Facility: CLINIC | Age: 84
End: 2019-02-05
Attending: ALLERGY & IMMUNOLOGY
Payer: COMMERCIAL

## 2019-02-05 VITALS — HEART RATE: 80 BPM | SYSTOLIC BLOOD PRESSURE: 121 MMHG | DIASTOLIC BLOOD PRESSURE: 75 MMHG

## 2019-02-05 DIAGNOSIS — L50.1 IDIOPATHIC URTICARIA: Primary | ICD-10-CM

## 2019-02-05 PROCEDURE — 96372 THER/PROPH/DIAG INJ SC/IM: CPT

## 2019-02-05 PROCEDURE — 25000128 H RX IP 250 OP 636: Performed by: ALLERGY & IMMUNOLOGY

## 2019-02-05 RX ADMIN — OMALIZUMAB 150 MG: 150 INJECTION, SOLUTION SUBCUTANEOUS at 13:06

## 2019-02-05 NOTE — PROGRESS NOTES
Infusion Nursing Note:  Juliana M Elvis presents today for Xolair.    Patient seen by provider today: No   present during visit today: Not Applicable.    Note: N/A.    Intravenous Access:  No Intravenous access/labs at this visit.    Treatment Conditions:  Not Applicable.      Post Infusion Assessment:  Patient tolerated injection without incident.  Patient observed for 30 minutes post Xolair per protocol.    Discharge Plan:   Discharge instructions reviewed with: Patient.  Patient and/or family verbalized understanding of discharge instructions and all questions answered.  AVS to patient via [facility].  Patient will return 3/5/19 for next appointment.   Patient discharged in stable condition accompanied by: self.  Departure Mode: Ambulatory.    Angi Vargas RN

## 2019-02-27 ENCOUNTER — OFFICE VISIT (OUTPATIENT)
Dept: INTERNAL MEDICINE | Facility: CLINIC | Age: 84
End: 2019-02-27
Payer: COMMERCIAL

## 2019-02-27 VITALS
RESPIRATION RATE: 12 BRPM | DIASTOLIC BLOOD PRESSURE: 80 MMHG | WEIGHT: 119 LBS | OXYGEN SATURATION: 97 % | SYSTOLIC BLOOD PRESSURE: 128 MMHG | TEMPERATURE: 97.5 F | BODY MASS INDEX: 19.8 KG/M2 | HEART RATE: 87 BPM

## 2019-02-27 DIAGNOSIS — R09.81 CONGESTION OF PARANASAL SINUS: ICD-10-CM

## 2019-02-27 DIAGNOSIS — B37.0 THRUSH: Primary | ICD-10-CM

## 2019-02-27 PROCEDURE — 99213 OFFICE O/P EST LOW 20 MIN: CPT | Performed by: PHYSICIAN ASSISTANT

## 2019-02-27 RX ORDER — CLOTRIMAZOLE 10 MG/1
10 LOZENGE ORAL
Qty: 70 EACH | Refills: 0 | Status: SHIPPED | OUTPATIENT
Start: 2019-02-27 | End: 2019-03-25

## 2019-02-27 NOTE — PROGRESS NOTES
SUBJECTIVE:   Juliana Leal is a 89 year old female who presents to clinic today for the following health issues:      Concern - mouth is sore   Onset: x3 months    Description:   Feels as though there is a burning sensation in her mouth, worse on pallet area. Burns to consume anything acidic    Intensity: 8/10    Progression of Symptoms:  same    Accompanying Signs & Symptoms:  Phlegm    Previous history of similar problem:   Been coming to be seen about this since november    Precipitating factors:   Worsened by: acidic foods and drinks, has not been eating or drinking well in the last 10 days.     Alleviating factors:  Improved by: None    Therapies Tried and outcome: antibiotics.     Continued issues with sinus congestion. Has been on multiple round of antibiotics  She is now using nasacort steroid nasal spray and using netti pot daily but continues with congestion.  States the soft palette area is sore too     -------------------------------------    Problem list and histories reviewed & adjusted, as indicated.  Additional history: as documented    Labs reviewed in EPIC    Reviewed and updated as needed this visit by clinical staff       Reviewed and updated as needed this visit by Provider  Allergies  Meds         ROS:  Constitutional, HEENT, cardiovascular, pulmonary, gi and gu systems are negative, except as otherwise noted.    OBJECTIVE:     /80 (BP Location: Left arm, Patient Position: Chair, Cuff Size: Adult Regular)   Pulse 87   Temp 97.5  F (36.4  C) (Oral)   Resp 12   Wt 54 kg (119 lb)   SpO2 97%   BMI 19.80 kg/m    Body mass index is 19.8 kg/m .  GENERAL: healthy, alert and no distress  HENT: normal cephalic/atraumatic, nose without ulcers or lesions, rhinorrhea clear   Mouth with white coating on tongue few scatter white adherent patches bucca mucosa   NECK: no adenopathy, no asymmetry, masses, or scars and thyroid normal to palpation  RESP: lungs clear to auscultation - no rales,  rhonchi or wheezes  CV: regular rates and rhythm and normal S1 S2, no S3 or S4  SKIN: no suspicious lesions or rashes    Diagnostic Test Results:  none     ASSESSMENT/PLAN:             1. Thrush  With several rounds of antibiotics recently   Treatment as below   - clotrimazole 10 MG tiera; Take 1 Tiera (10 mg) by mouth 5 times daily  Dispense: 70 each; Refill: 0    2. Congestion of paranasal sinus  See ENT, continued issues with sinus congestion and with soft palette pain            See Patient Instructions    Cara Mercado PA-C  [facility]

## 2019-03-05 ENCOUNTER — TRANSFERRED RECORDS (OUTPATIENT)
Dept: HEALTH INFORMATION MANAGEMENT | Facility: CLINIC | Age: 84
End: 2019-03-05

## 2019-03-05 ENCOUNTER — HOSPITAL LABORATORY (OUTPATIENT)
Dept: OTHER | Facility: CLINIC | Age: 84
End: 2019-03-05

## 2019-03-06 ENCOUNTER — INFUSION THERAPY VISIT (OUTPATIENT)
Dept: INFUSION THERAPY | Facility: CLINIC | Age: 84
End: 2019-03-06
Attending: ALLERGY & IMMUNOLOGY
Payer: COMMERCIAL

## 2019-03-06 VITALS
SYSTOLIC BLOOD PRESSURE: 104 MMHG | DIASTOLIC BLOOD PRESSURE: 70 MMHG | HEART RATE: 84 BPM | RESPIRATION RATE: 16 BRPM | TEMPERATURE: 97.7 F | OXYGEN SATURATION: 98 %

## 2019-03-06 DIAGNOSIS — L50.1 IDIOPATHIC URTICARIA: Primary | ICD-10-CM

## 2019-03-06 PROCEDURE — 96372 THER/PROPH/DIAG INJ SC/IM: CPT

## 2019-03-06 PROCEDURE — 25000128 H RX IP 250 OP 636: Performed by: ALLERGY & IMMUNOLOGY

## 2019-03-06 RX ADMIN — OMALIZUMAB 150 MG: 150 INJECTION, SOLUTION SUBCUTANEOUS at 13:33

## 2019-03-06 ASSESSMENT — PAIN SCALES - GENERAL: PAINLEVEL: EXTREME PAIN (8)

## 2019-03-06 NOTE — PROGRESS NOTES
Infusion Nursing Note:  Juliana Leal presents today for Xolair.    Patient seen by provider today: No   present during visit today: Not Applicable.    Note: Has c/o a sinus infection.  Had a sinus x-ray and cultures done yesterday.  Will hear about results and potential ABX tomorrow.    Intravenous Access:  No Intravenous access/labs at this visit.    Treatment Conditions:  Not Applicable.      Post Infusion Assessment:  Patient tolerated injection without incident.    Discharge Plan:   Discharge instructions reviewed with: Patient.  Patient discharged in stable condition accompanied by: son.  Departure Mode: Ambulatory.  Next injection scheduled for 4/2/19.    HARRISON SHEPPARD RN

## 2019-03-08 LAB
BACTERIA SPEC CULT: ABNORMAL
Lab: ABNORMAL
SPECIMEN SOURCE: ABNORMAL

## 2019-03-11 LAB
Lab: ABNORMAL
SPECIMEN SOURCE: ABNORMAL
YEAST SPEC QL CULT: ABNORMAL

## 2019-03-19 ENCOUNTER — APPOINTMENT (OUTPATIENT)
Dept: GENERAL RADIOLOGY | Facility: CLINIC | Age: 84
DRG: 153 | End: 2019-03-19
Attending: EMERGENCY MEDICINE
Payer: COMMERCIAL

## 2019-03-19 ENCOUNTER — HOSPITAL ENCOUNTER (INPATIENT)
Facility: CLINIC | Age: 84
LOS: 2 days | Discharge: NURSING, REHAB OR LONG-TERM CARE | DRG: 153 | End: 2019-03-22
Attending: EMERGENCY MEDICINE | Admitting: HOSPITALIST
Payer: COMMERCIAL

## 2019-03-19 ENCOUNTER — HOSPITAL LABORATORY (OUTPATIENT)
Dept: OTHER | Facility: CLINIC | Age: 84
End: 2019-03-19

## 2019-03-19 DIAGNOSIS — J01.90 SUBACUTE SINUSITIS, UNSPECIFIED LOCATION: ICD-10-CM

## 2019-03-19 DIAGNOSIS — J01.01 ACUTE RECURRENT MAXILLARY SINUSITIS: ICD-10-CM

## 2019-03-19 DIAGNOSIS — E86.0 DEHYDRATION: ICD-10-CM

## 2019-03-19 DIAGNOSIS — M62.81 GENERALIZED MUSCLE WEAKNESS: ICD-10-CM

## 2019-03-19 DIAGNOSIS — B37.0 THRUSH: Primary | ICD-10-CM

## 2019-03-19 PROBLEM — J32.9 SINUSITIS: Status: ACTIVE | Noted: 2019-03-19

## 2019-03-19 LAB
ALBUMIN SERPL-MCNC: 3.1 G/DL (ref 3.4–5)
ALBUMIN UR-MCNC: NEGATIVE MG/DL
ALP SERPL-CCNC: 80 U/L (ref 40–150)
ALT SERPL W P-5'-P-CCNC: 16 U/L (ref 0–50)
ANION GAP SERPL CALCULATED.3IONS-SCNC: 7 MMOL/L (ref 3–14)
APPEARANCE UR: CLEAR
AST SERPL W P-5'-P-CCNC: 15 U/L (ref 0–45)
BACTERIA #/AREA URNS HPF: ABNORMAL /HPF
BASOPHILS # BLD AUTO: 0 10E9/L (ref 0–0.2)
BASOPHILS NFR BLD AUTO: 0.3 %
BILIRUB SERPL-MCNC: 0.5 MG/DL (ref 0.2–1.3)
BILIRUB UR QL STRIP: NEGATIVE
BUN SERPL-MCNC: 33 MG/DL (ref 7–30)
CALCIUM SERPL-MCNC: 10.7 MG/DL (ref 8.5–10.1)
CHLORIDE SERPL-SCNC: 104 MMOL/L (ref 94–109)
CO2 SERPL-SCNC: 23 MMOL/L (ref 20–32)
COLOR UR AUTO: YELLOW
CREAT SERPL-MCNC: 1.98 MG/DL (ref 0.52–1.04)
CRP SERPL-MCNC: 83.1 MG/L (ref 0–8)
DIFFERENTIAL METHOD BLD: ABNORMAL
EOSINOPHIL # BLD AUTO: 0 10E9/L (ref 0–0.7)
EOSINOPHIL NFR BLD AUTO: 0.1 %
ERYTHROCYTE [DISTWIDTH] IN BLOOD BY AUTOMATED COUNT: 16.6 % (ref 10–15)
GFR SERPL CREATININE-BSD FRML MDRD: 21 ML/MIN/{1.73_M2}
GLUCOSE SERPL-MCNC: 152 MG/DL (ref 70–99)
GLUCOSE UR STRIP-MCNC: NEGATIVE MG/DL
HCT VFR BLD AUTO: 37.3 % (ref 35–47)
HGB BLD-MCNC: 11.8 G/DL (ref 11.7–15.7)
HGB UR QL STRIP: NEGATIVE
IMM GRANULOCYTES # BLD: 0 10E9/L (ref 0–0.4)
IMM GRANULOCYTES NFR BLD: 0.3 %
INTERPRETATION ECG - MUSE: NORMAL
KETONES UR STRIP-MCNC: NEGATIVE MG/DL
LEUKOCYTE ESTERASE UR QL STRIP: NEGATIVE
LYMPHOCYTES # BLD AUTO: 1.2 10E9/L (ref 0.8–5.3)
LYMPHOCYTES NFR BLD AUTO: 8.9 %
MCH RBC QN AUTO: 29.7 PG (ref 26.5–33)
MCHC RBC AUTO-ENTMCNC: 31.6 G/DL (ref 31.5–36.5)
MCV RBC AUTO: 94 FL (ref 78–100)
MONOCYTES # BLD AUTO: 1.9 10E9/L (ref 0–1.3)
MONOCYTES NFR BLD AUTO: 14.4 %
MUCOUS THREADS #/AREA URNS LPF: PRESENT /LPF
NEUTROPHILS # BLD AUTO: 9.9 10E9/L (ref 1.6–8.3)
NEUTROPHILS NFR BLD AUTO: 76 %
NITRATE UR QL: NEGATIVE
NRBC # BLD AUTO: 0 10*3/UL
NRBC BLD AUTO-RTO: 0 /100
PH UR STRIP: 6 PH (ref 5–7)
PLATELET # BLD AUTO: 334 10E9/L (ref 150–450)
POTASSIUM SERPL-SCNC: 5 MMOL/L (ref 3.4–5.3)
PROT SERPL-MCNC: 7.8 G/DL (ref 6.8–8.8)
RBC # BLD AUTO: 3.97 10E12/L (ref 3.8–5.2)
RBC #/AREA URNS AUTO: 1 /HPF (ref 0–2)
SODIUM SERPL-SCNC: 134 MMOL/L (ref 133–144)
SOURCE: ABNORMAL
SP GR UR STRIP: 1.01 (ref 1–1.03)
SQUAMOUS #/AREA URNS AUTO: 1 /HPF (ref 0–1)
TROPONIN I SERPL-MCNC: <0.015 UG/L (ref 0–0.04)
UROBILINOGEN UR STRIP-MCNC: 0 MG/DL (ref 0–2)
WBC # BLD AUTO: 13 10E9/L (ref 4–11)
WBC #/AREA URNS AUTO: 3 /HPF (ref 0–5)

## 2019-03-19 PROCEDURE — 25000132 ZZH RX MED GY IP 250 OP 250 PS 637: Performed by: PHYSICIAN ASSISTANT

## 2019-03-19 PROCEDURE — 25000128 H RX IP 250 OP 636: Performed by: EMERGENCY MEDICINE

## 2019-03-19 PROCEDURE — 86140 C-REACTIVE PROTEIN: CPT | Performed by: EMERGENCY MEDICINE

## 2019-03-19 PROCEDURE — 80053 COMPREHEN METABOLIC PANEL: CPT | Performed by: EMERGENCY MEDICINE

## 2019-03-19 PROCEDURE — 93005 ELECTROCARDIOGRAM TRACING: CPT

## 2019-03-19 PROCEDURE — 81001 URINALYSIS AUTO W/SCOPE: CPT | Performed by: PHYSICIAN ASSISTANT

## 2019-03-19 PROCEDURE — 36415 COLL VENOUS BLD VENIPUNCTURE: CPT | Performed by: EMERGENCY MEDICINE

## 2019-03-19 PROCEDURE — 25000125 ZZHC RX 250: Performed by: EMERGENCY MEDICINE

## 2019-03-19 PROCEDURE — 87040 BLOOD CULTURE FOR BACTERIA: CPT | Performed by: EMERGENCY MEDICINE

## 2019-03-19 PROCEDURE — 99285 EMERGENCY DEPT VISIT HI MDM: CPT | Mod: 25

## 2019-03-19 PROCEDURE — 96366 THER/PROPH/DIAG IV INF ADDON: CPT

## 2019-03-19 PROCEDURE — 85025 COMPLETE CBC W/AUTO DIFF WBC: CPT | Performed by: EMERGENCY MEDICINE

## 2019-03-19 PROCEDURE — G0378 HOSPITAL OBSERVATION PER HR: HCPCS

## 2019-03-19 PROCEDURE — 71046 X-RAY EXAM CHEST 2 VIEWS: CPT

## 2019-03-19 PROCEDURE — 25800030 ZZH RX IP 258 OP 636: Performed by: PHYSICIAN ASSISTANT

## 2019-03-19 PROCEDURE — 96365 THER/PROPH/DIAG IV INF INIT: CPT

## 2019-03-19 PROCEDURE — 99207 ZZC CDG-MDM COMPONENT: MEETS LOW - DOWN CODED: CPT | Performed by: PHYSICIAN ASSISTANT

## 2019-03-19 PROCEDURE — 99219 ZZC INITIAL OBSERVATION CARE,LEVL II: CPT | Performed by: PHYSICIAN ASSISTANT

## 2019-03-19 PROCEDURE — 25000125 ZZHC RX 250: Performed by: PHYSICIAN ASSISTANT

## 2019-03-19 PROCEDURE — 84484 ASSAY OF TROPONIN QUANT: CPT | Performed by: EMERGENCY MEDICINE

## 2019-03-19 PROCEDURE — 96361 HYDRATE IV INFUSION ADD-ON: CPT

## 2019-03-19 RX ORDER — GABAPENTIN 100 MG/1
100 CAPSULE ORAL AT BEDTIME
Status: ON HOLD | COMMUNITY
End: 2019-05-13

## 2019-03-19 RX ORDER — ONDANSETRON 2 MG/ML
4 INJECTION INTRAMUSCULAR; INTRAVENOUS EVERY 6 HOURS PRN
Status: DISCONTINUED | OUTPATIENT
Start: 2019-03-19 | End: 2019-03-22 | Stop reason: HOSPADM

## 2019-03-19 RX ORDER — NALOXONE HYDROCHLORIDE 0.4 MG/ML
.1-.4 INJECTION, SOLUTION INTRAMUSCULAR; INTRAVENOUS; SUBCUTANEOUS
Status: DISCONTINUED | OUTPATIENT
Start: 2019-03-19 | End: 2019-03-22 | Stop reason: HOSPADM

## 2019-03-19 RX ORDER — AMOXICILLIN 250 MG
2 CAPSULE ORAL 2 TIMES DAILY
Status: DISCONTINUED | OUTPATIENT
Start: 2019-03-19 | End: 2019-03-22 | Stop reason: HOSPADM

## 2019-03-19 RX ORDER — CETIRIZINE HYDROCHLORIDE 10 MG/1
10 TABLET ORAL 2 TIMES DAILY
Status: DISCONTINUED | OUTPATIENT
Start: 2019-03-19 | End: 2019-03-22 | Stop reason: HOSPADM

## 2019-03-19 RX ORDER — OXYMETAZOLINE HYDROCHLORIDE 0.05 G/100ML
2 SPRAY NASAL 2 TIMES DAILY
Status: COMPLETED | OUTPATIENT
Start: 2019-03-19 | End: 2019-03-22

## 2019-03-19 RX ORDER — LEVOTHYROXINE SODIUM 88 UG/1
88 TABLET ORAL DAILY
Status: DISCONTINUED | OUTPATIENT
Start: 2019-03-20 | End: 2019-03-22 | Stop reason: HOSPADM

## 2019-03-19 RX ORDER — SULFAMETHOXAZOLE/TRIMETHOPRIM 800-160 MG
1 TABLET ORAL 2 TIMES DAILY
Status: ON HOLD | COMMUNITY
End: 2019-03-22

## 2019-03-19 RX ORDER — AMOXICILLIN 250 MG
1 CAPSULE ORAL 2 TIMES DAILY
Status: DISCONTINUED | OUTPATIENT
Start: 2019-03-19 | End: 2019-03-22 | Stop reason: HOSPADM

## 2019-03-19 RX ORDER — ACETAMINOPHEN 325 MG/1
650 TABLET ORAL EVERY 4 HOURS PRN
Status: DISCONTINUED | OUTPATIENT
Start: 2019-03-19 | End: 2019-03-22 | Stop reason: HOSPADM

## 2019-03-19 RX ORDER — FLUCONAZOLE 100 MG/1
100 TABLET ORAL
Status: DISCONTINUED | OUTPATIENT
Start: 2019-03-20 | End: 2019-03-22 | Stop reason: HOSPADM

## 2019-03-19 RX ORDER — ACETAMINOPHEN 650 MG/1
650 SUPPOSITORY RECTAL EVERY 4 HOURS PRN
Status: DISCONTINUED | OUTPATIENT
Start: 2019-03-19 | End: 2019-03-22 | Stop reason: HOSPADM

## 2019-03-19 RX ORDER — CLINDAMYCIN PHOSPHATE 600 MG/50ML
600 INJECTION, SOLUTION INTRAVENOUS EVERY 8 HOURS
Status: DISCONTINUED | OUTPATIENT
Start: 2019-03-19 | End: 2019-03-22 | Stop reason: HOSPADM

## 2019-03-19 RX ORDER — FLUCONAZOLE 100 MG/1
100 TABLET ORAL
Status: ON HOLD | COMMUNITY
End: 2019-03-22

## 2019-03-19 RX ORDER — SODIUM CHLORIDE 9 MG/ML
INJECTION, SOLUTION INTRAVENOUS CONTINUOUS
Status: DISCONTINUED | OUTPATIENT
Start: 2019-03-19 | End: 2019-03-20

## 2019-03-19 RX ORDER — ACETAMINOPHEN 500 MG
500 TABLET ORAL 2 TIMES DAILY PRN
Status: DISCONTINUED | OUTPATIENT
Start: 2019-03-19 | End: 2019-03-19

## 2019-03-19 RX ORDER — GABAPENTIN 100 MG/1
100-200 CAPSULE ORAL AT BEDTIME
Status: DISCONTINUED | OUTPATIENT
Start: 2019-03-19 | End: 2019-03-22 | Stop reason: HOSPADM

## 2019-03-19 RX ORDER — ONDANSETRON 4 MG/1
4 TABLET, ORALLY DISINTEGRATING ORAL EVERY 6 HOURS PRN
Status: DISCONTINUED | OUTPATIENT
Start: 2019-03-19 | End: 2019-03-22 | Stop reason: HOSPADM

## 2019-03-19 RX ORDER — CLINDAMYCIN PHOSPHATE 900 MG/50ML
900 INJECTION, SOLUTION INTRAVENOUS ONCE
Status: COMPLETED | OUTPATIENT
Start: 2019-03-19 | End: 2019-03-19

## 2019-03-19 RX ADMIN — OMEPRAZOLE 20 MG: 20 CAPSULE, DELAYED RELEASE ORAL at 18:55

## 2019-03-19 RX ADMIN — Medication 2 SPRAY: at 21:35

## 2019-03-19 RX ADMIN — CLINDAMYCIN PHOSPHATE 600 MG: 600 INJECTION, SOLUTION INTRAVENOUS at 22:44

## 2019-03-19 RX ADMIN — SENNOSIDES AND DOCUSATE SODIUM 2 TABLET: 8.6; 5 TABLET ORAL at 18:55

## 2019-03-19 RX ADMIN — CLINDAMYCIN PHOSPHATE 900 MG: 900 INJECTION, SOLUTION INTRAVENOUS at 15:58

## 2019-03-19 RX ADMIN — CETIRIZINE HYDROCHLORIDE 10 MG: 10 TABLET, FILM COATED ORAL at 18:55

## 2019-03-19 RX ADMIN — SODIUM CHLORIDE: 9 INJECTION, SOLUTION INTRAVENOUS at 22:44

## 2019-03-19 RX ADMIN — SODIUM CHLORIDE 500 ML: 9 INJECTION, SOLUTION INTRAVENOUS at 14:54

## 2019-03-19 RX ADMIN — GABAPENTIN 100 MG: 100 CAPSULE ORAL at 21:35

## 2019-03-19 RX ADMIN — SODIUM CHLORIDE: 9 INJECTION, SOLUTION INTRAVENOUS at 18:57

## 2019-03-19 ASSESSMENT — ENCOUNTER SYMPTOMS
DYSURIA: 0
FEVER: 0
WEAKNESS: 1
TREMORS: 1
HEMATURIA: 0
SINUS PAIN: 1
SORE THROAT: 1
CHILLS: 1
NAUSEA: 0
APPETITE CHANGE: 1
FREQUENCY: 0
FATIGUE: 1
VOMITING: 0
SINUS PRESSURE: 1
CHEST TIGHTNESS: 1
CONSTIPATION: 1
ABDOMINAL PAIN: 0

## 2019-03-19 NOTE — PROGRESS NOTES
ROOM # 224    Living Situation (if not independent, order SW consult): Home independently  Facility name:  : [facility]    Activity level at baseline: Ind with walker  Activity level on admit: 1 ast with walker      Patient registered to observation; given Patient Bill of Rights; given the opportunity to ask questions about observation status and their plan of care.  Patient has been oriented to the observation room, bathroom and call light is in place.    Discussed discharge goals and expectations with patient/family.

## 2019-03-19 NOTE — H&P
History and Physical     Juliana Leal MRN# [id number]   YOB: 1923 Age: 89 year old      Date of Admission:  3/19/2019    Primary care provider: Erick Ruano          Assessment and Plan:   Juliana Leal is a 89 year old female who is surprisingly spry and fundamentally healthy who comes to the emergency room at the urging of her ENT physician due to concerns of persistent left sinusitis despite multiple outpatient course of antibiotics.  She is currently on her fourth trial of antibiotic (Bactrim) based on sensitivities from sinus culture showing staph aureus.  She also has complaints of thrush and has been on Diflucan.  She basically was directed to come to the emergency room due to concerns of odontophagia, weakness, fall relating to poor p.o. Intake.    1.  Persistent sinusitis-patient has been followed by ENT since November and has been tried on several courses of antibiotic.  Nasal culture and mucus drainage was cultured and showed staph aureus.  Despite proper antibiotics she still has persistent symptoms although nothing constitutional such as fevers, signs of sepsis, headache, confusion, etc.  She did have an episode of chills and therefore blood culture was obtained to rule out possible bacteremia.  -Continue IV antibiotics, clindamycin as recommended by ENT  -Afrin twice daily times 3 days  -Formal ENT eval in the hospital    2.  Oral candidiasis-secondary to multiple course of oral antibiotics.  Currently on Diflucan which we will continue.  No obvious thrush lesions on today's exam.  -Continue Diflucan  -Consider adding swish and swallow    3.  Poor p.o. intake due to #2  -Continue IV fluids    4.  Generalized weakness secondary to above  -Ambulate with assistance  -Suspect symptoms should improve, with treatment.  Normally patient is ambulatory with a walker and still lives independently and very independent.    Registered to observation  DNR/DNI discussed with patient  Disposition-I  think able to go home potentially with home services, I do not think she will need TCU at this time                Chief Complaint:   Generalized weakness, fall, persistent sinusitis.         History of Present Illness:   Juliana Leal is a 89 year old female with a past medical history significant for CKD, hypothyroidism, chronic sinusitis, and hypertension who presents to the emergency department today with her son and daughter for multiple concerns and symptoms.     The patient has a history of sinus infections. She was initially seen and evaluated for this in November of 2018.  Due to multiple failed outpatient empiric abx pt was referred to ENT and seen by Dr. Irene of ENT on 3/5/19 and placed on a course of antibiotics (bactrim).  Abx was chose based on sinus culture that showed staph aureus.. This did not improve after a month and the patient returned to ENT and was subsequently placed on a new antibiotic, again without improvement after the duration of a month.She reports progressive sinus pain and congestion with recent mouth pain. The patient has not been eating secondary to this and her daughter reports that she has lost up to 10 pounds. She has had persistent mucosal drainage with associated sore throat, chills and tremors but no fever.    Today she was so weak that she even fell over her so far.  She is also losing weight and not eating much due to odynophagia.    Nasal endoscopy on 3/5/19: Showed thick purulent secretions. Culture from the left sinus.      3/5/19: nasal culture with staph aureus     CT of the Paranasal and Face:  Mucosal thickening most severe within the right frontal, anterior ethmoid, and maxillary sinuses with occlusion of the right frontal recess and ostiomeatal unit. The findings have worsened compared to 8/14/2013.                  Past Medical History:     Past Medical History:   Diagnosis Date     Angioedema 2017    Due to ACE      Arthritis      CKD (chronic kidney disease)  stage 3, GFR 30-59 ml/min (H) 2018     Gout      Headaches      Hives 2017    Dr KHOURY, avoid BBLK due to this condition     Hypertension      Thyroid disease                Past Surgical History:     Past Surgical History:   Procedure Laterality Date     COLECTOMY      diverticulitis     HC LIGATION OR BIOPSY TEMPORAL ARTERY  5/10/2012    Procedure:BIOPSY ARTERY TEMPORAL; Surgeon:RUI LOREDO; Location:[facility]     INCISE FINGER TENDON SHEATH       PHACOEMULSIFICATION CLEAR CORNEA WITH STANDARD INTRAOCULAR LENS IMPLANT  2012    Procedure:PHACOEMULSIFICATION CLEAR CORNEA WITH STANDARD INTRAOCULAR LENS IMPLANT; RIGHT PHACOEMULSIFICATION CLEAR CORNEA WITH STANDARD INTRAOCULAR LENS IMPLANT; Surgeon:MANJIT LYONS; Location:[facility]     PHACOEMULSIFICATION CLEAR CORNEA WITH STANDARD INTRAOCULAR LENS IMPLANT  3/14/2012    Procedure:PHACOEMULSIFICATION CLEAR CORNEA WITH STANDARD INTRAOCULAR LENS IMPLANT; LEFT PHACOEMULSIFICATION CLEAR CORNEA WITH STANDARD INTRAOCULAR LENS IMPLANT ; Surgeon:MANJIT LYONS; Location:[facility]     REPAIR ATRIAL SEPTAL DEFECT                 Social History:     Social History     Socioeconomic History     Marital status:      Spouse name: Not on file     Number of children: Not on file     Years of education: Not on file     Highest education level: Not on file   Occupational History     Not on file   Social Needs     Financial resource strain: Not on file     Food insecurity:     Worry: Not on file     Inability: Not on file     Transportation needs:     Medical: Not on file     Non-medical: Not on file   Tobacco Use     Smoking status: Former Smoker     Types: Cigarettes     Last attempt to quit: 1960     Years since quittin.2     Smokeless tobacco: Never Used   Substance and Sexual Activity     Alcohol use: No     Drug use: Not on file     Sexual activity: Not on file   Lifestyle     Physical activity:     Days per week: Not on file     Minutes per session: Not on  "file     Stress: Not on file   Relationships     Social connections:     Talks on phone: Not on file     Gets together: Not on file     Attends Jew service: Not on file     Active member of club or organization: Not on file     Attends meetings of clubs or organizations: Not on file     Relationship status: Not on file     Intimate partner violence:     Fear of current or ex partner: Not on file     Emotionally abused: Not on file     Physically abused: Not on file     Forced sexual activity: Not on file   Other Topics Concern     Parent/sibling w/ CABG, MI or angioplasty before 65F 55M? Not Asked   Social History Narrative     Not on file               Family History:   Reviewed and noncontributory         Allergies:      Allergies   Allergen Reactions     Lisinopril Other (See Comments) and Anaphylaxis     Swelling around mouth     Prednisone Unknown     \"every side effect listed\"     Allegra [Fexofenadine] Swelling     Amoxicillin Itching     Cipro [Quinolones] Itching     Codeine Sulfate Other (See Comments)     Felt out of it when took it yrs ago     Hydroxyzine      Feels drunk     Iodine [Gnp Iodides Decolorized]      Lyrica [Pregabalin]      Reaction unknown     Norvasc [Amlodipine Besylate]      Mouth felt funny     Tramadol      Felt loopy     Zaditor Other (See Comments) and Swelling     Tongue swelled  Didn't feel right  Eye drops     Penicillins Rash               Medications:     Prior to Admission medications    Medication Sig Last Dose Taking? Auth Provider   acetaminophen (TYLENOL) 500 MG tablet Take 500 mg by mouth 2 times daily as needed for mild pain Past Week at Unknown time Yes Unknown, Entered By History   diltiazem ER COATED BEADS (CARTIA XT) 300 MG 24 hr capsule TAKE 1 CAPSULE(300 MG) BY MOUTH DAILY 3/19/2019 at am Yes Erick Ruano MD   fluconazole (DIFLUCAN) 100 MG tablet Take 100 mg by mouth three times a week On Mon-Wed-Fri starting on 3/11/19 for 10 days 3/15/2019 at am Yes " "Unknown, Entered By History   gabapentin (NEURONTIN) 100 MG capsule Take 100-200 mg by mouth At Bedtime 3/18/2019 at hs Yes Unknown, Entered By History   levothyroxine (SYNTHROID/LEVOTHROID) 88 MCG tablet Take 1 tablet (88 mcg) by mouth daily 3/19/2019 at am Yes Erick Ruano MD   Multiple Vitamins-Minerals (CENTRUM SILVER) per tablet Take 1 tablet by mouth daily Past Week at Unknown time Yes Reported, Patient   omalizumab (XOLAIR) 150 MG injection Inject Subcutaneous every 28 days 3/6/2019 Yes Unknown, Entered By History   omeprazole (PRILOSEC) 20 MG CR capsule Take 1 capsule (20 mg) by mouth daily Past Month at Unknown time Yes Erick Ruano MD   sulfamethoxazole-trimethoprim (BACTRIM DS/SEPTRA DS) 800-160 MG tablet Take 1 tablet by mouth 2 times daily For 10 days starting on 3/11/19 3/18/2019 at pm Yes Unknown, Entered By History   triamcinolone (NASACORT) 55 MCG/ACT inhaler Spray 2 sprays into both nostrils daily 3/18/2019 at Unknown time Yes Erick Ruano MD   cetirizine (ZYRTEC) 10 MG tablet Take 1 tablet (10 mg) by mouth 2 times daily   Federico Manning MD   clotrimazole 10 MG ambrocio Take 1 Ambrocio (10 mg) by mouth 5 times daily on hold  Cara Mercado PA-C   emollient (VANICREAM) cream Apply topically as needed for other (leg rash)   Reported, Patient              Review of Systems:   A Comprehensive greater than 10 system review of systems was carried out.  Pertinent positives and negatives are noted above.  Otherwise negative for contributory information.     One episode of chills today       Physical Exam:   Blood pressure 112/62, pulse 76, temperature 96  F (35.6  C), temperature source Oral, resp. rate 16, height 1.651 m (5' 5\"), weight 54.4 kg (120 lb), SpO2 96 %, not currently breastfeeding.  Exam:  GENERAL:  Comfortable.  Nontoxic-appearing  PSYCH: pleasant, oriented, No acute distress.  HEENT:  PERRLA. Normal conjunctiva, normal hearing, nasal mucosa appears to be boggy and erythematous and " Oropharynx shows slight petechial erythema on the posterior pharynx but no obvious thrush  NECK:  Supple, no neck vein distention, adenopathy or bruits, normal thyroid.  HEART:  Normal S1, S2 with 2/6 murmur, no pericardial rub, gallops or S3 or S4.  LUNGS:  Clear to auscultation, normal Respiratory effort. No wheezing, rales or ronchi.  ABDOMEN:  Soft, no hepatosplenomegaly, normal bowel sounds. Non-tender, non distended.   EXTREMITIES:  No pedal edema, +2 pulses bilateral and equal.  SKIN:  Dry to touch, No rash, wound or ulcerations.  NEUROLOGIC:  CN 2-12 intact, BL 5/5 symmetric upper and lower extremity strength, sensation is intact with no focal deficits.           Data:     Recent Labs   Lab 03/19/19  1438   WBC 13.0*   HGB 11.8   HCT 37.3   MCV 94        Recent Labs   Lab 03/19/19  1438      POTASSIUM 5.0   CHLORIDE 104   CO2 23   ANIONGAP 7   *   BUN 33*   CR 1.98*   GFRESTIMATED 21*   GFRESTBLACK 24*   DINORAH 10.7*     Recent Labs   Lab 03/19/19  1524 03/19/19  1438 03/19/19  1300   CULT PENDING PENDING PENDING         Results for orders placed or performed during the hospital encounter of 03/19/19   Chest XR,  PA & LAT    Narrative    XR CHEST TWO VIEWS   3/19/2019 3:40 PM     HISTORY: Congestion.    COMPARISON: 8/7/2018.    FINDINGS: The heart size is normal. The thoracic aorta is calcified  and mildly tortuous. The lungs are hyperinflated but clear. No  pneumothorax or pleural effusion.      Impression    IMPRESSION: No acute abnormality.    MD Helen LAKE PA-C

## 2019-03-19 NOTE — ED PROVIDER NOTES
History     Chief Complaint:  Multiple Complaints    HPI   Juliana Leal is a 89 year old female with a past medical history significant for CKD, hypothyroidism, chronic sinusitis, and hypertension who presents to the emergency department today with her son and daughter for multiple concerns and symptoms. The patient has a history of sinus infections. She was initially seen and evaluated for this in November of 2018.  Due to multiple failed outpatient empiric abx pt was referred to ENT and seen by Dr. Irene of ENT on 3/5/19 and placed on a course of antibiotics (bactrim).  Abx was chose based on sinus culture that showed staph aureus.. This did not improve after a month and the patient returned to ENT and was subsequently placed on a new antibiotic, again without improvement after the duration of a month.She reports progressive sinus pain and congestion with recent mouth pain. The patient has not been eating secondary to this and her daughter reports that she has lost up to 10 pounds. She has had persistent mucosal drainage with associated sore throat, chills and tremors but no fever. Additionally the patient has felt weak and fatigued and 3 days ago reports that she fell when her knees gave out on her after standing from a seated position. The patient lives alone and uses a walker at baseline. She did not hit her head and states that she landed against a cushioned chair with her arms.  She denies injury from the fall. On 3/5/19 the patient underwent a CT of the Paranasal and Face as well as nasal endoscopy with cultures obtained from the left nare (results below). She was placed on Bactrim, and has also been using Nasacort. She was then seen by Dr. Irene again earlier this afternoon and due to persistent symptoms was sent here for evaluation with concern for possible blood infection. In addition to this, the patient suffers from chronic hives and receives monthly injections of Xolair. Here the patient endorses  chest pressure and congestion and notes ongoing bloody mucosal nasal production but otherwise denies chest pain. She is short of breath at baseline and has trouble breathing secondary to congestion but denies acute change or worsening. She is constipated and endorses gas pains but no abdominal pain, nausea or vomiting. No fevers or urinary changes.     Nasal endoscopy on 3/5/19: Showed thick purulent secretions. Culture from the left sinus.     3/5/19: nasal culture with staph aureus    CT of the Paranasal and Face:  Mucosal thickening most severe within the right frontal, anterior ethmoid, and maxillary sinuses with occlusion of the right frontal recess and ostiomeatal unit. The findings have worsened compared to 8/14/2013.     Allergies:  Lisinopril  Prednisone  Allegra [Fexofenadine]  Amoxicillin  Cipro [Quinolones]  Codeine Sulfate  Hydroxyzine  Iodine [Gnp Iodides Decolorized]  Lyrica [Pregabalin]  Norvasc [Amlodipine Besylate]  Tramadol  Zaditor  Penicillins    Medications:    Zyrtec   Clotrimazole   Neurontin   Synthroid   Prilosec   Nasacort      Past Medical History:    Angioedema   Arthritis   Chronic idiopathic urticaria   Sinus infection   Headaches   Hypertension   Thyroid disease, hypothyroidism   CKD     Past Surgical History:    Colectomy   Temporal artery biopsy   Intraocular lens implant   Repair atrial septal defect     Family History:    History reviewed. No pertinent family history.     Social History:  The patient was accompanied to the ED by her daughter.  Smoking Status: Former  Smokeless Tobacco: Never  Alcohol Use: No   Marital Status:   [5]    Review of Systems   Constitutional: Positive for appetite change, chills and fatigue. Negative for fever.   HENT: Positive for congestion, mouth sores, sinus pressure, sinus pain and sore throat.    Respiratory: Positive for chest tightness (pressure ).    Cardiovascular: Negative for chest pain.   Gastrointestinal: Positive for constipation.  Negative for abdominal pain, nausea and vomiting.   Genitourinary: Negative for decreased urine volume, dysuria, frequency and hematuria.   Neurological: Positive for tremors and weakness.   All other systems reviewed and are negative.        Physical Exam     Patient Vitals for the past 24 hrs:   BP Temp Temp src Pulse Heart Rate Resp SpO2 Weight   03/19/19 1700 112/62 -- -- 76 -- -- 96 % --   03/19/19 1645 112/63 -- -- 78 -- -- 96 % --   03/19/19 1630 116/57 -- -- 78 -- -- 95 % --   03/19/19 1615 125/65 -- -- 80 -- -- 97 % --   03/19/19 1600 121/85 -- -- 97 -- -- 92 % --   03/19/19 1515 121/72 -- -- 78 -- -- 94 % --   03/19/19 1500 126/68 -- -- 79 -- -- 96 % --   03/19/19 1423 142/72 97.2  F (36.2  C) Oral 92 92 18 97 % 54.4 kg (120 lb)       Physical Exam    Gen: alert  HEENT: PERRL, oropharynx: posterior oropharynx is red with thick post-nasal drip, Ears: TMs retracted but no redness  Neck: normal ROM, no palpable mass  CV: RRR, no murmurs  Pulm: breath sounds equal, lungs clear  Abd: Soft, nontender  Back: no evidence of injury, no cva tenderness  MSK: no deformity, moves all extremities  Skin: no rash  Neuro: alert, appropriate conversation and interaction    Emergency Department Course     ECG (14:29:01):  Rate 89 bpm. MN interval 226. QRS duration 80. QT/QTc 354/430. P-R-T axes 82 1 63. Sinus rhythm with 1st degree AV block. Cannot rule out anterior infarct. Interpreted at 1529 by Barb Guthrie.     Imaging:  Radiology findings were communicated with the patient who voiced understanding of the findings.    Chest XR, PA & LAT:  IMPRESSION: No acute abnormality.    NEREIDA HA MD    Laboratory:  Laboratory findings were communicated with the patient who voiced understanding of the findings.    CBC: WBC 13.0 (H), o/w WNL (HGB 11.8, )  CMP:  (H), BUN 33 (H), Creatinine 1.98 (H), GFR 21 (L), Ca 10.7 (H), o/w WNL   CRP: 83.1 (H)  Troponin (Collected 1438): <0.015     UA: Pending      Blood Culture x2: Pending    Interventions:  1454 - NS Bolus 1,000mL IV   1558 - Cleocin infusion 900 mg IV    Emergency Department Course:  Nursing notes and vitals reviewed.    1421: I performed an exam of the patient as documented above.     1500: I spoke with Dr. Ruiz of the ENT service regarding patient's presentation, findings, and plan of care.    1505: I spoke with Dr. Irene of the ENT service regarding patient's presentation, findings, and plan of care. She states no need for additional imagine. Will order Afrin BID    1527: Patient rechecked and updated.      1547: I spoke with VIDA Patten of the hospitalist service regarding patient's presentation, findings, and plan of care.    Findings and plan explained to the Patient who consents to admission.     Discussed the patient with Dr. Mcpherson, who will admit the patient to a obs bed for further monitoring, evaluation, and treatment.     Impression & Plan      Medical Decision Making:  Patient presents for progressive generalized weakness in the setting of failed outpatient treatment for sinusitis and poor PO intake secondary to oral pain and nausea.  + yeast on oropharyngeal culture in ENT clinic per Dr. Irene.  We will start clindamycin based on prior culture results, IV fluids and admit to the hospitalist for IV abx, hydration and ENT consultation.  No apparent injury from her recent fall.    Diagnosis:    ICD-10-CM    1. Subacute sinusitis, unspecified location J01.90 Blood culture   2. Dehydration E86.0    3. Generalized muscle weakness M62.81        Disposition:  Admitted to observation     Dana Potter  3/19/2019   [facility] EMERGENCY DEPARTMENT  I, Dana Potter, am serving as a scribe at 2:21 PM on 3/19/2019 to document services personally performed by Barb Guthrie based on my observations and the provider's statements to me.      Barb Guthrie MD  03/19/19 2461

## 2019-03-19 NOTE — ED TRIAGE NOTES
Patient presents with complaints of fall which happened today. Patient also not eating, having weight loss, SOB, chest pressure. Per family symptoms have been ongoing for a while now. ABC intact without need for intervention at this time.

## 2019-03-19 NOTE — PHARMACY-ADMISSION MEDICATION HISTORY
Admission medication history interview status for this patient is complete. See [facility] admission navigator for allergy information, prior to admission medications and immunization status.     Medication history interview source(s):Patient and Family  Medication history resources (including written lists, pill bottles, clinic record):[facility] med [facility]  Primary pharmacy:[facility]    Changes made to [facility] medication list:  Added: Bactrim, Diflucan, Xolair  Deleted: none  Changed: gabapentin dosing    Actions taken by pharmacist (provider contacted, etc):None     Additional medication history information:None    Medication reconciliation/reorder completed by provider prior to medication history? No    Do you take OTC medications (eg tylenol, ibuprofen, fish oil, eye/ear drops, etc)? yes(Y/N)    For patients on insulin therapy: NO (Y/N)  Lantus/levemir/NPH/Mix 70/30 dose:   (Y/N) (see Med list for doses)   Sliding scale Novolog Y/N  If Yes, do you have a baseline novolog pre-meal dose:  units with meals  Patients eat three meals a day:   Y/N    How many episodes of hypoglycemia do you have per week: _______  How many missed doses do you have per week: ______  How many times do you check your blood glucose per day: _______  Do you have a Continuous glucose monitor (CGM)   Y/N (remind pt that not approved for hospital use)   Any Barriers to therapy - Be specific :  cost of medications, comfortable with giving injections (if applicable), comfortable and confident with current diabetes regimen: Y/N ______________      Prior to Admission medications    Medication Sig Last Dose Taking? Auth Provider   acetaminophen (TYLENOL) 500 MG tablet Take 500 mg by mouth 2 times daily as needed for mild pain Past Week at Unknown time Yes Unknown, Entered By History   diltiazem ER COATED BEADS (CARTIA XT) 300 MG 24 hr capsule TAKE 1 CAPSULE(300 MG) BY MOUTH DAILY 3/19/2019 at am Yes Erick Ruano MD   fluconazole (DIFLUCAN) 100 MG tablet Take 100  mg by mouth three times a week On Mon-Wed-Fri starting on 3/11/19 for 10 days 3/15/2019 at am Yes Unknown, Entered By History   gabapentin (NEURONTIN) 100 MG capsule Take 100-200 mg by mouth At Bedtime 3/18/2019 at hs Yes Unknown, Entered By History   levothyroxine (SYNTHROID/LEVOTHROID) 88 MCG tablet Take 1 tablet (88 mcg) by mouth daily 3/19/2019 at am Yes Erick Ruano MD   Multiple Vitamins-Minerals (CENTRUM SILVER) per tablet Take 1 tablet by mouth daily Past Week at Unknown time Yes Reported, Patient   omeprazole (PRILOSEC) 20 MG CR capsule Take 1 capsule (20 mg) by mouth daily Past Month at Unknown time Yes Erick Ruano MD   sulfamethoxazole-trimethoprim (BACTRIM DS/SEPTRA DS) 800-160 MG tablet Take 1 tablet by mouth 2 times daily For 10 days starting on 3/11/19 3/18/2019 at pm Yes Unknown, Entered By History   triamcinolone (NASACORT) 55 MCG/ACT inhaler Spray 2 sprays into both nostrils daily 3/18/2019 at Unknown time Yes Erick Ruano MD   cetirizine (ZYRTEC) 10 MG tablet Take 1 tablet (10 mg) by mouth 2 times daily   Federico Manning MD   clotrimazole 10 MG ambrocio Take 1 Ambrocio (10 mg) by mouth 5 times daily on hold  Cara Mercado PA-C   emollient (VANICREAM) cream Apply topically as needed for other (leg rash)   Reported, Patient

## 2019-03-19 NOTE — ED NOTES
"[facility]  ED Nurse Handoff Report    Juliana Leal is a 89 year old female   ED Chief complaint: Multiple Complaints  . ED Diagnosis:   Final diagnoses:   Subacute sinusitis, unspecified location   Dehydration   Generalized muscle weakness     Allergies:   Allergies   Allergen Reactions     Lisinopril Other (See Comments) and Anaphylaxis     Swelling around mouth     Prednisone Unknown     \"every side effect listed\"     Allegra [Fexofenadine] Swelling     Amoxicillin Itching     Cipro [Quinolones] Itching     Codeine Sulfate Other (See Comments)     Felt out of it when took it yrs ago     Hydroxyzine      Feels drunk     Iodine [Gnp Iodides Decolorized]      Lyrica [Pregabalin]      Reaction unknown     Norvasc [Amlodipine Besylate]      Mouth felt funny     Tramadol      Felt loopy     Zaditor Other (See Comments) and Swelling     Tongue swelled  Didn't feel right  Eye drops     Penicillins Rash       Code Status: Full Code  Activity level - Baseline/Home:  Stand with Assist. Activity Level - Current:   Stand with Assist. Lift room needed: No. Bariatric: No   Needed: No   Isolation: No. Infection: Not Applicable.     Vital Signs:   Vitals:    03/19/19 1423   BP: 142/72   Pulse: 92   Resp: 18   Temp: 97.2  F (36.2  C)   TempSrc: Oral   SpO2: 97%   Weight: 54.4 kg (120 lb)       Cardiac Rhythm:  ,      Pain level:    Patient confused: No. Patient Falls Risk: Yes.   Elimination Status: Has voided   Patient Report - Initial Complaint:Patient C/0 increased weakness fall today not eating well.  Weakness and fall today. Focused Assessment:   Tests Performed:   Labs Ordered and Resulted from Time of ED Arrival Up to the Time of Departure from the ED   CBC WITH PLATELETS DIFFERENTIAL - Abnormal; Notable for the following components:       Result Value    WBC 13.0 (*)     RDW 16.6 (*)     Absolute Neutrophil 9.9 (*)     Absolute Monocytes 1.9 (*)     All other components within normal limits   CRP " INFLAMMATION - Abnormal; Notable for the following components:    CRP Inflammation 83.1 (*)     All other components within normal limits   COMPREHENSIVE METABOLIC PANEL - Abnormal; Notable for the following components:    Glucose 152 (*)     Urea Nitrogen 33 (*)     Creatinine 1.98 (*)     GFR Estimate 21 (*)     GFR Estimate If Black 24 (*)     Calcium 10.7 (*)     Albumin 3.1 (*)     All other components within normal limits   TROPONIN I   URINE MACROSCOPIC WITH REFLEX TO MICRO   NURSING DRAW AND HOLD   BLOOD CULTURE   BLOOD CULTURE    Abnormal Results:   Chest XR,  PA & LAT   Final Result   IMPRESSION: No acute abnormality.      NEREIDA HA MD        Treatments provided:IV antibiotics and IV fluids   Family Comments:daughter at bedside  OBS brochure/video discussed/provided to patient:  Yes  ED Medications:   Medications   clindamycin (CLEOCIN) infusion 900 mg (900 mg Intravenous New Bag 3/19/19 1558)   0.9% sodium chloride BOLUS (0 mLs Intravenous Stopped 3/19/19 1558)     Drips infusing:  Yes  For the majority of the shift, the patient's behavior Green. Interventions performed were none.     Severe Sepsis OR Septic Shock Diagnosis Present: No      ED Nurse Name/Phone Numb    RECEIVING UNIT ED HANDOFF REVIEW    Above ED Nurse Handoff Report was reviewed: Yes  Reviewed by: Avis Dobson on March 19, 2019 at 5:08 PM

## 2019-03-20 PROBLEM — J32.9 SINUSITIS, CHRONIC: Status: ACTIVE | Noted: 2019-03-20

## 2019-03-20 LAB
ANION GAP SERPL CALCULATED.3IONS-SCNC: 5 MMOL/L (ref 3–14)
BUN SERPL-MCNC: 28 MG/DL (ref 7–30)
CALCIUM SERPL-MCNC: 9.2 MG/DL (ref 8.5–10.1)
CHLORIDE SERPL-SCNC: 110 MMOL/L (ref 94–109)
CO2 SERPL-SCNC: 23 MMOL/L (ref 20–32)
CREAT SERPL-MCNC: 1.47 MG/DL (ref 0.52–1.04)
ERYTHROCYTE [DISTWIDTH] IN BLOOD BY AUTOMATED COUNT: 16.7 % (ref 10–15)
GFR SERPL CREATININE-BSD FRML MDRD: 30 ML/MIN/{1.73_M2}
GLUCOSE SERPL-MCNC: 91 MG/DL (ref 70–99)
HCT VFR BLD AUTO: 28.9 % (ref 35–47)
HGB BLD-MCNC: 9.2 G/DL (ref 11.7–15.7)
MCH RBC QN AUTO: 29.9 PG (ref 26.5–33)
MCHC RBC AUTO-ENTMCNC: 31.8 G/DL (ref 31.5–36.5)
MCV RBC AUTO: 94 FL (ref 78–100)
PLATELET # BLD AUTO: 245 10E9/L (ref 150–450)
POTASSIUM SERPL-SCNC: 4.6 MMOL/L (ref 3.4–5.3)
RBC # BLD AUTO: 3.08 10E12/L (ref 3.8–5.2)
SODIUM SERPL-SCNC: 138 MMOL/L (ref 133–144)
WBC # BLD AUTO: 7.8 10E9/L (ref 4–11)

## 2019-03-20 PROCEDURE — 25000132 ZZH RX MED GY IP 250 OP 250 PS 637: Performed by: PHYSICIAN ASSISTANT

## 2019-03-20 PROCEDURE — 99232 SBSQ HOSP IP/OBS MODERATE 35: CPT | Performed by: PHYSICIAN ASSISTANT

## 2019-03-20 PROCEDURE — 25000125 ZZHC RX 250: Performed by: PHYSICIAN ASSISTANT

## 2019-03-20 PROCEDURE — 96366 THER/PROPH/DIAG IV INF ADDON: CPT

## 2019-03-20 PROCEDURE — G0378 HOSPITAL OBSERVATION PER HR: HCPCS

## 2019-03-20 PROCEDURE — 36415 COLL VENOUS BLD VENIPUNCTURE: CPT | Performed by: PHYSICIAN ASSISTANT

## 2019-03-20 PROCEDURE — 25800030 ZZH RX IP 258 OP 636: Performed by: PHYSICIAN ASSISTANT

## 2019-03-20 PROCEDURE — 12000011 ZZH R&B MS OVERFLOW

## 2019-03-20 PROCEDURE — 85027 COMPLETE CBC AUTOMATED: CPT | Performed by: PHYSICIAN ASSISTANT

## 2019-03-20 PROCEDURE — 80048 BASIC METABOLIC PNL TOTAL CA: CPT | Performed by: PHYSICIAN ASSISTANT

## 2019-03-20 RX ORDER — NYSTATIN 100000/ML
500000 SUSPENSION, ORAL (FINAL DOSE FORM) ORAL 4 TIMES DAILY
Status: DISCONTINUED | OUTPATIENT
Start: 2019-03-20 | End: 2019-03-22 | Stop reason: HOSPADM

## 2019-03-20 RX ADMIN — SODIUM CHLORIDE: 9 INJECTION, SOLUTION INTRAVENOUS at 13:41

## 2019-03-20 RX ADMIN — SENNOSIDES AND DOCUSATE SODIUM 1 TABLET: 8.6; 5 TABLET ORAL at 08:52

## 2019-03-20 RX ADMIN — DILTIAZEM HYDROCHLORIDE 300 MG: 180 CAPSULE, COATED, EXTENDED RELEASE ORAL at 08:52

## 2019-03-20 RX ADMIN — CETIRIZINE HYDROCHLORIDE 10 MG: 10 TABLET, FILM COATED ORAL at 08:52

## 2019-03-20 RX ADMIN — NYSTATIN 500000 UNITS: 500000 SUSPENSION ORAL at 16:28

## 2019-03-20 RX ADMIN — OMEPRAZOLE 20 MG: 20 CAPSULE, DELAYED RELEASE ORAL at 08:52

## 2019-03-20 RX ADMIN — CLINDAMYCIN PHOSPHATE 600 MG: 600 INJECTION, SOLUTION INTRAVENOUS at 14:21

## 2019-03-20 RX ADMIN — FLUCONAZOLE 100 MG: 100 TABLET ORAL at 08:52

## 2019-03-20 RX ADMIN — NYSTATIN 500000 UNITS: 500000 SUSPENSION ORAL at 20:26

## 2019-03-20 RX ADMIN — LEVOTHYROXINE SODIUM 88 MCG: 88 TABLET ORAL at 08:52

## 2019-03-20 RX ADMIN — GABAPENTIN 100 MG: 100 CAPSULE ORAL at 22:11

## 2019-03-20 RX ADMIN — CETIRIZINE HYDROCHLORIDE 10 MG: 10 TABLET, FILM COATED ORAL at 20:26

## 2019-03-20 RX ADMIN — Medication 1 LOZENGE: at 11:12

## 2019-03-20 RX ADMIN — NYSTATIN 500000 UNITS: 500000 SUSPENSION ORAL at 14:18

## 2019-03-20 RX ADMIN — Medication 2 SPRAY: at 08:54

## 2019-03-20 RX ADMIN — SENNOSIDES AND DOCUSATE SODIUM 1 TABLET: 8.6; 5 TABLET ORAL at 20:26

## 2019-03-20 RX ADMIN — CLINDAMYCIN PHOSPHATE 600 MG: 600 INJECTION, SOLUTION INTRAVENOUS at 22:14

## 2019-03-20 RX ADMIN — CLINDAMYCIN PHOSPHATE 600 MG: 600 INJECTION, SOLUTION INTRAVENOUS at 06:30

## 2019-03-20 RX ADMIN — Medication 2 SPRAY: at 20:27

## 2019-03-20 NOTE — PROGRESS NOTES
[facility]  Hospitalist Progress Note  Helen Lamb PA-C 03/20/2019    Reason for Stay (Diagnosis): refractory sinusitis          Assessment and Plan:      Summary of Stay: Juliana Leal is a 89 year old female who is surprisingly spry and fundamentally healthy who comes to the emergency room at the urging of her ENT physician due to concerns of persistent left sinusitis despite multiple outpatient course of antibiotics.  She is currently on her fourth trial of antibiotic (Bactrim) based on sensitivities from sinus culture showing staph aureus.  She also has complaints of thrush and has also been on Diflucan.  She basically was directed to come to the emergency room due to concerns of odontophagia, weakness, fall relating to poor p.o. Intake.     1.  Persistent refractory sinusitis-patient has been followed by ENT since November and has been tried on several courses of antibiotic.  Nasal culture and mucus drainage was cultured 3/5 and showed staph aureus.  Despite cx tailored antibiotics she still has persistent symptoms. Repeat nasal cx obtained 3/19 by ENT (in [facility]) and is currently pending. D/w Dr. Harper ENT, given failed multiple treatment course, she recommends continued IV abx until her cx returns. Then tailor abx per cx results for a total treatment of 10-14 days.   -Continue IV antibiotics, clindamycin as recommended by ENT  -Afrin twice daily times 3 days  -Follow up with ENT in 2 weeks per Dr. Irene      2.  Oral candidiasis-secondary to multiple course of oral antibiotics.  Currently on Diflucan which we will continue.  No obvious thrush lesions on today's exam.  -Continue Diflucan  -Add swish and swallow     3.  Poor p.o. intake due to #2--better today.   -Ok to d.c IVF     4.  Generalized weakness secondary to above  -Ambulate with assistance  -Doing better but per dtr, has been failing at home       Registered to observation  DNR/DNI discussed with patient  Disposition-given hx of  "failing at home and independent living, will need TCU. PT to eval          Interval History (Subjective):      States feeling better today but still having a hard time eating since it hurts to swallow.   Dtr at home states that she has been having a hard time at home and had fell at home.                   Physical Exam:      Last Vital Signs:  /77 (BP Location: Right arm)   Pulse 76   Temp 96.8  F (36  C) (Oral)   Resp 18   Ht 1.651 m (5' 5\")   Wt 54.4 kg (120 lb)   SpO2 95%   BMI 19.97 kg/m        Intake/Output Summary (Last 24 hours) at 3/20/2019 2232  Last data filed at 3/20/2019 1338  Gross per 24 hour   Intake --   Output 750 ml   Net -750 ml       Constitutional: Awake, alert, cooperative, no apparent distress   Respiratory: Clear to auscultation bilaterally, no crackles or wheezing   Cardiovascular: Regular rate and rhythm, normal S1 and S2, and no murmur noted   Abdomen: Normal bowel sounds, soft, non-distended, non-tender   Skin: No rashes, no cyanosis, dry to touch   Neuro: Alert and oriented x3, no weakness, numbness, memory loss   Extremities: No edema, normal range of motion   Other(s):        All other systems: Negative          Medications:      All current medications were reviewed with changes reflected in problem list.         Data:      All new lab and imaging data was reviewed.   Labs:       Lab Results   Component Value Date     03/20/2019     03/19/2019     11/27/2018    Lab Results   Component Value Date    CHLORIDE 110 03/20/2019    CHLORIDE 104 03/19/2019    CHLORIDE 107 11/27/2018    Lab Results   Component Value Date    BUN 28 03/20/2019    BUN 33 03/19/2019    BUN 37 11/27/2018      Lab Results   Component Value Date    POTASSIUM 4.6 03/20/2019    POTASSIUM 5.0 03/19/2019    POTASSIUM 4.6 11/27/2018    Lab Results   Component Value Date    CO2 23 03/20/2019    CO2 23 03/19/2019    CO2 24 11/27/2018    Lab Results   Component Value Date    CR 1.47 03/20/2019 "    CR 1.98 03/19/2019    CR 1.52 11/27/2018        Recent Labs   Lab 03/20/19  0631 03/19/19  1438   WBC 7.8 13.0*   HGB 9.2* 11.8   HCT 28.9* 37.3   MCV 94 94    334      Imaging:   Results for orders placed or performed during the hospital encounter of 03/19/19   Chest XR,  PA & LAT    Narrative    XR CHEST TWO VIEWS   3/19/2019 3:40 PM     HISTORY: Congestion.    COMPARISON: 8/7/2018.    FINDINGS: The heart size is normal. The thoracic aorta is calcified  and mildly tortuous. The lungs are hyperinflated but clear. No  pneumothorax or pleural effusion.      Impression    IMPRESSION: No acute abnormality.    NEREIDA HA MD

## 2019-03-20 NOTE — PROGRESS NOTES
PRIMARY DIAGNOSIS: Persistent Sinusitis  OUTPATIENT/OBSERVATION GOALS TO BE MET BEFORE DISCHARGE:  1. Vitals sign stable or return to baseline: Yes    2. Tolerating oral antibiotics or has home infusion set up if applicable: No    3. Pain status: Discomfort with swallowing otherwise no pain    4. Return to near baseline physical activity: No    Discharge Planner Nurse   Safe discharge environment identified: Yes  Barriers to discharge: Yes       Entered by: Avis Dobson 03/19/2019 10:29 PM     Please review provider order for any additional goals.     Nurse to notify provider when observation goals have been met and patient is ready for discharge.    Patient is Alert and Oriented x4. Vitals are Temp: 96.5  F (35.8  C) Temp src: Oral BP: 111/49 Pulse: 76 Heart Rate: 77 Resp: 16 SpO2: 95 % O2 Device: None (Room air)   Pt is complaining of discomfort in their throat with swallowing otherwise denying pain.  They are 1 Assist with Activity, Gait Belt and Walker.    Pt is on a Regular diet. Patient has Normal Saline 0.9% running at 75 mL per hour.  Plan: IVF and IV antibiotics. Nasal spray. Encourage fluids and oral care.

## 2019-03-20 NOTE — PROGRESS NOTES
Brief SW note:     SW met with pt and daughter Suzi. Pt resides at home alone. At her baseline, pt is independent with ADL's. She has been ordering her groceries to be delivered. Ambulates with a walker at baseline. Her daughter assists with driving to and from appointments and running errands as needed. ENT following for treatment plan.    PT eval pending. Pt/dtr requested referrals be sent to [facility] and [facility] to anticipate need for TCU at discharge and check insurance benefits/bed availability. [facility] has declined. [facility] has offered a bed to patient, pending PT eval and insurance authorization. Anticipate that pt will have SNF benefits, noting that primary insurance is [employer]. Pt was flipped to inpt 3/20. Dtr noted that pt would be able to pay $[phone] private pay for TCU if needed. SW to continue following for discharge planning. Phone call to dtr to update her on discharge plans.

## 2019-03-20 NOTE — PROGRESS NOTES
"Pt ambulated in the hallway Ax1 with gait belt and walker.Denies dizziness, stated feeling \"tired\"  "

## 2019-03-20 NOTE — UTILIZATION REVIEW
[facility]   Admission Status; Secondary Review Determination   Admission date:  3/19/2019  2:18 PM    Under the authority of the Utilization Management Committee, the utilization review process indicated a secondary review on the above patient. The review outcome is based on review of the medical records, discussions with staff, and applying clinical experience noted on the date of the review.     (x) Inpatient Status Appropriate - This patient's medical care is consistent with medical management for inpatient care and reasonable inpatient medical practice.     RATIONALE FOR DETERMINATION   89 year old female who is fundamentally healthy who comes to the emergency room yesterday at the urging of her ENT physician due to concerns of persistent left sinusitis despite multiple outpatient course of antibiotics.  She is currently on her fourth trial of antibiotic (Bactrim) based on sensitivities from sinus culture showing staph aureus.  She also has complaints of thrush and has been on Diflucan.  She basically was directed to come to the emergency room due to concerns of odontophagia, weakness, fall relating to poor oral intake.  I discussed the case with the KIKE Cervantes Lamb.  This patient will be seen by ENT who is recommended IV antibiotics in order to improve her sinusitis and hopefully manage her poor oral intake.  Cultures also need to be followed up.  This patient has failed outpatient management, is complex and elderly, is at risk for clinical deterioration and worsening of her condition, will require greater than 2 midnight hospitalization for IV antibiotics which is only appropriate in hospital setting, and per Medicare guidelines is appropriate for inpatient status.  The severity of illness, intensity of service provided, expected LOS and risk for adverse outcome make the care complex, high risk and appropriate for hospital admission.       The information on this document is developed by the  utilization review team in order for the business office to ensure compliance. This only denotes the appropriateness of proper admission status and does not reflect the quality of care rendered.   The definitions of Inpatient Status and Observation Status used in making the determination above are those provided in the CMS Coverage Manual, Chapter 1 and Chapter 6, section 70.4.     Sincerely,   Galen Her DO MPH   Physician Advisor  Utilization Review  [facility]

## 2019-03-21 ENCOUNTER — APPOINTMENT (OUTPATIENT)
Dept: PHYSICAL THERAPY | Facility: CLINIC | Age: 84
DRG: 153 | End: 2019-03-21
Attending: PHYSICIAN ASSISTANT
Payer: COMMERCIAL

## 2019-03-21 LAB
ANION GAP SERPL CALCULATED.3IONS-SCNC: 5 MMOL/L (ref 3–14)
BACTERIA SPEC CULT: NO GROWTH
BUN SERPL-MCNC: 26 MG/DL (ref 7–30)
CALCIUM SERPL-MCNC: 9.3 MG/DL (ref 8.5–10.1)
CHLORIDE SERPL-SCNC: 109 MMOL/L (ref 94–109)
CO2 SERPL-SCNC: 23 MMOL/L (ref 20–32)
CREAT SERPL-MCNC: 1.31 MG/DL (ref 0.52–1.04)
ERYTHROCYTE [DISTWIDTH] IN BLOOD BY AUTOMATED COUNT: 16.6 % (ref 10–15)
GFR SERPL CREATININE-BSD FRML MDRD: 34 ML/MIN/{1.73_M2}
GLUCOSE SERPL-MCNC: 94 MG/DL (ref 70–99)
HCT VFR BLD AUTO: 31 % (ref 35–47)
HGB BLD-MCNC: 10 G/DL (ref 11.7–15.7)
Lab: NORMAL
MCH RBC QN AUTO: 30.2 PG (ref 26.5–33)
MCHC RBC AUTO-ENTMCNC: 32.3 G/DL (ref 31.5–36.5)
MCV RBC AUTO: 94 FL (ref 78–100)
PLATELET # BLD AUTO: 252 10E9/L (ref 150–450)
POTASSIUM SERPL-SCNC: 4.8 MMOL/L (ref 3.4–5.3)
RBC # BLD AUTO: 3.31 10E12/L (ref 3.8–5.2)
SODIUM SERPL-SCNC: 137 MMOL/L (ref 133–144)
SPECIMEN SOURCE: NORMAL
WBC # BLD AUTO: 8.3 10E9/L (ref 4–11)

## 2019-03-21 PROCEDURE — 97161 PT EVAL LOW COMPLEX 20 MIN: CPT | Mod: GP | Performed by: PHYSICAL THERAPIST

## 2019-03-21 PROCEDURE — 85027 COMPLETE CBC AUTOMATED: CPT | Performed by: PHYSICIAN ASSISTANT

## 2019-03-21 PROCEDURE — 36415 COLL VENOUS BLD VENIPUNCTURE: CPT | Performed by: PHYSICIAN ASSISTANT

## 2019-03-21 PROCEDURE — 99232 SBSQ HOSP IP/OBS MODERATE 35: CPT | Performed by: INTERNAL MEDICINE

## 2019-03-21 PROCEDURE — 97116 GAIT TRAINING THERAPY: CPT | Mod: GP | Performed by: PHYSICAL THERAPIST

## 2019-03-21 PROCEDURE — 97110 THERAPEUTIC EXERCISES: CPT | Mod: GP | Performed by: PHYSICAL THERAPIST

## 2019-03-21 PROCEDURE — 25000125 ZZHC RX 250: Performed by: PHYSICIAN ASSISTANT

## 2019-03-21 PROCEDURE — 12000011 ZZH R&B MS OVERFLOW

## 2019-03-21 PROCEDURE — 25000132 ZZH RX MED GY IP 250 OP 250 PS 637: Performed by: PHYSICIAN ASSISTANT

## 2019-03-21 PROCEDURE — 80048 BASIC METABOLIC PNL TOTAL CA: CPT | Performed by: PHYSICIAN ASSISTANT

## 2019-03-21 RX ADMIN — LEVOTHYROXINE SODIUM 88 MCG: 88 TABLET ORAL at 08:25

## 2019-03-21 RX ADMIN — NYSTATIN 500000 UNITS: 500000 SUSPENSION ORAL at 11:52

## 2019-03-21 RX ADMIN — NYSTATIN 500000 UNITS: 500000 SUSPENSION ORAL at 16:43

## 2019-03-21 RX ADMIN — OMEPRAZOLE 20 MG: 20 CAPSULE, DELAYED RELEASE ORAL at 08:26

## 2019-03-21 RX ADMIN — NYSTATIN 500000 UNITS: 500000 SUSPENSION ORAL at 08:25

## 2019-03-21 RX ADMIN — NYSTATIN 500000 UNITS: 500000 SUSPENSION ORAL at 21:12

## 2019-03-21 RX ADMIN — Medication 2 SPRAY: at 21:30

## 2019-03-21 RX ADMIN — CLINDAMYCIN PHOSPHATE 600 MG: 600 INJECTION, SOLUTION INTRAVENOUS at 06:10

## 2019-03-21 RX ADMIN — CETIRIZINE HYDROCHLORIDE 10 MG: 10 TABLET, FILM COATED ORAL at 21:11

## 2019-03-21 RX ADMIN — CLINDAMYCIN PHOSPHATE 600 MG: 600 INJECTION, SOLUTION INTRAVENOUS at 14:17

## 2019-03-21 RX ADMIN — Medication 2 SPRAY: at 08:25

## 2019-03-21 RX ADMIN — DILTIAZEM HYDROCHLORIDE 300 MG: 180 CAPSULE, COATED, EXTENDED RELEASE ORAL at 08:25

## 2019-03-21 RX ADMIN — SENNOSIDES AND DOCUSATE SODIUM 2 TABLET: 8.6; 5 TABLET ORAL at 08:26

## 2019-03-21 RX ADMIN — SENNOSIDES AND DOCUSATE SODIUM 1 TABLET: 8.6; 5 TABLET ORAL at 21:12

## 2019-03-21 RX ADMIN — CLINDAMYCIN PHOSPHATE 600 MG: 600 INJECTION, SOLUTION INTRAVENOUS at 22:08

## 2019-03-21 RX ADMIN — GABAPENTIN 100 MG: 100 CAPSULE ORAL at 21:12

## 2019-03-21 RX ADMIN — CETIRIZINE HYDROCHLORIDE 10 MG: 10 TABLET, FILM COATED ORAL at 08:25

## 2019-03-21 NOTE — PROGRESS NOTES
Your information has been submitted on March 21st, 2019 at 11:13:05 AM CDT. The confirmation number is [id number]

## 2019-03-21 NOTE — PROGRESS NOTES
Discharge Planner   Discharge Plans in progress: [facility] TCU-prvt room. Pt/dtr aware of $36 daily prvt room fee, and are agreeable.  Barriers to discharge plan: [employer] prior authorization  Follow up plan: PT rec TCU. [facility] accepted pt and have initiated prior authorization this morning. Pt and dtr updated. Transport TBD. SW to continue following for discharge planning needs.        Entered by: Mariella Otoole 03/21/2019 10:34 AM

## 2019-03-21 NOTE — PROGRESS NOTES
Discharge Planner   Discharge Plans in progress: [facility]-obtained insurance authorization  Barriers to discharge plan: None anticipated  Follow up plan: Pt's dtr Allan will transport pt at 1300 Friday to [facility]. Facility updated. PAS completed. SW to continue following for any additional discharge planning needs.        Entered by: Mariella Otoole 03/21/2019 3:29 PM

## 2019-03-21 NOTE — PROGRESS NOTES
[facility]  Hospitalist Progress Note  Jodee Saeed MD 03/21/19  Text Page  Pager: [phone] (7am-6pm)    Reason for Stay (Diagnosis): refractory sinusitis         Assessment and Plan:      Summary of Stay: Juliana Leal is a 89 year old female who is surprisingly spry and fundamentally healthy who was admitted on 3/19/2019 at the urging of her ENT physician due to concerns of persistent left sinusitis despite multiple outpatient course of antibiotics.  She had been on her fourth trial of antibiotic (Bactrim) based on sensitivities from sinus culture showing staph aureus.  She also has complaints of thrush and has also been on Diflucan.  She basically was directed to come to the emergency room due to concerns of odynophagia, weakness, fall relating to poor p.o. Intake.  Awaiting culture results but currently on Clindamycin.  Will need TCU at discharge.    Problem List/Assessment and Plan:     1.  Persistent refractory sinusitis-patient has been followed by ENT since November and has been tried on several courses of antibiotic.  Nasal culture and mucus drainage was cultured 3/5 and showed staph aureus.  Despite cx tailored antibiotics she still had persistent symptoms. Repeat nasal cx obtained 3/19 by ENT (in [facility]) and is currently pending. D/w Dr. Harper ENT, given failed multiple treatment course, she recommended continued IV abx until her cx returns, then tailor abx per cx results for a total treatment of 10-14 days.   -Continue IV antibiotics, clindamycin as recommended by ENT  -Afrin twice daily times 3 days  -Follow up with ENT in 2 weeks per Dr. Irene      2.  Oral candidiasis-secondary to multiple course of oral antibiotics.  Currently on Diflucan which we will continue.  No obvious thrush lesions on today's exam.  -Continue Diflucan  -swish and swallow     3.  Poor p.o. intake due to #2- Initially she was on IVF, oral intake improved.  IVF stopped.     4.  Generalized weakness secondary  "to above  -Ambulate with assistance  -Doing better but per dtr, has been failing at home   -Seen by PT, recommendation for TCU    5.  CKD with SHANAE - Resolved: Baseline creatinine ~1.3-1.5.  On admission had SHANAE with creatinine of 1.98 but with IVF her creatinine returned to baseline.  Creatinine today 1.31, no need to recheck tomorrow.    Diet: Regular Diet Adult    DVT Prophylaxis: Pneumatic Compression Devices  Castillo Catheter: not present  Code Status: DNR/DNI      Disposition Plan   Expected discharge: suspect tomorrow, recommended to transitional care unit once antibiotic plan established and safe disposition plan/ TCU bed available.  Entered: Jodee Saeed MD 03/21/2019, 11:32 AM       The patient's care was discussed with the Bedside Nurse, Care Coordinator/, Patient and Patient's Family.    Jodee Saeed MD  Hospitalist Service  [facility]          Interval History (Subjective):      Patient was seen with her daughter this morning.  She states she just feels really tired and worn out.  Eating and drinking fine.  No nausea, vomiting of abdominal pain.  Discussed recommendation of TCU and both the patient and her daughter agree to this.                  Physical Exam:      Last Vital Signs:  /68 (BP Location: Left arm)   Pulse 118   Temp 96.4  F (35.8  C) (Axillary)   Resp 16   Ht 1.651 m (5' 5\")   Wt 54.4 kg (120 lb)   SpO2 93%   BMI 19.97 kg/m      General: Alert, awake, no acute distress.  HEENT: Normocephalic and atraumatic, eyes anicteric and without scleral injection, EOMI, face symmetric, MMM.  Cardiac: RRR, normal S1, S2. No m/g/r, no LE edema.  Pulmonary: Normal chest rise, normal work of breathing.  Lungs CTAB without crackles or wheezing.  Abdomen: soft, non-tender, non-distended.  Normoactive bowel sounds, no guarding or rebound tenderness.  Extremities: no deformities.  Warm, well perfused.  Skin: no rashes or lesions.  Warm and Dry.  Neuro: No focal " deficits.  Speech clear.  Moving all extremities while sitting in a chair.  Psych: Alert and oriented x3. Appropriate affect.         Medications:      All current medications were reviewed with changes reflected in problem list.         Data:      All new lab and imaging data was reviewed.   Labs:  Recent Labs   Lab 03/21/19  0622 03/20/19  0631 03/19/19  1438    138 134   POTASSIUM 4.8 4.6 5.0   CHLORIDE 109 110* 104   CO2 23 23 23   ANIONGAP 5 5 7   GLC 94 91 152*   BUN 26 28 33*   CR 1.31* 1.47* 1.98*   GFRESTIMATED 34* 30* 21*   GFRESTBLACK 40* 35* 24*   DINORAH 9.3 9.2 10.7*     Recent Labs   Lab 03/21/19  0622 03/20/19  0631 03/19/19  1438   WBC 8.3 7.8 13.0*   HGB 10.0* 9.2* 11.8   HCT 31.0* 28.9* 37.3   MCV 94 94 94    245 334      Imaging:   No results found for this or any previous visit (from the past 24 hour(s)).    Jodee Saeed MD.

## 2019-03-21 NOTE — PROGRESS NOTES
PRIMARY DIAGNOSIS: Persistent Sinusitis  OUTPATIENT/OBSERVATION GOALS TO BE MET BEFORE DISCHARGE:  1. Vitals sign stable or return to baseline: Yes    2. Tolerating oral antibiotics or has home infusion set up if applicable: No    3. Pain status: Discomfort with swallowing otherwise no pain    4. Return to near baseline physical activity: No    Discharge Planner Nurse   Safe discharge environment identified: Yes  Barriers to discharge: Yes       Entered by: Avis Dobson 03/20/2019 10:36 PM     Please review provider order for any additional goals.     Nurse to notify provider when observation goals have been met and patient is ready for discharge.    Patient is Alert and Oriented x4. Vitals are Temp: 96.5  F (35.8  C) Temp src: Oral BP: 113/48 Pulse: 80 Heart Rate: 69 Resp: 16 SpO2: 94 % O2 Device: None (Room air)   Pt is complaining of discomfort in their throat with swallowing otherwise denying pain.  They are 1 Assist with Activity, Gait Belt and Walker.    Pt is on a Regular diet. Patient is Saline locked Plan: IVF and IV antibiotics. Nasal spray. Encourage fluids and oral care.

## 2019-03-21 NOTE — PROGRESS NOTES
03/21/19 0800   Quick Adds   Type of Visit Initial PT Evaluation   Living Environment   Lives With alone   Living Arrangements apartment   Home Accessibility no concerns   Transportation Anticipated family or friend will provide   Living Environment Comment Pt lives alone in a Sr apt building.  Dtr provides transportation.   Self-Care   Usual Activity Tolerance good   Current Activity Tolerance fair   Equipment Currently Used at Home grab bar, tub/shower;shower chair;walker, rolling   Activity/Exercise/Self-Care Comment Pt reports being I with all ADLs.  Pt reports she takes a shower but is scared to fall.  She states that she has a shower chair and HHspray but is not comfortable using it.  Pt reports she has been having more difficulty with caring for herself and her home since having her sinus problems.   Functional Level Prior   Ambulation 1-->assistive equipment   Transferring 1-->assistive equipment   Toileting 1-->assistive equipment   Bathing 1-->assistive equipment   Communication 0-->understands/communicates without difficulty   Swallowing 0-->swallows foods/liquids without difficulty   Cognition 0 - no cognition issues reported   Fall history within last six months yes   Number of times patient has fallen within last six months 1   Which of the above functional risks had a recent onset or change? ambulation;transferring   Prior Functional Level Comment Pt reports that she has not had the energy level to care for herself since her sinuse infections.  Pt spends the day sitting or resting in bed.    General Information   Onset of Illness/Injury or Date of Surgery - Date 03/19/19   Referring Physician Helen Lamb PA-C   Patient/Family Goals Statement Pt feels she is too weak to return home   Pertinent History of Current Problem (include personal factors and/or comorbidities that impact the POC) Juliana Leal is a 89 year old female who is surprisingly spry and fundamentally healthy who comes to the  emergency room at the urging of her ENT physician due to concerns of persistent left sinusitis despite multiple outpatient course of antibiotics.   Precautions/Limitations fall precautions   General Observations Pt sitting in bed eating breakfast   Cognitive Status Examination   Orientation orientation to person, place and time   Level of Consciousness alert   Follows Commands and Answers Questions 100% of the time;able to follow single-step instructions   Personal Safety and Judgment intact   Memory intact   Pain Assessment   Patient Currently in Pain Yes, see Vital Sign flowsheet  (pt reports her mouth burns with eating)   Integumentary/Edema   Integumentary/Edema no deficits were identifed   Posture    Posture Forward head position;Protracted shoulders   Range of Motion (ROM)   ROM Comment B UEs and LEs WFL   Strength   Strength Comments Decrease proximal UE and LE strength: B hip flex 3+/5, knee ext and ankle DF 5/5, B shld flex 4-/5, B elbow ext 4/5, elbow flex 5/5.  Decreased core strength with retro trunk lean with UE and LE MMT.     Bed Mobility   Bed Mobility Comments Sup > sit from flat bed without rail S.     Transfer Skills   Transfer Comments Sit <> stand from bed to FWW with CGA and VCs for safe hand placement.  Upon standing pt's LEs immediately buckled with pt collapsing back onto bed.  On second attempt, theraist provided Min A at pelvis to maintain standing balance.     Gait   Gait Comments Pt amb 50' with FWW and CGA/Min A with B UEs and LEs buckling.  Slow gait with forward flexed posture.    Balance   Balance Comments Moderate sitting balance with pt leaning back when performing UE or LE movements.  Poor standing balance requiring heaving wt bearing through UEs with frequent knee buckling at FWW.     Sensory Examination   Sensory Perception no deficits were identified   Coordination   Coordination no deficits were identified   Muscle Tone   Muscle Tone no deficits were identified   General  "Therapy Interventions   Planned Therapy Interventions balance training;gait training;strengthening;transfer training;progressive activity/exercise   Clinical Impression   Criteria for Skilled Therapeutic Intervention yes, treatment indicated   PT Diagnosis Decreased functional mobility   Influenced by the following impairments generalized weakness, impaired balance, deconditioned, impaired gait pattern, decreased activity tolerance   Functional limitations due to impairments Pt needs assist for all mobility, increased falls risk, unable to care for herself   Clinical Presentation Stable/Uncomplicated   Clinical Presentation Rationale Pt medically stable but below baseline for mobility   Clinical Decision Making (Complexity) Low complexity   Therapy Frequency` other (see comments)  (one time eval and treat)   Predicted Duration of Therapy Intervention (days/wks) one time eval and treat   Anticipated Discharge Disposition Transitional Care Facility   Risk & Benefits of therapy have been explained Yes   Patient, Family & other staff in agreement with plan of care Yes   [facility] [employer] [facility] [employer] \"6 Clicks\"   2016, Trustees of [employer], under license to [employer].  All rights reserved.   6 Clicks Short Forms Basic Mobility Inpatient Short Form   [facility]  \"6 Clicks\" V.2 Basic Mobility Inpatient Short Form   1. Turning from your back to your side while in a flat bed without using bedrails? 4 - None   2. Moving from lying on your back to sitting on the side of a flat bed without using bedrails? 4 - None   3. Moving to and from a bed to a chair (including a wheelchair)? 3 - A Little   4. Standing up from a chair using your arms (e.g., wheelchair, or bedside chair)? 3 - A Little   5. To walk in hospital room? 3 - A Little   6. Climbing 3-5 steps with a railing? 2 - A Lot   Basic Mobility Raw Score (Score out of 24.Lower scores equate to lower levels of function) 19   Total Evaluation Time "   Total Evaluation Time (Minutes) 10

## 2019-03-22 ENCOUNTER — TRANSFERRED RECORDS (OUTPATIENT)
Dept: HEALTH INFORMATION MANAGEMENT | Facility: CLINIC | Age: 84
End: 2019-03-22

## 2019-03-22 VITALS
RESPIRATION RATE: 16 BRPM | WEIGHT: 120 LBS | HEIGHT: 65 IN | DIASTOLIC BLOOD PRESSURE: 61 MMHG | SYSTOLIC BLOOD PRESSURE: 110 MMHG | OXYGEN SATURATION: 96 % | BODY MASS INDEX: 19.99 KG/M2 | TEMPERATURE: 96.9 F | HEART RATE: 76 BPM

## 2019-03-22 PROCEDURE — 25000125 ZZHC RX 250: Performed by: PHYSICIAN ASSISTANT

## 2019-03-22 PROCEDURE — 25000132 ZZH RX MED GY IP 250 OP 250 PS 637: Performed by: PHYSICIAN ASSISTANT

## 2019-03-22 PROCEDURE — 99239 HOSP IP/OBS DSCHRG MGMT >30: CPT | Performed by: INTERNAL MEDICINE

## 2019-03-22 RX ORDER — CLINDAMYCIN HCL 300 MG
300 CAPSULE ORAL 4 TIMES DAILY
Qty: 26 CAPSULE | Refills: 0 | DISCHARGE
Start: 2019-03-22 | End: 2019-04-01

## 2019-03-22 RX ORDER — NYSTATIN 100000/ML
500000 SUSPENSION, ORAL (FINAL DOSE FORM) ORAL 4 TIMES DAILY
DISCHARGE
Start: 2019-03-22 | End: 2019-03-25

## 2019-03-22 RX ORDER — FLUCONAZOLE 100 MG/1
100 TABLET ORAL
Qty: 4 TABLET | Refills: 0 | DISCHARGE
Start: 2019-03-22 | End: 2019-03-26

## 2019-03-22 RX ADMIN — ACETAMINOPHEN 650 MG: 325 TABLET, FILM COATED ORAL at 06:06

## 2019-03-22 RX ADMIN — LEVOTHYROXINE SODIUM 88 MCG: 88 TABLET ORAL at 08:07

## 2019-03-22 RX ADMIN — DILTIAZEM HYDROCHLORIDE 300 MG: 180 CAPSULE, COATED, EXTENDED RELEASE ORAL at 08:07

## 2019-03-22 RX ADMIN — SENNOSIDES AND DOCUSATE SODIUM 1 TABLET: 8.6; 5 TABLET ORAL at 08:08

## 2019-03-22 RX ADMIN — FLUCONAZOLE 100 MG: 100 TABLET ORAL at 09:55

## 2019-03-22 RX ADMIN — Medication 2 SPRAY: at 08:13

## 2019-03-22 RX ADMIN — CETIRIZINE HYDROCHLORIDE 10 MG: 10 TABLET, FILM COATED ORAL at 08:07

## 2019-03-22 RX ADMIN — OMEPRAZOLE 20 MG: 20 CAPSULE, DELAYED RELEASE ORAL at 08:07

## 2019-03-22 RX ADMIN — NYSTATIN 500000 UNITS: 500000 SUSPENSION ORAL at 08:07

## 2019-03-22 RX ADMIN — CLINDAMYCIN PHOSPHATE 600 MG: 600 INJECTION, SOLUTION INTRAVENOUS at 06:11

## 2019-03-22 NOTE — DISCHARGE SUMMARY
[facility]  Discharge Summary  Name: Juliana Leal    MRN: [id number]  YOB: 1923    Age: 89 year old  Date of Discharge:  3/22/2019  Date of Admission: 3/19/2019  Primary Care Provider: Erick Ruano  Discharge Physician:  Jodee Saeed MD  Discharging Service:  Hospitalist      Discharge Diagnoses:  1. Persistent Refractory Sinusitis  2. Oral Candidiasis  3. CKD with SHANAE  4. Dehydration  5. Generalized Weakness     Follow-ups Needed After Discharge   Follow up with ENT within 2 weeks    Unresulted Labs Ordered in the Past 30 Days of this Admission     No orders found from 10/16/2018 to 12/16/2018.        Hospital Course:  Juliana Leal is a 89 year old female who is surprisingly spry and fundamentally healthy who was admitted on 3/19/2019 at the urging of her ENT physician due to concerns of persistent left sinusitis despite multiple outpatient course of antibiotics.  She had been on her fourth trial of antibiotic (Bactrim) based on sensitivities from sinus culture showing staph aureus.  She also has complaints of thrush and has also been on Diflucan.  She basically was directed to come to the emergency room due to concerns of odynophagia, weakness, fall relating to poor oral intake.  She will discharge to TCU for further therapy.      1.  Persistent refractory sinusitis- patient has been followed by ENT since November and has been tried on several courses of antibiotic.  Nasal culture and mucus drainage was cultured 3/5 and showed staph aureus.  Despite cx tailored antibiotics she still had persistent symptoms. Repeat nasal cx obtained 3/19 by ENT (in [facility]) and remains no growth. D/w Dr. Meredith CRESPO, given failed multiple treatment course, she recommended continued IV abx until her cx returns, then tailor abx per cx results for a total treatment of 10-14 days.  She has been improving with IV Clindamycin, will complete a 10 day antibiotic course with oral Clindamycin upon discharge.  She will  "need to follow up with ENT (Dr. Irene) within 2 weeks.     2.  Oral candidiasis-secondary to multiple course of oral antibiotics.  She had been on Diflucan which was continued.  Also Nystatin swish and swallow.  She will remain on this until antibiotic course is complete, 7 more days.     3.  Poor p.o. intake due to #2- Initially she was on IVF, oral intake improved.  IVF stopped and oral intake improved.     4.  Generalized weakness secondary to above: She was seen by PT and recommended TCU.     5.  CKD with SHANAE - Resolved: Baseline creatinine ~1.3-1.5.  On admission had SHANAE with creatinine of 1.98 but with IVF her creatinine returned to baseline.  Creatinine improved to 1.31.       Discharge Disposition:  Discharged to short-term care facility     Allergies:  Allergies   Allergen Reactions     Lisinopril Other (See Comments) and Anaphylaxis     Swelling around mouth     Prednisone Unknown     \"every side effect listed\"     Allegra [Fexofenadine] Swelling     Amoxicillin Itching     Cipro [Quinolones] Itching     Codeine Sulfate Other (See Comments)     Felt out of it when took it yrs ago     Hydroxyzine      Feels drunk     Iodine [Gnp Iodides Decolorized]      Lyrica [Pregabalin]      Reaction unknown     Norvasc [Amlodipine Besylate]      Mouth felt funny     Tramadol      Felt loopy     Zaditor Other (See Comments) and Swelling     Tongue swelled  Didn't feel right  Eye drops     Penicillins Rash        Condition on Discharge:  Discharge condition: Good   Discharge vitals: Blood pressure 112/58, pulse 118, temperature 95.4  F (35.2  C), temperature source Oral, resp. rate 14, height 1.651 m (5' 5\"), weight 54.4 kg (120 lb), SpO2 96 %, not currently breastfeeding.   Code status on discharge: DNR / DNI     History of Illness:  See detailed admission note for full details.    Physical Exam:  Blood pressure 112/58, pulse 118, temperature 95.4  F (35.2  C), temperature source Oral, resp. rate 14, height 1.651 m (5' " "5\"), weight 54.4 kg (120 lb), SpO2 96 %, not currently breastfeeding.  Wt Readings from Last 1 Encounters:   03/19/19 54.4 kg (120 lb)     General: Alert, awake, no acute distress.  HEENT: Normocephalic and atraumatic, eyes anicteric and without scleral injection, EOMI, face symmetric, MMM.  Cardiac: RRR, normal S1, S2. No m/g/r, no LE edema.  Pulmonary: Normal chest rise, normal work of breathing.  Lungs CTAB without crackles or wheezing.  Abdomen: soft, non-tender, non-distended.  Normoactive bowel sounds, no guarding or rebound tenderness.  Extremities: no deformities.  Warm, well perfused.  Skin: no rashes or lesions.  Warm and Dry.  Neuro: No focal deficits.  Speech clear.  Moving all extremities while sitting in a chair.  Psych: Alert and oriented x3. Appropriate affect.    Procedures other than Imaging:  IVF  IV antibiotics     Imaging:  Results for orders placed or performed during the hospital encounter of 03/19/19   Chest XR,  PA & LAT    Narrative    XR CHEST TWO VIEWS   3/19/2019 3:40 PM     HISTORY: Congestion.    COMPARISON: 8/7/2018.    FINDINGS: The heart size is normal. The thoracic aorta is calcified  and mildly tortuous. The lungs are hyperinflated but clear. No  pneumothorax or pleural effusion.      Impression    IMPRESSION: No acute abnormality.    NEREIDA HA MD        Consultations:  Consultations This Hospital Stay   ENT IP CONSULT  PHYSICAL THERAPY ADULT IP CONSULT  PHYSICAL THERAPY ADULT IP CONSULT  OCCUPATIONAL THERAPY ADULT IP CONSULT     Recent Lab Results:  Recent Labs   Lab 03/21/19  0622 03/20/19  0631 03/19/19  1438   WBC 8.3 7.8 13.0*   HGB 10.0* 9.2* 11.8   HCT 31.0* 28.9* 37.3   MCV 94 94 94    245 334     Recent Labs   Lab 03/21/19  0622 03/20/19  0631 03/19/19  1438    138 134   POTASSIUM 4.8 4.6 5.0   CHLORIDE 109 110* 104   CO2 23 23 23   ANIONGAP 5 5 7   GLC 94 91 152*   BUN 26 28 33*   CR 1.31* 1.47* 1.98*   GFRESTIMATED 34* 30* 21*   GFRESTBLACK 40* 35* " 24*   DINORAH 9.3 9.2 10.7*          Pending Results:    Unresulted Labs Ordered in the Past 30 Days of this Admission     Date and Time Order Name Status Description    3/19/2019 1507 Blood culture Preliminary     3/19/2019 1507 Blood culture Preliminary            Discharge Instructions and Follow-Up:   Discharge Orders      General info for SNF    Length of Stay Estimate: Short Term Care: Estimated # of Days <30  Condition at Discharge: Improving  Level of care:skilled   Rehabilitation Potential: Good  Admission H&P remains valid and up-to-date: Yes  Recent Chemotherapy: N/A  Use Nursing Home Standing Orders: Yes     Mantoux instructions    Give two-step Mantoux (PPD) Per Facility Policy Yes     Reason for your hospital stay    You were hospitalized for a recurrent sinus infection leading to dehydration and significant weakness. You will go to rehab to work on improving your strength before going home.     Follow Up and recommended labs and tests    Follow up with ENT in clinic within 2 weeks.     Activity - Up with assistive device     DNR/DNI     Physical Therapy Adult Consult    Evaluate and treat as clinically indicated.    Reason:  Deconditioning, weakness     Occupational Therapy Adult Consult    Evaluate and treat as clinically indicated.    Reason:  Deconditioning, weakness     Fall precautions     Advance Diet as Tolerated    Follow this diet upon discharge: Orders Placed This Encounter      Regular Diet Adult     Discharge Medications   Current Discharge Medication List      START taking these medications    Details   clindamycin (CLEOCIN) 300 MG capsule Take 1 capsule (300 mg) by mouth 4 times daily  Qty: 26 capsule, Refills: 0    Associated Diagnoses: Acute recurrent maxillary sinusitis      nystatin (MYCOSTATIN) 516930 UNIT/ML suspension Take 5 mLs (500,000 Units) by mouth 4 times daily for 7 days    Associated Diagnoses: Thrush         CONTINUE these medications which have CHANGED    Details    fluconazole (DIFLUCAN) 100 MG tablet Take 1 tablet (100 mg) by mouth Every Mon, Wed, Fri Morning for 4 doses  Qty: 4 tablet, Refills: 0    Associated Diagnoses: Thrush         CONTINUE these medications which have NOT CHANGED    Details   acetaminophen (TYLENOL) 500 MG tablet Take 500 mg by mouth 2 times daily as needed for mild pain      diltiazem ER COATED BEADS (CARTIA XT) 300 MG 24 hr capsule TAKE 1 CAPSULE(300 MG) BY MOUTH DAILY  Qty: 90 capsule, Refills: 3    Associated Diagnoses: Essential hypertension      gabapentin (NEURONTIN) 100 MG capsule Take 100-200 mg by mouth At Bedtime      levothyroxine (SYNTHROID/LEVOTHROID) 88 MCG tablet Take 1 tablet (88 mcg) by mouth daily  Qty: 90 tablet, Refills: 2    Associated Diagnoses: Acquired hypothyroidism      Multiple Vitamins-Minerals (CENTRUM SILVER) per tablet Take 1 tablet by mouth daily      omalizumab (XOLAIR) 150 MG injection Inject Subcutaneous every 28 days      omeprazole (PRILOSEC) 20 MG CR capsule Take 1 capsule (20 mg) by mouth daily  Qty: 90 capsule, Refills: 1    Associated Diagnoses: Gastroesophageal reflux disease, esophagitis presence not specified      triamcinolone (NASACORT) 55 MCG/ACT inhaler Spray 2 sprays into both nostrils daily  Qty: 16.5 mL, Refills: 1    Associated Diagnoses: Nasal congestion      cetirizine (ZYRTEC) 10 MG tablet Take 1 tablet (10 mg) by mouth 2 times daily  Qty: 30 tablet    Associated Diagnoses: Need for pneumococcal vaccination      clotrimazole 10 MG tiera Take 1 Tiera (10 mg) by mouth 5 times daily  Qty: 70 each, Refills: 0    Associated Diagnoses: Thrush      emollient (VANICREAM) cream Apply topically as needed for other (leg rash)         STOP taking these medications       sulfamethoxazole-trimethoprim (BACTRIM DS/SEPTRA DS) 800-160 MG tablet Comments:   Reason for Stopping:               Time Spent on this Encounter   I, Jodee Saeed, personally saw the patient today and spent greater than 30 minutes  discharging this patient.    Jodee Saeed MD

## 2019-03-22 NOTE — PROGRESS NOTES
Patient's After Visit Summary was reviewed with patient and wife: YES  Patient verbalized understanding of After Visit Summary, recommended follow up and was given an opportunity to ask questions.   Discharge medications sent home with patient/family: KATHIE  Discharged with Daughter    OBSERVATION patient END time: 1150

## 2019-03-24 ASSESSMENT — MIFFLIN-ST. JEOR: SCORE: 910.7

## 2019-03-25 ENCOUNTER — NURSING HOME VISIT (OUTPATIENT)
Dept: GERIATRICS | Facility: CLINIC | Age: 84
End: 2019-03-25
Payer: COMMERCIAL

## 2019-03-25 VITALS
BODY MASS INDEX: 19.09 KG/M2 | HEIGHT: 65 IN | DIASTOLIC BLOOD PRESSURE: 65 MMHG | WEIGHT: 114.6 LBS | HEART RATE: 80 BPM | TEMPERATURE: 96.8 F | OXYGEN SATURATION: 95 % | SYSTOLIC BLOOD PRESSURE: 115 MMHG | RESPIRATION RATE: 18 BRPM

## 2019-03-25 DIAGNOSIS — I10 ESSENTIAL HYPERTENSION: ICD-10-CM

## 2019-03-25 DIAGNOSIS — R07.0 THROAT PAIN: ICD-10-CM

## 2019-03-25 DIAGNOSIS — R53.81 PHYSICAL DECONDITIONING: ICD-10-CM

## 2019-03-25 DIAGNOSIS — E03.9 ACQUIRED HYPOTHYROIDISM: ICD-10-CM

## 2019-03-25 DIAGNOSIS — N18.30 CKD (CHRONIC KIDNEY DISEASE) STAGE 3, GFR 30-59 ML/MIN (H): ICD-10-CM

## 2019-03-25 DIAGNOSIS — E86.0 DEHYDRATION: ICD-10-CM

## 2019-03-25 DIAGNOSIS — N17.9 ACUTE KIDNEY INJURY (H): ICD-10-CM

## 2019-03-25 DIAGNOSIS — J01.90 SUBACUTE SINUSITIS, UNSPECIFIED LOCATION: Primary | ICD-10-CM

## 2019-03-25 DIAGNOSIS — B37.0 ORAL THRUSH: ICD-10-CM

## 2019-03-25 PROBLEM — M62.81 MUSCLE WEAKNESS (GENERALIZED): Status: ACTIVE | Noted: 2019-03-25

## 2019-03-25 LAB
BACTERIA SPEC CULT: NO GROWTH
BACTERIA SPEC CULT: NO GROWTH
Lab: NORMAL
Lab: NORMAL
SPECIMEN SOURCE: NORMAL
SPECIMEN SOURCE: NORMAL

## 2019-03-25 PROCEDURE — 99207 ZZC CDG-MDM COMPONENT: MEETS LOW - DOWN CODED: CPT | Performed by: NURSE PRACTITIONER

## 2019-03-25 PROCEDURE — 99309 SBSQ NF CARE MODERATE MDM 30: CPT | Performed by: NURSE PRACTITIONER

## 2019-03-25 RX ORDER — NYSTATIN 500000 [USP'U]/1
1 TABLET, COATED ORAL 4 TIMES DAILY
COMMUNITY
Start: 2019-03-22 | End: 2019-03-26

## 2019-03-25 NOTE — PROGRESS NOTES
[facility]  PRIMARY CARE PROVIDER AND CLINIC:  Erick Ruano MD, [address] [place] [address] / [facility] [id number]  Chief Complaint   Patient presents with     [facility]     [facility] Medical Record Number:  [id number]  Place of Service where encounter took place:  [facility] ([facility] [[id number]]    Juliana Leal  is a 89 year old  (2/10/1923), admitted to the above facility from  [facility]. Hospital stay 3/19/19 through 3/22/19..  Admitted to this facility for  rehab, medical management and nursing care.    HPI:    HPI information obtained from: facility chart records, facility staff, patient report and [facility] chart review.      Subacute sinusitis, unspecified location  Oral thrush  Throat pain  Dehydration  Acute kidney injury (H)  CKD (chronic kidney disease) stage 3, GFR 30-59 ml/min (H)  Physical deconditioning  Essential hypertension  Acquired hypothyroidism    Brief Summary of Hospital Course: pt admitted from ENT office as she had persistent sinusitis and now thrush. She has had several courses of Abx. She had a nasal culture which showed Staph A and despite having Abx for that--still was not improved.  She was started on Diflucan, and Nystatin and cleocin(IV then po).  She has  SHANAE as not eating well and appetite poor. Sent for rehab and continuation of care       CODE STATUS/ADVANCE DIRECTIVES DISCUSSION:   DNR / DNI  Patient's living condition: lives alone  ALLERGIES: Lisinopril; Prednisone; Allegra [fexofenadine]; Amoxicillin; Cipro [quinolones]; Codeine sulfate; Hydroxyzine; Iodine [gnp iodides decolorized]; Lyrica [pregabalin]; Norvasc [amlodipine besylate]; Tramadol; Zaditor; and Penicillins  PAST MEDICAL HISTORY:  has a past medical history of Angioedema (2017), Arthritis, CKD (chronic kidney disease) stage 3, GFR 30-59 ml/min (H) (7/23/2018), Gout, Headaches, Hives (02/2017), Hypertension, and Thyroid disease.  PAST SURGICAL HISTORY:   has  a past surgical history that includes colectomy; incise finger tendon sheath; Repair atrial septal defect; Phacoemulsification clear cornea with standard intraocular lens implant (2/29/2012); Phacoemulsification clear cornea with standard intraocular lens implant (3/14/2012); and TEMPORAL ARTERY LIGATN OR BX (5/10/2012).  FAMILY HISTORY: family history is not on file.  SOCIAL HISTORY:   reports that she quit smoking about 59 years ago. Her smoking use included cigarettes. she has never used smokeless tobacco. She reports that she does not drink alcohol.    Post Discharge Medication Reconciliation Status: discharge medications reconciled and changed, per note/orders (see AVS)     Current Outpatient Medications   Medication Sig Dispense Refill     acetaminophen (TYLENOL) 500 MG tablet Take 500 mg by mouth 2 times daily as needed for mild pain       cetirizine (ZYRTEC) 10 MG tablet Take 1 tablet (10 mg) by mouth 2 times daily 30 tablet      clindamycin (CLEOCIN) 300 MG capsule Take 1 capsule (300 mg) by mouth 4 times daily (Patient taking differently: Take 300 mg by mouth 4 times daily Last dose on 3/28/19) 26 capsule 0     diltiazem ER COATED BEADS (CARTIA XT) 300 MG 24 hr capsule TAKE 1 CAPSULE(300 MG) BY MOUTH DAILY (Patient taking differently: TAKE 1 CAPSULE(300 MG) BY MOUTH DAILY; HOLD if SBP <110 OR HR < 55. Call if held x 2 in a row symptomatic) 90 capsule 3     emollient (VANICREAM) cream Apply topically as needed for other (leg rash)       fluconazole (DIFLUCAN) 100 MG tablet Take 1 tablet (100 mg) by mouth Every Mon, Wed, Fri Morning for 4 doses (Patient taking differently: Take 100 mg by mouth Every Mon, Wed, Fri Morning Last dose on 3/29) 4 tablet 0     gabapentin (NEURONTIN) 100 MG capsule Take 100 mg by mouth At Bedtime May have 1 additional dose prn at hs.       levothyroxine (SYNTHROID/LEVOTHROID) 88 MCG tablet Take 1 tablet (88 mcg) by mouth daily 90 tablet 2     Multiple Vitamins-Minerals (CENTRUM  "SILVER) per tablet Take 1 tablet by mouth daily       nystatin (MYCOSTATIN) 592724 units TABS tablet Take 1 tablet by mouth 4 times daily X 10 day course.       omeprazole (PRILOSEC) 20 MG CR capsule Take 1 capsule (20 mg) by mouth daily 90 capsule 1     sodium chloride (OCEAN) 0.65 % nasal spray Spray 2 sprays into both nostrils every hour as needed for congestion Or equivalent per kimberly. Pt. Can have at bedside.       triamcinolone (NASACORT) 55 MCG/ACT inhaler Spray 2 sprays into both nostrils daily 16.5 mL 1     omalizumab (XOLAIR) 150 MG injection Inject 150 mg Subcutaneous every 28 days HOLD WHILE IN TCU. Next dose 4/2/19. Can wait til after discharge.        TODAY DURING EXAM/ROS:  No CP, SOB, Cough, dizziness, fevers, chills, HA, N/V, dysuria or Bowel Abnormalities. Appetite is down.  No pain except \"ears feel sore when I swallow water\" and \"It is hard to breath at night lying down\". She sounds congested when talking    Vitals:  /65   Pulse 80   Temp 96.8  F (36  C)   Resp 18   Ht 1.651 m (5' 5\")   Wt 52 kg (114 lb 9.6 oz)   SpO2 95%   BMI 19.07 kg/m    Exam:  GENERAL APPEARANCE:  Alert, in no distress, oriented, cooperative  ENT:  Mouth and posterior oropharynx normal, moist mucous membranes, except coated tongue with thrush  EYES:  EOM, conjunctivae, lids, pupils and irises normal  NECK:  No adenopathy,masses or thyromegaly  RESP:  respiratory effort and palpation of chest normal, lungs clear to auscultation except occas crackle scattered , no respiratory distress  CV:  Palpation and auscultation of heart done , regular rate and rhythm, soft systolic murmur.  No rub, or gallop, no edema, +2 pedal pulses  ABDOMEN:  normal bowel sounds, soft, nontender, no hepatosplenomegaly or other masses  M/S:   DEJESUS equally, seen in chart  SKIN:  Inspection of skin and subcutaneous tissue baseline  NEURO:   Cranial nerves 2-12 are normal tested and grossly at patient's baseline  PSYCH:  oriented X 3, normal " insight, judgement and memory, affect and mood normal    Lab/Diagnostic data:      Recent Labs   Lab Test 03/21/19  0622 03/20/19  0631    138   POTASSIUM 4.8 4.6   CHLORIDE 109 110*   CO2 23 23   ANIONGAP 5 5   GLC 94 91   BUN 26 28   CR 1.31* 1.47*   DINORAH 9.3 9.2     CBC RESULTS:   Recent Labs   Lab Test 03/21/19  0622   WBC 8.3   RBC 3.31*   HGB 10.0*   HCT 31.0*   MCV 94   MCH 30.2   MCHC 32.3   RDW 16.6*          ASSESSMENT / PLAN:  (J01.90) Subacute sinusitis, unspecified location  (primary encounter diagnosis)  Comment/Plan: Cleocin thru 3/28, f/u ENT in 1-2 weeks. Add Ocean spray, menthol-type lozenges or cough drops and Vicks rub under edge of nose: all to help facilitate nasal air flow.    (B37.0) Oral thrush   (R07.0) Throat pain  Comment/Plan: cont diflucan orally, increase Nystatin to 10 day course and brush tongue at least three times daily. monitor.    (E86.0) Dehydration  Comment/Plan: encourage po, SLP to eval as hx of some dysphagia    (N17.9) Acute kidney injury (H)  (N18.3) CKD (chronic kidney disease) stage 3, GFR 30-59 ml/min (H)  Comment/Plan: improving, check BMP in am.    (R53.81) Physical deconditioning  Comment/Plan: PT OT SLP    (I10) Essential hypertension  Comment/Plan: cont diltiazem, add parameters for holding    (E03.9) Acquired hypothyroidism  Comment/Plan: cont same POC Monitor     **Left msg on Janis, Viviana cell with update**    Electronically signed by:  ARIEL Verdugo CNP

## 2019-03-26 ENCOUNTER — TRANSFERRED RECORDS (OUTPATIENT)
Dept: HEALTH INFORMATION MANAGEMENT | Facility: CLINIC | Age: 84
End: 2019-03-26

## 2019-03-26 ENCOUNTER — NURSING HOME VISIT (OUTPATIENT)
Dept: GERIATRICS | Facility: CLINIC | Age: 84
End: 2019-03-26
Payer: COMMERCIAL

## 2019-03-26 ENCOUNTER — RECORDS - HEALTHEAST (OUTPATIENT)
Dept: LAB | Facility: CLINIC | Age: 84
End: 2019-03-26

## 2019-03-26 VITALS
TEMPERATURE: 96.8 F | BODY MASS INDEX: 19.07 KG/M2 | SYSTOLIC BLOOD PRESSURE: 130 MMHG | OXYGEN SATURATION: 96 % | DIASTOLIC BLOOD PRESSURE: 75 MMHG | RESPIRATION RATE: 18 BRPM | HEART RATE: 91 BPM | HEIGHT: 65 IN

## 2019-03-26 DIAGNOSIS — J01.90 SUBACUTE SINUSITIS, UNSPECIFIED LOCATION: Primary | ICD-10-CM

## 2019-03-26 DIAGNOSIS — N18.30 CKD (CHRONIC KIDNEY DISEASE) STAGE 3, GFR 30-59 ML/MIN (H): ICD-10-CM

## 2019-03-26 LAB
ANION GAP SERPL CALCULATED.3IONS-SCNC: 9 MMOL/L (ref 5–18)
ANION GAP SERPL CALCULATED.3IONS-SCNC: 9 MMOL/L (ref 5–18)
BUN SERPL-MCNC: 33 MG/DL (ref 8–28)
BUN SERPL-MCNC: 33 MG/DL (ref 8–28)
CALCIUM SERPL-MCNC: 10 MG/DL (ref 8.5–10.5)
CALCIUM SERPL-MCNC: 10 MG/DL (ref 8.5–10.5)
CHLORIDE BLD-SCNC: 108 MMOL/L (ref 98–107)
CHLORIDE SERPLBLD-SCNC: 108 MMOL/L (ref 98–107)
CO2 SERPL-SCNC: 22 MMOL/L (ref 22–31)
CO2 SERPL-SCNC: 22 MMOL/L (ref 22–31)
CREAT SERPL-MCNC: 1.44 MG/DL (ref 0.6–1.1)
CREAT SERPL-MCNC: 1.44 MG/DL (ref 0.6–1.1)
GFR SERPL CREATININE-BSD FRML MDRD: 34 ML/MIN/1.73M2
GFR SERPL CREATININE-BSD FRML MDRD: 34 ML/MIN/1.73M2
GLUCOSE BLD-MCNC: 78 MG/DL (ref 70–125)
GLUCOSE SERPL-MCNC: 78 MG/DL (ref 70–125)
POTASSIUM BLD-SCNC: 4.6 MMOL/L (ref 3.5–5)
POTASSIUM SERPL-SCNC: 4.6 MMOL/L (ref 3.5–5)
SODIUM SERPL-SCNC: 139 MMOL/L (ref 136–145)
SODIUM SERPL-SCNC: 139 MMOL/L (ref 136–145)

## 2019-03-26 PROCEDURE — 99308 SBSQ NF CARE LOW MDM 20: CPT | Performed by: NURSE PRACTITIONER

## 2019-03-26 RX ORDER — MENTHOL
LOZENGE MUCOUS MEMBRANE EVERY 4 HOURS PRN
COMMUNITY
End: 2019-04-04

## 2019-03-26 RX ORDER — DILTIAZEM HYDROCHLORIDE 300 MG/1
300 CAPSULE, COATED, EXTENDED RELEASE ORAL DAILY
Status: ON HOLD | COMMUNITY
End: 2019-05-13

## 2019-03-26 RX ORDER — FLUCONAZOLE 100 MG/1
100 TABLET ORAL
COMMUNITY
Start: 2019-03-25 | End: 2019-04-01

## 2019-03-26 RX ORDER — OXYMETAZOLINE HYDROCHLORIDE 0.05 G/100ML
2 SPRAY NASAL AT BEDTIME
COMMUNITY
Start: 2019-03-26 | End: 2019-04-04

## 2019-03-26 NOTE — PROGRESS NOTES
"CC: Lab Recheck     Subacute sinusitis, unspecified location  CKD (chronic kidney disease) stage 3, GFR 30-59 ml/min (H)    HPI:    Juliana Leal is a 89 year old  (2/10/1923), who is being seen today for an episodic care visit at [facility]. Today's concern: lab recheck of BMP        OBJECTIVE: A & O x 3, NAD. Pt said slept better but still feels pretty stuffed up  And wants to use \"sinus saline pump\" instead of Ocean spray.    /75   Pulse 91   Temp 96.8  F (36  C)   Resp 18   Ht 1.651 m (5' 5\")   SpO2 96%   BMI 19.07 kg/m      LABS: BMP WNL except Cr 108, BUN 33 and Cr 1.44, GFR is 34  Recent Labs   Lab Test 03/21/19  0622 03/20/19  0631    138   POTASSIUM 4.8 4.6   CHLORIDE 109 110*   CO2 23 23   ANIONGAP 5 5   GLC 94 91   BUN 26 28   CR 1.31* 1.47*   DINORAH 9.3 9.2     Current Outpatient Medications   Medication Sig Dispense Refill     acetaminophen (TYLENOL) 500 MG tablet Take 500 mg by mouth 2 times daily as needed for mild pain       camphor-eucalyptus-menthol (VICKS VAPORUB) 4.73-1.2-2.6 % OINT ointment Apply topically every 4 hours as needed for cough Apply to skin below Nares topically every 4 hours as needed for as indicated related to ACUTE RECURRENT SINUSITIS, UNSPECIFIED (J01.91) unsupervised self-administration Pt may have @@ bedside.       cetirizine (ZYRTEC) 10 MG tablet Take 1 tablet (10 mg) by mouth 2 times daily 30 tablet      clindamycin (CLEOCIN) 300 MG capsule Take 1 capsule (300 mg) by mouth 4 times daily (Patient taking differently: Take 300 mg by mouth 4 times daily Last dose on 3/28/19) 26 capsule 0     diltiazem ER COATED BEADS (CARDIZEM CD/CARTIA XT) 300 MG 24 hr capsule Take 300 mg by mouth daily TAKE 1 CAPSULE(300 MG) BY MOUTH DAILY; HOLD if SBP <110 OR HR < 55. Call if held x 2 in a row symptomatic       emollient (VANICREAM) cream Apply topically as needed for other (leg rash)       fluconazole (DIFLUCAN) 100 MG tablet Take 100 mg by mouth three times a week 100 mg, " Oral, EVERY MONDAY WEDNESDAY AND FRIDAY MORNING for thrush for 4 doses       gabapentin (NEURONTIN) 100 MG capsule Take 100 mg by mouth At Bedtime May have 1 additional dose prn at hs.       levothyroxine (SYNTHROID/LEVOTHROID) 88 MCG tablet Take 1 tablet (88 mcg) by mouth daily 90 tablet 2     Multiple Vitamins-Minerals (CENTRUM SILVER) per tablet Take 1 tablet by mouth daily       Nutritional Supplements (NUTRITIONAL SUPPLEMENT PO) Take 4 oz by mouth 2 times daily two times a day for Low weight Give between meals       nystatin (MYCOSTATIN) 752648 unit/mL SUSP suspension Swish and swallow 5 mLs in mouth 4 times daily Give 5 ml by mouth four times a day for Thrush for 7 Days       omeprazole (PRILOSEC) 20 MG CR capsule Take 1 capsule (20 mg) by mouth daily 90 capsule 1     oxymetazoline (AFRIN) 0.05 % nasal spray Spray 2 sprays into both nostrils At Bedtime For 4 evenings       sodium chloride (OCEAN) 0.65 % nasal spray Spray 2 sprays into both nostrils every hour as needed for congestion Or equivalent per kimberly. Pt. Can have at bedside.       triamcinolone (NASACORT) 55 MCG/ACT inhaler Spray 2 sprays into both nostrils daily 16.5 mL 1     omalizumab (XOLAIR) 150 MG injection Inject 150 mg Subcutaneous every 28 days HOLD WHILE IN TCU. Next dose 4/2/19. Can wait til after discharge.         ASSESSMENT / PLAN:  (J01.90) Subacute sinusitis, unspecified location  (primary encounter diagnosis)  Comment/Plan: I called and d/w Dr Irene, her ENT MD re possibly trying a short course of Pred--she agrees ok but per [facility]/TCU nurse and Viviana, pt has not reacted well to it in the past.  She is on Nasonex, will add Afrin at HS to assist with sleep for a few days and monitor. Overall she slept better    (N18.3) CKD (chronic kidney disease) stage 3, GFR 30-59 ml/min (H)   Comment/Plan: review shows declining function so is nearing to  baseline, will not restart HCTZ(family asking) and may be up due to Abx/meds. Will recheck in 1-2  week or prn. Encourage fluids, monitor closely    Electronically Signed:  Ketty Nj RN, CNP

## 2019-03-27 ENCOUNTER — NURSING HOME VISIT (OUTPATIENT)
Dept: GERIATRICS | Facility: CLINIC | Age: 84
End: 2019-03-27
Payer: COMMERCIAL

## 2019-03-27 DIAGNOSIS — Z53.9 ERRONEOUS ENCOUNTER--DISREGARD: Primary | ICD-10-CM

## 2019-03-28 ENCOUNTER — DOCUMENTATION ONLY (OUTPATIENT)
Dept: OTHER | Facility: CLINIC | Age: 84
End: 2019-03-28

## 2019-03-28 ENCOUNTER — NURSING HOME VISIT (OUTPATIENT)
Dept: GERIATRICS | Facility: CLINIC | Age: 84
End: 2019-03-28
Payer: COMMERCIAL

## 2019-03-28 VITALS
HEIGHT: 65 IN | RESPIRATION RATE: 18 BRPM | HEART RATE: 86 BPM | SYSTOLIC BLOOD PRESSURE: 124 MMHG | TEMPERATURE: 96.2 F | OXYGEN SATURATION: 96 % | BODY MASS INDEX: 19.07 KG/M2 | DIASTOLIC BLOOD PRESSURE: 74 MMHG

## 2019-03-28 DIAGNOSIS — N18.30 CKD (CHRONIC KIDNEY DISEASE) STAGE 3, GFR 30-59 ML/MIN (H): ICD-10-CM

## 2019-03-28 DIAGNOSIS — B37.0 ORAL THRUSH: ICD-10-CM

## 2019-03-28 DIAGNOSIS — J32.8 OTHER CHRONIC SINUSITIS: ICD-10-CM

## 2019-03-28 DIAGNOSIS — I10 ESSENTIAL HYPERTENSION: ICD-10-CM

## 2019-03-28 DIAGNOSIS — R53.81 PHYSICAL DECONDITIONING: Primary | ICD-10-CM

## 2019-03-28 PROCEDURE — 99304 1ST NF CARE SF/LOW MDM 25: CPT | Mod: AI | Performed by: INTERNAL MEDICINE

## 2019-03-28 NOTE — LETTER
3/28/2019        RE: Juliana Leal  [address]  [facility] [address]        PRIMARY CARE PROVIDER AND CLINIC RESPONSIBLE:  Erick Ruano, [address]  / [facility] [address]        ADMISSION HISTORY AND PHYSICAL EXAMINATION     Chief Complaint   Patient presents with     Fatigue         HISTORY OF PRESENT ILLNESS:  89 year old female, (2/10/1923), admitted to the [facility] TCU for continuation of medical care and rehab.  HPI information obtained from: facility chart records, facility staff, patient report and [facility] chart review.    Juliana Leal is a 89 year old female with history of hypertension, hypothyroidism, ASD repair, chronic kidney disease stage III who was admitted at [facility] from 3/19 to 3/22/19 due to weakness, sinus headaches and persistent left sinusitis.  Was seen by ENT as an outpatient and she was treated with antibiotics for sinusitis.  Her sinus fluid cultures grew a staphylococcal aureus on 3/5/19, she was treated with Bactrim.  She was brought to the emergency room with a complaint of odynophagia, weakness fall related to poor oral fluid intake.  She was treated lately with 10 days of oral clindamycin.  She complicated with oral candidiasis.  She was treated with Diflucan.  Fluid for hydration.  Her creatinine improved after IV hydration.  She feels weak and tired.  She has difficult some of the mid details of her memory her apartment alone. Slept for due to headache last night, appetite poor and at BM with loose stools. Patient denies chest pain, dyspnea, abdominal pain, n&v, fever, chills, dysuria, leg pain or swelling. The rest of ROS is unremarkable. Patient is seen and examined by Ketty Nj NP. Please see Ketty Nj's admit noted dated 3/25/19 for details of admission, past medical history, family history, allergies, medication list, social history and other details pertinent with this admission. Hospital admission and dc  summary reviewed.    Past Medical History:   Diagnosis Date     Angioedema 2017    Due to ACE      Arthritis      CKD (chronic kidney disease) stage 3, GFR 30-59 ml/min (H) 7/23/2018     Gout      Headaches      Hives 02/2017    Dr KHOURY, avoid BBLK due to this condition     Hypertension      Thyroid disease        Past Surgical History:   Procedure Laterality Date     COLECTOMY      diverticulitis     HC LIGATION OR BIOPSY TEMPORAL ARTERY  5/10/2012    Procedure:BIOPSY ARTERY TEMPORAL; Surgeon:RUI LOREDO; Location: OR     INCISE FINGER TENDON SHEATH       PHACOEMULSIFICATION CLEAR CORNEA WITH STANDARD INTRAOCULAR LENS IMPLANT  2/29/2012    Procedure:PHACOEMULSIFICATION CLEAR CORNEA WITH STANDARD INTRAOCULAR LENS IMPLANT; RIGHT PHACOEMULSIFICATION CLEAR CORNEA WITH STANDARD INTRAOCULAR LENS IMPLANT; Surgeon:MANJIT LYONS; Location: [facility]     PHACOEMULSIFICATION CLEAR CORNEA WITH STANDARD INTRAOCULAR LENS IMPLANT  3/14/2012    Procedure:PHACOEMULSIFICATION CLEAR CORNEA WITH STANDARD INTRAOCULAR LENS IMPLANT; LEFT PHACOEMULSIFICATION CLEAR CORNEA WITH STANDARD INTRAOCULAR LENS IMPLANT ; Surgeon:MANJIT LYONS; Location: [facility]     REPAIR ATRIAL SEPTAL DEFECT         Current Outpatient Medications   Medication Sig     acetaminophen (TYLENOL) 500 MG tablet Take 500 mg by mouth 2 times daily as needed for mild pain     camphor-eucalyptus-menthol (VICKS VAPORUB) 4.73-1.2-2.6 % OINT ointment Apply topically every 4 hours as needed for cough Apply to skin below Nares topically every 4 hours as needed for as indicated related to ACUTE RECURRENT SINUSITIS, UNSPECIFIED (J01.91) unsupervised self-administration Pt may have @@ bedside.     cetirizine (ZYRTEC) 10 MG tablet Take 1 tablet (10 mg) by mouth 2 times daily     clindamycin (CLEOCIN) 300 MG capsule Take 1 capsule (300 mg) by mouth 4 times daily     diltiazem ER COATED BEADS (CARDIZEM CD/CARTIA XT) 300 MG 24 hr capsule Take 300 mg by mouth daily TAKE 1 CAPSULE(300  "MG) BY MOUTH DAILY; HOLD if SBP <110 OR HR < 55. Call if held x 2 in a row symptomatic     emollient (VANICREAM) cream Apply topically as needed for other (leg rash)     fluconazole (DIFLUCAN) 100 MG tablet Take 100 mg by mouth three times a week 100 mg, Oral, EVERY MONDAY WEDNESDAY AND FRIDAY MORNING for thrush for 4 doses     gabapentin (NEURONTIN) 100 MG capsule Take 100 mg by mouth At Bedtime May have 1 additional dose prn at hs.     levothyroxine (SYNTHROID/LEVOTHROID) 88 MCG tablet Take 1 tablet (88 mcg) by mouth daily     Multiple Vitamins-Minerals (CENTRUM SILVER) per tablet Take 1 tablet by mouth daily     Nutritional Supplements (NUTRITIONAL SUPPLEMENT PO) Take 4 oz by mouth 2 times daily two times a day for Low weight Give between meals     nystatin (MYCOSTATIN) 217910 unit/mL SUSP suspension Swish and swallow 5 mLs in mouth 4 times daily Give 5 ml by mouth four times a day for Thrush for 7 Days     omeprazole (PRILOSEC) 20 MG CR capsule Take 1 capsule (20 mg) by mouth daily     oxymetazoline (AFRIN) 0.05 % nasal spray Spray 2 sprays into both nostrils At Bedtime For 4 evenings     sodium chloride (OCEAN) 0.65 % nasal spray Spray 2 sprays into both nostrils every hour as needed for congestion Or equivalent per kimberly. Pt. Can have at bedside.     triamcinolone (NASACORT) 55 MCG/ACT inhaler Spray 2 sprays into both nostrils daily     omalizumab (XOLAIR) 150 MG injection Inject 150 mg Subcutaneous every 28 days HOLD WHILE IN TCU. Next dose 4/2/19. Can wait til after discharge.     No current facility-administered medications for this visit.        Allergies   Allergen Reactions     Lisinopril Other (See Comments) and Anaphylaxis     Swelling around mouth     Prednisone Unknown     \"every side effect listed\"     Allegra [Fexofenadine] Swelling     Amoxicillin Itching     Cipro [Quinolones] Itching     Codeine Sulfate Other (See Comments)     Felt out of it when took it yrs ago     Hydroxyzine      Feels " drunk     Iodine [Gnp Iodides Decolorized]      Lyrica [Pregabalin]      Reaction unknown     Norvasc [Amlodipine Besylate]      Mouth felt funny     Tramadol      Felt loopy     Zaditor Other (See Comments) and Swelling     Tongue swelled  Didn't feel right  Eye drops     Penicillins Rash       Social History     Socioeconomic History     Marital status:      Spouse name: Not on file     Number of children: Not on file     Years of education: Not on file     Highest education level: Not on file   Occupational History     Not on file   Social Needs     Financial resource strain: Not on file     Food insecurity:     Worry: Not on file     Inability: Not on file     Transportation needs:     Medical: Not on file     Non-medical: Not on file   Tobacco Use     Smoking status: Former Smoker     Types: Cigarettes     Last attempt to quit: 1960     Years since quittin.2     Smokeless tobacco: Never Used   Substance and Sexual Activity     Alcohol use: No     Drug use: Not on file     Sexual activity: Not on file   Lifestyle     Physical activity:     Days per week: Not on file     Minutes per session: Not on file     Stress: Not on file   Relationships     Social connections:     Talks on phone: Not on file     Gets together: Not on file     Attends Scientologist service: Not on file     Active member of club or organization: Not on file     Attends meetings of clubs or organizations: Not on file     Relationship status: Not on file     Intimate partner violence:     Fear of current or ex partner: Not on file     Emotionally abused: Not on file     Physically abused: Not on file     Forced sexual activity: Not on file   Other Topics Concern     Parent/sibling w/ CABG, MI or angioplasty before 65F 55M? Not Asked   Social History Narrative     Not on file          Information reviewed:  Medications, vital signs, orders, nursing notes, problem list, hospital information.     ROS: All 10 point review of system  "completed, those pertinent positive, please see H&P, the remaining ROS is negative.    /74   Pulse 86   Temp 96.2  F (35.7  C)   Resp 18   Ht 1.651 m (5' 5\")   SpO2 96%   BMI 19.07 kg/m       PHYSICAL EXAMINATION:   GENERAL:  No acute distress.  SKIN:  Dry and warm.  There is no rash at area of skin examined.  HEENT:  Head without trauma.  Pupils round, reactive. Exam of conjunctiva and lids are normal. Sclera without icterus. There is clearing oral thrush.  NECK:  Supple. No jugular venous distension.  CHEST: No reproducible chest tenderness.   LUNGS:  Normal respiratory effort. Lungs reveal decreased breath sounds at bases. No rales or wheezes.  HEART:  Regular rate and rhythm.  No murmur, gallops or rubs auscultated.  ABDOMEN:  Soft, bowel sounds positive.  There is no tenderness or guarding.   EXTREMITIES: No edema.   NEUROLOGIC:  Alert and oriented x3. Moving all ext. Gait not tested.  PSYCH: Recent and remote memory is mildly impaired. Mood and affect are normal.    Lab/Diagnostic data:  Reviewed  CBC RESULTS:   Recent Labs   Lab Test 03/21/19  0622   WBC 8.3   RBC 3.31*   HGB 10.0*   HCT 31.0*   MCV 94   MCH 30.2   MCHC 32.3   RDW 16.6*          Recent Labs   Lab Test 03/26/19 03/21/19  0622    137   POTASSIUM 4.6 4.8   CHLORIDE 108* 109   CO2 22 23   ANIONGAP 9 5   GLC 78 94   BUN 33* 26   CR 1.44* 1.31*   DINORAH 10.0 9.3       Liver Function Studies -   Recent Labs   Lab Test 03/19/19  1438   PROTTOTAL 7.8   ALBUMIN 3.1*   BILITOTAL 0.5   ALKPHOS 80   AST 15   ALT 16       Results for orders placed or performed during the hospital encounter of 03/19/19   Chest XR,  PA & LAT    Narrative    XR CHEST TWO VIEWS   3/19/2019 3:40 PM     HISTORY: Congestion.    COMPARISON: 8/7/2018.    FINDINGS: The heart size is normal. The thoracic aorta is calcified  and mildly tortuous. The lungs are hyperinflated but clear. No  pneumothorax or pleural effusion.      Impression    IMPRESSION: No acute " abnormality.    NEREIDA HA MD         ASSESSMENT / PLAN:     Physical deconditioning  Plan: PT/OT with increase independence, self-care, strength and endurance and other cares that help return to home/facility of origin, fall precautions. Care conference with patient and family for the progress of rehab and disposition issues will be discussed as planned. Rehab evaluation and other evaluations including CPT are at rehab logs, to be reviewed separately.  Fall risk assessment as well as cognitive evaluation so far performed:  SLUMS:  18/30  Tinetti:  19/28  Transfers: sup  Ambulating distance: 400-600  Feet sba-fww  Fww    Essential hypertension  Plan: Continue diltiazem with parameters. Blood pressure stable. We recommend life style modification with diet and exercises.    CKD (chronic kidney disease) stage 3, GFR 30-59 ml/min (H)  Plan: Continue to monitor BMP. Avoid nephrotoxins.    Oral thrush  Plan: Continue Diflucan, improving.     Other chronic sinusitis  Plan: Continue Clindamycin and follow up ENT.    Other problems with same care. Primary care doctor and other specialists to address those chronic problems in next clinic appointment to be scheduled upon discharge from the TCU.      Total time spent with patient visit was 34 min including patient visit, review of past records, patients counseling and coordinating care.      Electronically signed by:  Leon Benedict MD            Sincerely,        Leon Benedict MD

## 2019-03-28 NOTE — PROGRESS NOTES
PRIMARY CARE PROVIDER AND CLINIC RESPONSIBLE:  Erick Ruano, [address] [place] [id number]        ADMISSION HISTORY AND PHYSICAL EXAMINATION     Chief Complaint   Patient presents with     Fatigue         HISTORY OF PRESENT ILLNESS:  89 year old female, (2/10/1923), admitted to the [facility] TCU for continuation of medical care and rehab.  HPI information obtained from: facility chart records, facility staff, patient report and [facility] chart review.    Juliana Leal is a 89 year old female with history of hypertension, hypothyroidism, ASD repair, chronic kidney disease stage III who was admitted at [facility] from 3/19 to 3/22/19 due to weakness, sinus headaches and persistent left sinusitis.  Was seen by ENT as an outpatient and she was treated with antibiotics for sinusitis.  Her sinus fluid cultures grew a staphylococcal aureus on 3/5/19, she was treated with Bactrim.  She was brought to the emergency room with a complaint of odynophagia, weakness fall related to poor oral fluid intake.  She was treated lately with 10 days of oral clindamycin.  She complicated with oral candidiasis.  She was treated with Diflucan.  Fluid for hydration.  Her creatinine improved after IV hydration.  She feels weak and tired.  She has difficult some of the mid details of her memory her apartment alone. Slept for due to headache last night, appetite poor and at BM with loose stools. Patient denies chest pain, dyspnea, abdominal pain, n&v, fever, chills, dysuria, leg pain or swelling. The rest of ROS is unremarkable. Patient is seen and examined by Ketty Nj NP. Please see Ketty Nj's admit noted dated 3/25/19 for details of admission, past medical history, family history, allergies, medication list, social history and other details pertinent with this admission. Hospital admission and dc summary reviewed.    Past Medical History:   Diagnosis Date     Angioedema 2017    Due to ACE       Arthritis      CKD (chronic kidney disease) stage 3, GFR 30-59 ml/min (H) 7/23/2018     Gout      Headaches      Hives 02/2017    Dr KHOURY, avoid BBLK due to this condition     Hypertension      Thyroid disease        Past Surgical History:   Procedure Laterality Date     COLECTOMY      diverticulitis     HC LIGATION OR BIOPSY TEMPORAL ARTERY  5/10/2012    Procedure:BIOPSY ARTERY TEMPORAL; Surgeon:RUI LOREDO; Location: OR     INCISE FINGER TENDON SHEATH       PHACOEMULSIFICATION CLEAR CORNEA WITH STANDARD INTRAOCULAR LENS IMPLANT  2/29/2012    Procedure:PHACOEMULSIFICATION CLEAR CORNEA WITH STANDARD INTRAOCULAR LENS IMPLANT; RIGHT PHACOEMULSIFICATION CLEAR CORNEA WITH STANDARD INTRAOCULAR LENS IMPLANT; Surgeon:MANJIT LYONS; Location: EC     PHACOEMULSIFICATION CLEAR CORNEA WITH STANDARD INTRAOCULAR LENS IMPLANT  3/14/2012    Procedure:PHACOEMULSIFICATION CLEAR CORNEA WITH STANDARD INTRAOCULAR LENS IMPLANT; LEFT PHACOEMULSIFICATION CLEAR CORNEA WITH STANDARD INTRAOCULAR LENS IMPLANT ; Surgeon:MANJIT LYONS; Location: EC     REPAIR ATRIAL SEPTAL DEFECT         Current Outpatient Medications   Medication Sig     acetaminophen (TYLENOL) 500 MG tablet Take 500 mg by mouth 2 times daily as needed for mild pain     camphor-eucalyptus-menthol (VICKS VAPORUB) 4.73-1.2-2.6 % OINT ointment Apply topically every 4 hours as needed for cough Apply to skin below Nares topically every 4 hours as needed for as indicated related to ACUTE RECURRENT SINUSITIS, UNSPECIFIED (J01.91) unsupervised self-administration Pt may have @@ bedside.     cetirizine (ZYRTEC) 10 MG tablet Take 1 tablet (10 mg) by mouth 2 times daily     clindamycin (CLEOCIN) 300 MG capsule Take 1 capsule (300 mg) by mouth 4 times daily     diltiazem ER COATED BEADS (CARDIZEM CD/CARTIA XT) 300 MG 24 hr capsule Take 300 mg by mouth daily TAKE 1 CAPSULE(300 MG) BY MOUTH DAILY; HOLD if SBP <110 OR HR < 55. Call if held x 2 in a row symptomatic     emollient  "(VANICREAM) cream Apply topically as needed for other (leg rash)     fluconazole (DIFLUCAN) 100 MG tablet Take 100 mg by mouth three times a week 100 mg, Oral, EVERY MONDAY WEDNESDAY AND FRIDAY MORNING for thrush for 4 doses     gabapentin (NEURONTIN) 100 MG capsule Take 100 mg by mouth At Bedtime May have 1 additional dose prn at hs.     levothyroxine (SYNTHROID/LEVOTHROID) 88 MCG tablet Take 1 tablet (88 mcg) by mouth daily     Multiple Vitamins-Minerals (CENTRUM SILVER) per tablet Take 1 tablet by mouth daily     Nutritional Supplements (NUTRITIONAL SUPPLEMENT PO) Take 4 oz by mouth 2 times daily two times a day for Low weight Give between meals     nystatin (MYCOSTATIN) 827127 unit/mL SUSP suspension Swish and swallow 5 mLs in mouth 4 times daily Give 5 ml by mouth four times a day for Thrush for 7 Days     omeprazole (PRILOSEC) 20 MG CR capsule Take 1 capsule (20 mg) by mouth daily     oxymetazoline (AFRIN) 0.05 % nasal spray Spray 2 sprays into both nostrils At Bedtime For 4 evenings     sodium chloride (OCEAN) 0.65 % nasal spray Spray 2 sprays into both nostrils every hour as needed for congestion Or equivalent per kimberly. Pt. Can have at bedside.     triamcinolone (NASACORT) 55 MCG/ACT inhaler Spray 2 sprays into both nostrils daily     omalizumab (XOLAIR) 150 MG injection Inject 150 mg Subcutaneous every 28 days HOLD WHILE IN TCU. Next dose 4/2/19. Can wait til after discharge.     No current facility-administered medications for this visit.        Allergies   Allergen Reactions     Lisinopril Other (See Comments) and Anaphylaxis     Swelling around mouth     Prednisone Unknown     \"every side effect listed\"     Allegra [Fexofenadine] Swelling     Amoxicillin Itching     Cipro [Quinolones] Itching     Codeine Sulfate Other (See Comments)     Felt out of it when took it yrs ago     Hydroxyzine      Feels drunk     Iodine [Gnp Iodides Decolorized]      Lyrica [Pregabalin]      Reaction unknown     Norvasc " [Amlodipine Besylate]      Mouth felt funny     Tramadol      Felt loopy     Zaditor Other (See Comments) and Swelling     Tongue swelled  Didn't feel right  Eye drops     Penicillins Rash       Social History     Socioeconomic History     Marital status:      Spouse name: Not on file     Number of children: Not on file     Years of education: Not on file     Highest education level: Not on file   Occupational History     Not on file   Social Needs     Financial resource strain: Not on file     Food insecurity:     Worry: Not on file     Inability: Not on file     Transportation needs:     Medical: Not on file     Non-medical: Not on file   Tobacco Use     Smoking status: Former Smoker     Types: Cigarettes     Last attempt to quit: 1960     Years since quittin.2     Smokeless tobacco: Never Used   Substance and Sexual Activity     Alcohol use: No     Drug use: Not on file     Sexual activity: Not on file   Lifestyle     Physical activity:     Days per week: Not on file     Minutes per session: Not on file     Stress: Not on file   Relationships     Social connections:     Talks on phone: Not on file     Gets together: Not on file     Attends [employer] service: Not on file     Active member of club or organization: Not on file     Attends meetings of clubs or organizations: Not on file     Relationship status: Not on file     Intimate partner violence:     Fear of current or ex partner: Not on file     Emotionally abused: Not on file     Physically abused: Not on file     Forced sexual activity: Not on file   Other Topics Concern     Parent/sibling w/ CABG, MI or angioplasty before 65F 55M? Not Asked   Social History Narrative     Not on file          Information reviewed:  Medications, vital signs, orders, nursing notes, problem list, hospital information.     ROS: All 10 point review of system completed, those pertinent positive, please see H&P, the remaining ROS is negative.    /74   Pulse  "86   Temp 96.2  F (35.7  C)   Resp 18   Ht 1.651 m (5' 5\")   SpO2 96%   BMI 19.07 kg/m      PHYSICAL EXAMINATION:   GENERAL:  No acute distress.  SKIN:  Dry and warm.  There is no rash at area of skin examined.  HEENT:  Head without trauma.  Pupils round, reactive. Exam of conjunctiva and lids are normal. Sclera without icterus. There is clearing oral thrush.  NECK:  Supple. No jugular venous distension.  CHEST: No reproducible chest tenderness.   LUNGS:  Normal respiratory effort. Lungs reveal decreased breath sounds at bases. No rales or wheezes.  HEART:  Regular rate and rhythm.  No murmur, gallops or rubs auscultated.  ABDOMEN:  Soft, bowel sounds positive.  There is no tenderness or guarding.   EXTREMITIES: No edema.   NEUROLOGIC:  Alert and oriented x3. Moving all ext. Gait not tested.  PSYCH: Recent and remote memory is mildly impaired. Mood and affect are normal.    Lab/Diagnostic data:  Reviewed  CBC RESULTS:   Recent Labs   Lab Test 03/21/19  0622   WBC 8.3   RBC 3.31*   HGB 10.0*   HCT 31.0*   MCV 94   MCH 30.2   MCHC 32.3   RDW 16.6*          Recent Labs   Lab Test 03/26/19 03/21/19  0622    137   POTASSIUM 4.6 4.8   CHLORIDE 108* 109   CO2 22 23   ANIONGAP 9 5   GLC 78 94   BUN 33* 26   CR 1.44* 1.31*   DINORAH 10.0 9.3       Liver Function Studies -   Recent Labs   Lab Test 03/19/19  1438   PROTTOTAL 7.8   ALBUMIN 3.1*   BILITOTAL 0.5   ALKPHOS 80   AST 15   ALT 16       Results for orders placed or performed during the hospital encounter of 03/19/19   Chest XR,  PA & LAT    Narrative    XR CHEST TWO VIEWS   3/19/2019 3:40 PM     HISTORY: Congestion.    COMPARISON: 8/7/2018.    FINDINGS: The heart size is normal. The thoracic aorta is calcified  and mildly tortuous. The lungs are hyperinflated but clear. No  pneumothorax or pleural effusion.      Impression    IMPRESSION: No acute abnormality.    NEREIDA HA MD         ASSESSMENT / PLAN:     Physical deconditioning  Plan: PT/OT with " increase independence, self-care, strength and endurance and other cares that help return to home/facility of origin, fall precautions. Care conference with patient and family for the progress of rehab and disposition issues will be discussed as planned. Rehab evaluation and other evaluations including CPT are at rehab logs, to be reviewed separately.  Fall risk assessment as well as cognitive evaluation so far performed:  SLUMS:  18/30  Tinetti:  19/28  Transfers: sup  Ambulating distance: 400-600  Feet sba-fww  Fww    Essential hypertension  Plan: Continue diltiazem with parameters. Blood pressure stable. We recommend life style modification with diet and exercises.    CKD (chronic kidney disease) stage 3, GFR 30-59 ml/min (H)  Plan: Continue to monitor BMP. Avoid nephrotoxins.    Oral thrush  Plan: Continue Diflucan, improving.     Other chronic sinusitis  Plan: Continue Clindamycin and follow up ENT.    Other problems with same care. Primary care doctor and other specialists to address those chronic problems in next clinic appointment to be scheduled upon discharge from the TCU.      Total time spent with patient visit was 34 min including patient visit, review of past records, patients counseling and coordinating care.      Electronically signed by:  Leon Benedict MD

## 2019-04-01 ENCOUNTER — NURSING HOME VISIT (OUTPATIENT)
Dept: GERIATRICS | Facility: CLINIC | Age: 84
End: 2019-04-01
Payer: COMMERCIAL

## 2019-04-01 VITALS
TEMPERATURE: 96.3 F | OXYGEN SATURATION: 93 % | HEART RATE: 73 BPM | SYSTOLIC BLOOD PRESSURE: 99 MMHG | DIASTOLIC BLOOD PRESSURE: 64 MMHG | RESPIRATION RATE: 16 BRPM

## 2019-04-01 DIAGNOSIS — R07.0 THROAT PAIN: ICD-10-CM

## 2019-04-01 DIAGNOSIS — R53.81 PHYSICAL DECONDITIONING: ICD-10-CM

## 2019-04-01 DIAGNOSIS — M62.81 GENERALIZED MUSCLE WEAKNESS: ICD-10-CM

## 2019-04-01 DIAGNOSIS — N18.30 CKD (CHRONIC KIDNEY DISEASE) STAGE 3, GFR 30-59 ML/MIN (H): ICD-10-CM

## 2019-04-01 DIAGNOSIS — B37.0 ORAL THRUSH: ICD-10-CM

## 2019-04-01 DIAGNOSIS — I10 ESSENTIAL HYPERTENSION: ICD-10-CM

## 2019-04-01 DIAGNOSIS — J01.90 SUBACUTE SINUSITIS, UNSPECIFIED LOCATION: Primary | ICD-10-CM

## 2019-04-01 PROCEDURE — 99309 SBSQ NF CARE MODERATE MDM 30: CPT | Performed by: NURSE PRACTITIONER

## 2019-04-01 NOTE — PROGRESS NOTES
"[facility]  [place] [facility] Record Number:  [id number]  Place of Service where encounter took place:  [facility] ([facility]) [[id number]]  Chief Complaint   Patient presents with     Nursing Home Acute       HPI:    Juliana Leal  is a 89 year old (2/10/1923), who is being seen today for an episodic care visit.  HPI information obtained from: facility chart records, facility staff, patient report and [facility] chart review. Today's concern is: says mouth better but still not great appetite  \"I need to drink more and think I can now\".  Is sleeping better but still feels \" very tired\"     Subacute sinusitis, unspecified location  Oral thrush  Throat pain  Generalized muscle weakness  Physical deconditioning  Essential hypertension  CKD (chronic kidney disease) stage 3, GFR 30-59 ml/min (H)      Past Medical and Surgical History reviewed in Epic today.    MEDICATIONS:  Current Outpatient Medications   Medication Sig Dispense Refill     acetaminophen (TYLENOL) 500 MG tablet Take 500 mg by mouth 2 times daily as needed for mild pain       camphor-eucalyptus-menthol (VICKS VAPORUB) 4.73-1.2-2.6 % OINT ointment Apply topically every 4 hours as needed for cough Apply to skin below Nares topically every 4 hours as needed for as indicated related to ACUTE RECURRENT SINUSITIS, UNSPECIFIED (J01.91) unsupervised self-administration Pt may have @@ bedside.       cetirizine (ZYRTEC) 10 MG tablet Take 1 tablet (10 mg) by mouth 2 times daily 30 tablet      diltiazem ER COATED BEADS (CARDIZEM CD/CARTIA XT) 300 MG 24 hr capsule Take 300 mg by mouth daily TAKE 1 CAPSULE(300 MG) BY MOUTH DAILY; HOLD if SBP <110 OR HR < 55. Call if held x 2 in a row symptomatic       emollient (VANICREAM) cream Apply topically as needed for other (leg rash)       gabapentin (NEURONTIN) 100 MG capsule Take 100 mg by mouth At Bedtime May have 1 additional dose prn at hs.       levothyroxine " (SYNTHROID/LEVOTHROID) 88 MCG tablet Take 1 tablet (88 mcg) by mouth daily 90 tablet 2     Multiple Vitamins-Minerals (CENTRUM SILVER) per tablet Take 1 tablet by mouth daily       Nutritional Supplements (NUTRITIONAL SUPPLEMENT PO) Take 4 oz by mouth 2 times daily two times a day for Low weight Give between meals       nystatin (MYCOSTATIN) 001669 unit/mL SUSP suspension Swish and swallow 5 mLs in mouth 4 times daily Continue give 5 ml by mouth four times a day indefinitely no stop date & NP pwill review in 5-7 days. for Thrush       omeprazole (PRILOSEC) 20 MG CR capsule Take 1 capsule (20 mg) by mouth daily 90 capsule 1     sodium chloride (OCEAN) 0.65 % nasal spray Spray 2 sprays into both nostrils every hour as needed for congestion Or equivalent per kimberly. Pt. Can have at bedside.       triamcinolone (NASACORT) 55 MCG/ACT inhaler Spray 2 sprays into both nostrils daily 16.5 mL 1     omalizumab (XOLAIR) 150 MG injection Inject 150 mg Subcutaneous every 28 days HOLD WHILE IN TCU. Next dose 4/2/19. Can wait til after discharge.        TODAY DURING EXAM/ROS:  No CP, SOB, Cough, dizziness, fevers, chills, HA, N/V, dysuria or Bowel Abnormalities. Appetite is down.  No pain except throat sore but better    Objective:  BP 99/64   Pulse 73   Temp 96.3  F (35.7  C)   Resp 16   SpO2 93%      Exam:  GENERAL APPEARANCE:  Alert, in no distress, oriented, cooperative  ENT:  Mouth with moist mucous membranes, except coated tongue with thrush and back of throat but improved from last visit  RESP:  respiratory effort of chest normal, lungs clear to auscultation except occas wheeze scattered , no respiratory distress  CV:  Auscultation of heart done , RRR, soft systolic murmur.  No rub, or gallop, no edema, +2 pedal pulses  ABDOMEN:  normal bowel sounds, soft, nontender  M/S:   DEJESUS equally, seen in chair in room  SKIN:  Inspection of skin and subcutaneous tissue baseline  NEURO:   Cranial nerves 2-12 are  Grossly intact  and  at patient's baseline  PSYCH:  oriented X 3, normal insight, judgement and memory, affect and mood normal    Recent Labs   Lab Test 03/26/19 03/21/19  0622    137   POTASSIUM 4.6 4.8   CHLORIDE 108* 109   CO2 22 23   ANIONGAP 9 5   GLC 78 94   BUN 33* 26   CR 1.44* 1.31*   DINORAH 10.0 9.3     Hemoglobin   Date Value Ref Range Status   03/21/2019 10.0 (L) 11.7 - 15.7 g/dL Final   03/20/2019 9.2 (L) 11.7 - 15.7 g/dL Final       ASSESSMENT / PLAN:  (J01.90) Subacute sinusitis, unspecified location  (primary encounter diagnosis)  Comment/Plan: Abx done, f/u ENT in ~~1 weeks. Cont ocean spray, menthol-type lozenges, Vicks rub under edge of nose--is improving, monitor    (B37.0) Oral thrush   (R07.0) Throat pain  Comment/Plan:  Cont  Nystatin and cont with brush tongue, improving monitor.     (N18.3) CKD (chronic kidney disease) stage 3, GFR 30-59 ml/min (H)  Comment/Plan: improving, check BMP 4/4 as has not been eating well     (M62.81) Generalized muscle weakness   (R53.81) Physical deconditioning  Comment/Plan: PT OT SLP--slow as very tired, monitor    (I10) Essential hypertension  Comment/Plan: running 110-130's SBP, will cont diltiazem--has parameters for holding. Adjust if needed.       Electronically signed by:  ARIEL Verdugo CNP

## 2019-04-02 ENCOUNTER — HOSPITAL ENCOUNTER (OUTPATIENT)
Facility: CLINIC | Age: 84
Setting detail: SPECIMEN
End: 2019-04-02
Attending: ALLERGY & IMMUNOLOGY
Payer: COMMERCIAL

## 2019-04-02 ENCOUNTER — INFUSION THERAPY VISIT (OUTPATIENT)
Dept: INFUSION THERAPY | Facility: CLINIC | Age: 84
End: 2019-04-02
Attending: ALLERGY & IMMUNOLOGY
Payer: COMMERCIAL

## 2019-04-02 VITALS
DIASTOLIC BLOOD PRESSURE: 82 MMHG | HEART RATE: 97 BPM | SYSTOLIC BLOOD PRESSURE: 135 MMHG | TEMPERATURE: 98.3 F | RESPIRATION RATE: 16 BRPM | OXYGEN SATURATION: 98 %

## 2019-04-02 DIAGNOSIS — L50.1 IDIOPATHIC URTICARIA: Primary | ICD-10-CM

## 2019-04-02 PROCEDURE — 25000128 H RX IP 250 OP 636: Performed by: ALLERGY & IMMUNOLOGY

## 2019-04-02 PROCEDURE — 96372 THER/PROPH/DIAG INJ SC/IM: CPT

## 2019-04-02 RX ADMIN — OMALIZUMAB 150 MG: 150 INJECTION, SOLUTION SUBCUTANEOUS at 13:11

## 2019-04-02 NOTE — PROGRESS NOTES
Infusion Nursing Note:  Juliana Leal presents today for Xolair.    Patient seen by provider today: No   present during visit today: Not Applicable.    Note: N/A.    Intravenous Access:  No Intravenous access/labs at this visit.    Treatment Conditions:  Not Applicable.      Post Infusion Assessment:  Patient tolerated injection without incident.  Patient observed for 30 minutes post xolair per protocol.       Discharge Plan:   Copy of AVS reviewed with patient and/or family.  Patient will return 5/1/19 for next appointment.  Patient discharged in stable condition accompanied by: son.  Departure Mode: Ambulatory.    Gladys Vu RN

## 2019-04-03 ENCOUNTER — RECORDS - HEALTHEAST (OUTPATIENT)
Dept: LAB | Facility: CLINIC | Age: 84
End: 2019-04-03

## 2019-04-03 VITALS
WEIGHT: 114 LBS | HEART RATE: 64 BPM | SYSTOLIC BLOOD PRESSURE: 113 MMHG | OXYGEN SATURATION: 95 % | HEIGHT: 65 IN | TEMPERATURE: 96.9 F | RESPIRATION RATE: 18 BRPM | DIASTOLIC BLOOD PRESSURE: 62 MMHG | BODY MASS INDEX: 18.99 KG/M2

## 2019-04-03 ASSESSMENT — MIFFLIN-ST. JEOR: SCORE: 907.98

## 2019-04-04 ENCOUNTER — DISCHARGE SUMMARY NURSING HOME (OUTPATIENT)
Dept: GERIATRICS | Facility: CLINIC | Age: 84
End: 2019-04-04
Payer: COMMERCIAL

## 2019-04-04 ENCOUNTER — TRANSFERRED RECORDS (OUTPATIENT)
Dept: HEALTH INFORMATION MANAGEMENT | Facility: CLINIC | Age: 84
End: 2019-04-04

## 2019-04-04 DIAGNOSIS — I10 ESSENTIAL HYPERTENSION: ICD-10-CM

## 2019-04-04 DIAGNOSIS — R53.81 PHYSICAL DECONDITIONING: ICD-10-CM

## 2019-04-04 DIAGNOSIS — B37.0 ORAL THRUSH: ICD-10-CM

## 2019-04-04 DIAGNOSIS — J01.90 SUBACUTE SINUSITIS, UNSPECIFIED LOCATION: Primary | ICD-10-CM

## 2019-04-04 DIAGNOSIS — N18.30 CKD (CHRONIC KIDNEY DISEASE) STAGE 3, GFR 30-59 ML/MIN (H): ICD-10-CM

## 2019-04-04 DIAGNOSIS — M62.81 GENERALIZED MUSCLE WEAKNESS: ICD-10-CM

## 2019-04-04 LAB
ANION GAP SERPL CALCULATED.3IONS-SCNC: 5 MMOL/L (ref 5–16)
ANION GAP SERPL CALCULATED.3IONS-SCNC: 5 MMOL/L (ref 5–18)
BUN SERPL-MCNC: 34 MG/DL (ref 8–28)
BUN SERPL-MCNC: 34 MG/DL (ref 8–28)
CALCIUM SERPL-MCNC: 10.3 MG/DL (ref 8.5–10.5)
CALCIUM SERPL-MCNC: 10.3 MG/DL (ref 8.5–10.5)
CHLORIDE BLD-SCNC: 107 MMOL/L (ref 98–107)
CHLORIDE SERPLBLD-SCNC: 107 MMOL/L (ref 98–107)
CO2 SERPL-SCNC: 27 MMOL/L (ref 22–31)
CO2 SERPL-SCNC: 27 MMOL/L (ref 22–31)
CREAT SERPL-MCNC: 1.24 MG/DL (ref 0.6–1.1)
CREAT SERPL-MCNC: 1.24 MG/DL (ref 0.6–1.1)
GFR SERPL CREATININE-BSD FRML MDRD: 40 ML/MIN/1.73M2
GFR SERPL CREATININE-BSD FRML MDRD: 40 ML/MIN/1.73M2
GLUCOSE BLD-MCNC: 93 MG/DL (ref 70–125)
GLUCOSE SERPL-MCNC: 93 MG/DL (ref 70–125)
POTASSIUM BLD-SCNC: 4.2 MMOL/L (ref 3.5–5)
POTASSIUM SERPL-SCNC: 4.2 MMOL/L (ref 3.5–5)
SODIUM SERPL-SCNC: 139 MMOL/L (ref 136–145)
SODIUM SERPL-SCNC: 139 MMOL/L (ref 136–145)

## 2019-04-04 PROCEDURE — 99316 NF DSCHRG MGMT 30 MIN+: CPT | Performed by: NURSE PRACTITIONER

## 2019-04-04 NOTE — PATIENT INSTRUCTIONS
[facility] Discharge Orders    Name: Juliana Leal  : 2/10/1923  Planned Discharge Date: 19  Discharged to: previous assisted living, [facility]    MEDICAL FOLLOW UP  Follow up with PCP in 1-2 weeks --family to make appt  [facility] with [facility], DR Irene per her recs. Has appt currently on 19 with her      FUTURE LABS: No labs orders/due    ORDER CHANGES:  See below    DISCHARGE MEDICATIONS:  The patient s pharmacy is authorized to dispense a 30-day supply of medications. Refill requests should be directed to the primary provider, Erick Ruano   No narcotics are prescribed at time of discharge.   Current Outpatient Medications   Medication Sig Dispense Refill     acetaminophen (TYLENOL) 500 MG tablet Take 500 mg by mouth 2 times daily as needed for mild pain       cetirizine (ZYRTEC) 10 MG tablet Take 1 tablet (10 mg) by mouth 2 times daily 30 tablet      diltiazem ER COATED BEADS (CARDIZEM CD/CARTIA XT) 300 MG 24 hr capsule Take 300 mg by mouth daily TAKE 1 CAPSULE(300 MG) BY MOUTH DAILY; HOLD if SBP <110 OR HR < 55. Call if held x 2 in a row symptomatic       emollient (VANICREAM) cream Apply topically as needed for other (leg rash)       gabapentin (NEURONTIN) 100 MG capsule Take 100 mg by mouth At Bedtime May have 1 additional dose prn at hs.       levothyroxine (SYNTHROID/LEVOTHROID) 88 MCG tablet Take 1 tablet (88 mcg) by mouth daily 90 tablet 2     Multiple Vitamins-Minerals (CENTRUM SILVER) per tablet Take 1 tablet by mouth daily       nystatin (MYCOSTATIN) 312807 unit/mL SUSP suspension Swish and swallow 5 mLs in mouth 4 times daily Continue give 5 ml by mouth four times a day for 10 more days then dc       omalizumab (XOLAIR) 150 MG injection Inject 150 mg Subcutaneous every 28 days HOLD WHILE IN TCU. Next dose 19. Can wait til after discharge.       omeprazole (PRILOSEC) 20 MG CR capsule Take 1 capsule (20 mg) by mouth daily 90 capsule 1     sodium  chloride (OCEAN) 0.65 % nasal spray Spray 2 sprays into both nostrils every hour as needed for congestion Or equivalent per kimberly. Pt. Can have at bedside.       triamcinolone (NASACORT) 55 MCG/ACT inhaler Spray 2 sprays into both nostrils daily 16.5 mL 1       SERVICES:  Home Care:  Physical Therapy, Home Health Aide and From:  [facility]    ADDITIONAL INSTRUCTIONS:  None    ARIEL Verdugo CNP  This document was electronically signed on April 4, 2019

## 2019-04-04 NOTE — PROGRESS NOTES
[facility] DISCHARGE SUMMARY    PATIENT'S NAME: Juliana Leal  YOB: 1923    PRIMARY CARE PROVIDER AND CLINIC RESPONSIBLE AFTER TRANSFER: Erick Ruano      CODE STATUS DNR/DNI    TRANSFERRING PROVIDERS: ARIEL Verdugo CNP, A. Ahmed, MD,      DATE OF SNF ADMISSION:  March / 22 / 2019    DATE OF SNF DISCHARGE (including anticipating DC): April / 05 / 2019    DISCHARGE DISPOSITION: [facility]    Nursing Facility: [facility] stay 3/19/19 to 3/22/19.     Condition on Discharge:  Improving.    Function:  See below  Cognitive Scores: see below    Physical Function: see below  Equipment: walker    Slums (date):      18/30  CPT (date): TBT     Other Cognitive Tests (date):       DATE: 26-Mar 28-Mar 2-Apr 4-Apr     Bed Mob   mod I mod I     Transfers cga to sup sup mod I mod I     Amb-Distance  feet 400-600 feet 140-500 feet 400 feet     Amb-Assist sba sba to sup sup sup to mod I, 4WW    Amb -Device 4ww 4ww 4ww amb no AD sup    Stairs amb no AD NT  140 ft, no AD, sba     Tinetti (date):  19/28 20/28  Tag: yellow        DISCHARGE DIAGNOSIS:      Subacute sinusitis, unspecified location  Oral thrush  Generalized muscle weakness  Physical deconditioning  Essential hypertension  CKD (chronic kidney disease) stage 3, GFR 30-59 ml/min (H)        HOSPITAL COURSE:  pt admitted from ENT office as she had persistent sinusitis and now thrush. She has had several courses of Abx. She had a nasal culture which showed Staph A and despite having Abx for that--still was not improved.  She was started on Diflucan, and Nystatin and cleocin(IV then po).  She has  SHANAE as not eating well and appetite poor. Sent for rehab and continuation of care     TCU/SNF COURSE: pt initially was not eating well, throat was still sore, thrush was very evident.  With good oral care, nystatin and diflucan, improved greatly. I I gave her a short  course of Afrin also.  Pt is not a big eater but is near baseline. He is stronger also, sleeping better, sinus pain/aches are gone.  BMP today was in steady range--see below      PAST MEDICAL HISTORY:  Past Medical History:   Diagnosis Date     Angioedema 2017    Due to ACE      Arthritis      CKD (chronic kidney disease) stage 3, GFR 30-59 ml/min (H) 7/23/2018     Gout      Headaches      Hives 02/2017    Dr KHOURY, avoid BBLK due to this condition     Hypertension      Thyroid disease        DISCHARGE MEDICATIONS:  Current Outpatient Medications   Medication Sig Dispense Refill     acetaminophen (TYLENOL) 500 MG tablet Take 500 mg by mouth 2 times daily as needed for mild pain       cetirizine (ZYRTEC) 10 MG tablet Take 1 tablet (10 mg) by mouth 2 times daily 30 tablet      diltiazem ER COATED BEADS (CARDIZEM CD/CARTIA XT) 300 MG 24 hr capsule Take 300 mg by mouth daily TAKE 1 CAPSULE(300 MG) BY MOUTH DAILY; HOLD if SBP <110 OR HR < 55. Call if held x 2 in a row symptomatic       emollient (VANICREAM) cream Apply topically as needed for other (leg rash)       gabapentin (NEURONTIN) 100 MG capsule Take 100 mg by mouth At Bedtime May have 1 additional dose prn at hs.       levothyroxine (SYNTHROID/LEVOTHROID) 88 MCG tablet Take 1 tablet (88 mcg) by mouth daily 90 tablet 2     Multiple Vitamins-Minerals (CENTRUM SILVER) per tablet Take 1 tablet by mouth daily       nystatin (MYCOSTATIN) 179010 unit/mL SUSP suspension Swish and swallow 5 mLs in mouth 4 times daily Continue give 5 ml by mouth four times a day for 10 more days then dc       omalizumab (XOLAIR) 150 MG injection Inject 150 mg Subcutaneous every 28 days HOLD WHILE IN TCU. Next dose 4/2/19. Can wait til after discharge.       omeprazole (PRILOSEC) 20 MG CR capsule Take 1 capsule (20 mg) by mouth daily 90 capsule 1     sodium chloride (OCEAN) 0.65 % nasal spray Spray 2 sprays into both nostrils every hour as needed for congestion Or equivalent per kimberly. Pt.  "Can have at bedside.       triamcinolone (NASACORT) 55 MCG/ACT inhaler Spray 2 sprays into both nostrils daily 16.5 mL 1       MEDICATION CHANGES/RATIONALE:   See discharge  Orders.    /62   Pulse 64   Temp 96.9  F (36.1  C)   Resp 18   Ht 1.651 m (5' 5\")   Wt 51.7 kg (114 lb)   SpO2 95%   BMI 18.97 kg/m      TODAY DURING EXAM/ROS:  No CP, SOB, Cough, dizziness, fevers, chills, HA, N/V, dysuria or Bowel Abnormalities. Appetite is better.  No pain      Exam:  GENERAL APPEARANCE:  Alert, in no distress, oriented, cooperative  ENT:  Mouth with moist mucous membranes, scant thrush on tongue  RESP:  respiratory effort of chest normal, lungs clear to auscultation, no respiratory distress  CV:  Auscultation of heart done , RRR, soft systolic murmur.  No rub, or gallop, no edema, +2 pedal pulses  ABDOMEN:  normal bowel sounds, soft, nontender  M/S:   DEJESUS equally, seen in chair in room  SKIN:  Inspection of skin and subcutaneous tissue baseline  NEURO:   Cranial nerves 2-12 are grossly intact and  at patient's baseline  PSYCH:  oriented X 3, normal insight, judgement and memory, affect and mood normal     SNF LABS    Recent Labs   4/4/19  GFR 40 Lab Test 03/26/19 03/21/19  0622   139  137   4.2 POTASSIUM 4.6 4.8   107 CHLORIDE 108* 109   27 CO2 22 23    ANIONGAP 9 5   93 GLC 78 94   34 BUN 33* 26   1.24 CR 1.44* 1.31*   10.3 DINORAH 10.0 9.3             DISCHARGE PLAN:  Physical Therapy, Home Health Aide and From:  [facility] Follow-up with PCP in 7 days: family to call   Current [facility] or other scheduled appointments:none    MTM referral needed and placed by this provider: none    Pending labs: none     Discharge Treatments:none except if rec'd by ENT as outpt       TOTAL DISCHARGE TIME:   Greater than 30 minutes    ARIEL Verdugo CNP             "

## 2019-04-04 NOTE — Clinical Note
HI Pt is going home tomorrow.  She is better though still has thrush.  She sees ENT--Dr Irene on 4/11--week.. Would you or some provider in your office see next week--trying to keep her out of hospital again.Thanks Ketty

## 2019-04-07 DIAGNOSIS — K21.9 GASTROESOPHAGEAL REFLUX DISEASE, ESOPHAGITIS PRESENCE NOT SPECIFIED: ICD-10-CM

## 2019-04-08 ENCOUNTER — DOCUMENTATION ONLY (OUTPATIENT)
Dept: INTERNAL MEDICINE | Facility: CLINIC | Age: 84
End: 2019-04-08

## 2019-04-08 NOTE — PROGRESS NOTES
Claire with  HC calling,     Will see patient tomorrow for PT.     Informed approved per standing orders. HC staff will fax these orders for MD signature.      Hilda MORAN RN, BSN, PHN

## 2019-04-11 ENCOUNTER — APPOINTMENT (OUTPATIENT)
Dept: CT IMAGING | Facility: CLINIC | Age: 84
DRG: 152 | End: 2019-04-11
Attending: PHYSICIAN ASSISTANT
Payer: COMMERCIAL

## 2019-04-11 ENCOUNTER — HOSPITAL ENCOUNTER (INPATIENT)
Facility: CLINIC | Age: 84
LOS: 4 days | Discharge: NURSING, REHAB OR LONG-TERM CARE | DRG: 152 | End: 2019-04-16
Attending: EMERGENCY MEDICINE | Admitting: INTERNAL MEDICINE
Payer: COMMERCIAL

## 2019-04-11 DIAGNOSIS — R42 VERTIGO: Primary | ICD-10-CM

## 2019-04-11 DIAGNOSIS — J01.90 SUBACUTE SINUSITIS, UNSPECIFIED LOCATION: ICD-10-CM

## 2019-04-11 DIAGNOSIS — J02.0 ACUTE STREPTOCOCCAL PHARYNGITIS: ICD-10-CM

## 2019-04-11 DIAGNOSIS — R07.0 THROAT PAIN: ICD-10-CM

## 2019-04-11 DIAGNOSIS — J31.2 SORE THROAT, CHRONIC: ICD-10-CM

## 2019-04-11 DIAGNOSIS — R63.4 UNINTENTIONAL WEIGHT LOSS: ICD-10-CM

## 2019-04-11 DIAGNOSIS — R62.7 FAILURE TO THRIVE IN ADULT: ICD-10-CM

## 2019-04-11 DIAGNOSIS — R13.10 ODYNOPHAGIA: ICD-10-CM

## 2019-04-11 LAB
ANION GAP SERPL CALCULATED.3IONS-SCNC: 9 MMOL/L (ref 3–14)
BASOPHILS # BLD AUTO: 0.1 10E9/L (ref 0–0.2)
BASOPHILS NFR BLD AUTO: 0.6 %
BUN SERPL-MCNC: 35 MG/DL (ref 7–30)
CALCIUM SERPL-MCNC: 10.6 MG/DL (ref 8.5–10.1)
CHLORIDE SERPL-SCNC: 107 MMOL/L (ref 94–109)
CO2 SERPL-SCNC: 23 MMOL/L (ref 20–32)
CREAT SERPL-MCNC: 1.27 MG/DL (ref 0.52–1.04)
CRP SERPL-MCNC: 101 MG/L (ref 0–8)
DEPRECATED S PYO AG THROAT QL EIA: NORMAL
DIFFERENTIAL METHOD BLD: ABNORMAL
EOSINOPHIL # BLD AUTO: 0 10E9/L (ref 0–0.7)
EOSINOPHIL NFR BLD AUTO: 0.3 %
ERYTHROCYTE [DISTWIDTH] IN BLOOD BY AUTOMATED COUNT: 18.3 % (ref 10–15)
GFR SERPL CREATININE-BSD FRML MDRD: 36 ML/MIN/{1.73_M2}
GLUCOSE SERPL-MCNC: 130 MG/DL (ref 70–99)
HCT VFR BLD AUTO: 36 % (ref 35–47)
HGB BLD-MCNC: 11 G/DL (ref 11.7–15.7)
IMM GRANULOCYTES # BLD: 0 10E9/L (ref 0–0.4)
IMM GRANULOCYTES NFR BLD: 0.3 %
LACTATE BLD-SCNC: 1.6 MMOL/L (ref 0.7–2)
LYMPHOCYTES # BLD AUTO: 1.4 10E9/L (ref 0.8–5.3)
LYMPHOCYTES NFR BLD AUTO: 12.2 %
MCH RBC QN AUTO: 29.5 PG (ref 26.5–33)
MCHC RBC AUTO-ENTMCNC: 30.6 G/DL (ref 31.5–36.5)
MCV RBC AUTO: 97 FL (ref 78–100)
MONOCYTES # BLD AUTO: 2.3 10E9/L (ref 0–1.3)
MONOCYTES NFR BLD AUTO: 19.9 %
NEUTROPHILS # BLD AUTO: 7.7 10E9/L (ref 1.6–8.3)
NEUTROPHILS NFR BLD AUTO: 66.7 %
NRBC # BLD AUTO: 0 10*3/UL
NRBC BLD AUTO-RTO: 0 /100
PLATELET # BLD AUTO: 223 10E9/L (ref 150–450)
POTASSIUM SERPL-SCNC: 3.8 MMOL/L (ref 3.4–5.3)
RBC # BLD AUTO: 3.73 10E12/L (ref 3.8–5.2)
SODIUM SERPL-SCNC: 139 MMOL/L (ref 133–144)
SPECIMEN SOURCE: NORMAL
WBC # BLD AUTO: 11.5 10E9/L (ref 4–11)

## 2019-04-11 PROCEDURE — 99220 ZZC INITIAL OBSERVATION CARE,LEVL III: CPT | Performed by: PHYSICIAN ASSISTANT

## 2019-04-11 PROCEDURE — 87081 CULTURE SCREEN ONLY: CPT | Performed by: EMERGENCY MEDICINE

## 2019-04-11 PROCEDURE — 36415 COLL VENOUS BLD VENIPUNCTURE: CPT | Performed by: PHYSICIAN ASSISTANT

## 2019-04-11 PROCEDURE — 96360 HYDRATION IV INFUSION INIT: CPT

## 2019-04-11 PROCEDURE — 80048 BASIC METABOLIC PNL TOTAL CA: CPT | Performed by: EMERGENCY MEDICINE

## 2019-04-11 PROCEDURE — G0378 HOSPITAL OBSERVATION PER HR: HCPCS

## 2019-04-11 PROCEDURE — 70486 CT MAXILLOFACIAL W/O DYE: CPT

## 2019-04-11 PROCEDURE — 25800030 ZZH RX IP 258 OP 636: Performed by: PHYSICIAN ASSISTANT

## 2019-04-11 PROCEDURE — 96361 HYDRATE IV INFUSION ADD-ON: CPT

## 2019-04-11 PROCEDURE — 87040 BLOOD CULTURE FOR BACTERIA: CPT | Performed by: PHYSICIAN ASSISTANT

## 2019-04-11 PROCEDURE — 25000128 H RX IP 250 OP 636: Performed by: EMERGENCY MEDICINE

## 2019-04-11 PROCEDURE — 25000128 H RX IP 250 OP 636: Performed by: INTERNAL MEDICINE

## 2019-04-11 PROCEDURE — 87880 STREP A ASSAY W/OPTIC: CPT | Performed by: EMERGENCY MEDICINE

## 2019-04-11 PROCEDURE — 99285 EMERGENCY DEPT VISIT HI MDM: CPT | Mod: 25

## 2019-04-11 PROCEDURE — 25000132 ZZH RX MED GY IP 250 OP 250 PS 637: Performed by: PHYSICIAN ASSISTANT

## 2019-04-11 PROCEDURE — 86140 C-REACTIVE PROTEIN: CPT | Performed by: EMERGENCY MEDICINE

## 2019-04-11 PROCEDURE — 96374 THER/PROPH/DIAG INJ IV PUSH: CPT

## 2019-04-11 PROCEDURE — 85025 COMPLETE CBC W/AUTO DIFF WBC: CPT | Performed by: EMERGENCY MEDICINE

## 2019-04-11 PROCEDURE — 83605 ASSAY OF LACTIC ACID: CPT | Performed by: EMERGENCY MEDICINE

## 2019-04-11 PROCEDURE — 25800030 ZZH RX IP 258 OP 636: Performed by: INTERNAL MEDICINE

## 2019-04-11 PROCEDURE — 70490 CT SOFT TISSUE NECK W/O DYE: CPT

## 2019-04-11 RX ORDER — ACETAMINOPHEN 325 MG/1
650 TABLET ORAL EVERY 4 HOURS PRN
Status: DISCONTINUED | OUTPATIENT
Start: 2019-04-11 | End: 2019-04-16 | Stop reason: HOSPADM

## 2019-04-11 RX ORDER — AMOXICILLIN 250 MG
2 CAPSULE ORAL 2 TIMES DAILY PRN
Status: DISCONTINUED | OUTPATIENT
Start: 2019-04-11 | End: 2019-04-16 | Stop reason: HOSPADM

## 2019-04-11 RX ORDER — LIDOCAINE 40 MG/G
CREAM TOPICAL
Status: DISCONTINUED | OUTPATIENT
Start: 2019-04-11 | End: 2019-04-16 | Stop reason: HOSPADM

## 2019-04-11 RX ORDER — ONDANSETRON 4 MG/1
4 TABLET, ORALLY DISINTEGRATING ORAL EVERY 6 HOURS PRN
Status: DISCONTINUED | OUTPATIENT
Start: 2019-04-11 | End: 2019-04-16 | Stop reason: HOSPADM

## 2019-04-11 RX ORDER — GABAPENTIN 100 MG/1
100 CAPSULE ORAL
Status: ON HOLD | COMMUNITY
End: 2019-05-13

## 2019-04-11 RX ORDER — AMOXICILLIN 250 MG
1 CAPSULE ORAL 2 TIMES DAILY PRN
Status: DISCONTINUED | OUTPATIENT
Start: 2019-04-11 | End: 2019-04-16 | Stop reason: HOSPADM

## 2019-04-11 RX ORDER — TRIAMCINOLONE ACETONIDE 55 UG/1
2 SPRAY, METERED NASAL DAILY
Status: DISCONTINUED | OUTPATIENT
Start: 2019-04-12 | End: 2019-04-16 | Stop reason: HOSPADM

## 2019-04-11 RX ORDER — POLYETHYLENE GLYCOL 3350 17 G/17G
17 POWDER, FOR SOLUTION ORAL DAILY PRN
Status: DISCONTINUED | OUTPATIENT
Start: 2019-04-11 | End: 2019-04-16 | Stop reason: HOSPADM

## 2019-04-11 RX ORDER — GABAPENTIN 100 MG/1
100 CAPSULE ORAL AT BEDTIME
Status: DISCONTINUED | OUTPATIENT
Start: 2019-04-11 | End: 2019-04-16 | Stop reason: HOSPADM

## 2019-04-11 RX ORDER — MORPHINE SULFATE 2 MG/ML
1-2 INJECTION, SOLUTION INTRAMUSCULAR; INTRAVENOUS EVERY 6 HOURS PRN
Status: DISCONTINUED | OUTPATIENT
Start: 2019-04-11 | End: 2019-04-16 | Stop reason: HOSPADM

## 2019-04-11 RX ORDER — NALOXONE HYDROCHLORIDE 0.4 MG/ML
.1-.4 INJECTION, SOLUTION INTRAMUSCULAR; INTRAVENOUS; SUBCUTANEOUS
Status: DISCONTINUED | OUTPATIENT
Start: 2019-04-11 | End: 2019-04-16 | Stop reason: HOSPADM

## 2019-04-11 RX ORDER — LEVOTHYROXINE SODIUM 88 UG/1
88 TABLET ORAL DAILY
Status: DISCONTINUED | OUTPATIENT
Start: 2019-04-11 | End: 2019-04-16 | Stop reason: HOSPADM

## 2019-04-11 RX ORDER — ONDANSETRON 2 MG/ML
4 INJECTION INTRAMUSCULAR; INTRAVENOUS EVERY 6 HOURS PRN
Status: DISCONTINUED | OUTPATIENT
Start: 2019-04-11 | End: 2019-04-16 | Stop reason: HOSPADM

## 2019-04-11 RX ORDER — DIPHENHYDRAMINE HYDROCHLORIDE AND LIDOCAINE HYDROCHLORIDE AND ALUMINUM HYDROXIDE AND MAGNESIUM HYDRO
10 KIT EVERY 6 HOURS PRN
Status: DISCONTINUED | OUTPATIENT
Start: 2019-04-11 | End: 2019-04-16 | Stop reason: HOSPADM

## 2019-04-11 RX ORDER — SODIUM CHLORIDE, SODIUM LACTATE, POTASSIUM CHLORIDE, CALCIUM CHLORIDE 600; 310; 30; 20 MG/100ML; MG/100ML; MG/100ML; MG/100ML
INJECTION, SOLUTION INTRAVENOUS CONTINUOUS
Status: DISCONTINUED | OUTPATIENT
Start: 2019-04-11 | End: 2019-04-12

## 2019-04-11 RX ORDER — ACETAMINOPHEN 650 MG/1
650 SUPPOSITORY RECTAL EVERY 4 HOURS PRN
Status: DISCONTINUED | OUTPATIENT
Start: 2019-04-11 | End: 2019-04-16 | Stop reason: HOSPADM

## 2019-04-11 RX ORDER — CETIRIZINE HYDROCHLORIDE 10 MG/1
10 TABLET ORAL 2 TIMES DAILY
Status: DISCONTINUED | OUTPATIENT
Start: 2019-04-11 | End: 2019-04-16 | Stop reason: HOSPADM

## 2019-04-11 RX ORDER — HYDROCODONE BITARTRATE AND ACETAMINOPHEN 5; 325 MG/1; MG/1
1 TABLET ORAL EVERY 6 HOURS PRN
Status: DISCONTINUED | OUTPATIENT
Start: 2019-04-11 | End: 2019-04-16 | Stop reason: HOSPADM

## 2019-04-11 RX ADMIN — DILTIAZEM HYDROCHLORIDE 300 MG: 180 CAPSULE, COATED, EXTENDED RELEASE ORAL at 17:46

## 2019-04-11 RX ADMIN — OMEPRAZOLE 20 MG: 20 CAPSULE, DELAYED RELEASE ORAL at 17:46

## 2019-04-11 RX ADMIN — HYDROCODONE BITARTRATE AND ACETAMINOPHEN 1 TABLET: 5; 325 TABLET ORAL at 18:37

## 2019-04-11 RX ADMIN — SODIUM CHLORIDE 1000 ML: 9 INJECTION, SOLUTION INTRAVENOUS at 13:03

## 2019-04-11 RX ADMIN — LEVOTHYROXINE SODIUM 88 MCG: 88 TABLET ORAL at 17:47

## 2019-04-11 RX ADMIN — VANCOMYCIN HYDROCHLORIDE 750 MG: 1 INJECTION, POWDER, LYOPHILIZED, FOR SOLUTION INTRAVENOUS at 19:57

## 2019-04-11 RX ADMIN — SODIUM CHLORIDE, POTASSIUM CHLORIDE, SODIUM LACTATE AND CALCIUM CHLORIDE: 600; 310; 30; 20 INJECTION, SOLUTION INTRAVENOUS at 18:36

## 2019-04-11 RX ADMIN — GABAPENTIN 100 MG: 100 CAPSULE ORAL at 22:23

## 2019-04-11 ASSESSMENT — MIFFLIN-ST. JEOR: SCORE: 921.59

## 2019-04-11 ASSESSMENT — ENCOUNTER SYMPTOMS
SORE THROAT: 1
UNEXPECTED WEIGHT CHANGE: 1
NAUSEA: 0
COUGH: 0
VOMITING: 0
DIARRHEA: 0
FEVER: 0

## 2019-04-11 ASSESSMENT — ACTIVITIES OF DAILY LIVING (ADL)
FALL_HISTORY_WITHIN_LAST_SIX_MONTHS: YES
COGNITION: 0 - NO COGNITION ISSUES REPORTED
SWALLOWING: 0-->SWALLOWS FOODS/LIQUIDS WITHOUT DIFFICULTY
NUMBER_OF_TIMES_PATIENT_HAS_FALLEN_WITHIN_LAST_SIX_MONTHS: 1
RETIRED_EATING: 0-->INDEPENDENT
WHICH_OF_THE_ABOVE_FUNCTIONAL_RISKS_HAD_A_RECENT_ONSET_OR_CHANGE?: FALL HISTORY
TRANSFERRING: 1-->ASSISTIVE EQUIPMENT
RETIRED_COMMUNICATION: 0-->UNDERSTANDS/COMMUNICATES WITHOUT DIFFICULTY
AMBULATION: 1-->ASSISTIVE EQUIPMENT
DRESS: 0-->INDEPENDENT
BATHING: 1-->ASSISTIVE EQUIPMENT
TOILETING: 1-->ASSISTIVE EQUIPMENT

## 2019-04-11 NOTE — ED TRIAGE NOTES
Pt A & O x 4. ABCs intact. Per family, pt was having issues with mouth sores since last fall. Pt not able to drink or eat.

## 2019-04-11 NOTE — PHARMACY-ADMISSION MEDICATION HISTORY
Admission medication history interview status for this patient is complete. See [facility] admission navigator for allergy information, prior to admission medications and immunization status.     Medication history interview source(s):Patient and family  Medication history resources (including written lists, pill bottles, clinic record):med list from recent [facility] discharge and med list from TCU discharge 4/4/19  Primary pharmacy:---    Changes made to PTA medication list:  Added: ----  Deleted: -----  Changed:  scheduled and prn gabapentin entry    Actions taken by pharmacist (provider contacted, etc):None     Additional medication history information:pt states only had tylenol this am.  Family states pt religiously takes meds.  Only med change made at [facility] was nystatin swish and swallow    Medication reconciliation/reorder completed by provider prior to medication history? No      For patients on insulin therapy:N    Prior to Admission medications    Medication Sig Last Dose Taking? Auth Provider   acetaminophen (TYLENOL) 500 MG tablet Take 500 mg by mouth 2 times daily as needed for mild pain 4/11/2019 at am Yes Unknown, Entered By History   cetirizine (ZYRTEC) 10 MG tablet Take 1 tablet (10 mg) by mouth 2 times daily 4/10/2019 at Unknown time Yes Federico Manning MD   diltiazem ER COATED BEADS (CARDIZEM CD/CARTIA XT) 300 MG 24 hr capsule Take 300 mg by mouth daily TAKE 1 CAPSULE(300 MG) BY MOUTH DAILY; HOLD if SBP <110 OR HR < 55. Call if held x 2 in a row symptomatic 4/10/2019 at Unknown time Yes Reported, Patient   gabapentin (NEURONTIN) 100 MG capsule Take 100 mg by mouth nightly as needed  Yes Unknown, Entered By History   gabapentin (NEURONTIN) 100 MG capsule Take 100 mg by mouth At Bedtime  4/10/2019 at Unknown time Yes Unknown, Entered By History   levothyroxine (SYNTHROID/LEVOTHROID) 88 MCG tablet Take 1 tablet (88 mcg) by mouth daily 4/10/2019 at Unknown time Yes Erick Ruano MD   Multiple  Vitamins-Minerals (CENTRUM SILVER) per tablet Take 1 tablet by mouth daily 4/10/2019 at Unknown time Yes Reported, Patient   nystatin (MYCOSTATIN) 908640 unit/mL SUSP suspension Swish and swallow 5 mLs in mouth 4 times daily Continue give 5 ml by mouth four times a day for 10 more days then dc 4/10/2019 at 10 day course to be completed 4/14/19 Yes Reported, Patient   omeprazole (PRILOSEC) 20 MG CR capsule Take 1 capsule (20 mg) by mouth daily 4/10/2019 at Unknown time Yes Erick Ruano MD   triamcinolone (NASACORT) 55 MCG/ACT inhaler Spray 2 sprays into both nostrils daily 4/10/2019 at Unknown time Yes Erick Ruano MD   emollient (VANICREAM) cream Apply topically as needed for other (leg rash)   Reported, Patient   omalizumab (XOLAIR) 150 MG injection Inject 150 mg Subcutaneous every 28 days HOLD WHILE IN TCU. Next dose 4/2/19. Can wait til after discharge. 4/2/2019  Unknown, Entered By History   sodium chloride (OCEAN) 0.65 % nasal spray Spray 2 sprays into both nostrils every hour as needed for congestion Or equivalent per kimberly. Pt. Can have at bedside.   Reported, Patient

## 2019-04-11 NOTE — ED NOTES
"[facility]  ED Nurse Handoff Report    Juliana Leal is a 89 year old female   ED Chief complaint: No chief complaint on file.  . ED Diagnosis:   Final diagnoses:   Failure to thrive in adult   Sore throat, chronic   Odynophagia   Unintentional weight loss     Allergies:   Allergies   Allergen Reactions     Lisinopril Other (See Comments) and Anaphylaxis     Swelling around mouth     Prednisone Unknown     \"every side effect listed\"     Allegra [Fexofenadine] Swelling     Amoxicillin Itching     Cipro [Quinolones] Itching     Codeine Sulfate Other (See Comments)     Felt out of it when took it yrs ago     Hydroxyzine      Feels drunk     Iodine [Gnp Iodides Decolorized]      Lyrica [Pregabalin]      Reaction unknown     Norvasc [Amlodipine Besylate]      Mouth felt funny     Tramadol      Felt loopy     Zaditor Other (See Comments) and Swelling     Tongue swelled  Didn't feel right  Eye drops     Penicillins Rash       Code Status: DNR / DNI  Activity level - Baseline/Home:  Stand with Assist. Activity Level - Current:   Stand with Assist. Lift room needed: No. Bariatric: No   Needed: No   Isolation: No. Infection: Not Applicable.     Vital Signs:   Vitals:    04/11/19 1215 04/11/19 1216 04/11/19 1326 04/11/19 1332   BP: 137/87      Pulse: 95      Resp:    16   Temp:   97.5  F (36.4  C)    TempSrc:   Oral    SpO2: 98%      Weight:  53.1 kg (117 lb)         Cardiac Rhythm:  ,      Pain level:    Patient confused: No. Patient Falls Risk: Yes.   Elimination Status: Unable to void   Patient Report - Sore throat, dehydrated   Focused Assessment: HEENT - HEENT WDL: lips; mucosa  Lip Symptoms: dry   HEENT - Oral Mucosa Symptoms: no lesion(s); moist; no swelling; pink   Tests Performed:   Labs Ordered and Resulted from Time of ED Arrival Up to the Time of Departure from the ED   CBC WITH PLATELETS DIFFERENTIAL - Abnormal; Notable for the following components:       Result Value    WBC 11.5 (*)     " RBC Count 3.73 (*)     Hemoglobin 11.0 (*)     MCHC 30.6 (*)     RDW 18.3 (*)     Absolute Monocytes 2.3 (*)     All other components within normal limits   BASIC METABOLIC PANEL - Abnormal; Notable for the following components:    Glucose 130 (*)     Urea Nitrogen 35 (*)     Creatinine 1.27 (*)     GFR Estimate 36 (*)     GFR Estimate If Black 41 (*)     Calcium 10.6 (*)     All other components within normal limits   CRP INFLAMMATION - Abnormal; Notable for the following components:    CRP Inflammation 101.0 (*)     All other components within normal limits   LACTIC ACID WHOLE BLOOD   PERIPHERAL IV CATHETER   PULSE OXIMETRY NURSING   RAPID STREP SCREEN   BETA STREP GROUP A CULTURE        Treatments provided:   Medications   0.9% sodium chloride BOLUS (1,000 mLs Intravenous New Bag 4/11/19 1303)       Family Comments: Family present  OBS brochure/video discussed/provided to patient:  Yes  ED Medications:   Medications   0.9% sodium chloride BOLUS (1,000 mLs Intravenous New Bag 4/11/19 1303)     Drips infusing:  No  For the majority of the shift, the patient's behavior Green. Interventions performed were N/A     Severe Sepsis OR Septic Shock Diagnosis Present: No      ED Nurse Name/Phone Number: Joy Trejo,   1:53 PM    RECEIVING UNIT ED HANDOFF REVIEW    Above ED Nurse Handoff Report was reviewed: yes  Reviewed by: Kelin Mcmahon on April 11, 2019 at 3:35 PM

## 2019-04-11 NOTE — H&P
History and Physical     Juliana Leal MRN# [id number]   YOB: 1923 Age: 89 year old      Date of Admission:  4/11/2019    Primary care provider: Erick Ruano          Assessment and Plan:   Juliana Leal is a 89 year old spry female with hx of CKD, HTN, hypothyroidism and neuropathy whose main complaint of throat pain dates back to 8/2018. Since that time she has been treated with multiple rounds of antibiotics for sinusitis, seen by ENT with nasal endoscopy performed with very thick secretions cultured to be staph aureus, and Fluconazole for thrush but continues to have severe throat pain causing poor oral intake, debilitating weight loss and weakness.    Patient was discussed with Dr. Lozada, who was provider in ED. Chart review of ED work up was reviewed as well as chart review of Care Everywhere, previous visits and admissions.     1.  Throat discomfort with history of sinusitis  Complicated history since August of this year but basically has complained of throat pain with radiation to left ear persistently with findings on exam suggestive of sinusitis.  Has been treated with multiple rounds of antibiotics without improvement.  ENT performed nasal endoscopy in March showing an unusual amount of thick nasal secretions which were cultured to be staph aureus (treated with Bactrim but also susceptible to Doxy and Augmentin).  Her course was complicated by a course of thrush treated with Diflucan.  She continues to have poor oral intake and discomfort with swallowing her thick secretions.  She is losing weight and becoming quite frail. On initial presentation in August 2018 she had a CT of the soft tissue in her neck which was negative for abscess and a CT was performed of her sinuses by ENT in March but I can not see results. Family is requesting ENT consultation (they follow with Dr. Harper from [facility] care).  -ENT consult for possible scope (spoke with on phone and they will  discuss case with Dr. Harper)  -ENT recommend sinus CT now and will obtain CT soft tissue neck  -Sinus and blood cultures x 2  -Add Vancomycin based on talking to ENT who believe maybe she just needs prolonged course of antibitoics (this is from culture data they performed in March)  -Continue Cetirizine, Nasacort and Ocean nasal spray  -Add Magic Mouthwash  -NPO after midnight in case procedure is needed    2. Generalized weakness with fraility  Unable to ambulated today due to diffuse weakness. Reports poor oral intake and continued weight loss since #1 started. Just discharged from rehab on 4/4  -Fluid support with lactated Ringer's at 100 mL/h  -Social work and PT consult  -Family has made the comment that she may need long-term care    3. Hypothyroidism  -Continue PTA Levothyroxine    4. HTN  -Continue PTA Diltiazem    5 CKD  Baseline creatinine of 1.4-1.5 with current creatinine of 1.27  -Avoid nephrotoxins        Social: Walks with a walker with no recent falls.  Lives independently but family is concerned she cannot continue to do this.  Code: Discussed with patient  and they have chosen DNR/DNI  VTE prophylaxis: PCD's  Disposition: Observation                    Chief Complaint:   Throat pain and weakness         History of Present Illness:   Juliana Leal is a 89 year old female who presents with continued throat discomfort with eating and drinking that radiates to the left ear, diffuse weakness with inability to walk and complaints of nasal drainage.  Based on chart review she has been complaining of throat discomfort and sinus symptoms since August of this year.  She has been treated with multiple rounds of antibiotics without improvement and even seen by an ENT specialist with cultures done of her sinuses growing staph aureus (I cannot see this culture data but it was noted in a previous admission note).  She recently discharged from a rehab facility and has been home for the past 5 days but was too weak  to walk to her ENT appointment today.  Her ENT provider was called who requested a consult in house.  She continues to have throat discomfort especially with eating and drinking that she describes as a zinging sensation.  She also has significant mucus drainage from her nose that is stringy and difficult to swallow.  She coughs this up and there is some bright red blood in it.  She has not had fever or chills, diarrhea or vomiting.  She is persistently losing weight due to her inability to eat and is becoming more and more weak.  Her family is adamant that she sees ENT on this visit and that we figure out what the source of this discomfort is in order to treat it.              Past Medical History:     Past Medical History:   Diagnosis Date     Angioedema 2017    Due to ACE      Arthritis      CKD (chronic kidney disease) stage 3, GFR 30-59 ml/min (H) 7/23/2018     Gout      Headaches      Hives 02/2017    Dr KHOURY, avoid BBLK due to this condition     Hypertension      Thyroid disease                Past Surgical History:     Past Surgical History:   Procedure Laterality Date     COLECTOMY      diverticulitis     HC LIGATION OR BIOPSY TEMPORAL ARTERY  5/10/2012    Procedure:BIOPSY ARTERY TEMPORAL; Surgeon:RUI LOREDO; Location: OR     INCISE FINGER TENDON SHEATH       PHACOEMULSIFICATION CLEAR CORNEA WITH STANDARD INTRAOCULAR LENS IMPLANT  2/29/2012    Procedure:PHACOEMULSIFICATION CLEAR CORNEA WITH STANDARD INTRAOCULAR LENS IMPLANT; RIGHT PHACOEMULSIFICATION CLEAR CORNEA WITH STANDARD INTRAOCULAR LENS IMPLANT; Surgeon:MANJIT LYONS; Location: [facility]     PHACOEMULSIFICATION CLEAR CORNEA WITH STANDARD INTRAOCULAR LENS IMPLANT  3/14/2012    Procedure:PHACOEMULSIFICATION CLEAR CORNEA WITH STANDARD INTRAOCULAR LENS IMPLANT; LEFT PHACOEMULSIFICATION CLEAR CORNEA WITH STANDARD INTRAOCULAR LENS IMPLANT ; Surgeon:MANJIT LYONS; Location: [facility]     REPAIR ATRIAL SEPTAL DEFECT                 Social History:     Social  "History     Socioeconomic History     Marital status:      Spouse name: Not on file     Number of children: Not on file     Years of education: Not on file     Highest education level: Not on file   Occupational History     Not on file   Social Needs     Financial resource strain: Not on file     Food insecurity:     Worry: Not on file     Inability: Not on file     Transportation needs:     Medical: Not on file     Non-medical: Not on file   Tobacco Use     Smoking status: Former Smoker     Types: Cigarettes     Last attempt to quit: 1960     Years since quittin.3     Smokeless tobacco: Never Used   Substance and Sexual Activity     Alcohol use: No     Drug use: Not on file     Sexual activity: Not on file   Lifestyle     Physical activity:     Days per week: Not on file     Minutes per session: Not on file     Stress: Not on file   Relationships     Social connections:     Talks on phone: Not on file     Gets together: Not on file     Attends Spiritism service: Not on file     Active member of club or organization: Not on file     Attends meetings of clubs or organizations: Not on file     Relationship status: Not on file     Intimate partner violence:     Fear of current or ex partner: Not on file     Emotionally abused: Not on file     Physically abused: Not on file     Forced sexual activity: Not on file   Other Topics Concern     Parent/sibling w/ CABG, MI or angioplasty before 65F 55M? Not Asked   Social History Narrative     Not on file               Family History:   Family history reviewed and is non contributory         Allergies:      Allergies   Allergen Reactions     Lisinopril Other (See Comments) and Anaphylaxis     Swelling around mouth     Prednisone Unknown     \"every side effect listed\"     Allegra [Fexofenadine] Swelling     Amoxicillin Itching     Cipro [Quinolones] Itching     Codeine Sulfate Other (See Comments)     Felt out of it when took it yrs ago     Hydroxyzine      " Feels drunk     Iodine [Gnp Iodides Decolorized]      Lyrica [Pregabalin]      Reaction unknown     Norvasc [Amlodipine Besylate]      Mouth felt funny     Tramadol      Felt loopy     Zaditor Other (See Comments) and Swelling     Tongue swelled  Didn't feel right  Eye drops     Penicillins Rash               Medications:     Prior to Admission medications    Medication Sig Last Dose Taking? Auth Provider   acetaminophen (TYLENOL) 500 MG tablet Take 500 mg by mouth 2 times daily as needed for mild pain 4/11/2019 at am Yes Unknown, Entered By History   cetirizine (ZYRTEC) 10 MG tablet Take 1 tablet (10 mg) by mouth 2 times daily 4/10/2019 at Unknown time Yes Federico Manning MD   diltiazem ER COATED BEADS (CARDIZEM CD/CARTIA XT) 300 MG 24 hr capsule Take 300 mg by mouth daily TAKE 1 CAPSULE(300 MG) BY MOUTH DAILY; HOLD if SBP <110 OR HR < 55. Call if held x 2 in a row symptomatic 4/10/2019 at Unknown time Yes Reported, Patient   gabapentin (NEURONTIN) 100 MG capsule Take 100 mg by mouth nightly as needed  Yes Unknown, Entered By History   gabapentin (NEURONTIN) 100 MG capsule Take 100 mg by mouth At Bedtime  4/10/2019 at Unknown time Yes Unknown, Entered By History   levothyroxine (SYNTHROID/LEVOTHROID) 88 MCG tablet Take 1 tablet (88 mcg) by mouth daily 4/10/2019 at Unknown time Yes Erick Ruano MD   Multiple Vitamins-Minerals (CENTRUM SILVER) per tablet Take 1 tablet by mouth daily 4/10/2019 at Unknown time Yes Reported, Patient   nystatin (MYCOSTATIN) 870793 unit/mL SUSP suspension Swish and swallow 5 mLs in mouth 4 times daily Continue give 5 ml by mouth four times a day for 10 more days then dc 4/10/2019 at 10 day course to be completed 4/14/19 Yes Reported, Patient   omeprazole (PRILOSEC) 20 MG CR capsule Take 1 capsule (20 mg) by mouth daily 4/10/2019 at Unknown time Yes Erick Ruano MD   triamcinolone (NASACORT) 55 MCG/ACT inhaler Spray 2 sprays into both nostrils daily 4/10/2019 at Unknown time Yes Bindu  Erick GUSTAFSON MD   emollient (VANICREAM) cream Apply topically as needed for other (leg rash)   Reported, Patient   omalizumab (XOLAIR) 150 MG injection Inject 150 mg Subcutaneous every 28 days HOLD WHILE IN TCU. Next dose 4/2/19. Can wait til after discharge. 4/2/2019  Unknown, Entered By History   sodium chloride (OCEAN) 0.65 % nasal spray Spray 2 sprays into both nostrils every hour as needed for congestion Or equivalent per merwins. Pt. Can have at bedside.   Reported, Patient              Review of Systems:   A Comprehensive greater than 10 system review of systems was carried out.  Pertinent positives and negatives are noted above.  Otherwise negative for contributory information.            Physical Exam:   Blood pressure 118/64, pulse 80, temperature 96  F (35.6  C), temperature source Oral, resp. rate 17, weight 53.1 kg (117 lb), SpO2 97 %, not currently breastfeeding.  Exam:  GENERAL:  Comfortable frail appearing woman with muffled voice  PSYCH: pleasant, oriented, No acute distress.  HEENT:  PERRLA. Normal conjunctiva, normal hearing, nasal mucosa and Oropharynx are normal.  NECK:  Supple, no neck vein distention, adenopathy or bruits, normal thyroid. Throat is red with what appears to be areas of atrophy (white).  HEART:  Normal S1, S2 with no murmur, no pericardial rub, gallops or S3 or S4.  LUNGS:  Clear to auscultation, normal Respiratory effort. No wheezing, rales or ronchi.  ABDOMEN:  Soft, no hepatosplenomegaly, normal bowel sounds. Non-tender, non distended.   EXTREMITIES:  No pedal edema, +2 pulses bilateral and equal.  SKIN:  Dry to touch, No rash, wound or ulcerations.  NEUROLOGIC:  CN 2-12 grossly intact, sensation is intact with no focal deficits.               Data:     Recent Labs   Lab 04/11/19  1304   WBC 11.5*   HGB 11.0*   HCT 36.0   MCV 97        Recent Labs   Lab 04/11/19  1304      POTASSIUM 3.8   CHLORIDE 107   CO2 23   ANIONGAP 9   *   BUN 35*   CR 1.27*    GFRESTIMATED 36*   GFRESTBLACK 41*   DINORAH 10.6*         No results found for this or any previous visit (from the past 24 hour(s)).      Audrey Mejia PA-C

## 2019-04-11 NOTE — LETTER
Transition Communication Hand-off for Care Transitions to Next Level of Care Provider    Name: Juliana Leal  : 2/10/1923  MRN #: [id number]  Primary Care Provider: Erick Ruano  Primary Care MD Name: Erick Ruano  Primary Clinic: [address] [facility] [address]     Reason for Hospitalization:  Odynophagia [R13.10]  Unintentional weight loss [R63.4]  Sore throat, chronic [J31.2]  Failure to thrive in adult [R62.7]  Admit Date/Time: 2019 12:05 PM  Discharge Date: 19  Payor Source: Payor: MEDICA / Plan: [employer] / Product Type: HMO /     Readmission Assessment Measure (GLADIS) Risk Score/category: Elevated           Reason for Communication Hand-off Referral: Fragility  Multiple providers/specialties    Discharge Plan: [facility]       Concern for non-adherence with plan of care:   No  Discharge Needs Assessment:  Needs      Most Recent Value   Equipment Currently Used at Home  grab bar, tub/shower, shower chair, walker, rolling   # of Referrals Placed by CTS  Post Acute Facilities   PAS Number  [id number]          Already enrolled in Tele-monitoring program and name of program:  NA  Follow-up specialty is recommended: No    Follow-up plan:    Future Appointments   Date Time Provider Department Center   2019  1:00 PM [facility]       Any outstanding tests or procedures:        Referrals     Future Labs/Procedures    Occupational Therapy Adult Consult     Comments:    Evaluate and treat as clinically indicated.    Reason:  weakness    Physical Therapy Adult Consult     Comments:    Evaluate and treat as clinically indicated.    Reason:  weakness            Key Recommendations:  CTS identifies pt as high risk due to Elevated GLADIS. Patient was admitted on 19 with c/o throat pain resulting in poor oral intake, weight loss, and weakness/ dizziness. ENT/ PT/OT/Nutrition were consulted. Patient was treated with antibiotics and started  nutritional supplements. Patient has a history of CKD, HTN, hypothyroidism, and neuropathy. Patient has had outpatient infusions previously. Patient was discharged from a TCU on 4/4/19 and is currently open to home care PT/HHA/OT/ST/SW.  [facility].     Please continue to follow patient closely after discharge from TCU for nutritional status and resolution of infection.     Yun Smith    AVS/Discharge Summary is the source of truth; this is a helpful guide for improved communication of patient story

## 2019-04-11 NOTE — ED PROVIDER NOTES
"  History     Chief Complaint:  Sore Throat and Weakness    The history is provided by the patient.      Juliana Leal is a 89 year old female with a history of HTN who presents with throat pain and weakness. She has had a sore throat requiring at least 2 admissions since August 2018, most recently 3/19/19-3/22/19. She was treated for sinusitis and oral candidiasis and discharged to TCU. She was discharged from TCU to home 6 days ago. She presents today because her sore throat has persisted and is \"worse than it's ever been.\" She reports odynophagia as well as pain in her mouth and throat that \"burns and stings\" that radiates to both ears, left > right. This pain is so severe she cannot swallow and has not been eating with subsequent weight loss and generalized weakness. For example, yesterday she ate only a small amount of soup and today she has had only a piece of watermelon and tea. It is unclear how much weight she has lost, but patient believes she has lost 3 pounds since discharge less than 1 month ago, while some records indicate it may be 10 pounds. She reports sinus and nasal congestion and spitting up copious, clear mucous that hangs from her mouth and \"looks like a dog.\"  She denies fever, chest pain, cough, nausea, vomiting, or diarrhea. The patient reports that her symptoms are sometimes better but is unable to state when or what results in improvement over the last 8 months. She has been following directions for Nystatin since discharge. She follows with Dr. Irene of ENT and was supposed to have an appointment today, but was too weak to attend.     Allergies:  Lisinopril  Prednisone  Allegra  Amoxicillin   Cipro  Codeine Sulfate   Hydroxyzine  Iodine  Lyrica  Norvasc   Tramadol  Zaditor  Penicillins     Medications:    Zyrtec  Cardizem   Vanicream  Gabapentin  Synthroid/Levothroid  Mycostatin  Xolair  Prilosec  [place]  Nasacort     Past Medical History:    Angioedema   Arthritis   CKD "   Gout   Headaches  Throat pain   Hives   Hypertension   Oral thrush   Thyroid disease  Sinusitis  Muscle tension headaches   idiopathic urticaria      Past Surgical History:    Colectomy  Ligation or biopsy temporal artery  Incise finger tendon sheath  phacoemulsification clear cornea with standard intraocular lens implant x2  Repair atrial septal defect     Family History:    Family history reviewed. No pertinent family history.     Social History:  The patient was accompanied to the ED by granddaughter and later daughter.  Lives alone in independent living.  Smoking Status: Former Smoker  Smokeless Tobacco: Never Used  Alcohol Use: Negative  Drug Use: Negative  PCP: Erick Ruano   Marital Status:        Review of Systems   Constitutional: Positive for unexpected weight change. Negative for fever.   HENT: Positive for congestion, ear pain, sore throat and trouble swallowing (painful).    Respiratory: Negative for cough.    Cardiovascular: Negative for chest pain.   Gastrointestinal: Negative for diarrhea, nausea and vomiting.   Neurological: Positive for weakness (generalized).   All other systems reviewed and are negative.    Physical Exam     Patient Vitals for the past 24 hrs:   BP Temp Temp src Pulse Resp SpO2 Weight   04/11/19 1332 -- -- -- -- 16 -- --   04/11/19 1326 -- 97.5  F (36.4  C) Oral -- -- -- --   04/11/19 1216 -- -- -- -- -- -- 53.1 kg (117 lb)   04/11/19 1215 137/87 -- -- 95 -- 98 % --      Physical Exam  General: Well-developed. Cachectic. Chronically ill appearing elderly  woman. Cooperative.  Head:  Atraumatic.  Eyes:  Conjunctivae, lids, and sclerae are normal.  ENT:    Normal nose. Moist mucous membranes. Mildly erythematous posterior oropharynx without focal wound, masses, edema, or exudate. No evidence of active thrush. Somewhat muffled voice. Clear TMs bilaterally.  Neck:  Supple. Normal range of motion.  CV:  Regular rate and rhythm. Normal heart sounds with no murmurs,  rubs, or gallops detected.  Resp:  No respiratory distress. Clear to auscultation bilaterally without decreased breath sounds, wheezing, rales, or rhonchi.  GI:  Soft. Non-distended. Non-tender.    MS:  Normal ROM. No bilateral lower extremity edema.  Skin:  Warm. Non-diaphoretic. No pallor.  Neuro: Awake. A&Ox3. Normal strength.  Psych:  Normal mood and affect. Normal speech.  Vitals reviewed.    Emergency Department Course     Laboratory:  Laboratory findings were communicated with the patient who voiced understanding of the findings.    Lactic Acid (Resulted: 1316): 1.6   CRP inflammation: 101.0 (H)  CBC: WBC 11.5 (H), HGB 11.0 (L),   BMP: Glucose 130 (H), BUN 35 (H), GFR 36 (L), Calcium 10.6 (H) o/w WNL (Creatinine 1.27 (H))  Rapid Strep Screen: Negative  Beta strep group A culture: Pending     Interventions:  Medications   0.9% sodium chloride BOLUS (1,000 mLs Intravenous New Bag 4/11/19 1303)        Emergency Department Course:     Nursing notes and vitals reviewed.    1217 I performed an exam of the patient as documented above.     1304 IV was inserted and blood was drawn for laboratory testing, results above.     1420 I spoke with VIDA Gómez, of the hospitalist service from [facility] regarding patient's presentation, findings, and plan of care.      I personally reviewed the lab results with the patient and answered all related questions prior to admission.    Impression & Plan      Medical Decision Making:  Juliana is a 89 year old woman who has been struggling with sore throat and odynophagia since at least August 2018.  She was recently admitted in March 2019 for this and was treated for sinusitis and thrush.  She was discharged to TCU and notes in the medical record indicates she was improved and was discharged 6 days ago.  She lives in independent living and reports she is not doing well there with continued sore throat and odynophagia resulting in decreased oral intake and subsequent  weight loss.  She had an appointment to see her ENT today given ongoing issues but was too weak and unwell to attend.  She denies all other concerns or complaints including fever.  She appears chronically ill and cachectic on exam.  She has a somewhat muffled voice though posterior oropharynx exam is generally reassuring with only mild erythema without focal edema or masses and no exudates or evidence of persistent thrush currently.  Fortunately, her laboratory studies are overall reassuring.  However, her BUN is elevated at 35.  This appears to have trended upwards from 26 upon discharge.  There is no evidence of kidney injury today.  Notably, her CRP is elevated at 101.  This is compared to 83 at last admission and is of uncertain clinical significance but raises question on incompletely treated infection. Fortunately, she has no hypoperfusion or hypotension with a normal lactate of 1.6.  A very mild leukocytosis of 11.5 is noted and is also slightly increased since discharge value of 8.3.  Normocytic anemia to 11 is at baseline.  I did perform a rapid strep though this is negative.  Upon chart review it appears that this is thought to be primarily a sinus issue.  Without fever and with her recent antibiotics that resulted in thrush I am hesitant to administer antibiotics in the emergency department.  I have some suspicion she may benefit from steroids though feel this decision is best made by ENT and can safely be deferred until that time.  Patient was given IV fluids in the emergency department but will require admission as she has had failure to thrive as an outpatient and is clearly not doing well.  It is difficult to say exactly how much weight loss she has had though it could be as much as 10 pounds in the last month. She will benefit from further fluids and care as well as ENT consultation. I discussed the plan for admission with the patient and family and answered all their questions.  They verbalized  understanding and are amenable.  I discussed patient's case with Comfort Mejia, hospitalist service, who accepts admission and has no further orders.    Diagnosis:    ICD-10-CM    1. Failure to thrive in adult R62.7    2. Sore throat, chronic J31.2    3. Odynophagia R13.10    4. Unintentional weight loss R63.4       Disposition:   The patient was placed in observation.     Scribe Disclosure:  I, Orla Severson, am serving as a scribe at 12:26 PM on 4/11/2019 to document services personally performed by Ruth Lozada MD based on my observations and the provider's statements to me.     [facility] EMERGENCY DEPARTMENT       Ruth Lozada MD  04/13/19 1038

## 2019-04-11 NOTE — PLAN OF CARE
ROOM # 220    Living Situation (if not independent, order SW consult):  Facility name: Ind senior living with no services   : Suzi-daughter    Activity level at baseline: Ind with walker  Activity level on admit: x1 with walker      Patient registered to observation; given Patient Bill of Rights; given the opportunity to ask questions about observation status and their plan of care.  Patient has been oriented to the observation room, bathroom and call light is in place.    Discussed discharge goals and expectations with patient/family.

## 2019-04-12 PROBLEM — J32.9 RECURRENT SINUSITIS: Status: ACTIVE | Noted: 2019-04-12

## 2019-04-12 LAB
ANION GAP SERPL CALCULATED.3IONS-SCNC: 5 MMOL/L (ref 3–14)
BUN SERPL-MCNC: 28 MG/DL (ref 7–30)
CALCIUM SERPL-MCNC: 9.3 MG/DL (ref 8.5–10.1)
CHLORIDE SERPL-SCNC: 111 MMOL/L (ref 94–109)
CO2 SERPL-SCNC: 26 MMOL/L (ref 20–32)
CREAT SERPL-MCNC: 1.01 MG/DL (ref 0.52–1.04)
ERYTHROCYTE [DISTWIDTH] IN BLOOD BY AUTOMATED COUNT: 18.6 % (ref 10–15)
GFR SERPL CREATININE-BSD FRML MDRD: 47 ML/MIN/{1.73_M2}
GLUCOSE SERPL-MCNC: 97 MG/DL (ref 70–99)
GRAM STN SPEC: NORMAL
GRAM STN SPEC: NORMAL
HCT VFR BLD AUTO: 31.4 % (ref 35–47)
HGB BLD-MCNC: 9.8 G/DL (ref 11.7–15.7)
Lab: NORMAL
MCH RBC QN AUTO: 29.4 PG (ref 26.5–33)
MCHC RBC AUTO-ENTMCNC: 31.2 G/DL (ref 31.5–36.5)
MCV RBC AUTO: 94 FL (ref 78–100)
PLATELET # BLD AUTO: 206 10E9/L (ref 150–450)
POTASSIUM SERPL-SCNC: 3.6 MMOL/L (ref 3.4–5.3)
RBC # BLD AUTO: 3.33 10E12/L (ref 3.8–5.2)
SODIUM SERPL-SCNC: 142 MMOL/L (ref 133–144)
SPECIMEN SOURCE: NORMAL
WBC # BLD AUTO: 9.7 10E9/L (ref 4–11)

## 2019-04-12 PROCEDURE — 80048 BASIC METABOLIC PNL TOTAL CA: CPT | Performed by: PHYSICIAN ASSISTANT

## 2019-04-12 PROCEDURE — 87077 CULTURE AEROBIC IDENTIFY: CPT | Performed by: PHYSICIAN ASSISTANT

## 2019-04-12 PROCEDURE — 99233 SBSQ HOSP IP/OBS HIGH 50: CPT | Performed by: INTERNAL MEDICINE

## 2019-04-12 PROCEDURE — 25000125 ZZHC RX 250: Performed by: INTERNAL MEDICINE

## 2019-04-12 PROCEDURE — 85027 COMPLETE CBC AUTOMATED: CPT | Performed by: PHYSICIAN ASSISTANT

## 2019-04-12 PROCEDURE — 96361 HYDRATE IV INFUSION ADD-ON: CPT

## 2019-04-12 PROCEDURE — 25000132 ZZH RX MED GY IP 250 OP 250 PS 637: Performed by: PHYSICIAN ASSISTANT

## 2019-04-12 PROCEDURE — 09JK8ZZ INSPECTION OF NASAL MUCOSA AND SOFT TISSUE, VIA NATURAL OR ARTIFICIAL OPENING ENDOSCOPIC: ICD-10-PCS | Performed by: OTOLARYNGOLOGY

## 2019-04-12 PROCEDURE — 87186 SC STD MICRODIL/AGAR DIL: CPT | Performed by: PHYSICIAN ASSISTANT

## 2019-04-12 PROCEDURE — 25000125 ZZHC RX 250: Performed by: OTOLARYNGOLOGY

## 2019-04-12 PROCEDURE — 87205 SMEAR GRAM STAIN: CPT | Performed by: PHYSICIAN ASSISTANT

## 2019-04-12 PROCEDURE — 36415 COLL VENOUS BLD VENIPUNCTURE: CPT | Performed by: PHYSICIAN ASSISTANT

## 2019-04-12 PROCEDURE — G0378 HOSPITAL OBSERVATION PER HR: HCPCS

## 2019-04-12 PROCEDURE — 96375 TX/PRO/DX INJ NEW DRUG ADDON: CPT

## 2019-04-12 PROCEDURE — 25000128 H RX IP 250 OP 636: Performed by: OTOLARYNGOLOGY

## 2019-04-12 PROCEDURE — 25800030 ZZH RX IP 258 OP 636: Performed by: PHYSICIAN ASSISTANT

## 2019-04-12 PROCEDURE — 12000011 ZZH R&B MS OVERFLOW

## 2019-04-12 PROCEDURE — 87070 CULTURE OTHR SPECIMN AEROBIC: CPT | Performed by: PHYSICIAN ASSISTANT

## 2019-04-12 RX ORDER — CEFTRIAXONE 2 G/1
2 INJECTION, POWDER, FOR SOLUTION INTRAMUSCULAR; INTRAVENOUS EVERY 24 HOURS
Status: DISCONTINUED | OUTPATIENT
Start: 2019-04-12 | End: 2019-04-16 | Stop reason: HOSPADM

## 2019-04-12 RX ORDER — DEXAMETHASONE SODIUM PHOSPHATE 4 MG/ML
6 INJECTION, SOLUTION INTRA-ARTICULAR; INTRALESIONAL; INTRAMUSCULAR; INTRAVENOUS; SOFT TISSUE EVERY 8 HOURS
Status: DISCONTINUED | OUTPATIENT
Start: 2019-04-12 | End: 2019-04-14

## 2019-04-12 RX ADMIN — LEVOTHYROXINE SODIUM 88 MCG: 88 TABLET ORAL at 07:55

## 2019-04-12 RX ADMIN — DEXAMETHASONE SODIUM PHOSPHATE 6 MG: 4 INJECTION, SOLUTION INTRA-ARTICULAR; INTRALESIONAL; INTRAMUSCULAR; INTRAVENOUS; SOFT TISSUE at 10:13

## 2019-04-12 RX ADMIN — CEFTRIAXONE 2 G: 2 INJECTION, POWDER, FOR SOLUTION INTRAMUSCULAR; INTRAVENOUS at 10:14

## 2019-04-12 RX ADMIN — DEXAMETHASONE SODIUM PHOSPHATE 6 MG: 4 INJECTION, SOLUTION INTRA-ARTICULAR; INTRALESIONAL; INTRAMUSCULAR; INTRAVENOUS; SOFT TISSUE at 17:41

## 2019-04-12 RX ADMIN — SODIUM CHLORIDE, POTASSIUM CHLORIDE, SODIUM LACTATE AND CALCIUM CHLORIDE: 600; 310; 30; 20 INJECTION, SOLUTION INTRAVENOUS at 07:55

## 2019-04-12 RX ADMIN — CETIRIZINE HYDROCHLORIDE 10 MG: 10 TABLET, FILM COATED ORAL at 07:55

## 2019-04-12 RX ADMIN — DILTIAZEM HYDROCHLORIDE 300 MG: 180 CAPSULE, COATED, EXTENDED RELEASE ORAL at 07:55

## 2019-04-12 RX ADMIN — OMEPRAZOLE 20 MG: 20 CAPSULE, DELAYED RELEASE ORAL at 07:55

## 2019-04-12 RX ADMIN — GABAPENTIN 100 MG: 100 CAPSULE ORAL at 21:26

## 2019-04-12 RX ADMIN — TOPICAL ANESTHETIC 1 ML: 200 SPRAY DENTAL; PERIODONTAL at 11:44

## 2019-04-12 RX ADMIN — LIDOCAINE HYDROCHLORIDE 10 ML: 20 JELLY TOPICAL at 09:32

## 2019-04-12 RX ADMIN — CETIRIZINE HYDROCHLORIDE 10 MG: 10 TABLET, FILM COATED ORAL at 20:21

## 2019-04-12 RX ADMIN — HYDROCODONE BITARTRATE AND ACETAMINOPHEN 1 TABLET: 5; 325 TABLET ORAL at 20:21

## 2019-04-12 NOTE — PLAN OF CARE
PRIMARY DIAGNOSIS: ACUTE PAIN r/t THROAT INFECTION  OUTPATIENT/OBSERVATION GOALS TO BE MET BEFORE DISCHARGE:  1. Pain Status: Improved but still requiring IV narcotics.    2. Return to near baseline physical activity: Yes    3. Cleared for discharge by consultants (if involved): No    Vitals are Temp: 97.4  F (36.3  C) Temp src: Oral BP: 116/65 Pulse: 81   Resp: 16 SpO2: 94 % RA. Reports minor pain but declines interventions. Patient reports feeling like she is congested. Continuing with IV Vancomycin. LR infusing at 100 ml/hr. Plan to consult with ENT, PT, and SW.     Discharge Planner Nurse   Safe discharge environment identified: Pending consults    Barriers to discharge: Consults         Entered by: Shagufta Meeks 04/12/2019 4:54 AM     Please review provider order for any additional goals.   Nurse to notify provider when observation goals have been met and patient is ready for discharge.

## 2019-04-12 NOTE — PLAN OF CARE
PRIMARY DIAGNOSIS: ACUTE PAIN r/t Throat infection    OUTPATIENT/OBSERVATION GOALS TO BE MET BEFORE DISCHARGE:    1. Pain Status: Improved but still requiring IV narcotics, it is painful for the pt to swallow. If possible, she likes medications given IV or in warm apple sauce. No cold food or water, they are very painful for her.    2. Return to near baseline physical activity: Yes    3. Cleared for discharge by consultants (if involved): No    VSS on RA. Pt up w/assist x1 and a walker. [place], hearing aids in. Small blanchable redness on bottom. Bed alarm on. Regular diet. Pain improved with IV meds. Family has gone home. CT scan done, awaiting results. Plan: IV antibiotics, IVF    Discharge Planner Nurse     Safe discharge environment identified: Pt lives in independent senior living  Barriers to discharge: Yes, pt is still in pain         Entered by: Jodee Mejia 04/11/2019      Please review provider order for any additional goals.   Nurse to notify provider when observation goals have been met and patient is ready for discharge.

## 2019-04-12 NOTE — PLAN OF CARE
PRIMARY DIAGNOSIS: ACUTE PAIN r/t Throat infection    OUTPATIENT/OBSERVATION GOALS TO BE MET BEFORE DISCHARGE:    1. Pain Status: Improved but still requiring IV narcotics, it is painful for the pt to swallow. If possible, she likes medications given IV or in warm apple sauce. No cold food or water, they are very painful for her.    2. Return to near baseline physical activity: Yes    3. Cleared for discharge by consultants (if involved): No    VSS. Pt up w/assist x1 and a walker. [place]. Small blanchable redness on bottom. Bed alarm on. Regular diet. Pain improved with IV meds. Family has gone home. Plan: IV antibiotics, IVF    Discharge Planner Nurse     Safe discharge environment identified: Pt lives in independent senior living  Barriers to discharge: Yes         Entered by: Vaughn Anderson 04/12/2019      Please review provider order for any additional goals.   Nurse to notify provider when observation goals have been met and patient is ready for discharge.

## 2019-04-12 NOTE — CONSULTS
Consult Date:  04/12/2019      CHIEF COMPLAINT:  Nasal congestion, sore throat, weakness.      HISTORY OF PRESENT ILLNESS:  Juliana Leal is a 89-year-old female with multiple medical issues and a patient of Dr. Irene of ENT.  She has previously been evaluated in the clinic for a persistent sinus infection.  Unfortunately, despite treatment as an outpatient with Bactrim and also with antifungals including Diflucan and triamcinolone, she has had persistent symptoms and is with failure to thrive with losing weight and worsening weakness.  She has a severe sore throat and nasal obstruction.  She was seen in the Emergency Department where she had a CT scan of the sinuses and neck, and there was a finding of pansinusitis.  She is admitted to the hospital and is on vancomycin.  She has, so far, not had any improvement in the last 24 hours.      PAST MEDICAL HISTORY:  Angioedema, arthritis, CKD, gout, headaches, sore throat pain, hives, hypertension, thrush, thyroid disease, sinusitis, muscle tension headaches, idiopathic urticaria.      MEDICATIONS:  Zyrtec, Cardizem, Vanicream, gabapentin, Synthroid, Levothyroid, Mycostatin, Xolair, Prilosec, Ocean spray, Nasacort.      PAST SURGICAL HISTORY:  Colectomy, ligation biopsy temporal artery, incision finger tendon sheath, phacoemulsification of clear cornea, repair atrial septal defect.      FAMILY HISTORY:  Noncontributory.      SOCIAL HISTORY:  Lifetime nonsmoker and no alcohol.      REVIEW OF SYSTEMS:  Positive for decreased appetite and weight loss, negative for fever, Negative for chest pain or abdominal pain.  Positive for feeling shortness of breath and unable to breathe through the nose.      ALLERGIES:  LISINOPRIL, PREDNISONE, ALLEGRA, AMOXICILLIN, CIPRO, CODEINE, HYDROXYZINE, IODINE, LYRICA, NORVASC, TRAMADOL, ZADITOR, PENICILLINS.      PHYSICAL EXAMINATION:   GENERAL:  She is awake and alert, sitting up in bed.     VITAL SIGNS:  Blood pressure 137/87, pulse 95,  temperature 97.5, 98% on room air.   EYES:  No swelling or periorbital edema or redness.   NOSE:  She has yellow discharge in each nasal cavity and midline septum.  Significant congestion diffusely.   ORAL CAVITY AND OROPHARYNX:  There is diffuse swelling and redness of the soft palate.  It appears she is status post tonsillectomy.  There is some yellow thick exudate on the posterior pharyngeal wall, appears to be postnasal drainage.  Tongue is normal.  Floor of mouth normal.  She has fair dentition.  No obvious dental infection.     NECK:  Soft and nontender, no lymphadenopathy or limitation of range of motion.      PROCEDURE NOTE:  A flexible fiberoptic nasal endoscopy was performed after applying lidocaine jelly to each nasal cavity.  There is very diffuse swelling and redness of the tissues and there is diffuse yellow exudate in the nasal cavities bilaterally, worse on the right side.  I collected a culture from the right side.  There are no signs of any masses in the nasopharynx and there is posterior nasal drainage.  No evidence of tumor.  The larynx was normal.      IMAGING:  I reviewed the CT scan recently performed, which shows diffuse pansinusitis with bubbles and fluid in the frontal sinus and opacification of the maxillary sinuses.      LABORATORY DATA:  White blood cell count is 9.      IMPRESSION AND PLAN:  Severe sinusitis with pharyngitis, thus far insufficiently treated.        TREATMENT PLAN:  I recommend adding ceftriaxone to the current vancomycin.  A culture was collected and we will see what this shows.  Add the dexamethasone to reduce inflammation and to help with pain.  I see no evidence of a current fungal infection.  She will need observation in the hospital and expect likely 2 additional days in the hospital.  We will follow.         SRIDHAR JERONIMO MD             D: 04/12/2019   T: 04/12/2019   MT: [facility]      Name:     KEITH LACY   MRN:      [id number]        Account:       [id number]    :      02/10/1923           Consult Date:  2019      Document: [id number]

## 2019-04-12 NOTE — PLAN OF CARE
PRIMARY DIAGNOSIS: ACUTE PAIN r/t Throat infection    OUTPATIENT/OBSERVATION GOALS TO BE MET BEFORE DISCHARGE:    1. Pain Status: Improved but still requiring IV narcotics, it is painful for the pt to swallow. If possible, she likes medications given IV or in warm apple sauce. No cold food or water, they are very painful for her.    2. Return to near baseline physical activity: Yes    3. Cleared for discharge by consultants (if involved): No    VSS on RA. Pt up w/assist x1 and a walker. [place], hearing aids in. Small blanchable redness on bottom. Bed alarm on. Regular diet. Plan: IV antibiotics, IVF    Discharge Planner Nurse     Safe discharge environment identified: Pt lives in independent senior living  Barriers to discharge: Yes, pt is still in pain         Entered by: Jodee Mejia 04/11/2019      Please review provider order for any additional goals.   Nurse to notify provider when observation goals have been met and patient is ready for discharge.

## 2019-04-12 NOTE — PLAN OF CARE
"/69   Pulse 82   Temp 98  F (36.7  C)   Resp 20   Ht 1.651 m (5' 5\")   Wt 53.1 kg (117 lb)   SpO2 93%   BMI 19.47 kg/m      Neuro: WDL, patient [place] without hearing aids.   Cardiac: WDL  Lungs: WDL  GI: WDL  : WDL  Pain: 6/10, hurricane spray before eating  IV: IVF infusing,   Meds: IV ABX, hurricane spray, steroids  Labs/tests: sinus culture pending  Diet: Regular   Activity: SBA when up   Misc: Patient has severe throat pain, can try magic mouthwash  Plan: Patient is stable and on room air.  Assist of SBA, on a regular diet.  Will continue to monitor and provide cares.     "

## 2019-04-12 NOTE — PHARMACY-VANCOMYCIN DOSING SERVICE
Pharmacy Vancomycin Initial Note  Date of Service 2019  Patient's  2/10/1923  89 year old, female    Indication: Skin and Soft Tissue Infection    Current estimated CrCl = Estimated Creatinine Clearance: 21.7 mL/min (A) (based on SCr of 1.27 mg/dL (H)).    Creatinine for last 3 days  2019:  1:04 PM Creatinine 1.27 mg/dL    Recent Vancomycin Level(s) for last 3 days  No results found for requested labs within last 72 hours.      Vancomycin IV Administrations (past 72 hours)      No vancomycin orders with administrations in past 72 hours.                Nephrotoxins and other renal medications (From now, onward)    Start     Dose/Rate Route Frequency Ordered Stop    19 1830  vancomycin (VANCOCIN) 750 mg in sodium chloride 0.9 % 250 mL intermittent infusion      750 mg  over 90 Minutes Intravenous EVERY 48 HOURS 19 1828            Contrast Orders - past 72 hours (72h ago, onward)    None                Plan:  1.  Start vancomycin  750 mg IV q48h.   2.  Goal Trough Level: 10-15 mg/L   3.  Pharmacy will check trough levels as appropriate in 1-3 Days.    4. Serum creatinine levels will be ordered a minimum of twice weekly.    5. [place] method utilized to dose vancomycin therapy: Method 1    Galen Watson

## 2019-04-12 NOTE — PLAN OF CARE
PT: Eval orders received, chart reviewed. Pt soundly sleeping upon arrival. Awakes to voice. Donns hearing aids. Declines any OOB activity and PT evaluation this day due to fatigue and requesting to sleep. Cancel

## 2019-04-12 NOTE — PROGRESS NOTES
[facility]  Patient is currently open to home care services with [facility].  The patient is currently receiving PT OT [facility] services.  [facility]  and team have been notified of patient admission.  [facility] liaison will continue to follow patient during stay.  If appropriate provide orders to resume home care at time of discharge. Pt would also benefit from SN visits.

## 2019-04-12 NOTE — PROGRESS NOTES
[facility]  Hospitalist Progress Note  Jodee Saeed MD 04/12/19  Text Page  Pager: [phone] (7am-6pm)    Reason for Stay (Diagnosis): Recurrent Sinusitis, weakness         Assessment and Plan:      Summary of Stay: Juliana Leal is a 89 year old female with past medical history of CKD, hypothyroidism, HTN who has been struggling with recurrent sinusitis since 8/2018 with recurrent rounds of antibiotics who was admitted on 4/11/2019 with persistent throat pain, odynophagia, diffuse weakness and was found to have acute sinusitis.  Patient was seen by ENT and will require at least 48 hours IV antibiotics.    Problem List/Assessment and Plan:     1. Severe Sinusitive with Pharyngitis: Has been treated with multiple rounds of antibiotics but symptoms have recurred and are more severe now.  Patient was seen by Dr. Byrne with ENT ad a culture was collected.  CT shows severe sinusitis from admission.  I also discussed the case with Dr. Irene (who sees her in the outpateint setting), she will need to follow up with her in clinic in 2-3 weeks to possibly discuss sinus surgery given her recurrent and persistent symptoms.  ENT feels that she will need at least 48 hours of IV antibiotics before transitioning to oral based oncultures.  - ENT consulted, apprecaite recommendations   - Continue IV Vancomycin and Ceftriaxone  - Continue dexamethasone (started today)  - Huricaine spray for pain relief  - Continue Cetrizine, Nasacors and Ocean nasal spray    2. Generalized Weaness: Patient discarged on 4/4 from TCU.  She quickly deteriorated because she was unable to eat due to throat pain/drainage.  Treatment as above.  PT also consulted.    3. Hypothyroidism: Continue Synthroid.    4. HTN: Continue Diltiazem.    5. CKD: Baseline creatinine ~1.2-1.5, creatinine actually improved to 1.01 today.    6. Suspected Severe Malnutrition: Has been unable to eat due to throat symptoms.  BMI 19.47, very weak.  Will order  "nutrition consult as well as supplements between meals.    Diet: Regular Diet Adult    DVT Prophylaxis: Low Risk/Ambulatory with no VTE prophylaxis indicated  [place] Catheter: not present  Code Status: DNR/DNI      Disposition Plan   Expected discharge: 2 - 3 days, recommended to TBD based on therapy recommendations once antibiotic plan established and safe disposition plan/ TCU bed available.  Entered: Jodee Saeed MD 04/12/2019, 10:27 AM       The patient's care was discussed with the Bedside Nurse, Care Coordinator/, Patient, Patient's Family and ENT Consultant.    Jodee Saeed MD  Hospitalist Service  [facility]          Interval History (Subjective):      Patient states she continnues to have significant throat pain, particularly when swallowing.  Has thick sinus drainage.  Denies SOB, cough.  FEels incredibly weak and run down.      I spoke with Dr. Byrne from ENT.  Also discussed the patient with her daughter at the bedside later in the morning.  I spoke with Dr. Irene (patient's ENT in the outpatient setting) this afternoon as well.                  Physical Exam:      Last Vital Signs:  /69   Pulse 76   Temp 97.2  F (36.2  C)   Resp 16   Ht 1.651 m (5' 5\")   Wt 53.1 kg (117 lb)   SpO2 95%   BMI 19.47 kg/m      General: Alert, awake, appears very weak, thin  HEENT: Normocephalic and atraumatic, eyes anicteric and without scleral injection, EOMI, face symmetric, MMM. Posterior pharynx with drainage and diffuse erythema and swelling  Cardiac: RRR, normal S1, S2. II/VI NORBERTO heard throughout precordium, no g/r, no LE edema.  Pulmonary: Normal chest rise, normal work of breathing.  Lungs CTAB without crackles or wheezing.  Abdomen: soft, non-tender, non-distended.  Normoactive bowel sounds, no guarding or rebound tenderness.  Extremities: no deformities.  Warm, well perfused.  Skin: no rashes or lesions.  Warm and Dry.  Neuro: No focal deficits.  Speech clear but voice " slightly muffled. Moving extremities in bed, diffusely weak  Psych: Alert and oriented x3. Appropriate affect.         Medications:      All current medications were reviewed with changes reflected in problem list.         Data:      All new lab and imaging data was reviewed.   Labs:  Recent Labs   Lab 04/12/19  0624 04/11/19  1304    139   POTASSIUM 3.6 3.8   CHLORIDE 111* 107   CO2 26 23   ANIONGAP 5 9   GLC 97 130*   BUN 28 35*   CR 1.01 1.27*   GFRESTIMATED 47* 36*   GFRESTBLACK 54* 41*   DINORAH 9.3 10.6*     Recent Labs   Lab 04/12/19  0624 04/11/19  1304   WBC 9.7 11.5*   HGB 9.8* 11.0*   HCT 31.4* 36.0   MCV 94 97    223      Imaging:   Recent Results (from the past 24 hour(s))   CT Sinus w/o Contrast    Narrative    CT SCAN OF THE PARANASAL SINUSES AND FACE  4/11/2019 7:11 PM     HISTORY: Sinusitis, chronic, possible surgery.    TECHNIQUE: Noncontrast axial scans of the sinuses with coronal and  sagittal reformations were completed. Radiation dose for this scan was  reduced using automated exposure control, adjustment of the mA and/or  kV according to patient size, or iterative reconstruction technique.      COMPARISON: None.    FINDINGS:   Frontal sinuses: Moderate mucosal thickening in the right frontal  sinus. There is an air-fluid layer in the right frontal sinus. The  right frontal sinus drainage pathway is occluded by soft tissue. The  left frontal sinus and frontal sinus drainage pathway are clear.    Ethmoid sinuses: Marked mucosal thickening in the right greater than  left ethmoid air cells. The right anterior ethmoid air cells are  almost completely opacified.     Right maxillary sinus: Marked circumferential mucosal thickening in  the right maxillary sinus measuring up to 1 cm in width. There is  air/secretions in the right maxillary sinus posteriorly. The right  maxillary sinus ostium and infundibulum are occluded by soft tissue.     Left maxillary sinus: Mucosal thickening in the left  maxillary sinus  greatest along the inferior, lateral, and medial margins measuring  approximately 5 mm in width. The left maxillary sinus ostium is  occluded by soft tissue but the infundibulum appears patent.     Sphenoid sinus: Aerosolized secretions in the inferior right sphenoid  sinus. The sphenoid sinus ostia are occluded by soft tissue  bilaterally. The sphenoethmoidal recesses are clear. There is a right  [place] air cell.    Nasal septum: Normal and in the midline.     Turbinates and nasal cavity: Marked mucosal thickening in the nasal  cavity particularly of the anterior inferior left nasal cavity and  posterior superior right nasal cavity.      Laminae papyracea and cribriform plate: Normal.    Other findings: Low mAs images of the brain are unremarkable. No  suspicious lucencies around the visualized teeth.       Impression    IMPRESSION:   1. Marked mucosal thickening throughout the paranasal sinuses  particularly the right frontal sinus, right anterior ethmoid air  cells, and right maxillary sinus. There is an air-fluid layer in the  right frontal sinus. This is compatible with acute sinusitis. There is  also marked mucosal thickening throughout the nasal cavity.  2. Occlusion of the right maxillary sinus ostium and infundibulum,  right frontal sinus drainage pathway, left maxillary sinus ostium, and  bilateral sphenoid sinus ostia.  3. Anatomic variant of a right [place] air cell.     ADRIAN MARQUIS MD   CT Soft Tissue Neck w/o Contrast    Narrative    CT SCAN OF THE NECK WITHOUT CONTRAST  4/11/2019 7:12 PM     HISTORY: Sore throat/stridor, epiglottis or tonsillitis suspected.     TECHNIQUE:  Axial images and coronal reformations. No IV contrast.  Radiation dose for this scan was reduced using automated exposure  control, adjustment of the mA and/or kV according to patient size, or  iterative reconstruction technique.    COMPARISON: Neck CT 8/3/2018.     FINDINGS: Evaluation is mildly limited given lack  of intravenous  contrast. No significant inflammatory fat stranding or fluid  collections are appreciated. No definite suspicious masses are seen  although the patient appears cachectic which makes evaluation  difficult.    The epiglottis is not thickened and the preepiglottic fat is clear.    Right greater than left palatine tonsils are prominent in size.    Persistent marked fatty infiltration of the left submandibular gland.  Nonobstructing calcification in the right submandibular gland is  unchanged. Parotid glands are unremarkable. 2 mm calcification in the  left thyroid gland is unchanged and is likely benign.    There are atherosclerotic calcifications at the carotid bifurcations  bilaterally.    Degenerative changes are seen in the spine.    There is biapical pleural thickening.    Marked mucosal thickening in visualized paranasal sinuses. This is  better described on sinus CT.      Impression    IMPRESSION:    1. Nonspecific prominence of the right greater than left palatine  tonsils. This raises concern for tonsillitis. No obvious fluid  collection is seen although evaluation is limited given lack of  intravenous contrast as well as dental amalgam streak artifact.  Recommend direct visualization.  2. No other significant inflammation or fluid collection is  appreciated in the neck.  3. Marked mucosal thickening in the paranasal sinuses better described  on sinus CT.       MD Jodee HAYWARD MD.

## 2019-04-13 LAB
ANION GAP SERPL CALCULATED.3IONS-SCNC: 7 MMOL/L (ref 3–14)
BACTERIA SPEC CULT: NORMAL
BUN SERPL-MCNC: 35 MG/DL (ref 7–30)
CALCIUM SERPL-MCNC: 9.5 MG/DL (ref 8.5–10.1)
CHLORIDE SERPL-SCNC: 112 MMOL/L (ref 94–109)
CO2 SERPL-SCNC: 25 MMOL/L (ref 20–32)
CREAT SERPL-MCNC: 1.04 MG/DL (ref 0.52–1.04)
ERYTHROCYTE [DISTWIDTH] IN BLOOD BY AUTOMATED COUNT: 18.7 % (ref 10–15)
GFR SERPL CREATININE-BSD FRML MDRD: 45 ML/MIN/{1.73_M2}
GLUCOSE SERPL-MCNC: 154 MG/DL (ref 70–99)
HCT VFR BLD AUTO: 28.4 % (ref 35–47)
HGB BLD-MCNC: 9 G/DL (ref 11.7–15.7)
Lab: NORMAL
MCH RBC QN AUTO: 29.5 PG (ref 26.5–33)
MCHC RBC AUTO-ENTMCNC: 31.7 G/DL (ref 31.5–36.5)
MCV RBC AUTO: 93 FL (ref 78–100)
PLATELET # BLD AUTO: 202 10E9/L (ref 150–450)
POTASSIUM SERPL-SCNC: 3.9 MMOL/L (ref 3.4–5.3)
RBC # BLD AUTO: 3.05 10E12/L (ref 3.8–5.2)
SODIUM SERPL-SCNC: 144 MMOL/L (ref 133–144)
SPECIMEN SOURCE: NORMAL
VANCOMYCIN SERPL-MCNC: 4.6 MG/L
WBC # BLD AUTO: 11.8 10E9/L (ref 4–11)

## 2019-04-13 PROCEDURE — 25000128 H RX IP 250 OP 636: Performed by: OTOLARYNGOLOGY

## 2019-04-13 PROCEDURE — 25000132 ZZH RX MED GY IP 250 OP 250 PS 637: Performed by: HOSPITALIST

## 2019-04-13 PROCEDURE — 80202 ASSAY OF VANCOMYCIN: CPT | Performed by: INTERNAL MEDICINE

## 2019-04-13 PROCEDURE — 40000893 ZZH STATISTIC PT IP EVAL DEFER

## 2019-04-13 PROCEDURE — 25000128 H RX IP 250 OP 636: Performed by: INTERNAL MEDICINE

## 2019-04-13 PROCEDURE — 25800030 ZZH RX IP 258 OP 636: Performed by: INTERNAL MEDICINE

## 2019-04-13 PROCEDURE — 99233 SBSQ HOSP IP/OBS HIGH 50: CPT | Performed by: HOSPITALIST

## 2019-04-13 PROCEDURE — 80048 BASIC METABOLIC PNL TOTAL CA: CPT | Performed by: INTERNAL MEDICINE

## 2019-04-13 PROCEDURE — 12000011 ZZH R&B MS OVERFLOW

## 2019-04-13 PROCEDURE — 85027 COMPLETE CBC AUTOMATED: CPT | Performed by: INTERNAL MEDICINE

## 2019-04-13 PROCEDURE — 25000132 ZZH RX MED GY IP 250 OP 250 PS 637: Performed by: PHYSICIAN ASSISTANT

## 2019-04-13 PROCEDURE — 36415 COLL VENOUS BLD VENIPUNCTURE: CPT | Performed by: INTERNAL MEDICINE

## 2019-04-13 RX ORDER — MECLIZINE HCL 12.5 MG 12.5 MG/1
12.5 TABLET ORAL 3 TIMES DAILY PRN
Status: DISCONTINUED | OUTPATIENT
Start: 2019-04-13 | End: 2019-04-16 | Stop reason: HOSPADM

## 2019-04-13 RX ADMIN — VANCOMYCIN HYDROCHLORIDE 750 MG: 1 INJECTION, POWDER, LYOPHILIZED, FOR SOLUTION INTRAVENOUS at 18:27

## 2019-04-13 RX ADMIN — DEXAMETHASONE SODIUM PHOSPHATE 6 MG: 4 INJECTION, SOLUTION INTRA-ARTICULAR; INTRALESIONAL; INTRAMUSCULAR; INTRAVENOUS; SOFT TISSUE at 01:41

## 2019-04-13 RX ADMIN — DILTIAZEM HYDROCHLORIDE 300 MG: 180 CAPSULE, COATED, EXTENDED RELEASE ORAL at 08:39

## 2019-04-13 RX ADMIN — OMEPRAZOLE 20 MG: 20 CAPSULE, DELAYED RELEASE ORAL at 08:49

## 2019-04-13 RX ADMIN — DEXAMETHASONE SODIUM PHOSPHATE 6 MG: 4 INJECTION, SOLUTION INTRA-ARTICULAR; INTRALESIONAL; INTRAMUSCULAR; INTRAVENOUS; SOFT TISSUE at 10:23

## 2019-04-13 RX ADMIN — CEFTRIAXONE 2 G: 2 INJECTION, POWDER, FOR SOLUTION INTRAMUSCULAR; INTRAVENOUS at 10:23

## 2019-04-13 RX ADMIN — HYDROCODONE BITARTRATE AND ACETAMINOPHEN 1 TABLET: 5; 325 TABLET ORAL at 21:54

## 2019-04-13 RX ADMIN — GABAPENTIN 100 MG: 100 CAPSULE ORAL at 21:48

## 2019-04-13 RX ADMIN — CETIRIZINE HYDROCHLORIDE 10 MG: 10 TABLET, FILM COATED ORAL at 08:49

## 2019-04-13 RX ADMIN — MECLIZINE 12.5 MG: 12.5 TABLET ORAL at 10:23

## 2019-04-13 RX ADMIN — LEVOTHYROXINE SODIUM 88 MCG: 88 TABLET ORAL at 08:49

## 2019-04-13 RX ADMIN — CETIRIZINE HYDROCHLORIDE 10 MG: 10 TABLET, FILM COATED ORAL at 21:48

## 2019-04-13 RX ADMIN — DEXAMETHASONE SODIUM PHOSPHATE 6 MG: 4 INJECTION, SOLUTION INTRA-ARTICULAR; INTRALESIONAL; INTRAMUSCULAR; INTRAVENOUS; SOFT TISSUE at 16:36

## 2019-04-13 ASSESSMENT — ENCOUNTER SYMPTOMS
WEAKNESS: 1
TROUBLE SWALLOWING: 1

## 2019-04-13 NOTE — PLAN OF CARE
VSS. Throat unchanged. IV decadron and IV vanco given. Tolerated Boost Breeze. Will continue to monitor.

## 2019-04-13 NOTE — PLAN OF CARE
Neuro: WDL  Cardiac: WDL  Lungs: WDL  GI: WDL  : WDL  Pain: 5/10 Throat/Sinus area- Norco given before bed.  IV:SL  Meds: IV ABX, decadron, Norco  Labs/tests: sinus culture neg  Diet: Regular   Activity: SBA. GB an walker   Plan: IV abx, nutrition consult, decadron.

## 2019-04-13 NOTE — CONSULTS
"CLINICAL NUTRITION SERVICES  -  ASSESSMENT NOTE      MALNUTRITION:  % Weight Loss:  Up to 5% in 1 month (non-severe malnutrition)  % Intake:  </= 75% for >/= 1 month (severe malnutrition)  Subcutaneous Fat Loss:  Orbital region mild depletion and Upper arm region moderate depletion  Muscle Loss:  Temporal region moderate depletion, Clavicle bone region moderate depletion, Acromion bone region moderate depletion and Posterior calf region moderate depletion  Fluid Retention:  None noted    Malnutrition Diagnosis: Severe malnutrition  In Context of:  Acute illness or injury  Chronic illness or disease        REASON FOR ASSESSMENT  Juliana Leal is a 89 year old female seen by Registered Dietitian for Provider Order - \"malnutrition\".        NUTRITION HISTORY  - Information obtained from patient and chart.  - Patient with a h/o CKD, neuropathy, recurrent sinusitis.  - Per documentation, throat pain since 8/2018, treated with multiple ABX, seen by ENT with previous nasal endoscopy (3/2019) --> thick secretions, thrush.  - Thrush, pain, PO secretions, dysphagia all impacting oral intakes, especially over the past ~1 month per patient's report.    - Noted recent discharge from rehab on 4/04/2019.    - Resides independently, was only home for 5 days.   - Describes regular diet at baseline with meals TID.  She was not fond of the food in rehab and feels that staff were \"brushing aside\" her sinusitis issues.  Feels she has consistently been eating <50-75% usual intakes over the past few weeks to month.    - Describes that eating worsened the pain and that temperature extremes also cause an increase in pain.  - She denies the use of oral supplements at home or in a rehab setting.  - NKFA.      CURRENT NUTRITION ORDERS  Diet Order:     Regular  Boost Plus between meals added by MD yesterday    Current Intake/Tolerance:  Did not like the Boost supplement.  Would like to try Boost Breeze.      NUTRITION FOCUSED PHYSICAL " "ASSESSMENT (NFPA)  Completed:  Yes Full assessment         Observed:    Muscle wasting (refer to documentation in Malnutrition section) and Subcutaneous fat loss (refer to documentation in Malnutrition section)    Obtained from Chart/Interdisciplinary Team:  ENT following: \"she has a severe sore throat and nasal obstruction\", plans for 48 hrs of IV ABX, possible need for outpatient sinus surgery if no improvement in 2-3 weeks  CT of sinuses and neck: pansinusitis  BM yesterday    ANTHROPOMETRICS  Height: 5' 5\"  Weight: 53.1 kg (117#)  Body mass index is 19.47 kg/m .  Weight Status:  Normal BMI  IBW: 56.8 kg  % IBW: 94%  Weight History:  Wt Readings from Last 10 Encounters:   04/11/19 53.1 kg (117 lb)   04/03/19 51.7 kg (114 lb)   03/24/19 52 kg (114 lb 9.6 oz)   03/19/19 54.4 kg (120 lb)   02/27/19 54 kg (119 lb)   01/31/19 55.8 kg (123 lb 1.6 oz)   01/08/19 56.6 kg (124 lb 12.8 oz)   12/04/18 57.2 kg (125 lb 15.9 oz)   11/27/18 57.2 kg (126 lb)   10/12/18 57.6 kg (127 lb)   - 5% wt loss over the past <1 month.  - UBW was about 120# per patient report.  She cannot remember when she last weighed.  Feels she got down to a lowest wt of 110# in rehab.    LABS  Labs reviewed    MEDICATIONS  Medications reviewed including prn magic mouthwash      ASSESSED NUTRITION NEEDS PER APPROVED PRACTICE GUIDELINES:    Dosing Weight 53.1 kg  Estimated Energy Needs: 2028-5271 kcals (30-35 Kcal/Kg)  Justification: repletion and underweight  Estimated Protein Needs: 80-96 grams protein (1.2-1.8 g pro/Kg)  Justification: Repletion and preservation of lean body mass  Estimated Fluid Needs: per MD  Justification: per MD    MALNUTRITION:  % Weight Loss:  Up to 5% in 1 month (non-severe malnutrition)  % Intake:  </= 75% for >/= 1 month (severe malnutrition)  Subcutaneous Fat Loss:  Orbital region mild depletion and Upper arm region moderate depletion  Muscle Loss:  Temporal region moderate depletion, Clavicle bone region moderate depletion, " Acromion bone region moderate depletion and Posterior calf region moderate depletion  Fluid Retention:  None noted    Malnutrition Diagnosis: Severe malnutrition  In Context of:  Acute illness or injury  Chronic illness or disease    NUTRITION DIAGNOSIS:  Inadequate oral intake related to pain, secretions, and dysphagia with recurrent sinusitis as evidenced by meeting <75% needs over the past month, 5% wt loss during this time, fat/muscle loss, coding of malnutrition.      NUTRITION INTERVENTIONS  Recommendations / Nutrition Prescription  Continue regular diet per MD.  High calorie/high protein focus.    Change to Boost Breeze between meals.     Add daily MVI/M.       Implementation  Nutrition education: Provided education on protein sources from food and oral supplements as tolerated.  Patient is not interested in regular Boost or milkshakes/smoothies.  She is ok with trying the Boost Breeze.      Medical Food Supplement, Multivitamin/Mineral: As above.    Collaboration and Referral of Nutrition care: Discussed POC with MD, hopeful for improvement in sx with IV ABX.      Nutrition Goals  Patient to tolerate at least 50% of meals and 1 supplement daily to show improvement in PO intakes.      MONITORING AND EVALUATION:  Progress towards goals will be monitored and evaluated per protocol and Practice Guidelines          Cara Wisdom RD, LD  Clinical Dietitian  3rd floor/ICU: [phone]  All other floors: [phone]  Weekend/holiday: [phone]

## 2019-04-13 NOTE — PROGRESS NOTES
[facility]  Hospitalist Progress Note  Hector Mcpherson MD 04/13/19  Text Page  Pager: [phone]    Reason for Stay (Diagnosis): Recurrent Sinusitis, weakness         Assessment and Plan:      Summary of Stay: Juliana Leal is a 89 year old female with past medical history of CKD, hypothyroidism, HTN who has been struggling with recurrent sinusitis since 8/2018 with recurrent rounds of antibiotics who was admitted on 4/11/2019 with persistent throat pain, odynophagia, diffuse weakness and was found to have acute sinusitis.  Patient was seen by ENT and will require at least 48 hours IV antibiotics.    Severe Sinusitive with Pharyngitis  - Has been treated with multiple rounds of antibiotics but symptoms have recurred and are more severe now.  Patient was seen by Dr. Byrne with ENT ad a culture was collected.  CT shows severe sinusitis from admission.  I also discussed the case with Dr. Irene (who sees her in the outpateint setting), she will need to follow up with her in clinic in 2-3 weeks to possibly discuss sinus surgery given her recurrent and persistent symptoms.  ENT feels that she will need at least 48 hours of IV antibiotics before transitioning to oral based oncultures.  - ENT consulted, apprecaite recommendations   - Continue IV Vancomycin and Ceftriaxone  - Continue dexamethasone (started yesterday), likely discontinue tomorrow  - Huricaine spray for pain relief  - Continue Cetrizine, Nasacors and Pitt nasal spray    Dizziness    - like vertigo related to ongoing sinus infection    - PRN meclizine    Generalized Weaness  - Patient discarged on 4/4 from TCU.  She quickly deteriorated because she was unable to eat due to throat pain/drainage.  Treatment as above.  PT also consulted.    Hypothyroidism  - Continue Synthroid.    HTN  - Continue Diltiazem.    CKD  -  Baseline creatinine ~1.2-1.5, creatinine actually improved to 1.01 today.    Suspected Severe Malnutrition  - Has been unable to  "eat due to throat symptoms.  BMI 19.47, very weak.  Will order nutrition consult as well as supplements between meals.    Offered to update family. She states this is not necessary. They were here and are now at [facility]    Diet: Regular Diet Adult  Snacks/Supplements Adult: Boost Plus; Between Meals  Snacks/Supplements Pediatric: Ensure Plus; Between Meals    DVT Prophylaxis: Low Risk/Ambulatory with no VTE prophylaxis indicated  Castillo Catheter: not present  Code Status: DNR/DNI      Disposition Plan   Expected discharge: 2 - 3 days, recommended to TBD based on therapy recommendations once antibiotic plan established and safe disposition plan/ TCU bed available.  Entered: Hector Mcpherson MD 04/13/2019, 11:51 AM       The patient's care was discussed with the Bedside Nurse, Care Coordinator/, Patient, Patient's Family and ENT Consultant.    Hector Mcpherson MD  Hospitalist Service  [facility]          Interval History (Subjective):      Patient in bed. Eating/drinking. Does not like the boost. Tolerating sherbert, fluids.No chest pain, sob, abdo pain. Had dizziness this am but gone after meclizine                  Physical Exam:      Last Vital Signs:  /78 (BP Location: Right arm)   Pulse 94   Temp 97.1  F (36.2  C) (Oral)   Resp 16   Ht 1.651 m (5' 5\")   Wt 53.1 kg (117 lb)   SpO2 94%   BMI 19.47 kg/m      General: Alert, awake, appears very weak, thin  HEENT: Normocephalic and atraumatic, eyes anicteric and without scleral injection, EOMI, face symmetric, MMM. Posterior pharynx with drainage and diffuse erythema and swelling  Cardiac: RRR, normal S1, S2. II/VI NORBERTO heard throughout precordium, no g/r, no LE edema.  Pulmonary: Normal chest rise, normal work of breathing.  Lungs CTAB without crackles or wheezing.  Abdomen: soft, non-tender, non-distended.  Normoactive bowel sounds, no guarding or rebound tenderness.  Extremities: no deformities.  Warm, well perfused.  Skin: no " rashes or lesions.  Warm and Dry.  Neuro: No focal deficits.  Speech clear but voice slightly muffled. Moving extremities in bed, diffusely weak  Psych: Alert and oriented x3. Appropriate affect.         Medications:      All current medications were reviewed with changes reflected in problem list.         Data:      All new lab and imaging data was reviewed.   Labs:  Recent Labs   Lab 04/13/19  0606 04/12/19 0624 04/11/19  1304    142 139   POTASSIUM 3.9 3.6 3.8   CHLORIDE 112* 111* 107   CO2 25 26 23   ANIONGAP 7 5 9   * 97 130*   BUN 35* 28 35*   CR 1.04 1.01 1.27*   GFRESTIMATED 45* 47* 36*   GFRESTBLACK 52* 54* 41*   DINORAH 9.5 9.3 10.6*     Recent Labs   Lab 04/13/19  0606 04/12/19  0624 04/11/19  1304   WBC 11.8* 9.7 11.5*   HGB 9.0* 9.8* 11.0*   HCT 28.4* 31.4* 36.0   MCV 93 94 97    206 223      Imaging:   No results found for this or any previous visit (from the past 24 hour(s)).    Hector Mcpherson MD

## 2019-04-13 NOTE — PLAN OF CARE
"/78 (BP Location: Right arm)   Pulse 94   Temp 97.1  F (36.2  C) (Oral)   Resp 16   Ht 1.651 m (5' 5\")   Wt 53.1 kg (117 lb)   SpO2 94%   BMI 19.47 kg/m      Neuro: WDL  Cardiac: WDl  Lungs: wdl  GI: WDL  : WDL  Pain: Hurst a little  IV: Saline locked  Meds: IV ABX, Decadron  Labs/tests:   Diet: Regular  Activity: Ax1  Misc: Patient has throat pain does have hurricane spray, magic mouthwash or lozenges available.   Plan: Patient is stable and on room air.  Assist of  one, on a regular diet. Will continue to monitor and provide cares.     "

## 2019-04-13 NOTE — PLAN OF CARE
PT: Orders received. Chart reviewed. The patient refuses skilled PT services this morning as she is still dizzy and does not feel safe doing any activity. She is seated with HOB elevated talking to family/friends at this time.

## 2019-04-14 ENCOUNTER — APPOINTMENT (OUTPATIENT)
Dept: PHYSICAL THERAPY | Facility: CLINIC | Age: 84
DRG: 152 | End: 2019-04-14
Attending: PHYSICIAN ASSISTANT
Payer: COMMERCIAL

## 2019-04-14 LAB
ANION GAP SERPL CALCULATED.3IONS-SCNC: 3 MMOL/L (ref 3–14)
BASOPHILS # BLD AUTO: 0 10E9/L (ref 0–0.2)
BASOPHILS NFR BLD AUTO: 0.1 %
BUN SERPL-MCNC: 50 MG/DL (ref 7–30)
CALCIUM SERPL-MCNC: 9.7 MG/DL (ref 8.5–10.1)
CHLORIDE SERPL-SCNC: 112 MMOL/L (ref 94–109)
CO2 SERPL-SCNC: 27 MMOL/L (ref 20–32)
CREAT SERPL-MCNC: 1.11 MG/DL (ref 0.52–1.04)
DIFFERENTIAL METHOD BLD: ABNORMAL
EOSINOPHIL # BLD AUTO: 0 10E9/L (ref 0–0.7)
EOSINOPHIL NFR BLD AUTO: 0 %
ERYTHROCYTE [DISTWIDTH] IN BLOOD BY AUTOMATED COUNT: 19.2 % (ref 10–15)
GFR SERPL CREATININE-BSD FRML MDRD: 42 ML/MIN/{1.73_M2}
GLUCOSE SERPL-MCNC: 168 MG/DL (ref 70–99)
HCT VFR BLD AUTO: 28.8 % (ref 35–47)
HGB BLD-MCNC: 9.2 G/DL (ref 11.7–15.7)
IMM GRANULOCYTES # BLD: 0.2 10E9/L (ref 0–0.4)
IMM GRANULOCYTES NFR BLD: 1 %
LYMPHOCYTES # BLD AUTO: 1.4 10E9/L (ref 0.8–5.3)
LYMPHOCYTES NFR BLD AUTO: 6.1 %
MCH RBC QN AUTO: 29.7 PG (ref 26.5–33)
MCHC RBC AUTO-ENTMCNC: 31.9 G/DL (ref 31.5–36.5)
MCV RBC AUTO: 93 FL (ref 78–100)
MONOCYTES # BLD AUTO: 1.9 10E9/L (ref 0–1.3)
MONOCYTES NFR BLD AUTO: 8.2 %
NEUTROPHILS # BLD AUTO: 19.3 10E9/L (ref 1.6–8.3)
NEUTROPHILS NFR BLD AUTO: 84.6 %
NRBC # BLD AUTO: 0 10*3/UL
NRBC BLD AUTO-RTO: 0 /100
PLATELET # BLD AUTO: 212 10E9/L (ref 150–450)
POTASSIUM SERPL-SCNC: 4.1 MMOL/L (ref 3.4–5.3)
RBC # BLD AUTO: 3.1 10E12/L (ref 3.8–5.2)
SODIUM SERPL-SCNC: 142 MMOL/L (ref 133–144)
WBC # BLD AUTO: 22.8 10E9/L (ref 4–11)

## 2019-04-14 PROCEDURE — 25000128 H RX IP 250 OP 636: Performed by: INTERNAL MEDICINE

## 2019-04-14 PROCEDURE — 80048 BASIC METABOLIC PNL TOTAL CA: CPT | Performed by: HOSPITALIST

## 2019-04-14 PROCEDURE — 25000128 H RX IP 250 OP 636: Performed by: OTOLARYNGOLOGY

## 2019-04-14 PROCEDURE — 97530 THERAPEUTIC ACTIVITIES: CPT | Mod: GP | Performed by: PHYSICAL THERAPIST

## 2019-04-14 PROCEDURE — 25000128 H RX IP 250 OP 636: Performed by: HOSPITALIST

## 2019-04-14 PROCEDURE — 99233 SBSQ HOSP IP/OBS HIGH 50: CPT | Performed by: HOSPITALIST

## 2019-04-14 PROCEDURE — 12000011 ZZH R&B MS OVERFLOW

## 2019-04-14 PROCEDURE — 85025 COMPLETE CBC W/AUTO DIFF WBC: CPT | Performed by: HOSPITALIST

## 2019-04-14 PROCEDURE — 25000132 ZZH RX MED GY IP 250 OP 250 PS 637: Performed by: PHYSICIAN ASSISTANT

## 2019-04-14 PROCEDURE — 25800030 ZZH RX IP 258 OP 636: Performed by: INTERNAL MEDICINE

## 2019-04-14 PROCEDURE — 36415 COLL VENOUS BLD VENIPUNCTURE: CPT | Performed by: HOSPITALIST

## 2019-04-14 PROCEDURE — 97161 PT EVAL LOW COMPLEX 20 MIN: CPT | Mod: GP | Performed by: PHYSICAL THERAPIST

## 2019-04-14 RX ADMIN — CETIRIZINE HYDROCHLORIDE 10 MG: 10 TABLET, FILM COATED ORAL at 08:26

## 2019-04-14 RX ADMIN — HYDROCODONE BITARTRATE AND ACETAMINOPHEN 1 TABLET: 5; 325 TABLET ORAL at 22:13

## 2019-04-14 RX ADMIN — CEFTRIAXONE 2 G: 2 INJECTION, POWDER, FOR SOLUTION INTRAMUSCULAR; INTRAVENOUS at 10:38

## 2019-04-14 RX ADMIN — SODIUM CHLORIDE 1000 ML: 9 INJECTION, SOLUTION INTRAVENOUS at 10:38

## 2019-04-14 RX ADMIN — CETIRIZINE HYDROCHLORIDE 10 MG: 10 TABLET, FILM COATED ORAL at 22:13

## 2019-04-14 RX ADMIN — VANCOMYCIN HYDROCHLORIDE 750 MG: 10 INJECTION, POWDER, LYOPHILIZED, FOR SOLUTION INTRAVENOUS at 06:47

## 2019-04-14 RX ADMIN — GABAPENTIN 100 MG: 100 CAPSULE ORAL at 22:11

## 2019-04-14 RX ADMIN — DEXAMETHASONE SODIUM PHOSPHATE 6 MG: 4 INJECTION, SOLUTION INTRA-ARTICULAR; INTRALESIONAL; INTRAMUSCULAR; INTRAVENOUS; SOFT TISSUE at 01:11

## 2019-04-14 RX ADMIN — OMEPRAZOLE 20 MG: 20 CAPSULE, DELAYED RELEASE ORAL at 08:25

## 2019-04-14 RX ADMIN — LEVOTHYROXINE SODIUM 88 MCG: 88 TABLET ORAL at 08:26

## 2019-04-14 RX ADMIN — DEXAMETHASONE SODIUM PHOSPHATE 6 MG: 4 INJECTION, SOLUTION INTRA-ARTICULAR; INTRALESIONAL; INTRAMUSCULAR; INTRAVENOUS; SOFT TISSUE at 09:04

## 2019-04-14 RX ADMIN — ACETAMINOPHEN 650 MG: 325 TABLET, FILM COATED ORAL at 12:27

## 2019-04-14 NOTE — PROGRESS NOTES
Discharge Planner   Discharge Plans in progress: TCU (referral sent)  Barriers to discharge plan: none identified  Follow up plan: PAS needed and family can transport        Entered by: Emma Sandra 04/14/2019 3:04 PM

## 2019-04-14 NOTE — PLAN OF CARE
"Vitals: BP 98/57 (BP Location: Left arm)   Pulse 67   Temp 97.1  F (36.2  C) (Oral)   Resp 20   Ht 1.651 m (5' 5\")   Wt 53.1 kg (117 lb)   SpO2 96%   BMI 19.47 kg/m    Situation/Status: Pt throat unchanged, continue to be swollen and painful. IV decadron and IV vanco given  Neuro: A/O x 4, hard of hearing. Wears HA  Pain: Rated pain at 5/10, gave prn norco at HS for pain, with effective relief, has hurricane spray and lozenges available.  Activity: Assist of 1 w/ gait belt and walker  Diet: Reg diet  Lungs: BS-QVH-Przwt  GI/: No nausea/vomiting, + Flautus, BS x4.  Lines/drains: IV-SL  Plan: Continue to monitor per POC.    "

## 2019-04-14 NOTE — CONSULTS
Care Transition Initial Assessment - SW     Met with: Patient and Family    Active Problems:    Throat pain    Recurrent sinusitis       DATA  Lives With: alone   Living Arrangements: apartment  Quality of Family Relationships: helpful, involved, supportive  Description of Support System: Supportive, Involved  Who is your support system?: Children  Support Assessment: Adequate family and caregiver support, Adequate social supports.   Identified issues/concerns regarding health management: none   Quality of Family Relationships: helpful, involved, supportive  Transportation Anticipated: family or friend will provide    ASSESSMENT  Cognitive Status:  awake, alert and oriented  Concerns to be addressed: SW met with patient at bedside for assessment of discharge planning needs. Also present were several members of her family. Patient is alert and oriented X3, conversant, and able to participate in discharge planning. Met with patient to discuss need for skilled nursing facility at discharge. The patient was given a list of skilled nursing facilities which includes the medicare.gov website for a comprehensive list of skilled nursing facilities.     Patient has chosen in this order  1. [facility]   [facility]. [facility]  [facility]. [facility]  4. [facility]    Patient would like a private room.     PLAN  Financial costs for the patient includes Private room .  Patient given options and choices for discharge yes .  Patient/family is agreeable to the plan?  Yes:   Patient Goals and Preferences: TCU .  Patient anticipates discharging to:  TCU.    HUBER Huang  [phone]

## 2019-04-14 NOTE — PLAN OF CARE
"PRIMARY DIAGNOSIS: \"GENERIC\" NURSING  OUTPATIENT/OBSERVATION GOALS TO BE MET BEFORE DISCHARGE:  ADLs back to baseline: No    Activity and level of assistance: Up with standby assistance.    Pain status: Pain free.    Return to near baseline physical activity: No     Discharge Planner Nurse   Safe discharge environment identified: Yes  Barriers to discharge: Yes       Entered by: Vivienne Zamorano 04/14/2019 11:29 AM     Please review provider order for any additional goals.   Nurse to notify provider when observation goals have been met and patient is ready for discharge.  "

## 2019-04-14 NOTE — PROGRESS NOTES
04/14/19 1020   Quick Adds   Type of Visit Initial PT Evaluation   Living Environment   Lives With alone   Living Arrangements apartment   Home Accessibility no concerns   Transportation Anticipated family or friend will provide   Self-Care   Usual Activity Tolerance good   Current Activity Tolerance fair   Equipment Currently Used at Home grab bar, tub/shower;shower chair;walker, rolling   Functional Level Prior   Ambulation 1-->assistive equipment   Transferring 1-->assistive equipment   Toileting 1-->assistive equipment   Bathing 1-->assistive equipment   Communication 0-->understands/communicates without difficulty   Swallowing 0-->swallows foods/liquids without difficulty   Cognition 0 - no cognition issues reported   Fall history within last six months yes   Number of times patient has fallen within last six months 1   Prior Functional Level Comment Pt reports that she has not had the energy level to care for herself since her sinuse infections.  Pt reports does not feel stronger following TCU due to not eating. Pt reports only had 1 hot meal there. Pt reports does all own cares at home besides driving. Pt family took keys away   General Information   Onset of Illness/Injury or Date of Surgery - Date 04/11/19   Referring Physician Roberto   Patient/Family Goals Statement Pt feeling weaker due to not eating, pt concerned about DC home   Pertinent History of Current Problem (include personal factors and/or comorbidities that impact the POC) Julaina Leal is a 89 year old female with past medical history of CKD, hypothyroidism, HTN who has been struggling with recurrent sinusitis since 8/2018 with recurrent rounds of antibiotics who was admitted on 4/11/2019 with persistent throat pain, odynophagia, diffuse weakness and was found to have acute sinusitis.  Patient was seen by ENT and will require at least 48 hours IV antibiotics.   General Info Comments Pt up with RN upon entry   Cognitive Status Examination    Orientation orientation to person, place and time   Level of Consciousness alert   Follows Commands and Answers Questions 100% of the time;able to follow multistep instructions   Personal Safety and Judgment intact   Memory intact   Cognitive Comment Pt able to name reason for stay, recall meds and new meds   Pain Assessment   Patient Currently in Pain Yes, see Vital Sign flowsheet  (throat pain)   Integumentary/Edema   Integumentary/Edema no deficits were identifed   Posture    Posture Forward head position;Protracted shoulders   Range of Motion (ROM)   ROM Comment WFL B LEs   Strength   Strength Comments good static strength, mildly decreased functional endurance   Bed Mobility   Bed Mobility Comments not tested this session   Transfer Skills   Transfer Comments SBA with use of FWW   Gait   Gait Comments Pt was cued for ambulation in hallway, cues for posture 250 feet of ambulation, reports feeling fatigued with this. Pt concerned re: standing tolerance, CGA   Balance   Balance Comments need for FWW   General Therapy Interventions   Planned Therapy Interventions balance training;gait training;strengthening;transfer training;progressive activity/exercise   Clinical Impression   Criteria for Skilled Therapeutic Intervention yes, treatment indicated   PT Diagnosis Decreased functional mobility   Influenced by the following impairments decreased activity tolerance   Functional limitations due to impairments poor tolerance to self care   Clinical Presentation Stable/Uncomplicated   Clinical Presentation Rationale improving   Clinical Decision Making (Complexity) Low complexity   Therapy Frequency` 5 times/week   Predicted Duration of Therapy Intervention (days/wks) 3 days   Anticipated Discharge Disposition Home with Home Therapy;Transitional Care Facility;Home with Assist  (pending dtg ability to assist with IADLs)   Risk & Benefits of therapy have been explained Yes   Patient, Family & other staff in agreement with  "plan of care Yes   [facility] TM \"6 Clicks\"   2016, Trustees of [facility], under license to [employer].  All rights reserved.   6 Clicks Short Forms Basic Mobility Inpatient Short Form   [facility]  \"6 Clicks\" V.2 Basic Mobility Inpatient Short Form   1. Turning from your back to your side while in a flat bed without using bedrails? 4 - None   2. Moving from lying on your back to sitting on the side of a flat bed without using bedrails? 4 - None   3. Moving to and from a bed to a chair (including a wheelchair)? 3 - A Little   4. Standing up from a chair using your arms (e.g., wheelchair, or bedside chair)? 3 - A Little   5. To walk in hospital room? 3 - A Little   6. Climbing 3-5 steps with a railing? 3 - A Little   Basic Mobility Raw Score (Score out of 24.Lower scores equate to lower levels of function) 20   Total Evaluation Time   Total Evaluation Time (Minutes) 10     "

## 2019-04-14 NOTE — PLAN OF CARE
PT: PT julio completed. Pt is her due to sinusitis with throat pain, decreased oral intake and weakness. Pt was recently at TCU and DISCHARGE home with home care. Pt lives alone in senior apt. Pt inde with cares besides driving.   Discharge Planner PT   Patient plan for discharge: open to suggestions, concerned about discharge home  Current status: Pt able to tolerate 250 feet of ambulation, needing cues for posture, but reporting fatigue with this. Pt limited tolerance to IADLs per pt. Was not cooking and eating at home.   Barriers to return to prior living situation: Lives alone  Recommendations for discharge: Home with Home PT/OT if pt's dtg could assist PRN with IADLs, paulino cooking, cleaning and laundry due to energy level, not a cognitive concern at this time with quick screen. If pt's dtg not able to assist for a couple days with cooking, cleaning and laundry recommend TCU.   Rationale for recommendations: Pt able to ambulate functional distances and perform basic ADLs, but energy level low from poor oral intake and would benefit from either increased help from family or short TCU stay.        Entered by: Allie Kapoor 04/14/2019 11:10 AM

## 2019-04-14 NOTE — PHARMACY-VANCOMYCIN DOSING SERVICE
Pharmacy Vancomycin Note  Date of Service 2019  Patient's  2/10/1923   89 year old, female    Indication: Skin and Soft Tissue Infection  Goal Trough Level: 10-15 mg/L  Day of Therapy: 3  Current Vancomycin regimen:  750 mg IV q48h    Current estimated CrCl = Estimated Creatinine Clearance: 26.5 mL/min (based on SCr of 1.04 mg/dL).    Creatinine for last 3 days  2019:  1:04 PM Creatinine 1.27 mg/dL  2019:  6:24 AM Creatinine 1.01 mg/dL  2019:  6:06 AM Creatinine 1.04 mg/dL    Recent Vancomycin Levels (past 3 days)  2019:  5:04 PM Vancomycin Level 4.6 mg/L    Vancomycin IV Administrations (past 72 hours)                   vancomycin (VANCOCIN) 750 mg in sodium chloride 0.9 % 250 mL intermittent infusion (mg) 750 mg New Bag 19 182     750 mg New Bag 19 195                Nephrotoxins and other renal medications (From now, onward)    Start     Dose/Rate Route Frequency Ordered Stop    19 0600  vancomycin (VANCOCIN) 750 mg in sodium chloride 0.9 % 250 mL intermittent infusion      750 mg  over 90 Minutes Intravenous EVERY 24 HOURS 19 2030               Contrast Orders - past 72 hours (72h ago, onward)    None          Interpretation of levels and current regimen:  Trough level is  Subtherapeutic    Has serum creatinine changed > 50% in last 72 hours: No, but changed significantly.    Urine output:  unable to determine    Renal Function: Stable    Plan:  1.  Increase Dose to 750 mg every 24 hr, to start 12 after the last infusion.  2.  Pharmacy will check trough levels as appropriate in 1-3 Days.    3. Serum creatinine levels will be ordered daily for the first week of therapy and at least twice weekly for subsequent weeks.      Nick Cartagena        .

## 2019-04-14 NOTE — PROGRESS NOTES
[facility]  Hospitalist Progress Note  Hector Mcpherson MD 04/13/19  Text Page  Pager: [phone]    Reason for Stay (Diagnosis): Recurrent Sinusitis, weakness         Assessment and Plan:      Summary of Stay: Juliana Leal is a 89 year old female with past medical history of CKD, hypothyroidism, HTN who has been struggling with recurrent sinusitis since 8/2018 with recurrent rounds of antibiotics who was admitted on 4/11/2019 with persistent throat pain, odynophagia, diffuse weakness and was found to have acute sinusitis.  Patient was seen by ENT and will require at least 48 hours IV antibiotics.    Severe Sinusitive with Pharyngitis  - Has been treated with multiple rounds of antibiotics but symptoms have recurred and are more severe now.  Patient was seen by Dr. Byrne with ENT ad a culture was collected.  CT shows severe sinusitis from admission.  I also discussed the case with Dr. Irene (who sees her in the outpateint setting), she will need to follow up with her in clinic in 2-3 weeks to possibly discuss sinus surgery given her recurrent and persistent symptoms.  ENT feels that she will need at least 48 hours of IV antibiotics before transitioning to oral based oncultures.  - ENT consulted, apprecaite recommendations   - Continue IV Vancomycin and Ceftriaxone  - will now discontinue dexamethasone (started Friday for inflammation)  - Huricaine spray for pain relief  - Continue Cetrizine, Nasacors and Snowslip nasal spray    Dizziness    - like vertigo related to ongoing sinus infection    - PRN meclizine    - no dizziness today    Mild increase in Cr    - likely due to poor PO intake    - IVF x 1 liter today    Generalized Weaness  - Patient discarged on 4/4 from TCU.  She quickly deteriorated because she was unable to eat due to throat pain/drainage.  Treatment as above.  PT also consulted.  - will attempt ambulation today    Hypothyroidism  - Continue Synthroid.    HTN  - Continue  "Diltiazem.    CKD  -  Baseline creatinine ~1.2-1.5, creatinine actually improved to 1.01 today.    Suspected Severe Malnutrition  - Has been unable to eat due to throat symptoms.  BMI 19.47, very weak.  Will order nutrition consult as well as supplements between meals.    Offered to update family. Again she states she does not need me to call anyone. I indicated they may want an update from me. She states she talked to her granddaughter this am who will update the rest of the family.    Diet: Regular Diet Adult  Snacks/Supplements Adult: Boost Breeze; Between Meals    DVT Prophylaxis: Low Risk/Ambulatory with no VTE prophylaxis indicated  Castillo Catheter: not present  Code Status: DNR/DNI      Disposition Plan   Expected discharge: 2 - 3 days, recommended to TBD based on therapy recommendations once antibiotic plan established and safe disposition plan/ TCU bed available.  Entered: Hector Mcpherson MD 04/14/2019, 10:13 AM       The patient's care was discussed with the Bedside Nurse, Care Coordinator/, Patient, Patient's Family and ENT Consultant.    Hector Mcpherson MD  Hospitalist Service  [facility]          Interval History (Subjective):      Patient in bed. Eating/drinking more today. Doing better. Pain better. WIll try  To get oob today. No chest pain, sob                Physical Exam:      Last Vital Signs:  /84 (BP Location: Left arm)   Pulse 79   Temp 97.3  F (36.3  C) (Oral)   Resp 18   Ht 1.651 m (5' 5\")   Wt 53.1 kg (117 lb)   SpO2 94%   BMI 19.47 kg/m      General: Alert, awake, appears very weak, thin  HEENT: Normocephalic and atraumatic, eyes anicteric and without scleral injection, EOMI, face symmetric, MMM. Posterior pharynx with drainage and diffuse erythema and swelling  Cardiac: RRR, normal S1, S2. II/VI NORBERTO heard throughout precordium, no g/r, no LE edema.  Pulmonary: Normal chest rise, normal work of breathing.  Lungs CTAB without crackles or " wheezing.  Abdomen: soft, non-tender, non-distended.  Normoactive bowel sounds, no guarding or rebound tenderness.  Extremities: no deformities.  Warm, well perfused.  Skin: no rashes or lesions.  Warm and Dry.  Neuro: No focal deficits.  Speech clear but voice slightly muffled. Moving extremities in bed, diffusely weak  Psych: Alert and oriented x3. Appropriate affect.         Medications:      All current medications were reviewed with changes reflected in problem list.         Data:      All new lab and imaging data was reviewed.   Labs:  Recent Labs   Lab 04/14/19 0611 04/13/19 0606 04/12/19  0624    144 142   POTASSIUM 4.1 3.9 3.6   CHLORIDE 112* 112* 111*   CO2 27 25 26   ANIONGAP 3 7 5   * 154* 97   BUN 50* 35* 28   CR 1.11* 1.04 1.01   GFRESTIMATED 42* 45* 47*   GFRESTBLACK 48* 52* 54*   DINORAH 9.7 9.5 9.3     Recent Labs   Lab 04/14/19 0611 04/13/19 0606 04/12/19  0624   WBC 22.8* 11.8* 9.7   HGB 9.2* 9.0* 9.8*   HCT 28.8* 28.4* 31.4*   MCV 93 93 94    202 206      Imaging:   No results found for this or any previous visit (from the past 24 hour(s)).    Hector Mcpherson MD

## 2019-04-14 NOTE — CONSULTS
CTS identifies pt as high risk due to Elevated GLADIS. Patient was admitted on 4/11/19 with c/o throat pain resulting in poor oral intake, weight loss, and weakness/ dizziness. ENT/ PT/OT/Nutrition were consulted. Patient was treated with antibiotics and started nutritional supplements. Patient has a history of CKD, HTN, hypothyroidism, and neuropathy. Patient has had outpatient infusions previously. Patient was discharged from a TCU on 4/4/19 and is currently open to home care PT/HHA/OT/ST/SW.   Hospital follow up appointment was not scheduled for the pt at this time as discharge is still in progress.    Handoff will be given to PCP clinic at discharge.     CM will continue to follow patient for any additional discharge needs.     Yun MAN  Care Transition Services  [phone]

## 2019-04-14 NOTE — PROGRESS NOTES
Labs just came in this am. WBC went up. Will update day nurse and PA this morning.  Results for KEITH LACY (MRN [id number]) as of 4/14/2019 06:30   Ref. Range 4/12/2019 06:24 4/13/2019 06:06 4/14/2019 06:11   WBC Latest Ref Range: 4.0 - 11.0 10e9/L 9.7 11.8 (H) 22.8 (H)

## 2019-04-15 LAB
ANION GAP SERPL CALCULATED.3IONS-SCNC: 7 MMOL/L (ref 3–14)
BASOPHILS # BLD AUTO: 0 10E9/L (ref 0–0.2)
BASOPHILS NFR BLD AUTO: 0.1 %
BUN SERPL-MCNC: 57 MG/DL (ref 7–30)
CALCIUM SERPL-MCNC: 9.2 MG/DL (ref 8.5–10.1)
CHLORIDE SERPL-SCNC: 113 MMOL/L (ref 94–109)
CO2 SERPL-SCNC: 23 MMOL/L (ref 20–32)
CREAT SERPL-MCNC: 1.11 MG/DL (ref 0.52–1.04)
DIFFERENTIAL METHOD BLD: ABNORMAL
EOSINOPHIL # BLD AUTO: 0 10E9/L (ref 0–0.7)
EOSINOPHIL NFR BLD AUTO: 0 %
ERYTHROCYTE [DISTWIDTH] IN BLOOD BY AUTOMATED COUNT: 19.4 % (ref 10–15)
GFR SERPL CREATININE-BSD FRML MDRD: 42 ML/MIN/{1.73_M2}
GLUCOSE SERPL-MCNC: 154 MG/DL (ref 70–99)
HCT VFR BLD AUTO: 29.2 % (ref 35–47)
HGB BLD-MCNC: 9.2 G/DL (ref 11.7–15.7)
IMM GRANULOCYTES # BLD: 0.2 10E9/L (ref 0–0.4)
IMM GRANULOCYTES NFR BLD: 0.8 %
LYMPHOCYTES # BLD AUTO: 1.2 10E9/L (ref 0.8–5.3)
LYMPHOCYTES NFR BLD AUTO: 5.9 %
MCH RBC QN AUTO: 29.6 PG (ref 26.5–33)
MCHC RBC AUTO-ENTMCNC: 31.5 G/DL (ref 31.5–36.5)
MCV RBC AUTO: 94 FL (ref 78–100)
MONOCYTES # BLD AUTO: 1.7 10E9/L (ref 0–1.3)
MONOCYTES NFR BLD AUTO: 8.3 %
NEUTROPHILS # BLD AUTO: 17 10E9/L (ref 1.6–8.3)
NEUTROPHILS NFR BLD AUTO: 84.9 %
NRBC # BLD AUTO: 0 10*3/UL
NRBC BLD AUTO-RTO: 0 /100
PLATELET # BLD AUTO: 185 10E9/L (ref 150–450)
POTASSIUM SERPL-SCNC: 4.1 MMOL/L (ref 3.4–5.3)
RBC # BLD AUTO: 3.11 10E12/L (ref 3.8–5.2)
SODIUM SERPL-SCNC: 143 MMOL/L (ref 133–144)
WBC # BLD AUTO: 20 10E9/L (ref 4–11)

## 2019-04-15 PROCEDURE — 85025 COMPLETE CBC W/AUTO DIFF WBC: CPT | Performed by: HOSPITALIST

## 2019-04-15 PROCEDURE — 25000132 ZZH RX MED GY IP 250 OP 250 PS 637: Performed by: PHYSICIAN ASSISTANT

## 2019-04-15 PROCEDURE — 25000128 H RX IP 250 OP 636: Performed by: INTERNAL MEDICINE

## 2019-04-15 PROCEDURE — 80048 BASIC METABOLIC PNL TOTAL CA: CPT | Performed by: HOSPITALIST

## 2019-04-15 PROCEDURE — 25000128 H RX IP 250 OP 636: Performed by: OTOLARYNGOLOGY

## 2019-04-15 PROCEDURE — 12000011 ZZH R&B MS OVERFLOW

## 2019-04-15 PROCEDURE — 99239 HOSP IP/OBS DSCHRG MGMT >30: CPT | Performed by: HOSPITALIST

## 2019-04-15 PROCEDURE — 36415 COLL VENOUS BLD VENIPUNCTURE: CPT | Performed by: HOSPITALIST

## 2019-04-15 PROCEDURE — 25800030 ZZH RX IP 258 OP 636: Performed by: INTERNAL MEDICINE

## 2019-04-15 RX ORDER — MECLIZINE HCL 12.5 MG 12.5 MG/1
12.5 TABLET ORAL 3 TIMES DAILY PRN
Status: ON HOLD | DISCHARGE
Start: 2019-04-15 | End: 2019-05-13

## 2019-04-15 RX ORDER — DOXYCYCLINE 100 MG/1
100 CAPSULE ORAL 2 TIMES DAILY
DISCHARGE
Start: 2019-04-15 | End: 2019-04-30

## 2019-04-15 RX ORDER — DIPHENHYDRAMINE HYDROCHLORIDE AND LIDOCAINE HYDROCHLORIDE AND ALUMINUM HYDROXIDE AND MAGNESIUM HYDRO
10 KIT EVERY 6 HOURS PRN
Status: ON HOLD | DISCHARGE
Start: 2019-04-15 | End: 2019-05-13

## 2019-04-15 RX ORDER — HYDROCODONE BITARTRATE AND ACETAMINOPHEN 5; 325 MG/1; MG/1
1 TABLET ORAL EVERY 6 HOURS PRN
Qty: 10 TABLET | Refills: 0 | Status: SHIPPED | OUTPATIENT
Start: 2019-04-15 | End: 2019-04-17

## 2019-04-15 RX ADMIN — CETIRIZINE HYDROCHLORIDE 10 MG: 10 TABLET, FILM COATED ORAL at 19:53

## 2019-04-15 RX ADMIN — OMEPRAZOLE 20 MG: 20 CAPSULE, DELAYED RELEASE ORAL at 08:51

## 2019-04-15 RX ADMIN — CEFTRIAXONE 2 G: 2 INJECTION, POWDER, FOR SOLUTION INTRAMUSCULAR; INTRAVENOUS at 08:48

## 2019-04-15 RX ADMIN — CETIRIZINE HYDROCHLORIDE 10 MG: 10 TABLET, FILM COATED ORAL at 08:51

## 2019-04-15 RX ADMIN — GABAPENTIN 100 MG: 100 CAPSULE ORAL at 19:54

## 2019-04-15 RX ADMIN — DILTIAZEM HYDROCHLORIDE 300 MG: 180 CAPSULE, COATED, EXTENDED RELEASE ORAL at 08:51

## 2019-04-15 RX ADMIN — HYDROCODONE BITARTRATE AND ACETAMINOPHEN 1 TABLET: 5; 325 TABLET ORAL at 19:53

## 2019-04-15 RX ADMIN — VANCOMYCIN HYDROCHLORIDE 750 MG: 10 INJECTION, POWDER, LYOPHILIZED, FOR SOLUTION INTRAVENOUS at 06:13

## 2019-04-15 RX ADMIN — DIPHENHYDRAMINE HYDROCHLORIDE AND LIDOCAINE HYDROCHLORIDE AND ALUMINUM HYDROXIDE AND MAGNESIUM HYDRO 10 ML: KIT at 08:56

## 2019-04-15 RX ADMIN — LEVOTHYROXINE SODIUM 88 MCG: 88 TABLET ORAL at 08:51

## 2019-04-15 NOTE — DISCHARGE SUMMARY
[facility]  Hospitalist Discharge Summary       Date of Admission:  4/11/2019  Date of Discharge:  4/16/2019  Discharging Provider: Hector Mcpherson MD      Discharge Diagnoses     Severe sinusitis with pharyngitis    Follow-ups Needed After Discharge   Follow-up Appointments     Follow Up and recommended labs and tests      Follow up with primary care provider in 7-10 days.  The following   labs/tests are recommended: cbc and chem 7 in 2 days. Follow-up with Dr. Irene (ENT, daughter has her number) within 1 week             Unresulted Labs Ordered in the Past 30 Days of this Admission     Date and Time Order Name Status Description    4/11/2019 1723 Blood culture Preliminary     4/11/2019 1723 Blood culture Preliminary     4/11/2019 1723 Sinus Culture Aerobic Bacterial Preliminary       These results will be followed up by ENT    Discharge Disposition   Discharged to short-term care facility  Condition at discharge: Stable    Hospital Course      Juliana Leal is a 89 year old female with past medical history of CKD, hypothyroidism, HTN who has been struggling with recurrent sinusitis since 8/2018 with recurrent rounds of antibiotics who was admitted on 4/11/2019 with persistent throat pain, odynophagia, diffuse weakness and was found to have acute sinusitis.  Patient was seen by ENT and will require at least 48 hours IV antibiotics.     Severe Sinusitive with Pharyngitis  - Has been treated with multiple rounds of antibiotics but symptoms have recurred and are more severe now.  Patient was seen by Dr. Byrne with ENT ad a culture was collected.  CT shows severe sinusitis from admission.  I also discussed the case with Dr. Irene (who sees her in the outpateint setting), she will need to follow up with her in clinic in 2-3 weeks to possibly discuss sinus surgery given her recurrent and persistent symptoms.  ENT feels that she will need at least 48 hours of IV antibiotics before transitioning to oral  based oncultures.  - ENT consulted, apprecaite recommendations   - Continue IV Vancomycin and Ceftriaxone  - will now discontinue dexamethasone (started Friday for inflammation)  - Huricaine spray for pain relief  - Continue Cetrizine, Nasacors and Keyes nasal spray     Dizziness    - like vertigo related to ongoing sinus infection    - PRN meclizine    - no dizziness today     Mild increase in Cr    - likely due to poor PO intake    - IVF x 1 liter today     Generalized Weaness  - Patient discarged on 4/4 from TCU.  She quickly deteriorated because she was unable to eat due to throat pain/drainage.  Treatment as above.  PT also consulted.  - will attempt ambulation today     Hypothyroidism  - Continue Synthroid.     HTN  - Continue Diltiazem.     CKD  -  Baseline creatinine ~1.2-1.5, creatinine actually improved to 1.01 today.     Suspected Severe Malnutrition  - Has been unable to eat due to throat symptoms.  BMI 19.47, very weak.  Will order nutrition consult as well as supplements between meals.     Patient doing well. I personally spoke with Dr. Byrne (ENT). He is ok with discharge today. He recommends 10 days of doxy on discharge and follow-up with Dr. Irene as an outpatient. I met with her daughter and answered all her questions.   Addendum: Patient was to discharge yesterday, but this was delayed due to her insurance needing prior authorization. She has been seen this am. She is doing well. No new complaints. Vitals and labs stable. She will discharge to a rehab facility today.     Consultations This Hospital Stay   PHYSICAL THERAPY ADULT IP CONSULT  SOCIAL WORK IP CONSULT  ENT IP CONSULT  PHARMACY TO DOSE [facility]  NUTRITION SERVICES ADULT IP CONSULT  CARE COORDINATOR IP CONSULT  PHYSICAL THERAPY ADULT IP CONSULT  OCCUPATIONAL THERAPY ADULT IP CONSULT    Code Status   DNR/DNI    Time Spent on this Encounter   I, Hector Mcpherson, personally saw the patient today and spent greater than 30 minutes discharging this  patient.       Hector Mcpherson MD  [facility]  ______________________________________________________________________    Physical Exam   Vital Signs: Temp: 97.9  F (36.6  C) Temp src: Oral BP: 126/72 Pulse: 64   Resp: 18 SpO2: 95 % O2 Device: None (Room air)    Weight: 117 lbs 0 oz  Constitutional: awake, alert, cooperative, no apparent distress, and appears stated age  Respiratory: No increased work of breathing, good air exchange, clear to auscultation bilaterally, no crackles or wheezing  Cardiovascular: Normal apical impulse, regular rate and rhythm, normal S1 and S2, no S3 or S4, and no murmur noted  GI: No scars, normal bowel sounds, soft, non-distended, non-tender, no masses palpated, no hepatosplenomegally       Primary Care Physician   Erick Ruano    Discharge Orders      General info for SNF    Length of Stay Estimate: Short Term Care: Estimated # of Days <30  Condition at Discharge: Stable  Level of care:skilled   Rehabilitation Potential: Fair  Admission H&P remains valid and up-to-date: Yes  Recent Chemotherapy: N/A  Use Nursing Home Standing Orders: Yes     Mantoux instructions    Give two-step Mantoux (PPD) Per Facility Policy Yes     Reason for your hospital stay    Sinusitis and pharyngitis     Follow Up and recommended labs and tests    Follow up with primary care provider in 7-10 days.  The following labs/tests are recommended: cbc and chem 7 in 2 days. Follow-up with Dr. Irene (ENT, daughter has her number) within 1 week     Activity - Up with nursing assistance     DNR/DNI     Physical Therapy Adult Consult    Evaluate and treat as clinically indicated.    Reason:  weakness     Occupational Therapy Adult Consult    Evaluate and treat as clinically indicated.    Reason:  weakness     Fall precautions     Advance Diet as Tolerated    Follow this diet upon discharge: Regular       Significant Results and Procedures   Most Recent 3 CBC's:  Recent Labs   Lab Test 04/15/19  0642  04/14/19  0611 04/13/19  0606   WBC 20.0* 22.8* 11.8*   HGB 9.2* 9.2* 9.0*   MCV 94 93 93    212 202     Most Recent 3 BMP's:  Recent Labs   Lab Test 04/15/19  0642 04/14/19  0611 04/13/19  0606    142 144   POTASSIUM 4.1 4.1 3.9   CHLORIDE 113* 112* 112*   CO2 23 27 25   BUN 57* 50* 35*   CR 1.11* 1.11* 1.04   ANIONGAP 7 3 7   DINORAH 9.2 9.7 9.5   * 168* 154*     Most Recent 2 LFT's:  Recent Labs   Lab Test 03/19/19  1438 04/12/18  1659   AST 15 16   ALT 16 8   ALKPHOS 80 69   BILITOTAL 0.5 0.2   ,   Results for orders placed or performed during the hospital encounter of 04/11/19   CT Soft Tissue Neck w/o Contrast    Narrative    CT SCAN OF THE NECK WITHOUT CONTRAST  4/11/2019 7:12 PM     HISTORY: Sore throat/stridor, epiglottis or tonsillitis suspected.     TECHNIQUE:  Axial images and coronal reformations. No IV contrast.  Radiation dose for this scan was reduced using automated exposure  control, adjustment of the mA and/or kV according to patient size, or  iterative reconstruction technique.    COMPARISON: Neck CT 8/3/2018.     FINDINGS: Evaluation is mildly limited given lack of intravenous  contrast. No significant inflammatory fat stranding or fluid  collections are appreciated. No definite suspicious masses are seen  although the patient appears cachectic which makes evaluation  difficult.    The epiglottis is not thickened and the preepiglottic fat is clear.    Right greater than left palatine tonsils are prominent in size.    Persistent marked fatty infiltration of the left submandibular gland.  Nonobstructing calcification in the right submandibular gland is  unchanged. Parotid glands are unremarkable. 2 mm calcification in the  left thyroid gland is unchanged and is likely benign.    There are atherosclerotic calcifications at the carotid bifurcations  bilaterally.    Degenerative changes are seen in the spine.    There is biapical pleural thickening.    Marked mucosal thickening in  visualized paranasal sinuses. This is  better described on sinus CT.      Impression    IMPRESSION:    1. Nonspecific prominence of the right greater than left palatine  tonsils. This raises concern for tonsillitis. No obvious fluid  collection is seen although evaluation is limited given lack of  intravenous contrast as well as dental amalgam streak artifact.  Recommend direct visualization.  2. No other significant inflammation or fluid collection is  appreciated in the neck.  3. Marked mucosal thickening in the paranasal sinuses better described  on sinus CT.       ADRIAN MARQUIS MD   CT Sinus w/o Contrast    Narrative    CT SCAN OF THE PARANASAL SINUSES AND FACE  4/11/2019 7:11 PM     HISTORY: Sinusitis, chronic, possible surgery.    TECHNIQUE: Noncontrast axial scans of the sinuses with coronal and  sagittal reformations were completed. Radiation dose for this scan was  reduced using automated exposure control, adjustment of the mA and/or  kV according to patient size, or iterative reconstruction technique.      COMPARISON: None.    FINDINGS:   Frontal sinuses: Moderate mucosal thickening in the right frontal  sinus. There is an air-fluid layer in the right frontal sinus. The  right frontal sinus drainage pathway is occluded by soft tissue. The  left frontal sinus and frontal sinus drainage pathway are clear.    Ethmoid sinuses: Marked mucosal thickening in the right greater than  left ethmoid air cells. The right anterior ethmoid air cells are  almost completely opacified.     Right maxillary sinus: Marked circumferential mucosal thickening in  the right maxillary sinus measuring up to 1 cm in width. There is  air/secretions in the right maxillary sinus posteriorly. The right  maxillary sinus ostium and infundibulum are occluded by soft tissue.     Left maxillary sinus: Mucosal thickening in the left maxillary sinus  greatest along the inferior, lateral, and medial margins measuring  approximately 5 mm in  width. The left maxillary sinus ostium is  occluded by soft tissue but the infundibulum appears patent.     Sphenoid sinus: Aerosolized secretions in the inferior right sphenoid  sinus. The sphenoid sinus ostia are occluded by soft tissue  bilaterally. The sphenoethmoidal recesses are clear. There is a right  [place]odi air cell.    Nasal septum: Normal and in the midline.     Turbinates and nasal cavity: Marked mucosal thickening in the nasal  cavity particularly of the anterior inferior left nasal cavity and  posterior superior right nasal cavity.      Laminae papyracea and cribriform plate: Normal.    Other findings: Low mAs images of the brain are unremarkable. No  suspicious lucencies around the visualized teeth.       Impression    IMPRESSION:   1. Marked mucosal thickening throughout the paranasal sinuses  particularly the right frontal sinus, right anterior ethmoid air  cells, and right maxillary sinus. There is an air-fluid layer in the  right frontal sinus. This is compatible with acute sinusitis. There is  also marked mucosal thickening throughout the nasal cavity.  2. Occlusion of the right maxillary sinus ostium and infundibulum,  right frontal sinus drainage pathway, left maxillary sinus ostium, and  bilateral sphenoid sinus ostia.  3. Anatomic variant of a right Onodi air cell.     ADRIAN MARQUIS MD       Discharge Medications   Current Discharge Medication List      START taking these medications    Details   doxycycline hyclate (VIBRAMYCIN) 100 MG capsule Take 1 capsule (100 mg) by mouth 2 times daily for 10 days    Associated Diagnoses: Subacute sinusitis, unspecified location      HYDROcodone-acetaminophen (NORCO) 5-325 MG tablet Take 1 tablet by mouth every 6 hours as needed for moderate to severe pain  Qty: 10 tablet, Refills: 0    Associated Diagnoses: Throat pain      magic mouthwash (FIRST-MOUTHWASH BLM) compounding kit Swish and swallow 10 mLs in mouth every 6 hours as needed for mouth sores     Associated Diagnoses: Throat pain      meclizine (ANTIVERT) 12.5 MG tablet Take 1 tablet (12.5 mg) by mouth 3 times daily as needed for dizziness    Associated Diagnoses: Vertigo         CONTINUE these medications which have NOT CHANGED    Details   acetaminophen (TYLENOL) 500 MG tablet Take 500 mg by mouth 2 times daily as needed for mild pain      cetirizine (ZYRTEC) 10 MG tablet Take 1 tablet (10 mg) by mouth 2 times daily  Qty: 30 tablet    Associated Diagnoses: Need for pneumococcal vaccination      diltiazem ER COATED BEADS (CARDIZEM CD/CARTIA XT) 300 MG 24 hr capsule Take 300 mg by mouth daily TAKE 1 CAPSULE(300 MG) BY MOUTH DAILY; HOLD if SBP <110 OR HR < 55. Call if held x 2 in a row symptomatic      !! gabapentin (NEURONTIN) 100 MG capsule Take 100 mg by mouth nightly as needed      !! gabapentin (NEURONTIN) 100 MG capsule Take 100 mg by mouth At Bedtime       levothyroxine (SYNTHROID/LEVOTHROID) 88 MCG tablet Take 1 tablet (88 mcg) by mouth daily  Qty: 90 tablet, Refills: 2    Associated Diagnoses: Acquired hypothyroidism      Multiple Vitamins-Minerals (CENTRUM SILVER) per tablet Take 1 tablet by mouth daily      omeprazole (PRILOSEC) 20 MG CR capsule Take 1 capsule (20 mg) by mouth daily  Qty: 90 capsule, Refills: 1    Associated Diagnoses: Gastroesophageal reflux disease, esophagitis presence not specified      triamcinolone (NASACORT) 55 MCG/ACT inhaler Spray 2 sprays into both nostrils daily  Qty: 16.5 mL, Refills: 1    Associated Diagnoses: Nasal congestion      emollient (VANICREAM) cream Apply topically as needed for other (leg rash)      omalizumab (XOLAIR) 150 MG injection Inject 150 mg Subcutaneous every 28 days HOLD WHILE IN TCU. Next dose 4/2/19. Can wait til after discharge.      sodium chloride (OCEAN) 0.65 % nasal spray Spray 2 sprays into both nostrils every hour as needed for congestion Or equivalent per kimberly. Pt. Can have at bedside.       !! - Potential duplicate medications found.  "Please discuss with provider.      STOP taking these medications       nystatin (MYCOSTATIN) 406701 unit/mL SUSP suspension Comments:   Reason for Stopping:             Allergies   Allergies   Allergen Reactions     Lisinopril Other (See Comments) and Anaphylaxis     Swelling around mouth     Prednisone Unknown     \"every side effect listed\"     Allegra [Fexofenadine] Swelling     Amoxicillin Itching     Cipro [Quinolones] Itching     Codeine Sulfate Other (See Comments)     Felt out of it when took it yrs ago     Hydroxyzine      Feels drunk     Iodine [Gnp Iodides Decolorized]      Lyrica [Pregabalin]      Reaction unknown     Norvasc [Amlodipine Besylate]      Mouth felt funny     Tramadol      Felt loopy     Zaditor Other (See Comments) and Swelling     Tongue swelled  Didn't feel right  Eye drops     Penicillins Rash     "

## 2019-04-15 NOTE — PROGRESS NOTES
Discharge Planner   Discharge Plans in progress: Yes, therapy recommending TCU. Patient has been accepted to [facility]. [facility] patient.   Barriers to discharge plan: Prior authorization from Medic.   Follow up plan: CM will continue to follow for coordinating discharge to TCU.        Entered by: Yun Smith 04/15/2019 2:16 PM       Yun Smith MA-RN  Care Transition Services  [phone]

## 2019-04-15 NOTE — PLAN OF CARE
"/72 (BP Location: Left arm)   Pulse 64   Temp 97.9  F (36.6  C) (Oral)   Resp 18   Ht 1.651 m (5' 5\")   Wt 53.1 kg (117 lb)   SpO2 95%   BMI 19.47 kg/m      Neuro: WDL  Cardiac: WDL  Lungs: WDL  GI: WDL  : WDL  Pain: Denies  IV: saline locked  Meds: IV ABX  Diet: regular  Activity: SBA  Plan: Patient is stable and on room air.  Assist of  SBA, on a regular diet. Patient can discharge on oral ABX once a facility accepts her. Will continue to monitor and provide cares.     "

## 2019-04-15 NOTE — PLAN OF CARE
Neuro:WNL, [place]. Has HA's  Pain: PRN [place] before bed for pain/rest.  Activity: Ax1, GB and walker.  Diet: Reg diet, pt reports fair appetite is improving.  Lungs: WNL  GI/: WNL  Lines/drains: New PIV R forearm, SL  Plan: D/C 1-2 days w/ IV vanco/rocephin. Throat- redness improving. Follow up with 2-3 weeks ENT for sinus surgery.

## 2019-04-15 NOTE — CONSULTS
[facility]  Antimicrobial Stewardship Team (AST) Note            To: Hospitalist  Unit:   Patient Name: Juliana Leal    Allergies: Amoxicillin (itching), penicillin (rash), ciprofloxacin (itching)          Brief Summary:  Patient  is a 89 year old female with past medical history of CKD, hypothyroidism, HTN who has been struggling with recurrent sinusitis since 8/2018 with recurrent rounds of antibiotics who was admitted on 4/11/2019 with persistent throat pain, odynophagia, diffuse weakness and was found to have acute sinusitis.  Patient was seen by ENT and will require at least 48 hours IV antibiotics. Patient is currently on IV vancomycin (Day 4), and ceftriaxone (Day 4)     Assessment: Tmax = 97.9/afebrile, WBC = 20.0 (?22.8 on 4/14), sinus culture is growing a single colony of Rahnella aquatilis (= gram (-) candelario)/susceptibility testing in progress, Pt has CKD/CrCl = 24.8 ml/min. Cultures are negative for MRSA/other resistant gram (+) organisms   Current Antibiotic therapy: Ceftriaxone 2grams IV q24hrs (4/12@1000 - Day 4), IV vancomycin 750 mg q24hrs (4/11@2000 - Day 4)     Clinical Features/Vital Signs (VS):          Culture Results:  Date Culture Site Organism   4/11 Blood culture x 2 (RH/LA)   No growth after 4 days   4/11 Throat    Negative, No Group A Streptococcal antigen detected    No Beta Streptococcus isolated   4/12 Nares/Sinus     Light growth normal sera    Single colony Rahnella aquatilis     Imaging:        Recommendation/Interventions:  Cultures are negative for MRSA/resistant gram (+) organisms. Recommend discontinue IV vancomycin, not worth the risk of nephrotoxicity.     Discussed w/ ID Staff-Dr Vidal Lewis, PharmD, BCPS  Pharmacy Clinical Specialist  [phone]

## 2019-04-15 NOTE — PROGRESS NOTES
[facility]  Hospitalist Progress Note  Hector Mcpherson MD 04/14/19  Text Page  Pager: [phone]    Reason for Stay (Diagnosis): Recurrent Sinusitis, weakness         Assessment and Plan:      Summary of Stay: Juliana Leal is a 89 year old female with past medical history of CKD, hypothyroidism, HTN who has been struggling with recurrent sinusitis since 8/2018 with recurrent rounds of antibiotics who was admitted on 4/11/2019 with persistent throat pain, odynophagia, diffuse weakness and was found to have acute sinusitis.  Patient was seen by ENT and will require at least 48 hours IV antibiotics.    Severe Sinusitive with Pharyngitis  - Has been treated with multiple rounds of antibiotics but symptoms have recurred and are more severe now.  Patient was seen by Dr. Byrne with ENT ad a culture was collected.  CT shows severe sinusitis from admission.  I also discussed the case with Dr. Irene (who sees her in the outpateint setting), she will need to follow up with her in clinic in 2-3 weeks to possibly discuss sinus surgery given her recurrent and persistent symptoms.  ENT feels that she will need at least 48 hours of IV antibiotics before transitioning to oral based oncultures.  - ENT consulted, apprecaite recommendations   - Continue IV Vancomycin and Ceftriaxone  - will now discontinue dexamethasone (started Friday for inflammation)  - Huricaine spray for pain relief  - Continue Cetrizine, Nasacors and [place] nasal spray  - spoke with Dr. Byrne. Was to discharge today on doxy, but now pending prior authorization for insurance.     Dizziness    - like vertigo related to ongoing sinus infection    - PRN meclizine    - no dizziness today    Mild increase in Cr    - likely due to poor PO intake    - IVF x 1 liter today    Generalized Weaness  - Patient discarged on 4/4 from TCU.  She quickly deteriorated because she was unable to eat due to throat pain/drainage.  Treatment as above.  PT also  "consulted.  - will attempt ambulation today    Hypothyroidism  - Continue Synthroid.    HTN  - Continue Diltiazem.    CKD  -  Baseline creatinine ~1.2-1.5, creatinine actually improved to 1.01 today.    Suspected Severe Malnutrition  - Has been unable to eat due to throat symptoms.  BMI 19.47, very weak.  Will order nutrition consult as well as supplements between meals.    Met with daughter    Diet: Regular Diet Adult  Snacks/Supplements Adult: Boost Breeze; Between Meals  Advance Diet as Tolerated    DVT Prophylaxis: Low Risk/Ambulatory with no VTE prophylaxis indicated  Castillo Catheter: not present  Code Status: DNR/DNI      Disposition Plan   Expected discharge: 2 - 3 days, recommended to TBD based on therapy recommendations once antibiotic plan established and safe disposition plan/ TCU bed available.  Entered: Hector Mcpherson MD 04/15/2019, 2:20 PM       The patient's care was discussed with the Bedside Nurse, Care Coordinator/, Patient, Patient's Family and ENT Consultant.    Hector Mcpherson MD  Hospitalist Service  [facility]          Interval History (Subjective):      Patient in bed. Feels well. Was tired today because she did not sleep well due to issues with her IV. Eating some.       Physical Exam:      Last Vital Signs:  /72 (BP Location: Left arm)   Pulse 64   Temp 97.9  F (36.6  C) (Oral)   Resp 18   Ht 1.651 m (5' 5\")   Wt 53.1 kg (117 lb)   SpO2 95%   BMI 19.47 kg/m      General: Alert, awake, appears very weak, thin  HEENT: Normocephalic and atraumatic, eyes anicteric and without scleral injection, EOMI, face symmetric, MMM. Posterior pharynx with drainage and diffuse erythema and swelling  Cardiac: RRR, normal S1, S2. II/VI NORBERTO heard throughout precordium, no g/r, no LE edema.  Pulmonary: Normal chest rise, normal work of breathing.  Lungs CTAB without crackles or wheezing.  Abdomen: soft, non-tender, non-distended.  Normoactive bowel sounds, no guarding " or rebound tenderness.  Extremities: no deformities.  Warm, well perfused.  Skin: no rashes or lesions.  Warm and Dry.  Neuro: No focal deficits.  Speech clear but voice slightly muffled. Moving extremities in bed, diffusely weak  Psych: Alert and oriented x3. Appropriate affect.         Medications:      All current medications were reviewed with changes reflected in problem list.         Data:      All new lab and imaging data was reviewed.   Labs:  Recent Labs   Lab 04/15/19  0642 04/14/19  0611 04/13/19  0606    142 144   POTASSIUM 4.1 4.1 3.9   CHLORIDE 113* 112* 112*   CO2 23 27 25   ANIONGAP 7 3 7   * 168* 154*   BUN 57* 50* 35*   CR 1.11* 1.11* 1.04   GFRESTIMATED 42* 42* 45*   GFRESTBLACK 48* 48* 52*   DINORAH 9.2 9.7 9.5     Recent Labs   Lab 04/15/19  0642 04/14/19  0611 04/13/19  0606   WBC 20.0* 22.8* 11.8*   HGB 9.2* 9.2* 9.0*   HCT 29.2* 28.8* 28.4*   MCV 94 93 93    212 202      Imaging:   No results found for this or any previous visit (from the past 24 hour(s)).    Hector Mcpherson MD

## 2019-04-15 NOTE — PLAN OF CARE
"/70 (BP Location: Left arm)   Pulse 76   Temp 97.6  F (36.4  C) (Oral)   Resp 18   Ht 1.651 m (5' 5\")   Wt 53.1 kg (117 lb)   SpO2 96%   BMI 19.47 kg/m    \Situation/Status: Pt throat improving, continue to be slightly red,swollen, and painful. IV decadron Dc'd today. On IV Vanco and Rocephin.  Neuro: A/O x 4, hard of hearing.Some forgetfulness. Wears HA  Pain: Rated pain at 5/10, gave prn norco at HS for pain, with effective relief, has hurricane spray and lozenges available.  Activity: Assist of 1 w/ gait belt and walker  Diet: Reg diet  Lungs: DX-HOR-Vuiru  GI/: No nausea/vomiting, + Flautus, BS x4.  Lines/drains: IV-SL  Plan: discontinue 1-2 days when IV abx complete to TCU. ENT following.Continue to monitor per POC.    "

## 2019-04-16 ENCOUNTER — APPOINTMENT (OUTPATIENT)
Dept: PHYSICAL THERAPY | Facility: CLINIC | Age: 84
DRG: 152 | End: 2019-04-16
Payer: COMMERCIAL

## 2019-04-16 VITALS
BODY MASS INDEX: 19.49 KG/M2 | HEIGHT: 65 IN | RESPIRATION RATE: 20 BRPM | DIASTOLIC BLOOD PRESSURE: 83 MMHG | SYSTOLIC BLOOD PRESSURE: 140 MMHG | HEART RATE: 65 BPM | OXYGEN SATURATION: 96 % | TEMPERATURE: 97.4 F | WEIGHT: 117 LBS

## 2019-04-16 LAB
ANION GAP SERPL CALCULATED.3IONS-SCNC: 7 MMOL/L (ref 3–14)
BASOPHILS # BLD AUTO: 0 10E9/L (ref 0–0.2)
BASOPHILS NFR BLD AUTO: 0.1 %
BUN SERPL-MCNC: 52 MG/DL (ref 7–30)
CALCIUM SERPL-MCNC: 9 MG/DL (ref 8.5–10.1)
CHLORIDE SERPL-SCNC: 112 MMOL/L (ref 94–109)
CO2 SERPL-SCNC: 25 MMOL/L (ref 20–32)
CREAT SERPL-MCNC: 1.04 MG/DL (ref 0.52–1.04)
DIFFERENTIAL METHOD BLD: ABNORMAL
EOSINOPHIL # BLD AUTO: 0 10E9/L (ref 0–0.7)
EOSINOPHIL NFR BLD AUTO: 0.1 %
ERYTHROCYTE [DISTWIDTH] IN BLOOD BY AUTOMATED COUNT: 19 % (ref 10–15)
GFR SERPL CREATININE-BSD FRML MDRD: 45 ML/MIN/{1.73_M2}
GLUCOSE SERPL-MCNC: 145 MG/DL (ref 70–99)
HCT VFR BLD AUTO: 30.2 % (ref 35–47)
HGB BLD-MCNC: 9.7 G/DL (ref 11.7–15.7)
IMM GRANULOCYTES # BLD: 0.1 10E9/L (ref 0–0.4)
IMM GRANULOCYTES NFR BLD: 0.9 %
LYMPHOCYTES # BLD AUTO: 1.1 10E9/L (ref 0.8–5.3)
LYMPHOCYTES NFR BLD AUTO: 6.7 %
MCH RBC QN AUTO: 29.9 PG (ref 26.5–33)
MCHC RBC AUTO-ENTMCNC: 32.1 G/DL (ref 31.5–36.5)
MCV RBC AUTO: 93 FL (ref 78–100)
MONOCYTES # BLD AUTO: 1.9 10E9/L (ref 0–1.3)
MONOCYTES NFR BLD AUTO: 12 %
NEUTROPHILS # BLD AUTO: 13 10E9/L (ref 1.6–8.3)
NEUTROPHILS NFR BLD AUTO: 80.2 %
NRBC # BLD AUTO: 0 10*3/UL
NRBC BLD AUTO-RTO: 0 /100
PLATELET # BLD AUTO: 173 10E9/L (ref 150–450)
POTASSIUM SERPL-SCNC: 4.1 MMOL/L (ref 3.4–5.3)
RBC # BLD AUTO: 3.24 10E12/L (ref 3.8–5.2)
SODIUM SERPL-SCNC: 144 MMOL/L (ref 133–144)
VANCOMYCIN SERPL-MCNC: 26.1 MG/L
WBC # BLD AUTO: 16.2 10E9/L (ref 4–11)

## 2019-04-16 PROCEDURE — 25000132 ZZH RX MED GY IP 250 OP 250 PS 637: Performed by: PHYSICIAN ASSISTANT

## 2019-04-16 PROCEDURE — 25000128 H RX IP 250 OP 636: Performed by: OTOLARYNGOLOGY

## 2019-04-16 PROCEDURE — 36415 COLL VENOUS BLD VENIPUNCTURE: CPT | Performed by: HOSPITALIST

## 2019-04-16 PROCEDURE — 80048 BASIC METABOLIC PNL TOTAL CA: CPT | Performed by: HOSPITALIST

## 2019-04-16 PROCEDURE — 25800030 ZZH RX IP 258 OP 636: Performed by: INTERNAL MEDICINE

## 2019-04-16 PROCEDURE — 36415 COLL VENOUS BLD VENIPUNCTURE: CPT | Performed by: INTERNAL MEDICINE

## 2019-04-16 PROCEDURE — 97530 THERAPEUTIC ACTIVITIES: CPT | Mod: GP | Performed by: PHYSICAL THERAPIST

## 2019-04-16 PROCEDURE — 25000128 H RX IP 250 OP 636: Performed by: INTERNAL MEDICINE

## 2019-04-16 PROCEDURE — 85025 COMPLETE CBC W/AUTO DIFF WBC: CPT | Performed by: HOSPITALIST

## 2019-04-16 PROCEDURE — 80202 ASSAY OF VANCOMYCIN: CPT | Performed by: INTERNAL MEDICINE

## 2019-04-16 RX ADMIN — OMEPRAZOLE 20 MG: 20 CAPSULE, DELAYED RELEASE ORAL at 08:28

## 2019-04-16 RX ADMIN — VANCOMYCIN HYDROCHLORIDE 750 MG: 10 INJECTION, POWDER, LYOPHILIZED, FOR SOLUTION INTRAVENOUS at 05:34

## 2019-04-16 RX ADMIN — DIPHENHYDRAMINE HYDROCHLORIDE AND LIDOCAINE HYDROCHLORIDE AND ALUMINUM HYDROXIDE AND MAGNESIUM HYDRO 10 ML: KIT at 08:31

## 2019-04-16 RX ADMIN — CETIRIZINE HYDROCHLORIDE 10 MG: 10 TABLET, FILM COATED ORAL at 08:28

## 2019-04-16 RX ADMIN — DILTIAZEM HYDROCHLORIDE 300 MG: 180 CAPSULE, COATED, EXTENDED RELEASE ORAL at 08:28

## 2019-04-16 RX ADMIN — LEVOTHYROXINE SODIUM 88 MCG: 88 TABLET ORAL at 08:28

## 2019-04-16 RX ADMIN — CEFTRIAXONE 2 G: 2 INJECTION, POWDER, FOR SOLUTION INTRAMUSCULAR; INTRAVENOUS at 08:25

## 2019-04-16 NOTE — PLAN OF CARE
Discharge Planner PT   Patient plan for discharge:TCU  Current status: Pt needing safety cues for BR transfer, using bars but at times not keeping walker close by. Pt was cued for safe transfer into bed, not backing up with FWW on enough before sitting, almost sliding off bed. Pt was cued for 250 feet with FWW, needing cues for posture. Pt needing reminders for walking 3-4 times per day along with PT as reports this was 1st walk out of her room today.   Barriers to return to prior living situation: Lives alone  Recommendations for discharge: Home with Home PT/OT if pt's dtg could assist PRN with IADLs, paulino cooking, cleaning and laundry due to energy level, not a cognitive concern at this time with quick screen. If pt's dtg not able to assist for a couple days with cooking, cleaning and laundry recommend TCU.   Rationale for recommendations: Pt able to ambulate functional distances and perform basic ADLs, but energy level low from poor oral intake and would benefit from either increased help from family or short TCU stay.        Entered by: Allie Kapoor 04/16/2019 4:12 PM

## 2019-04-16 NOTE — PLAN OF CARE
"/72 (BP Location: Right arm)   Pulse 57   Temp 97.8  F (36.6  C) (Oral)   Resp 16   Ht 1.651 m (5' 5\")   Wt 53.1 kg (117 lb)   SpO2 98%   BMI 19.47 kg/m      Orientation: X4   Neurological: Intact   Pain status: Denies throat pain, given magic mouthwash x 1 on day shift  Activity: Up with assist of 1 and walker   Resp: WDL   Lungs: Clear   Cardiac: WDL   GI: WDL   : WDL   Skin: Bruised   IVF: SL besides IV antibiotics   Diet: Regular -takes meds crushed with warm applesauce  Consults: PT and SW consulted   Plan:  TC[facility] ([facility]) once prior authorization complete.     "

## 2019-04-16 NOTE — PHARMACY-VANCOMYCIN DOSING SERVICE
Pharmacy Vancomycin Note  Date of Service 2019  Patient's  2/10/1923   89 year old, female    Indication: Skin and Soft Tissue Infection  Goal Trough Level: 10-15 mg/L  Day of Therapy: 6  Current Vancomycin regimen:  750 mg IV q24h    Current estimated CrCl = Estimated Creatinine Clearance: 26.5 mL/min (based on SCr of 1.04 mg/dL).    Creatinine for last 3 days  2019:  6:11 AM Creatinine 1.11 mg/dL  4/15/2019:  6:42 AM Creatinine 1.11 mg/dL  2019:  5:21 AM Creatinine 1.04 mg/dL    Recent Vancomycin Levels (past 3 days)  2019:  5:04 PM Vancomycin Level 4.6 mg/L  2019:  6:49 AM Vancomycin Level 26.1 mg/L      Vancomycin IV Administrations (past 72 hours)                   vancomycin (VANCOCIN) 750 mg in sodium chloride 0.9 % 250 mL intermittent infusion (mg) 750 mg New Bag 19 0534     750 mg New Bag 04/15/19 0613     750 mg New Bag 19 0647                Nephrotoxins and other renal medications (From now, onward)    Start     Dose/Rate Route Frequency Ordered Stop    19 0600  vancomycin (VANCOCIN) 750 mg in sodium chloride 0.9 % 250 mL intermittent infusion      750 mg  over 90 Minutes Intravenous EVERY 24 HOURS 19 2030               Contrast Orders - past 72 hours (72h ago, onward)    None          Interpretation of levels and current regimen:      **Level was drawn during or immediately following vanco infusion -- level is not indicative of trough.  Will reorder level if vanco is expected to continue.         Plan:  1.  Continue Current Dose  2.  Pharmacy will check trough levels as appropriate in 1-3 Days.    3. Serum creatinine levels will be ordered daily for the first week of therapy and at least twice weekly for subsequent weeks.      Tricia Olivier, PharmVioletD.        .

## 2019-04-16 NOTE — PROGRESS NOTES
Discharge Planner   Discharge Plans in progress: [facility]  Barriers to discharge plan: Awaiting [employer] prior auth  Follow up plan: discharge to [facility] via dtr Jan to transport. Awaiting insurance auth.  left at facility to request update.        Entered by: Mariella Otoole 04/16/2019 2:10 PM        1600: Auth obtained. Dtr Jan to transport at 1700.

## 2019-04-16 NOTE — PLAN OF CARE
"/77 (BP Location: Left arm)   Pulse 67   Temp 98.1  F (36.7  C) (Oral)   Resp 16   Ht 1.651 m (5' 5\")   Wt 53.1 kg (117 lb)   SpO2 96%   BMI 19.47 kg/m      Orientation: X4   Neurological: Intact   Pain status: 4 out of 10 in throat, PRN Norco given   Activity: Up with assist of 1 and walker   Resp: WDL   Lungs: Clear   Cardiac: WDL   GI: WDL   : WDL   Skin: Bruised   IVF: SL besides IV antibiotics   Diet: Regular   Consults: PT and SW consulted   Interventions this shift: Pain control   Plan:  TCU ([facility] today possibly.   "

## 2019-04-16 NOTE — PLAN OF CARE
PRIMARY DIAGNOSIS: Sinus infection    OUTPATIENT/OBSERVATION GOALS TO BE MET BEFORE DISCHARGE  1. Orthostatic performed: No    2. Tolerating PO medications: Yes    3. Return to near baseline physical activity: Yes    4. Cleared for discharge by consultants (if involved): Yes    Discharge Planner Nurse   Safe discharge environment identified: Yes  Barriers to discharge: No       Entered by: Avis Dobson 04/16/2019 5:38 PM     Please review provider order for any additional goals.   Nurse to notify provider when observation goals have been met and patient is ready for discharge.    Patient is Alert and Oriented x4. Vitals are Temp: 97.4  F (36.3  C) Temp src: Oral BP: 140/83 Pulse: 65   Resp: 20 SpO2: 96 % O2 Device: None (Room air)   Pt is complaining of pain in their mild discomfort in their throat, denies intervention.  They are SBA with Activity and walker.    Pt is on a Regular diet. Patient is Saline locked.  Plan: pt to go to [facility] once insurance is approved. SW following. Daughter to transport

## 2019-04-16 NOTE — PROGRESS NOTES
Your information has been submitted on April 16th, 2019 at 09:48:06 AM CDT. The confirmation number is [id number]

## 2019-04-16 NOTE — PLAN OF CARE
Patient's After Visit Summary was reviewed with patient and/or Daughter .   Patient verbalized understanding of After Visit Summary, recommended follow up and was given an opportunity to ask questions.   Discharge medications sent home with patient/family: No, Scripts sent with pt.     Discharged with daughter    Discharge information sent with patient to give to facility.    OBSERVATION patient END time: 3187

## 2019-04-17 ENCOUNTER — DOCUMENTATION ONLY (OUTPATIENT)
Dept: OTHER | Facility: CLINIC | Age: 84
End: 2019-04-17

## 2019-04-17 ENCOUNTER — PATIENT OUTREACH (OUTPATIENT)
Dept: CARE COORDINATION | Facility: CLINIC | Age: 84
End: 2019-04-17

## 2019-04-17 ENCOUNTER — NURSING HOME VISIT (OUTPATIENT)
Dept: GERIATRICS | Facility: CLINIC | Age: 84
End: 2019-04-17
Payer: COMMERCIAL

## 2019-04-17 VITALS
RESPIRATION RATE: 18 BRPM | WEIGHT: 121 LBS | BODY MASS INDEX: 20.16 KG/M2 | HEIGHT: 65 IN | HEART RATE: 72 BPM | SYSTOLIC BLOOD PRESSURE: 149 MMHG | DIASTOLIC BLOOD PRESSURE: 82 MMHG | OXYGEN SATURATION: 96 % | TEMPERATURE: 98.2 F

## 2019-04-17 DIAGNOSIS — R53.81 PHYSICAL DECONDITIONING: ICD-10-CM

## 2019-04-17 DIAGNOSIS — I10 BENIGN ESSENTIAL HYPERTENSION: ICD-10-CM

## 2019-04-17 DIAGNOSIS — N18.30 CKD (CHRONIC KIDNEY DISEASE) STAGE 3, GFR 30-59 ML/MIN (H): ICD-10-CM

## 2019-04-17 DIAGNOSIS — R63.4 WEIGHT LOSS: ICD-10-CM

## 2019-04-17 DIAGNOSIS — R07.0 THROAT PAIN: ICD-10-CM

## 2019-04-17 DIAGNOSIS — J01.90 SUBACUTE SINUSITIS, UNSPECIFIED LOCATION: Primary | ICD-10-CM

## 2019-04-17 PROCEDURE — 99207 ZZC CDG-MDM COMPONENT: MEETS LOW - DOWN CODED: CPT | Performed by: NURSE PRACTITIONER

## 2019-04-17 PROCEDURE — 99309 SBSQ NF CARE MODERATE MDM 30: CPT | Performed by: NURSE PRACTITIONER

## 2019-04-17 RX ORDER — HYDROCODONE BITARTRATE AND ACETAMINOPHEN 5; 325 MG/1; MG/1
1 TABLET ORAL EVERY 6 HOURS PRN
Qty: 30 TABLET | Refills: 0 | Status: SHIPPED | OUTPATIENT
Start: 2019-04-17 | End: 2019-04-30

## 2019-04-17 ASSESSMENT — MIFFLIN-ST. JEOR: SCORE: 939.73

## 2019-04-17 NOTE — LETTER
[facility]   To:   [facility] MOJICA          Please give to facility    From:   Hi Dunlap  [facility]  Care Coordinator   [facility]   P: [phone]  [email or web address]   Patient Name:  Juliana Leal YOB: 1923   Admit date: 4/16/2019      *Information Needed:  Please contact me when the patient will discharge (or if they will move to long term care)- include the discharge date, disposition, and main diagnosis   - If the patient is discharged with home care services, please provide the name of the agency    Also- Please inform me if a care conference is being held.   Phone, Fax or Email with information                              Thank you

## 2019-04-17 NOTE — PROGRESS NOTES
Clinic Care Coordination Contact  Care Coordination Transition Communication    Referral Source: IP Handoff    Clinical Data: Patient was hospitalized at [facility] from 4/11/2019 to 4/16/2019 with diagnosis of Severe sinusitis with pharyngitis with vertigo.     Transition to Facility:              [facility]              Contact name and phone number/fax: [phone]      Plan: RN/SW Care Coordinator will await notification from facility staff informing RN/SW Care Coordinator of patient's discharge plans/needs. RN/SW Care Coordinator will review chart and outreach to facility staff every 4 weeks and as needed.     HUBER Taylor  Clinic Care Coordinator  [facility]  [phone]  [email or web address]

## 2019-04-17 NOTE — PLAN OF CARE
Physical Therapy Discharge Summary    Reason for therapy discharge:    Discharged to transitional care facility.    Progress towards therapy goal(s). See goals on Care Plan in [facility] electronic health record for goal details.  Goals partially met.  Barriers to achieving goals:   discharge from facility.    Therapy recommendation(s):    Continued therapy is recommended.  Rationale/Recommendations:  Patient would benefit from PT at TCU in order to increase independence with safe, functional mobility.

## 2019-04-17 NOTE — PROGRESS NOTES
Transition Communication Hand-off for Care Transitions to Next Level of Care Provider    Name: Juliana Leal  : 2/10/1923  MRN #: [id number]  Primary Care Provider: Erick Ruano  Primary Care MD Name: Erick Ruano  Primary Clinic: [address] [facility] [address]     Reason for Hospitalization:  Odynophagia [R13.10]  Unintentional weight loss [R63.4]  Sore throat, chronic [J31.2]  Failure to thrive in adult [R62.7]  Admit Date/Time: 2019 12:05 PM  Discharge Date: 19  Payor Source: Payor: [employer] / Plan: [employer] / Product Type: HMO /     Readmission Assessment Measure (GLADIS) Risk Score/category: Elevated           Reason for Communication Hand-off Referral: Fragility  Multiple providers/specialties    [facility]charge Plan: [facility]       Concern for non-adherence with plan of care:   No  Discharge Needs Assessment:  Needs      Most Recent Value   Equipment Currently Used at Home  grab bar, tub/shower, shower chair, walker, rolling   # of Referrals Placed by CTS  Post Acute Facilities   PAS Number  [id number]          Already enrolled in Tele-monitoring program and name of program:  NA  Follow-up specialty is recommended: No    Follow-up plan:    Future Appointments   Date Time Provider Department Center   2019  1:00 PM [facility] INFUSION CHAIR [facility]       Any outstanding tests or procedures:        Referrals     Future Labs/Procedures    Occupational Therapy Adult Consult     Comments:    Evaluate and treat as clinically indicated.    Reason:  weakness    Physical Therapy Adult Consult     Comments:    Evaluate and treat as clinically indicated.    Reason:  weakness            Key Recommendations:  CTS identifies pt as high risk due to Elevated GLADIS. Patient was admitted on 19 with c/o throat pain resulting in poor oral intake, weight loss, and weakness/ dizziness. ENT/ PT/OT/Nutrition were consulted. Patient was treated with antibiotics and started  nutritional supplements. Patient has a history of CKD, HTN, hypothyroidism, and neuropathy. Patient has had outpatient infusions previously. Patient was discharged from a TCU on 4/4/19 and is currently open to home care PT/HHA/OT/ST/SW.  [facility]     Please continue to follow patient closely after discharge from TCU for nutritional status and resolution of infection.     Yun Smith    AVS/Discharge Summary is the source of truth; this is a helpful guide for improved communication of patient story

## 2019-04-17 NOTE — LETTER
4/17/2019        RE: Juliana Leal  [address]  [facility] [address]        [place] [facility]    PRIMARY CARE PROVIDER AND CLINIC:  Erick Ruano MD, [address]  / [facility] [address]  Chief Complaint   Patient presents with     Hospital F/U     [place] Medical Record Number:  [id number]  Place of Service where encounter took place:  [facility] - [facility] ([facility]) [[id number]]    Juliana Leal  is a 89 year old  (2/10/1923), admitted to the above facility from  [facility]. Hospital stay 4/11/19 through 4/16/19..  Admitted to this facility for  rehab, medical management and nursing care.    HPI:    HPI information obtained from: facility chart records, facility staff, patient report and [facility] chart review.     Brief Summary of Hospital Course:   Patient admitted to [facility] 4/11-4/16 due to recurrent sinusitis since 08/2018. CT found severe sinusitis. Was treated with IV vancomycin and ceftriaxone, then transitioned to doxycycline upon discharge to TCU. Was also treated with short course of dexamethasone. Also taking cetrizine, saline nasal spray, and nasacort. Patient to follow-up with ENT in 2-3 weeks.     Updates on Status Since Skilled nursing Admission:     Acute on chronic sinusitis  Weight loss   Since at TCU, patient continues to c/o throat pain w/variable intake. Admits to feeling better since hospital stay. Noted to have weight loss with suspected malnutrition due to ongoing sinusitis w/sore throat. States she has been tolerating regular diet. As mentioned above, currently taking 10-day course of doxycycline. Also taking cetrizine, saline nasal spray, and nasacort. Reports mild-moderate throat pain, has been taking norco prn w/relief. Patient to follow-up with ENT in 2-3 weeks. Denies feeling feverish/chills.     CKD  Creat baseline 1.2-1.5. Last creat was 1.16 on 4/16.     Benign essential hypertension  Currently taking diltiazem. Denies  chest pain, SOB, headaches, syncope.     Physical deconditioning  PTA lives in ILF alone. Patient is actively participating in therapy sessions. Ambulates with walker. Cognitive testing to be done in therapy. SW following for discharge planning.           CODE STATUS/ADVANCE DIRECTIVES DISCUSSION:   DNR / DNI    Patient's living condition: lives alone    ALLERGIES: Lisinopril; Prednisone; Allegra [fexofenadine]; Amoxicillin; Cipro [quinolones]; Codeine sulfate; Hydroxyzine; Iodine [gnp iodides decolorized]; Lyrica [pregabalin]; Norvasc [amlodipine besylate]; Tramadol; Zaditor; and Penicillins  PAST MEDICAL HISTORY:  has a past medical history of Angioedema (2017), Arthritis, CKD (chronic kidney disease) stage 3, GFR 30-59 ml/min (H) (7/23/2018), Gout, Headaches, Hives (02/2017), Hypertension, and Thyroid disease.  PAST SURGICAL HISTORY:   has a past surgical history that includes colectomy; incise finger tendon sheath; Repair atrial septal defect; Phacoemulsification clear cornea with standard intraocular lens implant (2/29/2012); Phacoemulsification clear cornea with standard intraocular lens implant (3/14/2012); and TEMPORAL ARTERY LIGATN OR BX (5/10/2012).  FAMILY HISTORY: family history is not on file.  SOCIAL HISTORY:   reports that she quit smoking about 59 years ago. Her smoking use included cigarettes. She has never used smokeless tobacco. She reports that she does not drink alcohol.    Post Discharge Medication Reconciliation Status: discharge medications reconciled and changed, per note/orders (see AVS)    Current Outpatient Medications   Medication Sig Dispense Refill     acetaminophen (TYLENOL) 500 MG tablet Take 500 mg by mouth 2 times daily as needed for mild pain       cetirizine (ZYRTEC) 10 MG tablet Take 1 tablet (10 mg) by mouth 2 times daily 30 tablet      diltiazem ER COATED BEADS (CARDIZEM CD/CARTIA XT) 300 MG 24 hr capsule Take 300 mg by mouth daily TAKE 1 CAPSULE(300 MG) BY MOUTH DAILY; HOLD  "if SBP <110 OR HR < 55. Call if held x 2 in a row symptomatic       doxycycline hyclate (VIBRAMYCIN) 100 MG capsule Take 1 capsule (100 mg) by mouth 2 times daily for 10 days       emollient (VANICREAM) cream Apply topically as needed for other (leg rash)       gabapentin (NEURONTIN) 100 MG capsule Take 100 mg by mouth nightly as needed       gabapentin (NEURONTIN) 100 MG capsule Take 100 mg by mouth At Bedtime        HYDROcodone-acetaminophen (NORCO) 5-325 MG tablet Take 1 tablet by mouth every 6 hours as needed for moderate to severe pain 30 tablet 0     levothyroxine (SYNTHROID/LEVOTHROID) 88 MCG tablet Take 1 tablet (88 mcg) by mouth daily 90 tablet 2     magic mouthwash (FIRST-MOUTHWASH BLM) compounding kit Swish and swallow 10 mLs in mouth every 6 hours as needed for mouth sores       meclizine (ANTIVERT) 12.5 MG tablet Take 1 tablet (12.5 mg) by mouth 3 times daily as needed for dizziness       Multiple Vitamins-Minerals (CENTRUM SILVER) per tablet Take 1 tablet by mouth daily       omeprazole (PRILOSEC) 20 MG CR capsule Take 1 capsule (20 mg) by mouth daily 90 capsule 1     sodium chloride (OCEAN) 0.65 % nasal spray Spray 2 sprays into both nostrils every hour as needed for congestion Or equivalent per kimberly. Pt. Can have at bedside.       triamcinolone (NASACORT) 55 MCG/ACT inhaler Spray 2 sprays into both nostrils daily 16.5 mL 1     ACETAMINOPHEN PO Take 650 mg by mouth 3 times daily And q4h prn       omalizumab (XOLAIR) 150 MG injection Inject 150 mg Subcutaneous every 28 days HOLD WHILE IN TCU. Next dose 4/2/19. Can wait til after discharge.           ROS:  10 point ROS of systems including Constitutional, Eyes, Respiratory, Cardiovascular, Gastroenterology, Genitourinary, Integumentary, Musculoskeletal, Psychiatric were all negative except for pertinent positives noted in my HPI.      Vitals:  /82   Pulse 72   Temp 98.2  F (36.8  C)   Resp 18   Ht 1.651 m (5' 5\")   Wt 54.9 kg (121 lb)   " SpO2 96%   BMI 20.14 kg/m     Exam:  GENERAL APPEARANCE:  Alert, in no distress, appears healthy, oriented, cooperative  ENT:  Mouth and posterior oropharynx normal, moist mucous membranes, [place]  EYES:  EOM, conjunctivae, lids, pupils and irises normal, PERRL  NECK:  No adenopathy,masses or thyromegaly  RESP:  respiratory effort and palpation of chest normal, lungs clear to auscultation , no respiratory distress  CV:  Palpation and auscultation of heart done , regular rate and rhythm, no murmur, rub, or gallop, no edema, +2 pedal pulses  ABDOMEN:  normal bowel sounds, soft, nontender, no guarding or rebound  M/S:   Gait and station abnormal, uses walker. Digits and nails normal  SKIN:  Inspection of skin and subcutaneous tissue baseline, Palpation of skin and subcutaneous tissue baseline  NEURO:   Cranial nerves 2-12 are normal tested and grossly at patient's baseline, Examination of sensation by touch normal  PSYCH:  Oriented X 3, affect and mood normal, cognitive testing to be done in therapy        Lab/Diagnostic data:  Labs done in SNF are in [facility]. Please refer to them using [employer] Everywhere., Recent labs in EPIC reviewed by me today.  and   Most Recent 3 CBC's:  Recent Labs   Lab Test 04/18/19 04/16/19  0521 04/15/19  0642   WBC 11.8* 16.2* 20.0*   HGB 9.7* 9.7* 9.2*   MCV 90 93 94   * 173 185     Most Recent 3 BMP's:  Recent Labs   Lab Test 04/23/19  0748 04/18/19  0733 04/16/19  0521    142 144   POTASSIUM 3.6 3.3* 4.1   CHLORIDE 107 110* 112*   CO2 29 27 25   BUN 22 31* 52*   CR 1.04 0.93 1.04   ANIONGAP 5 5 7   DINORAH 9.5 8.9 9.0   GLC 85 87 145*           ASSESSMENT/PLAN:    Current [facility] scheduled appointments:  1. Patient to follow-up with Dr. Harper (ENT) in 2-3 weeks    (J01.90) Subacute sinusitis, unspecified location  (primary encounter diagnosis  (R07.0) Throat pain  (R63.4) Weight loss  Comment: Acute on chronic sinusitis with subsequent sore throat and weight loss.   Plan:  Check CBC & BMP 4/18. Add scheduled tylenol TID and prn. Dietary following. Speech to eval/treat dysphagia. Patient to follow-up with ENT within 1 week.     (N18.3) CKD (chronic kidney disease) stage 3, GFR 30-59 ml/min (H)  Comment: Creat at baseline  Plan: Monitor BMP. Avoid nephrotoxic medications.     (I10) Benign essential hypertension  Comment: Controlled  Plan: Continue plan of care. Monitor BP/HR.     (R53.81) Physical deconditioning  Comment: Ongoing  Plan: Encourage active participation in therapy session to increase strength and promote independence in activities and ADLs. Cognitive testing to be done in therapy. SW following for discharge planning.     Clarification: Omalizumab 150mg subcutaneous every 28 days - reported to hold while at TCU.           Total time spent with patient visit at the skilled nursing facility was 35 min including patient visit and review of past records. Greater than 50% of total time spent with counseling and coordinating care due to hospital f/u     Electronically signed by:  ARIEL Caceres CNP                           Sincerely,        ARIEL Caceres CNP

## 2019-04-17 NOTE — PROGRESS NOTES
[place] [facility]    PRIMARY CARE PROVIDER AND CLINIC:  Erick Ruano MD, [address] [place] [address] / [facility] [address]  Chief Complaint   Patient presents with     Hospital F/U     [place] Medical Record Number:  [id number]  Place of Service where encounter took place:  [facility] - [facility] ([facility]) [[id number]]    Juliana Leal  is a 89 year old  (2/10/1923), admitted to the above facility from  [facility]. Hospital stay 4/11/19 through 4/16/19..  Admitted to this facility for  rehab, medical management and nursing care.    HPI:    HPI information obtained from: facility chart records, facility staff, patient report and [facility] chart review.     Brief Summary of Hospital Course:   Patient admitted to [facility] 4/11-4/16 due to recurrent sinusitis since 08/2018. CT found severe sinusitis. Was treated with IV vancomycin and ceftriaxone, then transitioned to doxycycline upon discharge to TCU. Was also treated with short course of dexamethasone. Also taking cetrizine, saline nasal spray, and nasacort. Patient to follow-up with ENT in 2-3 weeks.     Updates on Status Since Skilled nursing Admission:     Acute on chronic sinusitis  Weight loss   Since at TCU, patient continues to c/o throat pain w/variable intake. Admits to feeling better since hospital stay. Noted to have weight loss with suspected malnutrition due to ongoing sinusitis w/sore throat. States she has been tolerating regular diet. As mentioned above, currently taking 10-day course of doxycycline. Also taking cetrizine, saline nasal spray, and nasacort. Reports mild-moderate throat pain, has been taking norco prn w/relief. Patient to follow-up with ENT in 2-3 weeks. Denies feeling feverish/chills.     CKD  Creat baseline 1.2-1.5. Last creat was 1.16 on 4/16.     Benign essential hypertension  Currently taking diltiazem. Denies chest pain, SOB, headaches, syncope.     Physical deconditioning  PTA lives in [facility] alone. Patient is  actively participating in therapy sessions. Ambulates with walker. Cognitive testing to be done in therapy. SW following for discharge planning.           CODE STATUS/ADVANCE DIRECTIVES DISCUSSION:   DNR / DNI    Patient's living condition: lives alone    ALLERGIES: Lisinopril; Prednisone; Allegra [fexofenadine]; Amoxicillin; Cipro [quinolones]; Codeine sulfate; Hydroxyzine; Iodine [gnp iodides decolorized]; Lyrica [pregabalin]; Norvasc [amlodipine besylate]; Tramadol; Zaditor; and Penicillins  PAST MEDICAL HISTORY:  has a past medical history of Angioedema (2017), Arthritis, CKD (chronic kidney disease) stage 3, GFR 30-59 ml/min (H) (7/23/2018), Gout, Headaches, Hives (02/2017), Hypertension, and Thyroid disease.  PAST SURGICAL HISTORY:   has a past surgical history that includes colectomy; incise finger tendon sheath; Repair atrial septal defect; Phacoemulsification clear cornea with standard intraocular lens implant (2/29/2012); Phacoemulsification clear cornea with standard intraocular lens implant (3/14/2012); and TEMPORAL ARTERY LIGATN OR BX (5/10/2012).  FAMILY HISTORY: family history is not on file.  SOCIAL HISTORY:   reports that she quit smoking about 59 years ago. Her smoking use included cigarettes. She has never used smokeless tobacco. She reports that she does not drink alcohol.    Post Discharge Medication Reconciliation Status: discharge medications reconciled and changed, per note/orders (see AVS)    Current Outpatient Medications   Medication Sig Dispense Refill     acetaminophen (TYLENOL) 500 MG tablet Take 500 mg by mouth 2 times daily as needed for mild pain       cetirizine (ZYRTEC) 10 MG tablet Take 1 tablet (10 mg) by mouth 2 times daily 30 tablet      diltiazem ER COATED BEADS (CARDIZEM CD/CARTIA XT) 300 MG 24 hr capsule Take 300 mg by mouth daily TAKE 1 CAPSULE(300 MG) BY MOUTH DAILY; HOLD if SBP <110 OR HR < 55. Call if held x 2 in a row symptomatic       doxycycline hyclate (VIBRAMYCIN)  "100 MG capsule Take 1 capsule (100 mg) by mouth 2 times daily for 10 days       emollient (VANICREAM) cream Apply topically as needed for other (leg rash)       gabapentin (NEURONTIN) 100 MG capsule Take 100 mg by mouth nightly as needed       gabapentin (NEURONTIN) 100 MG capsule Take 100 mg by mouth At Bedtime        HYDROcodone-acetaminophen (NORCO) 5-325 MG tablet Take 1 tablet by mouth every 6 hours as needed for moderate to severe pain 30 tablet 0     levothyroxine (SYNTHROID/LEVOTHROID) 88 MCG tablet Take 1 tablet (88 mcg) by mouth daily 90 tablet 2     magic mouthwash (FIRST-MOUTHWASH BLM) compounding kit Swish and swallow 10 mLs in mouth every 6 hours as needed for mouth sores       meclizine (ANTIVERT) 12.5 MG tablet Take 1 tablet (12.5 mg) by mouth 3 times daily as needed for dizziness       Multiple Vitamins-Minerals (CENTRUM SILVER) per tablet Take 1 tablet by mouth daily       omeprazole (PRILOSEC) 20 MG CR capsule Take 1 capsule (20 mg) by mouth daily 90 capsule 1     sodium chloride (OCEAN) 0.65 % nasal spray Spray 2 sprays into both nostrils every hour as needed for congestion Or equivalent per kimberly. Pt. Can have at bedside.       triamcinolone (NASACORT) 55 MCG/ACT inhaler Spray 2 sprays into both nostrils daily 16.5 mL 1     ACETAMINOPHEN PO Take 650 mg by mouth 3 times daily And q4h prn       omalizumab (XOLAIR) 150 MG injection Inject 150 mg Subcutaneous every 28 days HOLD WHILE IN TCU. Next dose 4/2/19. Can wait til after discharge.           ROS:  10 point ROS of systems including Constitutional, Eyes, Respiratory, Cardiovascular, Gastroenterology, Genitourinary, Integumentary, Musculoskeletal, Psychiatric were all negative except for pertinent positives noted in my HPI.      Vitals:  /82   Pulse 72   Temp 98.2  F (36.8  C)   Resp 18   Ht 1.651 m (5' 5\")   Wt 54.9 kg (121 lb)   SpO2 96%   BMI 20.14 kg/m    Exam:  GENERAL APPEARANCE:  Alert, in no distress, appears healthy, " oriented, cooperative  ENT:  Mouth and posterior oropharynx normal, moist mucous membranes, [place]  EYES:  EOM, conjunctivae, lids, pupils and irises normal, PERRL  NECK:  No adenopathy,masses or thyromegaly  RESP:  respiratory effort and palpation of chest normal, lungs clear to auscultation , no respiratory distress  CV:  Palpation and auscultation of heart done , regular rate and rhythm, no murmur, rub, or gallop, no edema, +2 pedal pulses  ABDOMEN:  normal bowel sounds, soft, nontender, no guarding or rebound  M/S:   Gait and station abnormal, uses walker. Digits and nails normal  SKIN:  Inspection of skin and subcutaneous tissue baseline, Palpation of skin and subcutaneous tissue baseline  NEURO:   Cranial nerves 2-12 are normal tested and grossly at patient's baseline, Examination of sensation by touch normal  PSYCH:  Oriented X 3, affect and mood normal, cognitive testing to be done in therapy        Lab/Diagnostic data:  Labs done in SNF are in [facility]. Please refer to them using [employer] Everywhere., Recent labs in EPIC reviewed by me today.  and   Most Recent 3 CBC's:  Recent Labs   Lab Test 04/18/19 04/16/19  0521 04/15/19  0642   WBC 11.8* 16.2* 20.0*   HGB 9.7* 9.7* 9.2*   MCV 90 93 94   * 173 185     Most Recent 3 BMP's:  Recent Labs   Lab Test 04/23/19  0748 04/18/19  0733 04/16/19  0521    142 144   POTASSIUM 3.6 3.3* 4.1   CHLORIDE 107 110* 112*   CO2 29 27 25   BUN 22 31* 52*   CR 1.04 0.93 1.04   ANIONGAP 5 5 7   DINORAH 9.5 8.9 9.0   GLC 85 87 145*           ASSESSMENT/PLAN:    Current [place] scheduled appointments:  1. Patient to follow-up with Dr. Harper (ENT) in 2-3 weeks    (J01.90) Subacute sinusitis, unspecified location  (primary encounter diagnosis  (R07.0) Throat pain  (R63.4) Weight loss  Comment: Acute on chronic sinusitis with subsequent sore throat and weight loss.   Plan: Check CBC & BMP 4/18. Add scheduled tylenol TID and prn. Dietary following. Speech to eval/treat  dysphagia. Patient to follow-up with ENT within 1 week.     (N18.3) CKD (chronic kidney disease) stage 3, GFR 30-59 ml/min (H)  Comment: Creat at baseline  Plan: Monitor BMP. Avoid nephrotoxic medications.     (I10) Benign essential hypertension  Comment: Controlled  Plan: Continue plan of care. Monitor BP/HR.     (R53.81) Physical deconditioning  Comment: Ongoing  Plan: Encourage active participation in therapy session to increase strength and promote independence in activities and ADLs. Cognitive testing to be done in therapy. SW following for discharge planning.     Clarification: Omalizumab 150mg subcutaneous every 28 days - reported to hold while at TCU.           Total time spent with patient visit at the skilled nursing facility was 35 min including patient visit and review of past records. Greater than 50% of total time spent with counseling and coordinating care due to hospital f/u     Electronically signed by:  ARIEL Caceres CNP

## 2019-04-17 NOTE — LETTER
4/17/2019        RE: Juliana Leal  [address]  [facility] [address]        [place] [facility]    PRIMARY CARE PROVIDER AND CLINIC:  Erick Ruano MD, [address]  / [facility] [address]  Chief Complaint   Patient presents with     Hospital F/U     [place] Medical Record Number:  [id number]  Place of Service where encounter took place:  [facility] ([facility]) [[id number]]    Juliana Leal  is a 89 year old  (2/10/1923), admitted to the above facility from  [facility]. Hospital stay 4/11/19 through 4/16/19..  Admitted to this facility for  rehab, medical management and nursing care.    HPI:    HPI information obtained from: facility chart records, facility staff, patient report and [facility] chart review.     Brief Summary of Hospital Course: ***      Updates on Status Since Skilled nursing Admission: ***        CODE STATUS/ADVANCE DIRECTIVES DISCUSSION:   DNR / DNI    Patient's living condition: lives alone    ALLERGIES: Lisinopril; Prednisone; Allegra [fexofenadine]; Amoxicillin; Cipro [quinolones]; Codeine sulfate; Hydroxyzine; Iodine [gnp iodides decolorized]; Lyrica [pregabalin]; Norvasc [amlodipine besylate]; Tramadol; Zaditor; and Penicillins  PAST MEDICAL HISTORY:  has a past medical history of Angioedema (2017), Arthritis, CKD (chronic kidney disease) stage 3, GFR 30-59 ml/min (H) (7/23/2018), Gout, Headaches, Hives (02/2017), Hypertension, and Thyroid disease.  PAST SURGICAL HISTORY:   has a past surgical history that includes colectomy; incise finger tendon sheath; Repair atrial septal defect; Phacoemulsification clear cornea with standard intraocular lens implant (2/29/2012); Phacoemulsification clear cornea with standard intraocular lens implant (3/14/2012); and TEMPORAL ARTERY LIGATN OR BX (5/10/2012).  FAMILY HISTORY: family history is not on file.  SOCIAL HISTORY:   reports that she quit smoking about 59 years ago. Her smoking use  included cigarettes. She has never used smokeless tobacco. She reports that she does not drink alcohol.    Post Discharge Medication Reconciliation Status: discharge medications reconciled and changed, per note/orders (see AVS)    Current Outpatient Medications   Medication Sig Dispense Refill     acetaminophen (TYLENOL) 500 MG tablet Take 500 mg by mouth 2 times daily as needed for mild pain       cetirizine (ZYRTEC) 10 MG tablet Take 1 tablet (10 mg) by mouth 2 times daily 30 tablet      diltiazem ER COATED BEADS (CARDIZEM CD/CARTIA XT) 300 MG 24 hr capsule Take 300 mg by mouth daily TAKE 1 CAPSULE(300 MG) BY MOUTH DAILY; HOLD if SBP <110 OR HR < 55. Call if held x 2 in a row symptomatic       doxycycline hyclate (VIBRAMYCIN) 100 MG capsule Take 1 capsule (100 mg) by mouth 2 times daily for 10 days       emollient (VANICREAM) cream Apply topically as needed for other (leg rash)       gabapentin (NEURONTIN) 100 MG capsule Take 100 mg by mouth nightly as needed       gabapentin (NEURONTIN) 100 MG capsule Take 100 mg by mouth At Bedtime        HYDROcodone-acetaminophen (NORCO) 5-325 MG tablet Take 1 tablet by mouth every 6 hours as needed for moderate to severe pain 10 tablet 0     levothyroxine (SYNTHROID/LEVOTHROID) 88 MCG tablet Take 1 tablet (88 mcg) by mouth daily 90 tablet 2     magic mouthwash (FIRST-MOUTHWASH BLM) compounding kit Swish and swallow 10 mLs in mouth every 6 hours as needed for mouth sores       meclizine (ANTIVERT) 12.5 MG tablet Take 1 tablet (12.5 mg) by mouth 3 times daily as needed for dizziness       Multiple Vitamins-Minerals (CENTRUM SILVER) per tablet Take 1 tablet by mouth daily       omeprazole (PRILOSEC) 20 MG CR capsule Take 1 capsule (20 mg) by mouth daily 90 capsule 1     sodium chloride (OCEAN) 0.65 % nasal spray Spray 2 sprays into both nostrils every hour as needed for congestion Or equivalent per kimberly. Pt. Can have at bedside.       triamcinolone (NASACORT) 55 MCG/ACT  "inhaler Spray 2 sprays into both nostrils daily 16.5 mL 1     omalizumab (XOLAIR) 150 MG injection Inject 150 mg Subcutaneous every 28 days HOLD WHILE IN TCU. Next dose 4/2/19. Can wait til after discharge.           ROS:  {ROS FGS:026444}      Vitals:  /82   Pulse 72   Temp 98.2  F (36.8  C)   Resp 18   Ht 1.651 m (5' 5\")   Wt 54.9 kg (121 lb)   SpO2 96%   BMI 20.14 kg/m     Exam:  GENERAL APPEARANCE:  {shelter GENERAL APPEARANCE:712427}  ENT:  {shelter ENT PE:079192}  EYES:  {shelter EYES PE :272391}  NECK:  {NURSING HOME NECK PE:829102}  RESP:  {NURSING HOME RESP PE:122581}  CV:  {shelter CV PE:913395}  ABDOMEN:  {NURSING HOME GI PE:888920}  M/S:   {NURSING HOME M/S PE:781043}  SKIN:  {NURSING HOME SKIN PE:335431}  NEURO:   {shelter NEURO PE:291158}  PSYCH:  {shelter PSYCH PE:295634}        Lab/Diagnostic data:  Labs done in SNF are in [facility] EPIC. Please refer to them using [facility] Everywhere., Recent labs in EPIC reviewed by me today.  and   Most Recent 3 CBC's:  Recent Labs   Lab Test 04/16/19  0521 04/15/19  0642 04/14/19  0611   WBC 16.2* 20.0* 22.8*   HGB 9.7* 9.2* 9.2*   MCV 93 94 93    185 212     Most Recent 3 BMP's:  Recent Labs   Lab Test 04/16/19  0521 04/15/19  0642 04/14/19  0611    143 142   POTASSIUM 4.1 4.1 4.1   CHLORIDE 112* 113* 112*   CO2 25 23 27   BUN 52* 57* 50*   CR 1.04 1.11* 1.11*   ANIONGAP 7 7 3   DINORAH 9.0 9.2 9.7   * 154* 168*           ASSESSMENT/PLAN:    Current [facility] scheduled appointments:  1. Patient to follow-up with Dr. Harper (ENT) in 1 week    {FGS DX INITIAL:564112}    Check CBC & BMP 4/18. Add scheduled tylenol TID and prn.   Speech to eval/treat dysphagia. Patient to follow-up with ENT within 1 week.     Clarification: Omalizumab 150mg subcutaneous every 28 days - reported to hold while at TCU.       {FGS TIME SPENT:244694}     Electronically signed by:  ARIEL Caceres CNP     {providers Please " double check the med list (in the plan section >> meds & orders tab) and Discontinue any of the meds flagged by the TC to be discontinued}                  [facility]    PRIMARY CARE PROVIDER AND CLINIC:  Erick Ruano MD, [address] [place] [address] / [facility] [address]  Chief Complaint   Patient presents with     Hospital F/U     [place] Medical Record Number:  [id number]  Place of Service where encounter took place:  [facility] - [facility] ([facility]) [[id number]]    Juliana Leal  is a 89 year old  (2/10/1923), admitted to the above facility from  [facility]. Hospital stay 4/11/19 through 4/16/19..  Admitted to this facility for  rehab, medical management and nursing care.    HPI:    HPI information obtained from: facility chart records, facility staff, patient report and [facility] chart review.     Brief Summary of Hospital Course:   Patient admitted to [facility] 4/11-4/16 due to recurrent sinusitis since 08/2018. CT found severe sinusitis. Was treated with IV vancomycin and ceftriaxone, then transitioned to doxycycline upon discharge to TCU. Was also treated with short course of dexamethasone. Also taking cetrizine, saline nasal spray, and nasacort. Patient to follow-up with ENT in 2-3 weeks.     Updates on Status Since Skilled nursing Admission:     Acute on chronic sinusitis  Weight loss   Since at TCU, patient continues to c/o throat pain w/variable intake. Admits to feeling better since hospital stay. Noted to have weight loss with suspected malnutrition due to ongoing sinusitis w/sore throat. States she has been tolerating regular diet. As mentioned above, currently taking 10-day course of doxycycline. Also taking cetrizine, saline nasal spray, and nasacort. Reports mild-moderate throat pain, has been taking norco prn w/relief. Patient to follow-up with ENT in 2-3 weeks. Denies feeling feverish/chills.     CKD  Creat baseline 1.2-1.5. Last creat was 1.16 on 4/16.     Benign  essential hypertension  Currently taking diltiazem. Denies chest pain, SOB, headaches, syncope.     Physical deconditioning  PTA lives in ILF alone. Patient is actively participating in therapy sessions. Ambulates with walker. Cognitive testing to be done in therapy. SW following for discharge planning.           CODE STATUS/ADVANCE DIRECTIVES DISCUSSION:   DNR / DNI    Patient's living condition: lives alone    ALLERGIES: Lisinopril; Prednisone; Allegra [fexofenadine]; Amoxicillin; Cipro [quinolones]; Codeine sulfate; Hydroxyzine; Iodine [gnp iodides decolorized]; Lyrica [pregabalin]; Norvasc [amlodipine besylate]; Tramadol; Zaditor; and Penicillins  PAST MEDICAL HISTORY:  has a past medical history of Angioedema (2017), Arthritis, CKD (chronic kidney disease) stage 3, GFR 30-59 ml/min (H) (7/23/2018), Gout, Headaches, Hives (02/2017), Hypertension, and Thyroid disease.  PAST SURGICAL HISTORY:   has a past surgical history that includes colectomy; incise finger tendon sheath; Repair atrial septal defect; Phacoemulsification clear cornea with standard intraocular lens implant (2/29/2012); Phacoemulsification clear cornea with standard intraocular lens implant (3/14/2012); and TEMPORAL ARTERY LIGATN OR BX (5/10/2012).  FAMILY HISTORY: family history is not on file.  SOCIAL HISTORY:   reports that she quit smoking about 59 years ago. Her smoking use included cigarettes. She has never used smokeless tobacco. She reports that she does not drink alcohol.    Post Discharge Medication Reconciliation Status: discharge medications reconciled and changed, per note/orders (see AVS)    Current Outpatient Medications   Medication Sig Dispense Refill     acetaminophen (TYLENOL) 500 MG tablet Take 500 mg by mouth 2 times daily as needed for mild pain       cetirizine (ZYRTEC) 10 MG tablet Take 1 tablet (10 mg) by mouth 2 times daily 30 tablet      diltiazem ER COATED BEADS (CARDIZEM CD/CARTIA XT) 300 MG 24 hr capsule Take 300 mg  by mouth daily TAKE 1 CAPSULE(300 MG) BY MOUTH DAILY; HOLD if SBP <110 OR HR < 55. Call if held x 2 in a row symptomatic       doxycycline hyclate (VIBRAMYCIN) 100 MG capsule Take 1 capsule (100 mg) by mouth 2 times daily for 10 days       emollient (VANICREAM) cream Apply topically as needed for other (leg rash)       gabapentin (NEURONTIN) 100 MG capsule Take 100 mg by mouth nightly as needed       gabapentin (NEURONTIN) 100 MG capsule Take 100 mg by mouth At Bedtime        HYDROcodone-acetaminophen (NORCO) 5-325 MG tablet Take 1 tablet by mouth every 6 hours as needed for moderate to severe pain 30 tablet 0     levothyroxine (SYNTHROID/LEVOTHROID) 88 MCG tablet Take 1 tablet (88 mcg) by mouth daily 90 tablet 2     magic mouthwash (FIRST-MOUTHWASH BLM) compounding kit Swish and swallow 10 mLs in mouth every 6 hours as needed for mouth sores       meclizine (ANTIVERT) 12.5 MG tablet Take 1 tablet (12.5 mg) by mouth 3 times daily as needed for dizziness       Multiple Vitamins-Minerals (CENTRUM SILVER) per tablet Take 1 tablet by mouth daily       omeprazole (PRILOSEC) 20 MG CR capsule Take 1 capsule (20 mg) by mouth daily 90 capsule 1     sodium chloride (OCEAN) 0.65 % nasal spray Spray 2 sprays into both nostrils every hour as needed for congestion Or equivalent per kimberly. Pt. Can have at bedside.       triamcinolone (NASACORT) 55 MCG/ACT inhaler Spray 2 sprays into both nostrils daily 16.5 mL 1     ACETAMINOPHEN PO Take 650 mg by mouth 3 times daily And q4h prn       omalizumab (XOLAIR) 150 MG injection Inject 150 mg Subcutaneous every 28 days HOLD WHILE IN TCU. Next dose 4/2/19. Can wait til after discharge.           ROS:  10 point ROS of systems including Constitutional, Eyes, Respiratory, Cardiovascular, Gastroenterology, Genitourinary, Integumentary, Musculoskeletal, Psychiatric were all negative except for pertinent positives noted in my HPI.      Vitals:  /82   Pulse 72   Temp 98.2  F (36.8  C)   " Resp 18   Ht 1.651 m (5' 5\")   Wt 54.9 kg (121 lb)   SpO2 96%   BMI 20.14 kg/m     Exam:  GENERAL APPEARANCE:  Alert, in no distress, appears healthy, oriented, cooperative  ENT:  Mouth and posterior oropharynx normal, moist mucous membranes, [place]  EYES:  EOM, conjunctivae, lids, pupils and irises normal, PERRL  NECK:  No adenopathy,masses or thyromegaly  RESP:  respiratory effort and palpation of chest normal, lungs clear to auscultation , no respiratory distress  CV:  Palpation and auscultation of heart done , regular rate and rhythm, no murmur, rub, or gallop, no edema, +2 pedal pulses  ABDOMEN:  normal bowel sounds, soft, nontender, no guarding or rebound  M/S:   Gait and station abnormal, uses walker. Digits and nails normal  SKIN:  Inspection of skin and subcutaneous tissue baseline, Palpation of skin and subcutaneous tissue baseline  NEURO:   Cranial nerves 2-12 are normal tested and grossly at patient's baseline, Examination of sensation by touch normal  PSYCH:  Oriented X 3, affect and mood normal, cognitive testing to be done in therapy        Lab/Diagnostic data:  Labs done in SNF are in [facility] EPIC. Please refer to them using [employer]Care Everywhere., Recent labs in EPIC reviewed by me today.  and   Most Recent 3 CBC's:  Recent Labs   Lab Test 04/18/19 04/16/19  0521 04/15/19  0642   WBC 11.8* 16.2* 20.0*   HGB 9.7* 9.7* 9.2*   MCV 90 93 94   * 173 185     Most Recent 3 BMP's:  Recent Labs   Lab Test 04/23/19  0748 04/18/19  0733 04/16/19  0521    142 144   POTASSIUM 3.6 3.3* 4.1   CHLORIDE 107 110* 112*   CO2 29 27 25   BUN 22 31* 52*   CR 1.04 0.93 1.04   ANIONGAP 5 5 7   DINORAH 9.5 8.9 9.0   GLC 85 87 145*           ASSESSMENT/PLAN:    Current [facility] scheduled appointments:  1. Patient to follow-up with Dr. Harper (ENT) in 2-3 weeks    (J01.90) Subacute sinusitis, unspecified location  (primary encounter diagnosis  (R07.0) Throat pain  (R63.4) Weight loss  Comment: Acute on chronic " sinusitis with subsequent sore throat and weight loss.   Plan: Check CBC & BMP 4/18. Add scheduled tylenol TID and prn. Dietary following. Speech to eval/treat dysphagia. Patient to follow-up with ENT within 1 week.     (N18.3) CKD (chronic kidney disease) stage 3, GFR 30-59 ml/min (H)  Comment: Creat at baseline  Plan: Monitor BMP. Avoid nephrotoxic medications.     (I10) Benign essential hypertension  Comment: Controlled  Plan: Continue plan of care. Monitor BP/HR.     (R53.81) Physical deconditioning  Comment: Ongoing  Plan: Encourage active participation in therapy session to increase strength and promote independence in activities and ADLs. Cognitive testing to be done in therapy. SW following for discharge planning.     Clarification: Omalizumab 150mg subcutaneous every 28 days - reported to hold while at TCU.           Total time spent with patient visit at the skilled nursing facility was 35 min including patient visit and review of past records. Greater than 50% of total time spent with counseling and coordinating care due to hospital f/u     Electronically signed by:  ARIEL Caceres CNP                           Sincerely,        ARIEL Caceres CNP

## 2019-04-18 ENCOUNTER — NURSING HOME VISIT (OUTPATIENT)
Dept: GERIATRICS | Facility: CLINIC | Age: 84
End: 2019-04-18
Payer: COMMERCIAL

## 2019-04-18 ENCOUNTER — HOSPITAL LABORATORY (OUTPATIENT)
Dept: OTHER | Facility: CLINIC | Age: 84
End: 2019-04-18

## 2019-04-18 ENCOUNTER — TRANSFERRED RECORDS (OUTPATIENT)
Dept: HEALTH INFORMATION MANAGEMENT | Facility: CLINIC | Age: 84
End: 2019-04-18

## 2019-04-18 VITALS
DIASTOLIC BLOOD PRESSURE: 67 MMHG | HEIGHT: 65 IN | TEMPERATURE: 97.2 F | RESPIRATION RATE: 18 BRPM | SYSTOLIC BLOOD PRESSURE: 117 MMHG | WEIGHT: 121 LBS | BODY MASS INDEX: 20.16 KG/M2 | OXYGEN SATURATION: 97 % | HEART RATE: 78 BPM

## 2019-04-18 DIAGNOSIS — J01.90 SUBACUTE SINUSITIS, UNSPECIFIED LOCATION: Primary | ICD-10-CM

## 2019-04-18 DIAGNOSIS — E87.6 HYPOKALEMIA: ICD-10-CM

## 2019-04-18 DIAGNOSIS — D64.9 ANEMIA, UNSPECIFIED TYPE: ICD-10-CM

## 2019-04-18 DIAGNOSIS — R63.4 WEIGHT LOSS: ICD-10-CM

## 2019-04-18 LAB
ANION GAP SERPL CALCULATED.3IONS-SCNC: 5 MMOL/L (ref 3–14)
BACTERIA SPEC CULT: ABNORMAL
BACTERIA SPEC CULT: ABNORMAL
BUN SERPL-MCNC: 31 MG/DL (ref 7–30)
CALCIUM SERPL-MCNC: 8.9 MG/DL (ref 8.5–10.1)
CHLORIDE SERPL-SCNC: 110 MMOL/L (ref 94–109)
CO2 SERPL-SCNC: 27 MMOL/L (ref 20–32)
CREAT SERPL-MCNC: 0.93 MG/DL (ref 0.52–1.04)
ERYTHROCYTE [DISTWIDTH] IN BLOOD BY AUTOMATED COUNT: 18.2 % (ref 10–15)
GFR SERPL CREATININE-BSD FRML MDRD: 52 ML/MIN/{1.73_M2}
GLUCOSE SERPL-MCNC: 87 MG/DL (ref 70–99)
HCT VFR BLD AUTO: 28.8 % (ref 35–47)
HEMOGLOBIN: 9.7 G/DL (ref 11.7–15.7)
Lab: ABNORMAL
MCH RBC QN AUTO: 30.2 PG (ref 26.5–33)
MCHC RBC AUTO-ENTMCNC: 33.7 G/DL (ref 31.5–36.5)
MCV RBC AUTO: 90 FL (ref 78–100)
PLATELET # BLD AUTO: 142 10E9/L (ref 150–450)
POTASSIUM SERPL-SCNC: 3.3 MMOL/L (ref 3.4–5.3)
RBC # BLD AUTO: 3.21 10E12/L (ref 3.8–5.2)
SODIUM SERPL-SCNC: 142 MMOL/L (ref 133–144)
SPECIMEN SOURCE: ABNORMAL
WBC # BLD AUTO: 11.8 10E9/L (ref 4–11)

## 2019-04-18 PROCEDURE — 99309 SBSQ NF CARE MODERATE MDM 30: CPT | Performed by: NURSE PRACTITIONER

## 2019-04-18 ASSESSMENT — MIFFLIN-ST. JEOR: SCORE: 939.73

## 2019-04-23 ENCOUNTER — HOSPITAL LABORATORY (OUTPATIENT)
Dept: OTHER | Facility: CLINIC | Age: 84
End: 2019-04-23

## 2019-04-23 LAB
ANION GAP SERPL CALCULATED.3IONS-SCNC: 5 MMOL/L (ref 3–14)
BUN SERPL-MCNC: 22 MG/DL (ref 7–30)
CALCIUM SERPL-MCNC: 9.5 MG/DL (ref 8.5–10.1)
CHLORIDE SERPL-SCNC: 107 MMOL/L (ref 94–109)
CO2 SERPL-SCNC: 29 MMOL/L (ref 20–32)
CREAT SERPL-MCNC: 1.04 MG/DL (ref 0.52–1.04)
GFR SERPL CREATININE-BSD FRML MDRD: 45 ML/MIN/{1.73_M2}
GLUCOSE SERPL-MCNC: 85 MG/DL (ref 70–99)
POTASSIUM SERPL-SCNC: 3.6 MMOL/L (ref 3.4–5.3)
SODIUM SERPL-SCNC: 141 MMOL/L (ref 133–144)

## 2019-04-24 ENCOUNTER — HOSPITAL LABORATORY (OUTPATIENT)
Dept: OTHER | Facility: CLINIC | Age: 84
End: 2019-04-24

## 2019-04-24 ENCOUNTER — NURSING HOME VISIT (OUTPATIENT)
Dept: GERIATRICS | Facility: CLINIC | Age: 84
End: 2019-04-24
Payer: COMMERCIAL

## 2019-04-24 VITALS
BODY MASS INDEX: 18.49 KG/M2 | SYSTOLIC BLOOD PRESSURE: 124 MMHG | DIASTOLIC BLOOD PRESSURE: 80 MMHG | HEIGHT: 65 IN | RESPIRATION RATE: 16 BRPM | WEIGHT: 111 LBS | OXYGEN SATURATION: 98 % | TEMPERATURE: 97 F | HEART RATE: 81 BPM

## 2019-04-24 DIAGNOSIS — J01.90 SUBACUTE SINUSITIS, UNSPECIFIED LOCATION: Primary | ICD-10-CM

## 2019-04-24 DIAGNOSIS — E87.6 HYPOKALEMIA: ICD-10-CM

## 2019-04-24 DIAGNOSIS — R63.4 WEIGHT LOSS: ICD-10-CM

## 2019-04-24 DIAGNOSIS — R53.81 PHYSICAL DECONDITIONING: ICD-10-CM

## 2019-04-24 PROCEDURE — 99309 SBSQ NF CARE MODERATE MDM 30: CPT | Performed by: NURSE PRACTITIONER

## 2019-04-24 ASSESSMENT — MIFFLIN-ST. JEOR: SCORE: 894.37

## 2019-04-24 NOTE — PROGRESS NOTES
[place] [facility]    [place] Medical Record Number:  [id number]  Place of Service where encounter took place:  [facility] (F[facility]) [[id number]]  Chief Complaint   Patient presents with     Nursing Home Acute       HPI:    Juliana Leal  is a 89 year old (2/10/1923), who is being seen today for an episodic care visit.  HPI information obtained from: facility chart records, facility staff, patient report and [facility] chart review.     Today's concern is:    Subacute sinusitis, unspecified location  Weight loss  Patient admitted to [facility] 4/11-4/16 due to recurrent sinusitis since 08/2018. CT found severe sinusitis. Was treated with IV vancomycin and ceftriaxone, then transitioned to doxycycline upon discharge to TCU. Noted to have weight loss (10-15lbs in the past 6 months) with suspected malnutrition due to ongoing sinusitis w/sore throat.   Today, admits to moderate throat pain. ENT recommending to collect throat swab for concern of strep and/or yeast infection. ST and dietary following. Patient to follow-up with ENT as directed.     Wt Readings from Last 4 Encounters:   04/24/19 50.3 kg (111 lb)   04/18/19 54.9 kg (121 lb)   04/17/19 54.9 kg (121 lb)   04/11/19 53.1 kg (117 lb)     Body mass index is 18.47 kg/m .      Hypokalemia  K+ 3.3 on 4/18, was given KCl supplements. Last K+ was 3.6 on 4/23.     Physical deconditioning  PTA lives in ILF alone. Patient is actively participating in therapy sessions. Ambulates 425ft with walker. Requires SBA with ADLs. Cognitive testing to be done in therapy. SW following for discharge planning.                                                                                                                                                                                                               Past Medical and Surgical History reviewed in Epic today.    MEDICATIONS:  Current Outpatient Medications   Medication Sig Dispense Refill      ACETAMINOPHEN PO Take 650 mg by mouth 3 times daily And q4h prn       cetirizine (ZYRTEC) 10 MG tablet Take 1 tablet (10 mg) by mouth 2 times daily 30 tablet      diltiazem ER COATED BEADS (CARDIZEM CD/CARTIA XT) 300 MG 24 hr capsule Take 300 mg by mouth daily TAKE 1 CAPSULE(300 MG) BY MOUTH DAILY; HOLD if SBP <110 OR HR < 55. Call if held x 2 in a row symptomatic       doxycycline hyclate (VIBRAMYCIN) 100 MG capsule Take 1 capsule (100 mg) by mouth 2 times daily for 10 days       emollient (VANICREAM) cream Apply topically as needed for other (leg rash)       gabapentin (NEURONTIN) 100 MG capsule Take 100 mg by mouth nightly as needed       gabapentin (NEURONTIN) 100 MG capsule Take 100 mg by mouth At Bedtime        HYDROcodone-acetaminophen (NORCO) 5-325 MG tablet Take 1 tablet by mouth every 6 hours as needed for moderate to severe pain 30 tablet 0     levothyroxine (SYNTHROID/LEVOTHROID) 88 MCG tablet Take 1 tablet (88 mcg) by mouth daily 90 tablet 2     magic mouthwash (FIRST-MOUTHWASH BLM) compounding kit Swish and swallow 10 mLs in mouth every 6 hours as needed for mouth sores       meclizine (ANTIVERT) 12.5 MG tablet Take 1 tablet (12.5 mg) by mouth 3 times daily as needed for dizziness       Multiple Vitamins-Minerals (CENTRUM SILVER) per tablet Take 1 tablet by mouth daily       omeprazole (PRILOSEC) 20 MG CR capsule Take 1 capsule (20 mg) by mouth daily 90 capsule 1     sodium chloride (OCEAN) 0.65 % nasal spray Spray 2 sprays into both nostrils every hour as needed for congestion Or equivalent per kimberly. Pt. Can have at bedside.       triamcinolone (NASACORT) 55 MCG/ACT inhaler Spray 2 sprays into both nostrils daily 16.5 mL 1     acetaminophen (TYLENOL) 500 MG tablet Take 500 mg by mouth 2 times daily as needed for mild pain       omalizumab (XOLAIR) 150 MG injection Inject 150 mg Subcutaneous every 28 days HOLD WHILE IN TCU. Next dose 4/2/19. Can wait til after discharge.             REVIEW OF  "SYSTEMS:  4 point ROS including Respiratory, CV, GI and , other than that noted in the HPI,  is negative      Objective:  /80   Pulse 81   Temp 97  F (36.1  C)   Resp 16   Ht 1.651 m (5' 5\")   Wt 50.3 kg (111 lb)   SpO2 98%   BMI 18.47 kg/m    Exam:  GENERAL APPEARANCE:  Alert, in no distress, appears healthy, oriented, cooperative  THROAT: Tenderness to neck.   RESP:  respiratory effort and palpation of chest normal, lungs clear to auscultation , no respiratory distress  CV:  Palpation and auscultation of heart done , regular rate and rhythm, no murmur, rub, or gallop, no edema, +2 pedal pulses  ABDOMEN:  normal bowel sounds, soft, nontender, no guarding or rebound  M/S:   Gait and station abnormal, uses walker. Digits and nails normal  SKIN:  Inspection of skin and subcutaneous tissue baseline, Palpation of skin and subcutaneous tissue baseline  NEURO:   Cranial nerves 2-12 are normal tested and grossly at patient's baseline, Examination of sensation by touch normal  PSYCH:  Oriented X 3, affect and mood normal, cognitive testing to be done in therapy        Labs:   Labs done in SNF are in [facility]. Please refer to them using [employer] Everywhere., Recent labs in EPIC reviewed by me today.  and Most Recent 3 CBC's:  Recent Labs   Lab Test 04/18/19 04/16/19  0521 04/15/19  0642   WBC 11.8* 16.2* 20.0*   HGB 9.7* 9.7* 9.2*   MCV 90 93 94   * 173 185     Most Recent 3 BMP's:  Recent Labs   Lab Test 04/23/19  0748 04/18/19  0733 04/16/19  0521    142 144   POTASSIUM 3.6 3.3* 4.1   CHLORIDE 107 110* 112*   CO2 29 27 25   BUN 22 31* 52*   CR 1.04 0.93 1.04   ANIONGAP 5 5 7   DINORAH 9.5 8.9 9.0   GLC 85 87 145*             ASSESSMENT/PLAN:    (J01.90) Subacute sinusitis, unspecified location  (primary encounter diagnosis)  (R63.4) Weight loss  Comment: Acute on chronic sinusitis with subsequent sore throat and weight loss.   Plan: Check CBC & BMP 4/29. Check throat swab for strep/yeast " infection. Course of Doxycycline to be completed 4/26. Dietary and ST following. Patient to follow-up with ENT (Dr. Irene) as directed.    (E87.6) Hypokalemia  Comment: Resolved  Plan: Monitor BMP.     (R53.81) Physical deconditioning  Comment: Ongoing  Plan: Encourage active participation in therapy session to increase strength and promote independence in activities and ADLs. Cognitive testing to be done in therapy. SW following for discharge planning.           Electronically signed by:  AIREL Caceres CNP

## 2019-04-25 ENCOUNTER — NURSING HOME VISIT (OUTPATIENT)
Dept: GERIATRICS | Facility: CLINIC | Age: 84
End: 2019-04-25

## 2019-04-25 ENCOUNTER — APPOINTMENT (OUTPATIENT)
Dept: GERIATRICS | Facility: CLINIC | Age: 84
End: 2019-04-25
Payer: COMMERCIAL

## 2019-04-25 DIAGNOSIS — R63.4 WEIGHT LOSS: ICD-10-CM

## 2019-04-25 DIAGNOSIS — J01.90 SUBACUTE SINUSITIS, UNSPECIFIED LOCATION: Primary | ICD-10-CM

## 2019-04-25 DIAGNOSIS — I10 BENIGN ESSENTIAL HYPERTENSION: ICD-10-CM

## 2019-04-25 PROCEDURE — 99308 SBSQ NF CARE LOW MDM 20: CPT | Performed by: INTERNAL MEDICINE

## 2019-04-26 LAB
BACTERIA SPEC CULT: NORMAL
Lab: NORMAL
SPECIMEN SOURCE: NORMAL

## 2019-04-27 NOTE — PROGRESS NOTES
[facility]    PRIMARY CARE PROVIDER AND CLINIC:  Erick Ruano MD, [address] [place] [address] / [facility] [id number]    Patient was seen by Dr. Burgess at the [facility] center on April 25, 2019 for a hospital follow-up visit.      Juliana Leal  is a 89 year old  (2/10/1923), admitted to the above facility from  [facility]. Hospital stay 4/11/19 through 4/16/19..        Admitted to this facility for  rehab, medical management and nursing care.    Hospital course was reviewed by me, is as per the hospital discharge summary and nurse practitioner note.    Patient has had a history of recurrent sinusitis since August 2018 she has had multiple courses of antibiotics without sustained benefit.  She was most recently hospitalized for throat pain, headache, nasal discharge.  CT scan of the sinuses on April 11, 2019 revealed air-fluid layers in the right frontal sinus consistent with acute sinusitis there was marked mucosal thickening throughout the paranasal sinuses as well as occlusion of the right maxillary sinus ostium, right frontal sinus drainage pathway, left maxillary sinus ostium.    Patient was treated with vancomycin and Rocephin was discharged on doxycycline at the time of discharge.  He was also treated with a short course of systemic steroids and is currently receiving antihistamines, topical steroids and nasal spray.    Patient states she continues to feel poorly.  She notes persistent sore throat with pain in the right side of her throat dominantly.  She has occasional yellowish nasal discharge.  She states that when she eats or drinks she feels like fluid is coming out of her nose.  She denies coughing with intake.  Her appetite is quite poor and she was feels like she is getting weaker.  She denies headache, fevers, chills, nausea, vomiting.  She is quite discouraged regarding her prolonged illness with failure of inpatient and outpatient treatment.    She is scheduled for follow-up with her  ENT specialist for consideration of sinus surgery        Concurrent medical concerns of include hypertension, chronic kidney disease and general deconditioning click in the setting of her prolonged illness with sinusitis.    She is ambulating with therapy with her walker.            CODE STATUS/ADVANCE DIRECTIVES DISCUSSION:   DNR / DNI    Patient's living condition: lives alone    ALLERGIES: Lisinopril; Prednisone; Allegra [fexofenadine]; Amoxicillin; Cipro [quinolones]; Codeine sulfate; Hydroxyzine; Iodine [gnp iodides decolorized]; Lyrica [pregabalin]; Norvasc [amlodipine besylate]; Tramadol; Zaditor; and Penicillins  PAST MEDICAL HISTORY:  has a past medical history of Angioedema (2017), Arthritis, CKD (chronic kidney disease) stage 3, GFR 30-59 ml/min (H) (7/23/2018), Gout, Headaches, Hives (02/2017), Hypertension, and Thyroid disease.  PAST SURGICAL HISTORY:   has a past surgical history that includes colectomy; incise finger tendon sheath; Repair atrial septal defect; Phacoemulsification clear cornea with standard intraocular lens implant (2/29/2012); Phacoemulsification clear cornea with standard intraocular lens implant (3/14/2012); and TEMPORAL ARTERY LIGATN OR BX (5/10/2012).  FAMILY HISTORY: family history is not on file.  SOCIAL HISTORY:   reports that she quit smoking about 59 years ago. Her smoking use included cigarettes. She has never used smokeless tobacco. She reports that she does not drink alcohol.        Current Outpatient Medications   Medication Sig Dispense Refill     acetaminophen (TYLENOL) 500 MG tablet Take 500 mg by mouth 2 times daily as needed for mild pain       ACETAMINOPHEN PO Take 650 mg by mouth 3 times daily And q4h prn       cetirizine (ZYRTEC) 10 MG tablet Take 1 tablet (10 mg) by mouth 2 times daily 30 tablet      diltiazem ER COATED BEADS (CARDIZEM CD/CARTIA XT) 300 MG 24 hr capsule Take 300 mg by mouth daily TAKE 1 CAPSULE(300 MG) BY MOUTH DAILY; HOLD if SBP <110 OR HR < 55.  Call if held x 2 in a row symptomatic       emollient (VANICREAM) cream Apply topically as needed for other (leg rash)       gabapentin (NEURONTIN) 100 MG capsule Take 100 mg by mouth nightly as needed       gabapentin (NEURONTIN) 100 MG capsule Take 100 mg by mouth At Bedtime        HYDROcodone-acetaminophen (NORCO) 5-325 MG tablet Take 1 tablet by mouth every 6 hours as needed for moderate to severe pain 30 tablet 0     levothyroxine (SYNTHROID/LEVOTHROID) 88 MCG tablet Take 1 tablet (88 mcg) by mouth daily 90 tablet 2     magic mouthwash (FIRST-MOUTHWASH BLM) compounding kit Swish and swallow 10 mLs in mouth every 6 hours as needed for mouth sores       meclizine (ANTIVERT) 12.5 MG tablet Take 1 tablet (12.5 mg) by mouth 3 times daily as needed for dizziness       Multiple Vitamins-Minerals (CENTRUM SILVER) per tablet Take 1 tablet by mouth daily       omalizumab (XOLAIR) 150 MG injection Inject 150 mg Subcutaneous every 28 days HOLD WHILE IN TCU. Next dose 4/2/19. Can wait til after discharge.       omeprazole (PRILOSEC) 20 MG CR capsule Take 1 capsule (20 mg) by mouth daily 90 capsule 1     sodium chloride (OCEAN) 0.65 % nasal spray Spray 2 sprays into both nostrils every hour as needed for congestion Or equivalent per kimberly. Pt. Can have at bedside.       triamcinolone (NASACORT) 55 MCG/ACT inhaler Spray 2 sprays into both nostrils daily 16.5 mL 1         ROS:  10 point ROS negative except as noted above    Vitals:    Exam:  GENERAL APPEARANCE:  Alert, in no distress, thin, pleasant, appears discouraged  ENT: Voice is soft somewhat raspy.  There is no nasal drainage.  Right posterior pharynx and soft palate are erythematous.  There is no purulent exudate in the back of her throat.  There is no adenopathy.  She is able to handle oral secretions without apparent difficulty   [place]  EYES: No eye redness or drainage  NECK: Supple  RESP: Lungs clear  CV: Regular rate and rhythm  ABDOMEN: Soft, nontender  M/S: No  lower extremity edema  SKIN: No rash  NEURO: Face symmetric, no focal weakness, no tremor.  Gait was not assessed  PSYCH:  Oriented X 3,         Most Recent 3 BMP's:  Recent Labs   Lab Test 04/23/19  0748 04/18/19  0733 04/16/19  0521    142 144   POTASSIUM 3.6 3.3* 4.1   CHLORIDE 107 110* 112*   CO2 29 27 25   BUN 22 31* 52*   CR 1.04 0.93 1.04   ANIONGAP 5 5 7   DINORAH 9.5 8.9 9.0   GLC 85 87 145*           ASSESSMENT/PLAN:    Chronic pansinusitis with symptoms factory to outpatient and inpatient therapy which has included multiple courses of antibiotics, systemic topical steroids.  Continued symptoms of sore throat, pain with swallowing, nasal drainage.  Intake has been poor the patient has experienced weight loss as well as increased debility.    Anemia, likely secondary to chronic infection and poor nutrition    Chronic kidney disease 2, stable    Hypertension, stable    Plan  Close follow-up with ENT, particular for consideration of sinus surgery  PT, OT, speech pathology  Nutrition is following  Monitor hemoglobin, blood pressure, BMP        Claudy Burgess MD

## 2019-04-29 ENCOUNTER — HOSPITAL LABORATORY (OUTPATIENT)
Dept: OTHER | Facility: CLINIC | Age: 84
End: 2019-04-29

## 2019-04-29 VITALS
WEIGHT: 108.6 LBS | TEMPERATURE: 98.9 F | HEART RATE: 88 BPM | HEIGHT: 65 IN | OXYGEN SATURATION: 96 % | SYSTOLIC BLOOD PRESSURE: 133 MMHG | DIASTOLIC BLOOD PRESSURE: 69 MMHG | BODY MASS INDEX: 18.09 KG/M2 | RESPIRATION RATE: 18 BRPM

## 2019-04-29 LAB
ANION GAP SERPL CALCULATED.3IONS-SCNC: 7 MMOL/L (ref 3–14)
BUN SERPL-MCNC: 28 MG/DL (ref 7–30)
CALCIUM SERPL-MCNC: 9.7 MG/DL (ref 8.5–10.1)
CHLORIDE SERPL-SCNC: 109 MMOL/L (ref 94–109)
CO2 SERPL-SCNC: 27 MMOL/L (ref 20–32)
CREAT SERPL-MCNC: 1.11 MG/DL (ref 0.52–1.04)
ERYTHROCYTE [DISTWIDTH] IN BLOOD BY AUTOMATED COUNT: 19.2 % (ref 10–15)
GFR SERPL CREATININE-BSD FRML MDRD: 42 ML/MIN/{1.73_M2}
GLUCOSE SERPL-MCNC: 87 MG/DL (ref 70–99)
HCT VFR BLD AUTO: 24.4 % (ref 35–47)
HGB BLD-MCNC: 8.1 G/DL (ref 11.7–15.7)
MCH RBC QN AUTO: 30.1 PG (ref 26.5–33)
MCHC RBC AUTO-ENTMCNC: 33.2 G/DL (ref 31.5–36.5)
MCV RBC AUTO: 91 FL (ref 78–100)
PLATELET # BLD AUTO: 167 10E9/L (ref 150–450)
POTASSIUM SERPL-SCNC: 3.4 MMOL/L (ref 3.4–5.3)
RBC # BLD AUTO: 2.69 10E12/L (ref 3.8–5.2)
SODIUM SERPL-SCNC: 143 MMOL/L (ref 133–144)
WBC # BLD AUTO: 8.5 10E9/L (ref 4–11)

## 2019-04-29 ASSESSMENT — MIFFLIN-ST. JEOR: SCORE: 883.49

## 2019-04-29 NOTE — PROGRESS NOTES
[facility] DISCHARGE SUMMARY    PATIENT'S NAME: Juliana Leal  YOB: 1923  MEDICAL RECORD NUMBER:  [id number]  Place of Service where encounter took place:  [facility] [[id number]]    PRIMARY CARE PROVIDER AND CLINIC RESPONSIBLE AFTER TRANSFER:   Erick Ruano MD, [address] / [facility] [address]    [facility]     Transferring providers: ARIEL Caceres CNP; Claudy Burgess MD  Recent Hospitalization/ED:  [facility] Hospital stay 4/11/19 to 4/16/19.  Date of SNF Admission: April / 16 / 2019  Date of SNF (anticipated) Discharge: May / 01 / 2019  Discharged to: previous independent home  Cognitive Scores: SLUMS: 26/30  Physical Function: Ambulating 425 ft with walker  DME: Walker    CODE STATUS/ADVANCE DIRECTIVES DISCUSSION:  DNR / DNI     ALLERGIES: Lisinopril; Prednisone; Allegra [fexofenadine]; Amoxicillin; Cipro [quinolones]; Codeine sulfate; Hydroxyzine; Iodine [gnp iodides decolorized]; Lyrica [pregabalin]; Norvasc [amlodipine besylate]; Tramadol; Zaditor; and Penicillins    DISCHARGE DIAGNOSIS/NURSING FACILITY COURSE:     Subacute sinusitis, unspecified location  Weight loss  Patient admitted to [facility] 4/11-4/16 due to recurrent sinusitis since 08/2018. Has had multiple course of abx w/o improvement. Has been followed by ENT (Dr. Irene). CT scan of sinuses on 4/11 found severe sinusitis. Was treated with short course of steroids, IV vancomycin and ceftriaxone, then transitioned to doxycycline upon discharge to TCU (completed 4/26). Noted to have weight loss (10-15lbs in the past 6 months) with suspected malnutrition due to ongoing sinusitis w/sore throat.   Today, continues to have moderate throat pain. States she has ongoing congestion and tired of symptoms not improving. Continues to also endorse poor appetite r/t sore throat. ENT recommended to collect throat swab for concern of strep and/or yeast infection - final results  were negative for growth. ST and dietary following. Currently taking tylenol, zyrtec, magic mouthwash, saline nasal spray, and nasacort. Patient to follow-up with ENT on 5/3.     Wt Readings from Last 4 Encounters:   04/29/19 49.3 kg (108 lb 9.6 oz)   04/24/19 50.3 kg (111 lb)   04/18/19 54.9 kg (121 lb)   04/17/19 54.9 kg (121 lb)       CKD  Creat baseline 1.2-1.5. Last creat was 1.11 on 4/29.      Benign essential hypertension  Currently taking diltiazem. Denies chest pain, SOB, headaches, syncope.     Anemia  Hgb noted to be trending down likely r/t chronic sinusitis. Hgb baseline unclear, appears to be around 9-11. Last Hgb was 8.1 on 4/29. No active sx of bleeding.  Recommending to recheck Hgb within 1-2 weeks of discharge and follow-up with PCP for further anemia work-up.    Physical deconditioning  [place]. Patient is actively participating in therapy sessions. Ambulates 425ft with walker. Requires SBA with ADLs. Cognitive testing done, SLUMS 26/30. SW following for discharge planning. Patient discharging home, as well as [facility] home care services, including home PT, OT, RN, HHA, .   Patient reports daughter will be at home with her for a few days to help with transition home.         Past Medical History:  has a past medical history of Angioedema (2017), Arthritis, CKD (chronic kidney disease) stage 3, GFR 30-59 ml/min (H) (7/23/2018), Gout, Headaches, Hives (02/2017), Hypertension, and Thyroid disease.    Discharge Medications:  Current Outpatient Medications   Medication Sig Dispense Refill     ACETAMINOPHEN PO Take 650 mg by mouth 3 times daily And q4h prn       cetirizine (ZYRTEC) 10 MG tablet Take 1 tablet (10 mg) by mouth 2 times daily 30 tablet      diltiazem ER COATED BEADS (CARDIZEM CD/CARTIA XT) 300 MG 24 hr capsule Take 300 mg by mouth daily TAKE 1 CAPSULE(300 MG) BY MOUTH DAILY; HOLD if SBP <110 OR HR < 55. Call if held x 2 in a row symptomatic       emollient  "(VANICREAM) cream Apply topically as needed for other (leg rash)       gabapentin (NEURONTIN) 100 MG capsule Take 100 mg by mouth nightly as needed       gabapentin (NEURONTIN) 100 MG capsule Take 100 mg by mouth At Bedtime        levothyroxine (SYNTHROID/LEVOTHROID) 88 MCG tablet Take 1 tablet (88 mcg) by mouth daily 90 tablet 2     magic mouthwash (FIRST-MOUTHWASH BLM) compounding kit Swish and swallow 10 mLs in mouth every 6 hours as needed for mouth sores       meclizine (ANTIVERT) 12.5 MG tablet Take 1 tablet (12.5 mg) by mouth 3 times daily as needed for dizziness       Multiple Vitamins-Minerals (CENTRUM SILVER) per tablet Take 1 tablet by mouth daily       omeprazole (PRILOSEC) 20 MG CR capsule Take 1 capsule (20 mg) by mouth daily 90 capsule 1     phenol (CHLORASEPTIC) 1.4 % spray Take 1 spray by mouth 3 times daily (before meals) And q2h prn       sodium chloride (OCEAN) 0.65 % nasal spray Spray 2 sprays into both nostrils every hour as needed for congestion Or equivalent per kimberly. Pt. Can have at bedside.       triamcinolone (NASACORT) 55 MCG/ACT inhaler Spray 2 sprays into both nostrils daily 16.5 mL 1     omalizumab (XOLAIR) 150 MG injection Inject 150 mg Subcutaneous every 28 days HOLD WHILE IN TCU. Next dose 4/2/19. Can wait til after discharge.         Medication Changes/Rationale:   Course of doxycycline to be completed 4/26  Discontinue norco prn    Controlled medications sent with patient:   not applicable/none         ROS:   10 point ROS of systems including Constitutional, Eyes, Respiratory, Cardiovascular, Gastroenterology, Genitourinary, Integumentary, Musculoskeletal, Psychiatric were all negative except for pertinent positives noted in my HPI.      Physical Exam:   Vitals: /69   Pulse 88   Temp 98.9  F (37.2  C)   Resp 18   Ht 1.651 m (5' 5\")   Wt 49.3 kg (108 lb 9.6 oz)   SpO2 96%   BMI 18.07 kg/m    BMI= Body mass index is 18.07 kg/m .  GENERAL APPEARANCE:  Alert, in no " distress, appears healthy, oriented, cooperative  ENT:  Mouth and posterior oropharynx normal, moist mucous membranes, [place].   EYES:  EOM, conjunctivae, lids, pupils and irises normal, PERRL  NECK:  No adenopathy,masses or thyromegaly  RESP:  respiratory effort and palpation of chest normal, lungs clear to auscultation , no respiratory distress  CV:  Palpation and auscultation of heart done , regular rate and rhythm, no murmur, rub, or gallop, no edema, +2 pedal pulses  ABDOMEN:  normal bowel sounds, soft, nontender, no guarding or rebound  M/S:   Gait and station abnormal, uses walker. Digits and nails normal  SKIN:  Inspection of skin and subcutaneous tissue baseline, Palpation of skin and subcutaneous tissue baseline  NEURO:   Cranial nerves 2-12 are normal tested and grossly at patient's baseline, Examination of sensation by touch normal  PSYCH:  Oriented X 3, affect and mood normal, cognitive testing SLUMS 26/30        SNF labs: [facility]. Please refer to them using [employer] Everywhere., Recent labs in EPIC reviewed by me today.  and   Most Recent 3 CBC's:  Recent Labs   Lab Test 04/29/19  0647 04/18/19 04/16/19  0521   WBC 8.5 11.8* 16.2*   HGB 8.1* 9.7* 9.7*   MCV 91 90 93    142* 173     Most Recent 3 BMP's:  Recent Labs   Lab Test 04/29/19  0647 04/23/19  0748 04/18/19  0733    141 142   POTASSIUM 3.4 3.6 3.3*   CHLORIDE 109 107 110*   CO2 27 29 27   BUN 28 22 31*   CR 1.11* 1.04 0.93   ANIONGAP 7 5 5   DINORAH 9.7 9.5 8.9   GLC 87 85 87           DISCHARGE PLAN:    Follow up labs: Recommending to recheck BMP & Hgb within 1 week of discharge from TCU dx chronic sinusitis, anemia.    Medical Follow Up:      Follow up with primary care provider in 1-2 weeks   Follow up with specialist ENT 5/3     MTM referral needed and placed by this provider: No    Current [place] scheduled appointments:  1. Patient to follow-up with PCP within 7 days of discharge from TCU.  2. Patient  to follow-up with ENT on 5/3      Discharge Services: Home Care:  Occupational Therapy, Physical Therapy, Speech Therapy , Registered Nurse, Home Health Aide and From:  [facility]      Discharge Instructions Verbalized to Patient at Discharge:     Notify ENT/PCP if you have a fever greater than 100.5 degrees, or if symptoms of chronic sinusitis worsen.           TOTAL DISCHARGE TIME:   Greater than 30 minutes      Electronically signed by:  ARIEL Caceres CNP

## 2019-04-30 ENCOUNTER — DISCHARGE SUMMARY NURSING HOME (OUTPATIENT)
Dept: GERIATRICS | Facility: CLINIC | Age: 84
End: 2019-04-30
Payer: COMMERCIAL

## 2019-04-30 DIAGNOSIS — J01.90 SUBACUTE SINUSITIS, UNSPECIFIED LOCATION: Primary | ICD-10-CM

## 2019-04-30 DIAGNOSIS — R63.4 WEIGHT LOSS: ICD-10-CM

## 2019-04-30 DIAGNOSIS — D64.9 ANEMIA, UNSPECIFIED TYPE: ICD-10-CM

## 2019-04-30 DIAGNOSIS — R53.81 PHYSICAL DECONDITIONING: ICD-10-CM

## 2019-04-30 DIAGNOSIS — N18.30 CKD (CHRONIC KIDNEY DISEASE) STAGE 3, GFR 30-59 ML/MIN (H): ICD-10-CM

## 2019-04-30 DIAGNOSIS — I10 BENIGN ESSENTIAL HYPERTENSION: ICD-10-CM

## 2019-04-30 PROCEDURE — 99316 NF DSCHRG MGMT 30 MIN+: CPT | Performed by: NURSE PRACTITIONER

## 2019-04-30 NOTE — LETTER
4/30/2019        RE: Juliana Leal  [address]  [facility] [address]        [place] [facility] DISCHARGE SUMMARY    PATIENT'S NAME: Juliana Leal  YOB: 1923  MEDICAL RECORD NUMBER:  [id number]  Place of Service where encounter took place:  [facility] [[id number]]    PRIMARY CARE PROVIDER AND CLINIC RESPONSIBLE AFTER TRANSFER:   Erick Ruano MD, [address] / [facility] [address]    [facility] Provider     Transferring providers: ARIEL Caceres CNP; Claudy Burgess MD  Recent Hospitalization/ED:  [facility] stay 4/11/19 to 4/16/19.  Date of SNF Admission: April / 16 / 2019  Date of SNF (anticipated) Discharge: May / 01 / 2019  Discharged to: previous independent home  Cognitive Scores: SLUMS: 26/30  Physical Function: Ambulating 425 ft with walker  DME: Walker    CODE STATUS/ADVANCE DIRECTIVES DISCUSSION:  DNR / DNI     ALLERGIES: Lisinopril; Prednisone; Allegra [fexofenadine]; Amoxicillin; Cipro [quinolones]; Codeine sulfate; Hydroxyzine; Iodine [gnp iodides decolorized]; Lyrica [pregabalin]; Norvasc [amlodipine besylate]; Tramadol; Zaditor; and Penicillins    DISCHARGE DIAGNOSIS/NURSING FACILITY COURSE:     Subacute sinusitis, unspecified location  Weight loss  Patient admitted to [facility] 4/11-4/16 due to recurrent sinusitis since 08/2018. Has had multiple course of abx w/o improvement. Has been followed by ENT (Dr. Irene). CT scan of sinuses on 4/11 found severe sinusitis. Was treated with short course of steroids, IV vancomycin and ceftriaxone, then transitioned to doxycycline upon discharge to TCU (completed 4/26). Noted to have weight loss (10-15lbs in the past 6 months) with suspected malnutrition due to ongoing sinusitis w/sore throat.   Today, continues to have moderate throat pain. States she has ongoing congestion and tired of symptoms not improving. Continues to also endorse poor appetite r/t sore  throat. ENT recommended to collect throat swab for concern of strep and/or yeast infection - final results were negative for growth. ST and dietary following. Currently taking tylenol, zyrtec, magic mouthwash, saline nasal spray, and nasacort. Patient to follow-up with ENT on 5/3.     Wt Readings from Last 4 Encounters:   04/29/19 49.3 kg (108 lb 9.6 oz)   04/24/19 50.3 kg (111 lb)   04/18/19 54.9 kg (121 lb)   04/17/19 54.9 kg (121 lb)       CKD  Creat baseline 1.2-1.5. Last creat was 1.11 on 4/29.      Benign essential hypertension  Currently taking diltiazem. Denies chest pain, SOB, headaches, syncope.     Anemia  Hgb noted to be trending down likely r/t chronic sinusitis. Hgb baseline unclear, appears to be around 9-11. Last Hgb was 8.1 on 4/29. No active sx of bleeding.  Recommending to recheck Hgb within 1-2 weeks of discharge and follow-up with PCP for further anemia work-up.    Physical deconditioning  PTA lives in ILF alone. Patient is actively participating in therapy sessions. Ambulates 425ft with walker. Requires SBA with ADLs. Cognitive testing done, SLUMS 26/30. SW following for discharge planning. Patient discharging home, as well as [facility] home care services, including home PT, OT, RN, HHA, ST.   Patient reports daughter will be at home with her for a few days to help with transition home.         Past Medical History:  has a past medical history of Angioedema (2017), Arthritis, CKD (chronic kidney disease) stage 3, GFR 30-59 ml/min (H) (7/23/2018), Gout, Headaches, Hives (02/2017), Hypertension, and Thyroid disease.    Discharge Medications:  Current Outpatient Medications   Medication Sig Dispense Refill     ACETAMINOPHEN PO Take 650 mg by mouth 3 times daily And q4h prn       cetirizine (ZYRTEC) 10 MG tablet Take 1 tablet (10 mg) by mouth 2 times daily 30 tablet      diltiazem ER COATED BEADS (CARDIZEM CD/CARTIA XT) 300 MG 24 hr capsule Take 300 mg by mouth daily TAKE 1 CAPSULE(300 MG) BY  "MOUTH DAILY; HOLD if SBP <110 OR HR < 55. Call if held x 2 in a row symptomatic       emollient (VANICREAM) cream Apply topically as needed for other (leg rash)       gabapentin (NEURONTIN) 100 MG capsule Take 100 mg by mouth nightly as needed       gabapentin (NEURONTIN) 100 MG capsule Take 100 mg by mouth At Bedtime        levothyroxine (SYNTHROID/LEVOTHROID) 88 MCG tablet Take 1 tablet (88 mcg) by mouth daily 90 tablet 2     magic mouthwash (FIRST-MOUTHWASH BLM) compounding kit Swish and swallow 10 mLs in mouth every 6 hours as needed for mouth sores       meclizine (ANTIVERT) 12.5 MG tablet Take 1 tablet (12.5 mg) by mouth 3 times daily as needed for dizziness       Multiple Vitamins-Minerals (CENTRUM SILVER) per tablet Take 1 tablet by mouth daily       omeprazole (PRILOSEC) 20 MG CR capsule Take 1 capsule (20 mg) by mouth daily 90 capsule 1     phenol (CHLORASEPTIC) 1.4 % spray Take 1 spray by mouth 3 times daily (before meals) And q2h prn       sodium chloride (OCEAN) 0.65 % nasal spray Spray 2 sprays into both nostrils every hour as needed for congestion Or equivalent per kimberly. Pt. Can have at bedside.       triamcinolone (NASACORT) 55 MCG/ACT inhaler Spray 2 sprays into both nostrils daily 16.5 mL 1     omalizumab (XOLAIR) 150 MG injection Inject 150 mg Subcutaneous every 28 days HOLD WHILE IN TCU. Next dose 4/2/19. Can wait til after discharge.         Medication Changes/Rationale:   Course of doxycycline to be completed 4/26  Discontinue norco prn    Controlled medications sent with patient:   not applicable/none         ROS:   10 point ROS of systems including Constitutional, Eyes, Respiratory, Cardiovascular, Gastroenterology, Genitourinary, Integumentary, Musculoskeletal, Psychiatric were all negative except for pertinent positives noted in my HPI.      Physical Exam:   Vitals: /69   Pulse 88   Temp 98.9  F (37.2  C)   Resp 18   Ht 1.651 m (5' 5\")   Wt 49.3 kg (108 lb 9.6 oz)   SpO2 96%  "  BMI 18.07 kg/m     BMI= Body mass index is 18.07 kg/m .  GENERAL APPEARANCE:  Alert, in no distress, appears healthy, oriented, cooperative  ENT:  Mouth and posterior oropharynx normal, moist mucous membranes, [place].   EYES:  EOM, conjunctivae, lids, pupils and irises normal, PERRL  NECK:  No adenopathy,masses or thyromegaly  RESP:  respiratory effort and palpation of chest normal, lungs clear to auscultation , no respiratory distress  CV:  Palpation and auscultation of heart done , regular rate and rhythm, no murmur, rub, or gallop, no edema, +2 pedal pulses  ABDOMEN:  normal bowel sounds, soft, nontender, no guarding or rebound  M/S:   Gait and station abnormal, uses walker. Digits and nails normal  SKIN:  Inspection of skin and subcutaneous tissue baseline, Palpation of skin and subcutaneous tissue baseline  NEURO:   Cranial nerves 2-12 are normal tested and grossly at patient's baseline, Examination of sensation by touch normal  PSYCH:  Oriented X 3, affect and mood normal, cognitive testing SLUMS 26/30        SNF labs: Labs done in SNF are in [place] EPIC. Please refer to them using [employer]/Care Everywhere., Recent labs in EPIC reviewed by me today.  and   Most Recent 3 CBC's:  Recent Labs   Lab Test 04/29/19  0647 04/18/19 04/16/19  0521   WBC 8.5 11.8* 16.2*   HGB 8.1* 9.7* 9.7*   MCV 91 90 93    142* 173     Most Recent 3 BMP's:  Recent Labs   Lab Test 04/29/19  0647 04/23/19  0748 04/18/19  0733    141 142   POTASSIUM 3.4 3.6 3.3*   CHLORIDE 109 107 110*   CO2 27 29 27   BUN 28 22 31*   CR 1.11* 1.04 0.93   ANIONGAP 7 5 5   DINORAH 9.7 9.5 8.9   GLC 87 85 87           DISCHARGE PLAN:    Follow up labs: Recommending to recheck BMP & Hgb within 1 week of discharge from TCU dx chronic sinusitis, anemia.    Medical Follow Up:      Follow up with primary care provider in 1-2 weeks   Follow up with specialist ENT 5/3     MTM referral needed and placed by this provider: No    Current [place] scheduled  "appointments:  1. Patient to follow-up with PCP within 7 days of discharge from TCU.  2. Patient to follow-up with ENT on 5/3      Discharge Services: Home Care:  Occupational Therapy, Physical Therapy, Speech Therapy , Registered Nurse, Home Health Aide and From:  [employer]      Discharge Instructions Verbalized to Patient at Discharge:     Notify ENT/PCP if you have a fever greater than 100.5 degrees, or if symptoms of chronic sinusitis worsen.           TOTAL DISCHARGE TIME:   Greater than 30 minutes      Electronically signed by:  ARIEL Caceres CNP                           Documentation of Face-to-Face and Certification for Home Health Services     Patient: Juliana Leal   YOB: 1923  MR Number: [id number]  Today's Date: 4/30/2019    I certify that patient: Juliana Leal is under my care and that I, or a nurse practitioner or physician's assistant working with me, had a face-to-face encounter that meets the physician face-to-face encounter requirements with this patient on: {Date choice:725521::\"***\"}.    This encounter with the patient was in whole, or in part, for the following medical condition, which is the primary reason for home health care: ***.    I certify that, based on my findings, the following services are medically necessary home health services: {Modalities- choose all that apply:108784}.    My clinical findings support the need for the above services because: {Homecare Options- Choose all that apply:569326}    Further, I certify that my clinical findings support that this patient is homebound (i.e. absences from home require considerable and taxing effort and are for medical reasons or [employer] services or infrequently or of short duration when for other reasons) because: {Homebound - Must include information about the medical condition and why it is a considerable and taxing effort for the patient to leave the home. Per CMS; diagnoses alone do not " substantiate homebound.:266337}.    Based on the above findings. I certify that this patient is confined to the home and needs intermittent skilled nursing care, physical therapy and/or speech therapy.  The patient is under my care, and I have initiated the establishment of the plan of care.  This patient will be followed by a physician who will periodically review the plan of care.  Physician/Provider to provide follow up care: Erick Ruano    Responsible Medicare certified PECOS Physician: ***  Physician Signature: See electronic signature associated with these discharge orders.  Date: 4/30/2019        Sincerely,        ARIEL Caceres CNP

## 2019-04-30 NOTE — LETTER
4/30/2019        RE: Juliana Leal  [address]  [facility] [address]        [place] [facility]IAT[facility]IC [facility] DISCHARGE SUMMARY    PATIENT'S NAME: Juliana Leal  YOB: 1923  MEDICAL RECORD NUMBER:  [id number]  Place of Service where encounter took place:  [facility] [[id number]]    PRIMARY CARE PROVIDER AND CLINIC RESPONSIBLE AFTER TRANSFER:   Erick Ruano MD, [address] / [facility] [address]    [facility] [place]     Transferring providers: ARIEL Cacerse CNP; Claudy Burgess MD  Recent Hospitalization/ED:  [facility] stay 4/11/19 to 4/16/19.  Date of SNF Admission: April / 16 / 2019  Date of SNF (anticipated) Discharge: May / 01 / 2019  Discharged to: previous independent home  Cognitive Scores: SLUMS: 26/30  Physical Function: Ambulating 425 ft with walker  DME: Walker    CODE STATUS/ADVANCE DIRECTIVES DISCUSSION:  DNR / DNI     ALLERGIES: Lisinopril; Prednisone; Allegra [fexofenadine]; Amoxicillin; Cipro [quinolones]; Codeine sulfate; Hydroxyzine; Iodine [gnp iodides decolorized]; Lyrica [pregabalin]; Norvasc [amlodipine besylate]; Tramadol; Zaditor; and Penicillins    DISCHARGE DIAGNOSIS/NURSING FACILITY COURSE:     Subacute sinusitis, unspecified location  Weight loss  Patient admitted to [facility] 4/11-4/16 due to recurrent sinusitis since 08/2018. Has had multiple course of abx w/o improvement. Has been followed by ENT (Dr. Irene). CT scan of sinuses on 4/11 found severe sinusitis. Was treated with short course of steroids, IV vancomycin and ceftriaxone, then transitioned to doxycycline upon discharge to TCU (completed 4/26). Noted to have weight loss (10-15lbs in the past 6 months) with suspected malnutrition due to ongoing sinusitis w/sore throat.   Today, continues to have moderate throat pain. States she has ongoing congestion and tired of symptoms not improving. Continues to also endorse poor appetite r/t sore  throat. ENT recommended to collect throat swab for concern of strep and/or yeast infection - final results were negative for growth. ST and dietary following. Currently taking tylenol, zyrtec, magic mouthwash, saline nasal spray, and nasacort. Patient to follow-up with ENT on 5/3.     Wt Readings from Last 4 Encounters:   04/29/19 49.3 kg (108 lb 9.6 oz)   04/24/19 50.3 kg (111 lb)   04/18/19 54.9 kg (121 lb)   04/17/19 54.9 kg (121 lb)       CKD  Creat baseline 1.2-1.5. Last creat was 1.11 on 4/29.      Benign essential hypertension  Currently taking diltiazem. Denies chest pain, SOB, headaches, syncope.     Anemia  Hgb noted to be trending down likely r/t chronic sinusitis. Hgb baseline unclear, appears to be around 9-11. Last Hgb was 8.1 on 4/29. No active sx of bleeding.  Recommending to recheck Hgb within 1-2 weeks of discharge and follow-up with PCP for further anemia work-up.    Physical deconditioning  PTA lives in ILF alone. Patient is actively participating in therapy sessions. Ambulates 425ft with walker. Requires SBA with ADLs. Cognitive testing done, SLUMS 26/30. SW following for discharge planning. Patient discharging home, as well as [facility] home care services, including home PT, OT, RN, HHA, ST.   Patient reports daughter will be at home with her for a few days to help with transition home.         Past Medical History:  has a past medical history of Angioedema (2017), Arthritis, CKD (chronic kidney disease) stage 3, GFR 30-59 ml/min (H) (7/23/2018), Gout, Headaches, Hives (02/2017), Hypertension, and Thyroid disease.    Discharge Medications:  Current Outpatient Medications   Medication Sig Dispense Refill     ACETAMINOPHEN PO Take 650 mg by mouth 3 times daily And q4h prn       cetirizine (ZYRTEC) 10 MG tablet Take 1 tablet (10 mg) by mouth 2 times daily 30 tablet      diltiazem ER COATED BEADS (CARDIZEM CD/CARTIA XT) 300 MG 24 hr capsule Take 300 mg by mouth daily TAKE 1 CAPSULE(300 MG) BY  "MOUTH DAILY; HOLD if SBP <110 OR HR < 55. Call if held x 2 in a row symptomatic       emollient (VANICREAM) cream Apply topically as needed for other (leg rash)       gabapentin (NEURONTIN) 100 MG capsule Take 100 mg by mouth nightly as needed       gabapentin (NEURONTIN) 100 MG capsule Take 100 mg by mouth At Bedtime        levothyroxine (SYNTHROID/LEVOTHROID) 88 MCG tablet Take 1 tablet (88 mcg) by mouth daily 90 tablet 2     magic mouthwash (FIRST-MOUTHWASH BLM) compounding kit Swish and swallow 10 mLs in mouth every 6 hours as needed for mouth sores       meclizine (ANTIVERT) 12.5 MG tablet Take 1 tablet (12.5 mg) by mouth 3 times daily as needed for dizziness       Multiple Vitamins-Minerals (CENTRUM SILVER) per tablet Take 1 tablet by mouth daily       omeprazole (PRILOSEC) 20 MG CR capsule Take 1 capsule (20 mg) by mouth daily 90 capsule 1     phenol (CHLORASEPTIC) 1.4 % spray Take 1 spray by mouth 3 times daily (before meals) And q2h prn       sodium chloride (OCEAN) 0.65 % nasal spray Spray 2 sprays into both nostrils every hour as needed for congestion Or equivalent per k[place]ly. Pt. Can have at bedside.       triamcinolone (NASACORT) 55 MCG/ACT inhaler Spray 2 sprays into both nostrils daily 16.5 mL 1     omalizumab (XOLAIR) 150 MG injection Inject 150 mg Subcutaneous every 28 days HOLD WHILE IN TCU. Next dose 4/2/19. Can wait til after discharge.         Medication Changes/Rationale:   Course of doxycycline to be completed 4/26  Discontinue norco prn    Controlled medications sent with patient:   not applicable/none         ROS:   10 point ROS of systems including Constitutional, Eyes, Respiratory, Cardiovascular, Gastroenterology, Genitourinary, Integumentary, Musculoskeletal, Psychiatric were all negative except for pertinent positives noted in my HPI.      Physical Exam:   Vitals: /69   Pulse 88   Temp 98.9  F (37.2  C)   Resp 18   Ht 1.651 m (5' 5\")   Wt 49.3 kg (108 lb 9.6 oz)   SpO2 96%  "  BMI 18.07 kg/m     BMI= Body mass index is 18.07 kg/m .  GENERAL APPEARANCE:  Alert, in no distress, appears healthy, oriented, cooperative  ENT:  Mouth and posterior oropharynx normal, moist mucous membranes, [place].   EYES:  EOM, conjunctivae, lids, pupils and irises normal, PERRL  NECK:  No adenopathy,masses or thyromegaly  RESP:  respiratory effort and palpation of chest normal, lungs clear to auscultation , no respiratory distress  CV:  Palpation and auscultation of heart done , regular rate and rhythm, no murmur, rub, or gallop, no edema, +2 pedal pulses  ABDOMEN:  normal bowel sounds, soft, nontender, no guarding or rebound  M/S:   Gait and station abnormal, uses walker. Digits and nails normal  SKIN:  Inspection of skin and subcutaneous tissue baseline, Palpation of skin and subcutaneous tissue baseline  NEURO:   Cranial nerves 2-12 are normal tested and grossly at patient's baseline, Examination of sensation by touch normal  PSYCH:  Oriented X 3, affect and mood normal, cognitive testing SLUMS 26/30        SNF labs: Labs done in SNF are in [place] EPIC. Please refer to them using [employer]/Care Everywhere., Recent labs in EPIC reviewed by me today.  and   Most Recent 3 CBC's:  Recent Labs   Lab Test 04/29/19  0647 04/18/19 04/16/19  0521   WBC 8.5 11.8* 16.2*   HGB 8.1* 9.7* 9.7*   MCV 91 90 93    142* 173     Most Recent 3 BMP's:  Recent Labs   Lab Test 04/29/19  0647 04/23/19  0748 04/18/19  0733    141 142   POTASSIUM 3.4 3.6 3.3*   CHLORIDE 109 107 110*   CO2 27 29 27   BUN 28 22 31*   CR 1.11* 1.04 0.93   ANIONGAP 7 5 5   DINORAH 9.7 9.5 8.9   GLC 87 85 87           DISCHARGE PLAN:    Follow up labs: Recommending to recheck BMP & Hgb within 1 week of discharge from TCU dx chronic sinusitis, anemia.    Medical Follow Up:      Follow up with primary care provider in 1-2 weeks   Follow up with specialist ENT 5/3     MTM referral needed and placed by this provider: No    Current [place] scheduled  appointments:  1. Patient to follow-up with PCP within 7 days of discharge from TCU.  2. Patient to follow-up with ENT on 5/3      Discharge Services: Home Care:  Occupational Therapy, Physical Therapy, Speech Therapy , Registered Nurse, Home Health Aide and From:  [employer]      Discharge Instructions Verbalized to Patient at Discharge:     Notify ENT/PCP if you have a fever greater than 100.5 degrees, or if symptoms of chronic sinusitis worsen.           TOTAL DISCHARGE TIME:   Greater than 30 minutes      Electronically signed by:  ARIEL Caceres CNP                           Documentation of Face-to-Face and Certification for Home Health Services     Patient: Juliana Leal   YOB: 1923  MR Number: [id number]  Today's Date: 4/30/2019    I certify that patient: Juliana Leal is under my care and that I, or a nurse practitioner or physician's assistant working with me, had a face-to-face encounter that meets the physician face-to-face encounter requirements with this patient on: 4/30/2019.    This encounter with the patient was in whole, or in part, for the following medical condition, which is the primary reason for home health care:   Encounter Diagnoses   Name Primary?     Subacute sinusitis, unspecified location Yes     Weight loss      CKD (chronic kidney disease) stage 3, GFR 30-59 ml/min (H)      Benign essential hypertension      Anemia, unspecified type      Physical deconditioning          I certify that, based on my findings, the following services are medically necessary home health services: Nursing, Occupational Therapy, Physical Therapy, Speech Language Therapy and HHA.    My clinical findings support the need for the above services because: Nurse is needed: To assess cognition after changes in medications or other medical regimen. and recheck BMP & Hgb within 1 week of discharge w/results sent to PCP (dx chronic sinusitis, anemia)., Occupational Therapy Services are  needed to assess and treat cognitive ability and address ADL safety due to impairment in mobility., Physical Therapy Services are needed to assess and treat the following functional impairments: mobility., Speech Therapy Services are needed to assess and treat impairments in language and/or swallow functions due to dysphagia. and HHA to assist with ADLs as needed.    Further, I certify that my clinical findings support that this patient is homebound (i.e. absences from home require considerable and taxing effort and are for medical reasons or [employer] services or infrequently or of short duration when for other reasons) because: Requires assistance of another person or specialized equipment to access medical services because patient: Requires supervision of another for safe transfer...    Based on the above findings. I certify that this patient is confined to the home and needs intermittent skilled nursing care, physical therapy and/or speech therapy.  The patient is under my care, and I have initiated the establishment of the plan of care.  This patient will be followed by a physician who will periodically review the plan of care.  Physician/Provider to provide follow up care: Erick Ruano    Responsible Medicare certified PECOS Physician: Claudy Burgess MD  Physician Signature: See electronic signature associated with these discharge orders.  Date: 4/30/2019        Sincerely,        ARIEL Caceres CNP

## 2019-04-30 NOTE — PROGRESS NOTES
Documentation of Face-to-Face and Certification for Home Health Services     Patient: Juliana Leal   YOB: 1923  MR Number: [id number]  Today's Date: 4/30/2019    I certify that patient: Juliana Leal is under my care and that I, or a nurse practitioner or physician's assistant working with me, had a face-to-face encounter that meets the physician face-to-face encounter requirements with this patient on: 4/30/2019.    This encounter with the patient was in whole, or in part, for the following medical condition, which is the primary reason for home health care:   Encounter Diagnoses   Name Primary?     Subacute sinusitis, unspecified location Yes     Weight loss      CKD (chronic kidney disease) stage 3, GFR 30-59 ml/min (H)      Benign essential hypertension      Anemia, unspecified type      Physical deconditioning          I certify that, based on my findings, the following services are medically necessary home health services: Nursing, Occupational Therapy, Physical Therapy, Speech Language Therapy and HHA.    My clinical findings support the need for the above services because: Nurse is needed: To assess cognition after changes in medications or other medical regimen. and recheck BMP & Hgb within 1 week of discharge w/results sent to PCP (dx chronic sinusitis, anemia)., Occupational Therapy Services are needed to assess and treat cognitive ability and address ADL safety due to impairment in mobility., Physical Therapy Services are needed to assess and treat the following functional impairments: mobility., Speech Therapy Services are needed to assess and treat impairments in language and/or swallow functions due to dysphagia. and HHA to assist with ADLs as needed.    Further, I certify that my clinical findings support that this patient is homebound (i.e. absences from home require considerable and taxing effort and are for medical reasons or Worship services or infrequently or of short  duration when for other reasons) because: Requires assistance of another person or specialized equipment to access medical services because patient: Requires supervision of another for safe transfer...    Based on the above findings. I certify that this patient is confined to the home and needs intermittent skilled nursing care, physical therapy and/or speech therapy.  The patient is under my care, and I have initiated the establishment of the plan of care.  This patient will be followed by a physician who will periodically review the plan of care.  Physician/Provider to provide follow up care: Erick Ruano    Responsible Medicare certified PECOS Physician: Claudy Burgess MD  Physician Signature: See electronic signature associated with these discharge orders.  Date: 4/30/2019

## 2019-05-01 ENCOUNTER — TELEPHONE (OUTPATIENT)
Dept: INTERNAL MEDICINE | Facility: CLINIC | Age: 84
End: 2019-05-01

## 2019-05-01 NOTE — TELEPHONE ENCOUNTER
[facility] calling [phone]    Wanting to know if you will follow pt for home care orders.

## 2019-05-01 NOTE — TELEPHONE ENCOUNTER
Will follow. Also call pt and schedule f/u appt with me re: recent hospitalization if I am going to be managing home care and issues related to recent hospitalizations/rehab

## 2019-05-01 NOTE — PATIENT INSTRUCTIONS
[facility] Discharge Orders    Name: Juliana Leal  : 2/10/1923  Planned Discharge Date: 19  Discharged to: previous independent home    MEDICAL FOLLOW UP  Follow up with PCP in 1-2 weeks   Follow up with [facility] on 5/3      FUTURE LABS: Recommending to recheck BMP & Hgb within 1 week of discharge from TCU dx chronic sinusitis, anemia (sent orders to home care RN).      ORDER CHANGES:  Course of doxycycline to be completed   Discontinue norco prn    DISCHARGE MEDICATIONS:  The patient s pharmacy is authorized to dispense a 30-day supply of medications. Refill requests should be directed to the primary provider, Erick Ruano.   No narcotics are prescribed at time of discharge.     Current Outpatient Medications   Medication Sig Dispense Refill     ACETAMINOPHEN PO Take 650 mg by mouth 3 times daily And q4h prn       cetirizine (ZYRTEC) 10 MG tablet Take 1 tablet (10 mg) by mouth 2 times daily 30 tablet      diltiazem ER COATED BEADS (CARDIZEM CD/CARTIA XT) 300 MG 24 hr capsule Take 300 mg by mouth daily TAKE 1 CAPSULE(300 MG) BY MOUTH DAILY; HOLD if SBP <110 OR HR < 55. Call if held x 2 in a row symptomatic       emollient (VANICREAM) cream Apply topically as needed for other (leg rash)       gabapentin (NEURONTIN) 100 MG capsule Take 100 mg by mouth nightly as needed       gabapentin (NEURONTIN) 100 MG capsule Take 100 mg by mouth At Bedtime        levothyroxine (SYNTHROID/LEVOTHROID) 88 MCG tablet Take 1 tablet (88 mcg) by mouth daily 90 tablet 2     magic mouthwash (FIRST-MOUTHWASH BLM) compounding kit Swish and swallow 10 mLs in mouth every 6 hours as needed for mouth sores       meclizine (ANTIVERT) 12.5 MG tablet Take 1 tablet (12.5 mg) by mouth 3 times daily as needed for dizziness       Multiple Vitamins-Minerals (CENTRUM SILVER) per tablet Take 1 tablet by mouth daily       omeprazole (PRILOSEC) 20 MG CR capsule Take 1 capsule (20 mg) by mouth daily 90 capsule 1      phenol (CHLORASEPTIC) 1.4 % spray Take 1 spray by mouth 3 times daily (before meals) And q2h prn       sodium chloride (OCEAN) 0.65 % nasal spray Spray 2 sprays into both nostrils every hour as needed for congestion Or equivalent per kimberly. Pt. Can have at bedside.       triamcinolone (NASACORT) 55 MCG/ACT inhaler Spray 2 sprays into both nostrils daily 16.5 mL 1     omalizumab (XOLAIR) 150 MG injection Inject 150 mg Subcutaneous every 28 days HOLD WHILE IN TCU. Next dose 4/2/19. Can wait til after discharge.         SERVICES:  Home Care:  Occupational Therapy, Physical Therapy, Speech Therapy , Registered Nurse, Home Health Aide and From:  [employer].      ADDITIONAL INSTRUCTIONS:  ? Notify ENT/PCP if you have a fever greater than 100.5 degrees, or if symptoms of chronic sinusitis worsen.         ARIEL Caceres CNP  This document was electronically signed on April 30, 2019

## 2019-05-03 ENCOUNTER — APPOINTMENT (OUTPATIENT)
Dept: CT IMAGING | Facility: CLINIC | Age: 84
DRG: 840 | End: 2019-05-03
Attending: EMERGENCY MEDICINE
Payer: COMMERCIAL

## 2019-05-03 ENCOUNTER — APPOINTMENT (OUTPATIENT)
Dept: GENERAL RADIOLOGY | Facility: CLINIC | Age: 84
DRG: 840 | End: 2019-05-03
Attending: EMERGENCY MEDICINE
Payer: COMMERCIAL

## 2019-05-03 ENCOUNTER — HOSPITAL ENCOUNTER (INPATIENT)
Facility: CLINIC | Age: 84
LOS: 10 days | Discharge: HOSPICE | DRG: 840 | End: 2019-05-13
Attending: EMERGENCY MEDICINE | Admitting: INTERNAL MEDICINE
Payer: COMMERCIAL

## 2019-05-03 ENCOUNTER — OFFICE VISIT (OUTPATIENT)
Dept: INTERNAL MEDICINE | Facility: CLINIC | Age: 84
End: 2019-05-03
Payer: COMMERCIAL

## 2019-05-03 VITALS
TEMPERATURE: 97.8 F | DIASTOLIC BLOOD PRESSURE: 68 MMHG | HEART RATE: 106 BPM | OXYGEN SATURATION: 98 % | BODY MASS INDEX: 17.64 KG/M2 | WEIGHT: 106 LBS | SYSTOLIC BLOOD PRESSURE: 102 MMHG | RESPIRATION RATE: 20 BRPM

## 2019-05-03 DIAGNOSIS — I48.91 ATRIAL FIBRILLATION WITH RVR (H): ICD-10-CM

## 2019-05-03 DIAGNOSIS — Z23 NEED FOR PNEUMOCOCCAL VACCINATION: ICD-10-CM

## 2019-05-03 DIAGNOSIS — R42 VERTIGO: ICD-10-CM

## 2019-05-03 DIAGNOSIS — K59.03 CONSTIPATION DUE TO PAIN MEDICATION: ICD-10-CM

## 2019-05-03 DIAGNOSIS — J02.9 SORE THROAT: ICD-10-CM

## 2019-05-03 DIAGNOSIS — K59.03 DRUG-INDUCED CONSTIPATION: Primary | ICD-10-CM

## 2019-05-03 DIAGNOSIS — C85.10 LARGE B-CELL LYMPHOMA (H): ICD-10-CM

## 2019-05-03 DIAGNOSIS — R13.10 DYSPHAGIA, UNSPECIFIED TYPE: ICD-10-CM

## 2019-05-03 DIAGNOSIS — K13.79 MASS OF ORAL CAVITY: ICD-10-CM

## 2019-05-03 DIAGNOSIS — R63.4 WEIGHT LOSS: ICD-10-CM

## 2019-05-03 DIAGNOSIS — E86.0 DEHYDRATION: ICD-10-CM

## 2019-05-03 DIAGNOSIS — R07.0 THROAT PAIN: ICD-10-CM

## 2019-05-03 DIAGNOSIS — E03.9 ACQUIRED HYPOTHYROIDISM: ICD-10-CM

## 2019-05-03 DIAGNOSIS — R00.0 TACHYCARDIA: ICD-10-CM

## 2019-05-03 LAB
ANION GAP SERPL CALCULATED.3IONS-SCNC: 9 MMOL/L (ref 3–14)
BASOPHILS # BLD AUTO: 0.1 10E9/L (ref 0–0.2)
BASOPHILS NFR BLD AUTO: 0.4 %
BUN SERPL-MCNC: 32 MG/DL (ref 7–30)
CALCIUM SERPL-MCNC: 11.6 MG/DL (ref 8.5–10.1)
CHLORIDE SERPL-SCNC: 106 MMOL/L (ref 94–109)
CO2 SERPL-SCNC: 25 MMOL/L (ref 20–32)
CREAT SERPL-MCNC: 1.33 MG/DL (ref 0.52–1.04)
CRP SERPL-MCNC: 132 MG/L (ref 0–8)
DIFFERENTIAL METHOD BLD: ABNORMAL
EOSINOPHIL # BLD AUTO: 0 10E9/L (ref 0–0.7)
EOSINOPHIL NFR BLD AUTO: 0.1 %
ERYTHROCYTE [DISTWIDTH] IN BLOOD BY AUTOMATED COUNT: 19.1 % (ref 10–15)
GFR SERPL CREATININE-BSD FRML MDRD: 34 ML/MIN/{1.73_M2}
GLUCOSE BLDC GLUCOMTR-MCNC: 115 MG/DL (ref 70–99)
GLUCOSE SERPL-MCNC: 158 MG/DL (ref 70–99)
HCT VFR BLD AUTO: 28 % (ref 35–47)
HGB BLD-MCNC: 9.3 G/DL (ref 11.7–15.7)
IMM GRANULOCYTES # BLD: 0 10E9/L (ref 0–0.4)
IMM GRANULOCYTES NFR BLD: 0.3 %
INTERPRETATION ECG - MUSE: NORMAL
LACTATE BLD-SCNC: 0.8 MMOL/L (ref 0.7–2)
LYMPHOCYTES # BLD AUTO: 2.3 10E9/L (ref 0.8–5.3)
LYMPHOCYTES NFR BLD AUTO: 19.7 %
MAGNESIUM SERPL-MCNC: 1.8 MG/DL (ref 1.6–2.3)
MAGNESIUM SERPL-MCNC: 2.6 MG/DL (ref 1.6–2.3)
MCH RBC QN AUTO: 30.3 PG (ref 26.5–33)
MCHC RBC AUTO-ENTMCNC: 33.2 G/DL (ref 31.5–36.5)
MCV RBC AUTO: 91 FL (ref 78–100)
MONOCYTES # BLD AUTO: 1.7 10E9/L (ref 0–1.3)
MONOCYTES NFR BLD AUTO: 14 %
NEUTROPHILS # BLD AUTO: 7.7 10E9/L (ref 1.6–8.3)
NEUTROPHILS NFR BLD AUTO: 65.5 %
NRBC # BLD AUTO: 0 10*3/UL
NRBC BLD AUTO-RTO: 0 /100
PLATELET # BLD AUTO: 206 10E9/L (ref 150–450)
POTASSIUM SERPL-SCNC: 3.7 MMOL/L (ref 3.4–5.3)
RBC # BLD AUTO: 3.07 10E12/L (ref 3.8–5.2)
SODIUM SERPL-SCNC: 140 MMOL/L (ref 133–144)
TSH SERPL DL<=0.005 MIU/L-ACNC: 1.22 MU/L (ref 0.4–4)
WBC # BLD AUTO: 11.8 10E9/L (ref 4–11)

## 2019-05-03 PROCEDURE — 85025 COMPLETE CBC W/AUTO DIFF WBC: CPT | Performed by: EMERGENCY MEDICINE

## 2019-05-03 PROCEDURE — 00000146 ZZHCL STATISTIC GLUCOSE BY METER IP

## 2019-05-03 PROCEDURE — 99207 ZZC OFFICE-HOSPITAL ADMIT: CPT | Performed by: INTERNAL MEDICINE

## 2019-05-03 PROCEDURE — 21000001 ZZH R&B HEART CARE

## 2019-05-03 PROCEDURE — 83605 ASSAY OF LACTIC ACID: CPT | Performed by: INTERNAL MEDICINE

## 2019-05-03 PROCEDURE — 70490 CT SOFT TISSUE NECK W/O DYE: CPT

## 2019-05-03 PROCEDURE — 83735 ASSAY OF MAGNESIUM: CPT | Performed by: INTERNAL MEDICINE

## 2019-05-03 PROCEDURE — 93005 ELECTROCARDIOGRAM TRACING: CPT

## 2019-05-03 PROCEDURE — 25000128 H RX IP 250 OP 636: Performed by: EMERGENCY MEDICINE

## 2019-05-03 PROCEDURE — 96365 THER/PROPH/DIAG IV INF INIT: CPT

## 2019-05-03 PROCEDURE — 96361 HYDRATE IV INFUSION ADD-ON: CPT

## 2019-05-03 PROCEDURE — 99285 EMERGENCY DEPT VISIT HI MDM: CPT | Mod: 25

## 2019-05-03 PROCEDURE — 83735 ASSAY OF MAGNESIUM: CPT | Performed by: EMERGENCY MEDICINE

## 2019-05-03 PROCEDURE — 84443 ASSAY THYROID STIM HORMONE: CPT | Performed by: EMERGENCY MEDICINE

## 2019-05-03 PROCEDURE — 71046 X-RAY EXAM CHEST 2 VIEWS: CPT

## 2019-05-03 PROCEDURE — 36415 COLL VENOUS BLD VENIPUNCTURE: CPT | Performed by: INTERNAL MEDICINE

## 2019-05-03 PROCEDURE — 96375 TX/PRO/DX INJ NEW DRUG ADDON: CPT

## 2019-05-03 PROCEDURE — 86140 C-REACTIVE PROTEIN: CPT | Performed by: EMERGENCY MEDICINE

## 2019-05-03 PROCEDURE — 96366 THER/PROPH/DIAG IV INF ADDON: CPT

## 2019-05-03 PROCEDURE — 99223 1ST HOSP IP/OBS HIGH 75: CPT | Mod: AI | Performed by: INTERNAL MEDICINE

## 2019-05-03 PROCEDURE — 80048 BASIC METABOLIC PNL TOTAL CA: CPT | Performed by: EMERGENCY MEDICINE

## 2019-05-03 RX ORDER — AMOXICILLIN 250 MG
1 CAPSULE ORAL 2 TIMES DAILY PRN
Status: DISCONTINUED | OUTPATIENT
Start: 2019-05-03 | End: 2019-05-13 | Stop reason: HOSPADM

## 2019-05-03 RX ORDER — PROCHLORPERAZINE 25 MG
12.5 SUPPOSITORY, RECTAL RECTAL EVERY 12 HOURS PRN
Status: DISCONTINUED | OUTPATIENT
Start: 2019-05-03 | End: 2019-05-13 | Stop reason: HOSPADM

## 2019-05-03 RX ORDER — AMOXICILLIN 250 MG
2 CAPSULE ORAL 2 TIMES DAILY PRN
Status: DISCONTINUED | OUTPATIENT
Start: 2019-05-03 | End: 2019-05-13 | Stop reason: HOSPADM

## 2019-05-03 RX ORDER — ONDANSETRON 2 MG/ML
4 INJECTION INTRAMUSCULAR; INTRAVENOUS EVERY 6 HOURS PRN
Status: DISCONTINUED | OUTPATIENT
Start: 2019-05-03 | End: 2019-05-13 | Stop reason: HOSPADM

## 2019-05-03 RX ORDER — NALOXONE HYDROCHLORIDE 0.4 MG/ML
.1-.4 INJECTION, SOLUTION INTRAMUSCULAR; INTRAVENOUS; SUBCUTANEOUS
Status: DISCONTINUED | OUTPATIENT
Start: 2019-05-03 | End: 2019-05-13 | Stop reason: HOSPADM

## 2019-05-03 RX ORDER — DILTIAZEM HYDROCHLORIDE 5 MG/ML
15 INJECTION INTRAVENOUS ONCE
Status: DISCONTINUED | OUTPATIENT
Start: 2019-05-03 | End: 2019-05-03

## 2019-05-03 RX ORDER — POTASSIUM CHLORIDE 29.8 MG/ML
20 INJECTION INTRAVENOUS
Status: DISCONTINUED | OUTPATIENT
Start: 2019-05-03 | End: 2019-05-03

## 2019-05-03 RX ORDER — POTASSIUM CHLORIDE 1500 MG/1
20-40 TABLET, EXTENDED RELEASE ORAL
Status: DISCONTINUED | OUTPATIENT
Start: 2019-05-03 | End: 2019-05-10

## 2019-05-03 RX ORDER — POTASSIUM CHLORIDE 1.5 G/1.58G
20-40 POWDER, FOR SOLUTION ORAL
Status: DISCONTINUED | OUTPATIENT
Start: 2019-05-03 | End: 2019-05-10

## 2019-05-03 RX ORDER — POLYETHYLENE GLYCOL 3350 17 G/17G
17 POWDER, FOR SOLUTION ORAL DAILY PRN
Status: DISCONTINUED | OUTPATIENT
Start: 2019-05-03 | End: 2019-05-13 | Stop reason: HOSPADM

## 2019-05-03 RX ORDER — DILTIAZEM HYDROCHLORIDE 5 MG/ML
5 INJECTION INTRAVENOUS EVERY 4 HOURS
Status: DISCONTINUED | OUTPATIENT
Start: 2019-05-03 | End: 2019-05-11

## 2019-05-03 RX ORDER — POTASSIUM CL/LIDO/0.9 % NACL 10MEQ/0.1L
10 INTRAVENOUS SOLUTION, PIGGYBACK (ML) INTRAVENOUS
Status: DISCONTINUED | OUTPATIENT
Start: 2019-05-03 | End: 2019-05-10

## 2019-05-03 RX ORDER — CALCIUM CARBONATE 500 MG/1
1000 TABLET, CHEWABLE ORAL 4 TIMES DAILY PRN
Status: DISCONTINUED | OUTPATIENT
Start: 2019-05-03 | End: 2019-05-13 | Stop reason: HOSPADM

## 2019-05-03 RX ORDER — SODIUM CHLORIDE 9 MG/ML
1000 INJECTION, SOLUTION INTRAVENOUS CONTINUOUS
Status: DISCONTINUED | OUTPATIENT
Start: 2019-05-03 | End: 2019-05-03

## 2019-05-03 RX ORDER — PROCHLORPERAZINE MALEATE 5 MG
5 TABLET ORAL EVERY 6 HOURS PRN
Status: DISCONTINUED | OUTPATIENT
Start: 2019-05-03 | End: 2019-05-13 | Stop reason: HOSPADM

## 2019-05-03 RX ORDER — SODIUM CHLORIDE 9 MG/ML
INJECTION, SOLUTION INTRAVENOUS CONTINUOUS
Status: DISCONTINUED | OUTPATIENT
Start: 2019-05-03 | End: 2019-05-07

## 2019-05-03 RX ORDER — DEXAMETHASONE SODIUM PHOSPHATE 10 MG/ML
10 INJECTION, SOLUTION INTRAMUSCULAR; INTRAVENOUS ONCE
Status: COMPLETED | OUTPATIENT
Start: 2019-05-03 | End: 2019-05-03

## 2019-05-03 RX ORDER — HYDROMORPHONE HYDROCHLORIDE 1 MG/ML
0.2 INJECTION, SOLUTION INTRAMUSCULAR; INTRAVENOUS; SUBCUTANEOUS
Status: DISCONTINUED | OUTPATIENT
Start: 2019-05-03 | End: 2019-05-12

## 2019-05-03 RX ORDER — MAGNESIUM SULFATE HEPTAHYDRATE 40 MG/ML
2 INJECTION, SOLUTION INTRAVENOUS ONCE
Status: COMPLETED | OUTPATIENT
Start: 2019-05-03 | End: 2019-05-03

## 2019-05-03 RX ORDER — LEVOTHYROXINE SODIUM ANHYDROUS 100 UG/5ML
37.5 INJECTION, POWDER, LYOPHILIZED, FOR SOLUTION INTRAVENOUS DAILY
Status: DISCONTINUED | OUTPATIENT
Start: 2019-05-06 | End: 2019-05-10

## 2019-05-03 RX ORDER — MAGNESIUM SULFATE HEPTAHYDRATE 40 MG/ML
4 INJECTION, SOLUTION INTRAVENOUS EVERY 4 HOURS PRN
Status: DISCONTINUED | OUTPATIENT
Start: 2019-05-03 | End: 2019-05-12

## 2019-05-03 RX ORDER — BISACODYL 10 MG
10 SUPPOSITORY, RECTAL RECTAL DAILY PRN
Status: DISCONTINUED | OUTPATIENT
Start: 2019-05-03 | End: 2019-05-13 | Stop reason: HOSPADM

## 2019-05-03 RX ORDER — ACETAMINOPHEN 325 MG/1
650 TABLET ORAL EVERY 4 HOURS PRN
Status: DISCONTINUED | OUTPATIENT
Start: 2019-05-03 | End: 2019-05-13 | Stop reason: HOSPADM

## 2019-05-03 RX ORDER — POTASSIUM CHLORIDE 7.45 MG/ML
10 INJECTION INTRAVENOUS
Status: DISCONTINUED | OUTPATIENT
Start: 2019-05-03 | End: 2019-05-10

## 2019-05-03 RX ORDER — ONDANSETRON 4 MG/1
4 TABLET, ORALLY DISINTEGRATING ORAL EVERY 6 HOURS PRN
Status: DISCONTINUED | OUTPATIENT
Start: 2019-05-03 | End: 2019-05-13 | Stop reason: HOSPADM

## 2019-05-03 RX ADMIN — MAGNESIUM SULFATE HEPTAHYDRATE 2 G: 40 INJECTION, SOLUTION INTRAVENOUS at 17:17

## 2019-05-03 RX ADMIN — DEXAMETHASONE SODIUM PHOSPHATE 10 MG: 10 INJECTION, SOLUTION INTRAMUSCULAR; INTRAVENOUS at 19:52

## 2019-05-03 RX ADMIN — SODIUM CHLORIDE 1000 ML: 9 INJECTION, SOLUTION INTRAVENOUS at 16:39

## 2019-05-03 NOTE — ED PROVIDER NOTES
History     Chief Complaint:  Tachycardia       HPI   Juliana Leal is a 89 year old female who presents with tachycardia. Patient presents via EMS with complaint of weakness and worsening chronic throat pain. Patients pressures have been in the 100's systolic. Sugars are 170. She is on diltiazem though has not taken it in 2 days secondary to low normal BPs. Per the patient's report, she has been feeling cold and dry for the past 4 months. She says that she does not feel palpitations and has never been told that she has atrial fibrillation. She lives alone. Patient denies chest pain, shortness of breath, or drainage. The patient's daughter reports that over the past 2 months she has been in and out of rehab with throat infections. Every time she is clear of her infection and taken off antibiotics she will get another infection. She has just finished a course of antibiotics. The patient has been unable to ear or drink, she has lost 14 lb. The patient's ENT physician is Dr. Irene.       Allergies:  Lisinopril   Prednisone   Allegra [Fexofenadine]   Amoxicillin   Cipro [Quinolones]   Codeine Sulfate   Hydroxyzine   Iodine [Gnp Iodides Decolorized]   Lyrica [Pregabalin]   Norvasc [Amlodipine Besylate]   Tramadol   Zaditor   Penicillins    Medications:    ACETAMINOPHEN PO  cetirizine (ZYRTEC) 10 MG tablet  diltiazem ER COATED BEADS (CARDIZEM CD/CARTIA XT) 300 MG 24 hr capsule  emollient (VANICREAM) cream  gabapentin (NEURONTIN) 100 MG capsule  gabapentin (NEURONTIN) 100 MG capsule  levothyroxine (SYNTHROID/LEVOTHROID) 88 MCG tablet  magic mouthwash (FIRST-MOUTHWASH BLM) compounding kit  meclizine (ANTIVERT) 12.5 MG tablet  Multiple Vitamins-Minerals (CENTRUM SILVER) per tablet  omalizumab (XOLAIR) 150 MG injection  omeprazole (PRILOSEC) 20 MG CR capsule  phenol (CHLORASEPTIC) 1.4 % spray  sodium chloride (OCEAN) 0.65 % nasal spray  triamcinolone (NASACORT) 55 MCG/ACT inhaler    Past Medical History:     Angioedema   Arthritis   CKD (chronic kidney disease) stage 3, GFR 30-59 ml/min   Gout   Headaches   Hives   Hypertension   Thyroid disease      Past Surgical History:    COLECTOMY   HC LIGATION OR BIOPSY TEMPORAL ARTERY   INCISE FINGER TENDON SHEATH   REPAIR ATRIAL SEPTAL DEFECT    Family History:    History reviewed. No pertinent family history.     Social History:  Smoking status: Former smoke  Alcohol use: No  Marital Status:   [5]     Review of Systems  General: Alert and cooperative with exam. Patient in mild distress. Normal mentation. Frail/elderly appearance  Head:  Scalp is NC/AT  Eyes:  No scleral icterus, PERRL  ENT:  The external nose and ears are normal. The oropharynx is with mild edema erythema; mucus membranes are dry. Uvula deviated to left  Neck:  Normal range of motion without rigidity.  CV:  Irregular rate and rhythm; tachy     Resp:  Breath sounds are clear bilaterally    Non-labored, no retractions or accessory muscle use  GI:  Abdomen is soft, no distension, no tenderness. No peritoneal signs  MS:  No lower extremity edema   Skin:  Warm and dry, No rash or lesions noted.  Neuro: Oriented x 3. No gross motor deficits.      Physical Exam     Patient Vitals for the past 24 hrs:   BP Temp Temp src Pulse Heart Rate Resp SpO2 Weight   05/03/19 2020 (!) 88/68 -- -- 116 135 16 97 % --   05/03/19 2000 101/71 -- -- 137 140 13 100 % --   05/03/19 1945 91/75 -- -- 124 128 12 99 % --   05/03/19 1930 101/63 -- -- 129 138 25 100 % --   05/03/19 1915 97/77 -- -- 128 117 10 99 % --   05/03/19 1900 (!) 87/71 -- -- 135 133 13 98 % --   05/03/19 1845 92/75 -- -- 112 125 11 97 % --   05/03/19 1830 (!) 89/33 -- -- 128 115 14 100 % --   05/03/19 1815 99/79 -- -- 135 130 15 99 % --   05/03/19 1801 112/81 -- -- 140 125 17 -- --   05/03/19 1757 99/76 -- -- 128 -- 15 -- --   05/03/19 1730 (!) 79/49 -- -- 134 146 11 -- --   05/03/19 1700 101/79 -- -- 68 -- -- 98 % --   05/03/19 1632 -- -- -- -- -- -- 92 % --    05/03/19 1626 90/65 98.4  F (36.9  C) Oral -- 129 18 -- 45.4 kg (100 lb)         Physical Exam  General: Alert and cooperative with exam. Patient in mild distress. Normal mentation. Frail elderly appearance  Head:  Scalp is NC/AT  Eyes:  No scleral icterus, PERRL  ENT:  The external nose and ears are normal. Mild to moderate posterior oropharynx swelling with mid deviation of the uvula to the left. Dry mucous membranes.   Neck:  Normal range of motion without rigidity.  CV:  Irregular and tachycardic     No pathologic murmur   Resp:  Breath sounds are clear bilaterally    Non-labored, no retractions or accessory muscle use  GI:  Abdomen is soft, no distension, no tenderness. No peritoneal signs  MS:  No lower extremity edema   Skin:  Warm and dry, No rash or lesions noted.  Neuro: Oriented x 3. No gross motor deficits.      Emergency Department Course   ECG (16:35:31):  Rate 151 bpm. WV interval *. QRS duration 86. QT/QTc 296/469. P-R-T axes * 40 92. Atrial fibrillation with rapid ventricular response with premature ventricular or aberrantly conducted complexes, Anterior infarct age undetermined, Marked ST abnormality possible inferior subendocardial injury, Abnormal ECG,  Interpreted at 1727 by Jc Arias DO.      Imaging:  Radiographic findings were communicated with the patient who voiced understanding of the findings.    XR chest   IMPRESSION: PA and lateral views of the chest. Lungs are hyperinflated  consistent with COPD. No infiltrate or consolidation. Heart is normal  in size. No effusions are evident. No pneumothorax  As read by radiology     CT soft tissue neck w/o contrast  IMPRESSION: Continued interval increase in prominence of the soft  tissues of Waldeyer's ring including the adenoids and palatine tonsils  bilaterally. While this may simply represent an inflammatory process,  malignancy such as lymphoma should be considered. Worsening paranasal  sinus disease. No fluid collections or  abscesses.  As read by radiology     Laboratory:  TSH with free T4 reflex: 1.22  Magnesium: 1.8  CRP inflammation: 132.0  BMP: Glucose 158, Urea nitrogen 32, GFR 43, Calcium 11.6, Creatinine 1.33  CBC: WBC 11.8, HGB 9.3,     Interventions:  1952: Decadron 10 mg IV  1948: Magnesium sulfate 2 g in NS IV Infusion    1948: NaCl bolus 1,500 ml IV    Emergency Department Course:  Past medical records, nursing notes, and vitals reviewed.  1630: I performed an exam of the patient and obtained history, as documented above.    The patient was sent for a XR chest and CT soft tissue neck while in the emergency department, findings above.    IV inserted and blood drawn.    1710: I rechecked the patient. Explained findings to the patient.    Findings and plan explained to the Patient and daughter who consents to admission.     1849: Discussed the patient with Dr. Cota, who will admit the patient to a [facility] bed for further monitoring, evaluation, and treatment.     1854: I spoke on the phone with ENT, Dr. Irene.       Impression & Plan      Medical Decision Making:  Patient is a 89-year-old female who presents with fatigue/general weakness and is noted to be tachycardic.  Patient's medical history and records were reviewed.  Initial consideration for, but not limited to, deep space infection (patient noted to have uvular deviation), infectious process, arrhythmia, electrolyte abnormality, thyroid dysfunction, among others.  Labs, EKG, and imaging was obtained.  EKG demonstrates atrial fibrillation with RVR with evidence of associated demand ischemia.  Patient denies chest pain or shortness of breath.  Chest x-ray demonstrates hyperinflated lungs though no infiltrate or consolidation and no evidence of effusion or vascular congestion.  CT soft tissue neck without contrast obtained and shows interval increase in prominence of soft tissues in the posterior oropharynx as noted above without identifiable fluid collection or  abscess; inflammatory versus neoplastic.  Spoke with ENT Dr. Irene who recommended decadron in ED; will consult on patient tomorrow morning.  Labs notable for worsening kidney function (creatinine 1.33 with GFR less than 40), mild hypomagnesemia (1.8, provided 2 g IV replacement), elevated CRP, mildly elevated white count, and chronic anemia (hemoglobin 9.3).  Patient appears clinically dehydrated and was provided IV fluids with noted improvement in heart rate.  Additionally notes inability to take diltiazem over the last 2 days secondary to low normal blood pressures (90s/100s systolic).   No emergent indication for anticoagulation at this time; will hold as patient may undergo biopsy procedure in the coming days.  Presentation is consistent with A. fib RVR likely secondary to dehydration and pt's inability to take diltiazem.  No indication for antibiotics at this time.  Patient admitted to [facility] with the hospitalist service.  Deferred initiating diltiazem infusion in the ED as pressures consistently in 90's systolic; HR improving with fluids.  No indication for emergent cardioversion and patient is not a cardioversion candidate as it is unknown how long she has been in A. fib RVR.    Diagnosis:    ICD-10-CM    1. Atrial fibrillation with RVR (H) I48.91    2. Dehydration E86.0    3. Sore throat J02.9        Disposition:  Admitted to [facility]      Claudy Weber  5/3/2019    EMERGENCY DEPARTMENT    Scribe Disclosure:  Claudy DAVALOS, am serving as a scribe at 4:30 PM on 5/3/2019 to document services personally performed by Jc Arias DO based on my observations and the provider's statements to me.         Jc Arias DO  05/04/19 0140

## 2019-05-03 NOTE — ED NOTES
Bed: ED26  Expected date:   Expected time:   Means of arrival:   Comments:  535  89 F transfer from clinic, rvr, lidia  [id number]

## 2019-05-03 NOTE — ED NOTES
"[facility]  ED Nurse Handoff Report    ED Chief complaint: Tachycardia (Afib with RVR at rate of 170.  From the clinic.  Recently in rehab r/t swallowing issues with abcess in throat.  Today clinic noticed her uvula is on the right side again so maybe another abcess.)      ED Diagnosis:   Final diagnoses:   None       Code Status: see md note    Allergies:   Allergies   Allergen Reactions     Lisinopril Other (See Comments) and Anaphylaxis     Swelling around mouth     Prednisone Unknown     \"every side effect listed\"     Allegra [Fexofenadine] Swelling     Amoxicillin Itching     Cipro [Quinolones] Itching     Codeine Sulfate Other (See Comments)     Felt out of it when took it yrs ago     Hydroxyzine      Feels drunk     Iodine [Gnp Iodides Decolorized]      Lyrica [Pregabalin]      Reaction unknown     Norvasc [Amlodipine Besylate]      Mouth felt funny     Tramadol      Felt loopy     Zaditor Other (See Comments) and Swelling     Tongue swelled  Didn't feel right  Eye drops     Penicillins Rash       Activity level - Baseline/Home:  Stand with Assist    Activity Level - Current:   Stand with Assist of 2     Needed?: No, but [place]    Isolation: No  Infection: Not Applicable  Bariatric?: No    Vital Signs:   Vitals:    05/03/19 1700 05/03/19 1730 05/03/19 1757 05/03/19 1801   BP: 101/79 (!) 79/49 99/76 112/81   Pulse: 68 134 128 140   Resp:  11 15 17   Temp:       TempSrc:       SpO2: 98%      Weight:           Cardiac Rhythm: ,        Pain level:      Is this patient confused?: No   Does this patient have a guardian?  No         If yes, is there guardianship documents in the Epic \"Code/ACP\" activity?  N/A         Guardian Notified?  N/A  [place] - Suicide Severity Rating Scale Completed?  Yes  If yes, what color did the patient score?  White    Patient Report: Initial Complaint: A/O x4, but very hard of hearing.  Was at the clinic today.  She was in rehab r/t swallowing issues d/t " swollen uvula and abscess.  In the clinic they noticed she was in afib RVR at rate of 170.  Low SBP en route and in ER: SBP .  Once stable above SBP> 110 diltizem gtt will be started.  Warm normal saline bolus running over 2 hours.  Mag replacement.  Focused Assessment: see above  Tests Performed: blood, CT, EKG  Abnormal Results: kidney function and elevated CRP.  Treatments provided: see above    Family Comments: none    OBS brochure/video discussed/provided to patient/family: No              Name of person given brochure if not patient: na              Relationship to patient: na    ED Medications:   Medications   0.9% sodium chloride BOLUS (1,000 mLs Intravenous New Bag 5/3/19 5093)   magnesium sulfate 2 g in water intermittent infusion (2 g Intravenous New Bag 5/3/19 2705)       Drips infusing?:  No    For the majority of the shift this patient was Green.   Interventions performed were encouragement.    Severe Sepsis OR Septic Shock Diagnosis Present: No    To be done/followed up on inpatient unit:  none    ED NURSE PHONE NUMBER: [phone]

## 2019-05-03 NOTE — PROGRESS NOTES
.  SUBJECTIVE:   Juliana Leal is a 89 year old female who presents to clinic today for the following   health issues:  Patient was hospitalized twice, below is the most recent Hospitalization.  Hospital Follow-up Visit:    Hospital/Nursing Home/IP Rehab Facility: [facility]  Date of Admission: 04/11/2019  Date of Discharge: 04/16/2019   Rehab  4/16/19 - 5/1/19  Reason(s) for Admission: Subacute sinusitis, weight loss            Problems taking medications regularly:  None       Medication changes since discharge: None       Problems adhering to non-medication therapy:  None    Summary of hospitalization:  [facility] discharge summary reviewed  Diagnostic Tests/Treatments reviewed.  Follow up needed:  labs  Other Healthcare Providers Involved in Patient s Care:          ENT  Update since discharge: worsened.     Post Discharge Medication Reconciliation: discharge medications reconciled and changed, per note/orders (see AVS).  Plan of care communicated with patient and daughter     Coding guidelines for this visit:  Type of Medical   Decision Making Face-to-Face Visit       within 7 Days of discharge Face-to-Face Visit        within 14 days of discharge   Moderate Complexity [id number]   High Complexity [id number]          Discharge summaries reviewed. Part of the NH rehab summary as below:    [facility] DISCHARGE SUMMARY     PATIENT'S NAME: Juliana Leal  YOB: 1923  MEDICAL RECORD NUMBER:  [id number]  Place of Service where encounter took place:  [facility] [[id number]]     PRIMARY CARE PROVIDER AND CLINIC RESPONSIBLE AFTER TRANSFER:   Erick Ruano MD, [address] [place] [address] / [facility] [id number]    [facility] Provider      Transferring providers: ARIEL Caceres CNP; Claudy Burgess MD  Recent Hospitalization/ED:  [facility] stay 4/11/19 to 4/16/19.  Date of SNF Admission: April / 16 / 2019  Date of SNF  (anticipated) Discharge: May / 01 / 2019  Discharged to: previous independent home  Cognitive Scores: SLUMS: 26/30  Physical Function: Ambulating 425 ft with walker  DME: Walker     CODE STATUS/ADVANCE DIRECTIVES DISCUSSION:  DNR / DNI      ALLERGIES: Lisinopril; Prednisone; Allegra [fexofenadine]; Amoxicillin; Cipro [quinolones]; Codeine sulfate; Hydroxyzine; Iodine [gnp iodides decolorized]; Lyrica [pregabalin]; Norvasc [amlodipine besylate]; Tramadol; Zaditor; and Penicillins     DISCHARGE DIAGNOSIS/NURSING FACILITY COURSE:      Subacute sinusitis, unspecified location  Weight loss  Patient admitted to [facility] 4/11-4/16 due to recurrent sinusitis since 08/2018. Has had multiple course of abx w/o improvement. Has been followed by ENT (Dr. Irene). CT scan of sinuses on 4/11 found severe sinusitis. Was treated with short course of steroids, IV vancomycin and ceftriaxone, then transitioned to doxycycline upon discharge to TCU (completed 4/26). Noted to have weight loss (10-15lbs in the past 6 months) with suspected malnutrition due to ongoing sinusitis w/sore throat.   Today, continues to have moderate throat pain. States she has ongoing congestion and tired of symptoms not improving. Continues to also endorse poor appetite r/t sore throat. ENT recommended to collect throat swab for concern of strep and/or yeast infection - final results were negative for growth. ST and dietary following. Currently taking tylenol, zyrtec, magic mouthwash, saline nasal spray, and nasacort. Patient to follow-up with ENT on 5/3.          Wt Readings from Last 4 Encounters:   04/29/19 49.3 kg (108 lb 9.6 oz)   04/24/19 50.3 kg (111 lb)   04/18/19 54.9 kg (121 lb)   04/17/19 54.9 kg (121 lb)         CKD  Creat baseline 1.2-1.5. Last creat was 1.11 on 4/29.      Benign essential hypertension  Currently taking diltiazem. Denies chest pain, SOB, headaches, syncope.      Anemia  Hgb noted to be trending down likely r/t chronic sinusitis. Hgb  baseline unclear, appears to be around 9-11. Last Hgb was 8.1 on 4/29. No active sx of bleeding.  Recommending to recheck Hgb within 1-2 weeks of discharge and follow-up with PCP for further anemia work-up.     Physical deconditioning  PTA lives in ILF alone. Patient is actively participating in therapy sessions. Ambulates 425ft with walker. Requires SBA with ADLs. Cognitive testing done, SLUMS 26/30. SW following for discharge planning. Patient discharging home, as well as [facility] home care services, including home PT, OT, RN, HHA, ST.   Patient reports daughter will be at home with her for a few days to help with transition home.        Pt's past medical history, family history, habits, medications and allergies were reviewed with the patient today.  See snap shot for  HCM status. Most recent lab results reviewed with pt. Problem list and histories reviewed & adjusted, as indicated.  Additional history as below:    Patient is seen with her daughter.  Hospital discharge summary and nursing home rehab summary reviewed.  Patient was supposed to see Dr. Irene from ENT today but that appointment was canceled by the daughter both because the patient was feeling so weak but also the daughter states that she had spoken to a nurse at the ENT clinic who said that since the patient had already been treated with antibiotics for her sinus infection, then she did not need to be seen at the ENT clinic and should just follow-up with her primary physician her patient's daughters information to me today.  I have not spoken with the ENT clinic today.  Patient has lost 18 pounds in the past 2 months.  She states that she has significant pain in her mouth and therefore is not able to swallow food or liquids.  Even drinking some cold water causes a burning sensation in the back of her throat.  Daughter states that a speech therapist commented to them 3 days ago just before getting out of rehab that the patient's soft palate was  "not moving and had some concerns about her swallowing but there is no mention of this in the rehab discharge summary.  Patient has become increasingly weak.  Having difficult time ambulating.  Denies fevers or chills.  Not having pain in her sinuses currently.  Slight shortness of breath without chest pain.  Denies abdominal pain but appetite is reduced.  Had not had a bowel movement for 5 days until today when she had some loose yellowish stool.  Patient's rehab discharge note states that the patient was walking 425 feet with her walker and actively participating in her therapy sessions.  It also states that the patient had an oral examination with moist mucous membranes and a normal posterior oropharynx.  However upon my exam today, findings are significantly different as described below       Additional ROS:   Constitutional, HEENT, Cardiovascular, Pulmonary, GI and , Neuro, MSK and Psych review of systems/symptoms are otherwise negative or unchanged from previous, except as noted above.      OBJECTIVE:  /68   Pulse 106   Temp 97.8  F (36.6  C) (Temporal)   Resp 20   Wt 48.1 kg (106 lb)   SpO2 98%   BMI 17.64 kg/m     Estimated body mass index is 17.64 kg/m  as calculated from the following:    Height as of 4/29/19: 1.651 m (5' 5\").    Weight as of this encounter: 48.1 kg (106 lb).     HENT: ear canals and TM's normal.  Bilateral hearing aids.  Oral mucosa is bone dry.  Uvula is shifted to the left and there is a firm masslike tissue in the right soft palate tonsillar area.  No fluctuance with palpation there and mildly tender  Neck: no adenopathy. Thyroid normal to palpation. No bruits  Pulm: Lungs clear to auscultation   CV: Regular  Rhythm, tachycardic  GI: Soft, nontender, Normal active bowel sounds, No hepatosplenomegaly or masses palpable  Ext: Peripheral pulses intact. No edema.  Neuro:  sensory exam grossly normal. Strength 3+ to 4-/5 globally    Assessment/Plan: (See plan discussion below " for further details)  1. Mass of oral cavity  Exam concerning for mass in the right posterior pharynx versus abscess though abscess seems less likely given report of having recent multiple treatments with antibiotic therapy.  Patient not able to be eating now and even some oral water intake causing pain.  Will need CT of the soft tissue of the neck and oropharynx today to assess further    2. Dehydration  Patient looks very dehydrated based on oral mucosa and history of poor oral intake.  Needs IV fluid rehydration    3. Tachycardia  Likely combination of dehydration plus recent worsening anemia.  Hemoglobin now down to 8.  No description of any black tarry stools to suggest acute bleeding.  Patient does have a history of CKD but last creatinine was 1.1 a week ago.  Will need recheck    4. Weight loss  Patient unable to eat with oral issues.  Is going to need at least a nasopharyngeal tube feed support but if having a lot of issues of the throat, may need to consider G tube placement given how much weight she is been losing.  Bowel function still present so would suggest this over TPN    Plan discussion:  With patient needing CT imaging, IV hydration, nutritional support with probable G-tube placement and tube feeds and further ENT consultation, patient will need to be admitted back to the hospital.  Spoke with hospitalist first at [facility] as the family had requested [facility] but was told that they had been told that ENT group with Dr. Irene does not cover [facility] and preferred that the patient be at [facility] instead.  Because of the acute needs a patient for evaluation, the  hospitalist requested patient first be seen in the ER.  Patient barely able to walk currently because of her weakness and daughter not feeling comfortable with trying to transport the patient herself.  Therefore requested ambulance transport to [facility] ER.  Discussed case with Dr Reina.     > 50 min spent in total in  evaluation of the patient and management including discussions with patient, daughter, hospitalist staff at both [facility] and [facility]'s and discussion with the result in the ER staff and paramedics    Erick Ruano MD  Internal Medicine Department  [facility]

## 2019-05-03 NOTE — ED TRIAGE NOTES
Afib with RVR at rate of 170.  From the clinic.  Recently in rehab r/t swallowing issues with abcess in throat.  Today clinic noticed her uvula is on the right side again so maybe another abcess.

## 2019-05-04 ENCOUNTER — APPOINTMENT (OUTPATIENT)
Dept: ULTRASOUND IMAGING | Facility: CLINIC | Age: 84
DRG: 840 | End: 2019-05-04
Attending: INTERNAL MEDICINE
Payer: COMMERCIAL

## 2019-05-04 LAB
ANION GAP SERPL CALCULATED.3IONS-SCNC: 11 MMOL/L (ref 3–14)
ANISOCYTOSIS BLD QL SMEAR: SLIGHT
BASOPHILS # BLD AUTO: 0 10E9/L (ref 0–0.2)
BASOPHILS NFR BLD AUTO: 0.2 %
BUN SERPL-MCNC: 34 MG/DL (ref 7–30)
CALCIUM SERPL-MCNC: 10.4 MG/DL (ref 8.5–10.1)
CHLORIDE SERPL-SCNC: 111 MMOL/L (ref 94–109)
CO2 SERPL-SCNC: 22 MMOL/L (ref 20–32)
CREAT SERPL-MCNC: 1.15 MG/DL (ref 0.52–1.04)
DIFFERENTIAL METHOD BLD: ABNORMAL
EOSINOPHIL # BLD AUTO: 0 10E9/L (ref 0–0.7)
EOSINOPHIL NFR BLD AUTO: 0 %
ERYTHROCYTE [DISTWIDTH] IN BLOOD BY AUTOMATED COUNT: 19.4 % (ref 10–15)
GFR SERPL CREATININE-BSD FRML MDRD: 40 ML/MIN/{1.73_M2}
GLUCOSE SERPL-MCNC: 132 MG/DL (ref 70–99)
HCT VFR BLD AUTO: 26.5 % (ref 35–47)
HGB BLD-MCNC: 9 G/DL (ref 11.7–15.7)
IMM GRANULOCYTES # BLD: 0 10E9/L (ref 0–0.4)
IMM GRANULOCYTES NFR BLD: 0.2 %
LYMPHOCYTES # BLD AUTO: 1 10E9/L (ref 0.8–5.3)
LYMPHOCYTES NFR BLD AUTO: 10.7 %
MCH RBC QN AUTO: 31 PG (ref 26.5–33)
MCHC RBC AUTO-ENTMCNC: 34 G/DL (ref 31.5–36.5)
MCV RBC AUTO: 91 FL (ref 78–100)
MONOCYTES # BLD AUTO: 2.2 10E9/L (ref 0–1.3)
MONOCYTES NFR BLD AUTO: 22.7 %
NEUTROPHILS # BLD AUTO: 6.3 10E9/L (ref 1.6–8.3)
NEUTROPHILS NFR BLD AUTO: 66.2 %
NRBC # BLD AUTO: 0 10*3/UL
NRBC BLD AUTO-RTO: 0 /100
OVALOCYTES BLD QL SMEAR: SLIGHT
PHOSPHATE SERPL-MCNC: 4.2 MG/DL (ref 2.5–4.5)
PLATELET # BLD AUTO: 215 10E9/L (ref 150–450)
PLATELET # BLD EST: ABNORMAL 10*3/UL
POTASSIUM SERPL-SCNC: 3.9 MMOL/L (ref 3.4–5.3)
RBC # BLD AUTO: 2.9 10E12/L (ref 3.8–5.2)
SODIUM SERPL-SCNC: 144 MMOL/L (ref 133–144)
WBC # BLD AUTO: 9.5 10E9/L (ref 4–11)

## 2019-05-04 PROCEDURE — 93971 EXTREMITY STUDY: CPT | Mod: LT

## 2019-05-04 PROCEDURE — 36415 COLL VENOUS BLD VENIPUNCTURE: CPT | Performed by: INTERNAL MEDICINE

## 2019-05-04 PROCEDURE — 85025 COMPLETE CBC W/AUTO DIFF WBC: CPT | Performed by: INTERNAL MEDICINE

## 2019-05-04 PROCEDURE — 25000128 H RX IP 250 OP 636: Performed by: INTERNAL MEDICINE

## 2019-05-04 PROCEDURE — 87070 CULTURE OTHR SPECIMN AEROBIC: CPT | Performed by: OTOLARYNGOLOGY

## 2019-05-04 PROCEDURE — 99233 SBSQ HOSP IP/OBS HIGH 50: CPT | Performed by: INTERNAL MEDICINE

## 2019-05-04 PROCEDURE — 25000132 ZZH RX MED GY IP 250 OP 250 PS 637: Performed by: INTERNAL MEDICINE

## 2019-05-04 PROCEDURE — 21000001 ZZH R&B HEART CARE

## 2019-05-04 PROCEDURE — C9113 INJ PANTOPRAZOLE SODIUM, VIA: HCPCS | Performed by: INTERNAL MEDICINE

## 2019-05-04 PROCEDURE — 25800030 ZZH RX IP 258 OP 636: Performed by: INTERNAL MEDICINE

## 2019-05-04 PROCEDURE — 25000125 ZZHC RX 250: Performed by: INTERNAL MEDICINE

## 2019-05-04 PROCEDURE — 84100 ASSAY OF PHOSPHORUS: CPT | Performed by: INTERNAL MEDICINE

## 2019-05-04 PROCEDURE — 80048 BASIC METABOLIC PNL TOTAL CA: CPT | Performed by: INTERNAL MEDICINE

## 2019-05-04 RX ORDER — MORPHINE SULFATE 2 MG/ML
.5-1 INJECTION, SOLUTION INTRAMUSCULAR; INTRAVENOUS EVERY 4 HOURS PRN
Status: DISCONTINUED | OUTPATIENT
Start: 2019-05-04 | End: 2019-05-12

## 2019-05-04 RX ORDER — GABAPENTIN 100 MG/1
100 CAPSULE ORAL AT BEDTIME
Status: DISCONTINUED | OUTPATIENT
Start: 2019-05-04 | End: 2019-05-13 | Stop reason: HOSPADM

## 2019-05-04 RX ADMIN — DILTIAZEM HYDROCHLORIDE 5 MG: 5 INJECTION INTRAVENOUS at 22:41

## 2019-05-04 RX ADMIN — DILTIAZEM HYDROCHLORIDE 5 MG: 5 INJECTION INTRAVENOUS at 19:18

## 2019-05-04 RX ADMIN — HYDROMORPHONE HYDROCHLORIDE 0.2 MG: 1 INJECTION, SOLUTION INTRAMUSCULAR; INTRAVENOUS; SUBCUTANEOUS at 10:18

## 2019-05-04 RX ADMIN — SODIUM CHLORIDE: 9 INJECTION, SOLUTION INTRAVENOUS at 22:37

## 2019-05-04 RX ADMIN — SODIUM CHLORIDE: 9 INJECTION, SOLUTION INTRAVENOUS at 12:12

## 2019-05-04 RX ADMIN — SODIUM CHLORIDE: 9 INJECTION, SOLUTION INTRAVENOUS at 00:52

## 2019-05-04 RX ADMIN — PANTOPRAZOLE SODIUM 40 MG: 40 INJECTION, POWDER, FOR SOLUTION INTRAVENOUS at 10:17

## 2019-05-04 RX ADMIN — DILTIAZEM HYDROCHLORIDE 5 MG: 5 INJECTION INTRAVENOUS at 00:51

## 2019-05-04 RX ADMIN — DILTIAZEM HYDROCHLORIDE 5 MG: 5 INJECTION INTRAVENOUS at 15:04

## 2019-05-04 RX ADMIN — DILTIAZEM HYDROCHLORIDE 5 MG: 5 INJECTION INTRAVENOUS at 10:19

## 2019-05-04 ASSESSMENT — ACTIVITIES OF DAILY LIVING (ADL)
ADLS_ACUITY_SCORE: 23

## 2019-05-04 NOTE — PROVIDER NOTIFICATION
Notified Dr. Keith after iv team called and informed nursing that cannot place picc until first thing 5/5 morning.  OK to defer TPN until the am after PICC, orders placed to replace phosphorus this evening.  Will relay to bedside RN

## 2019-05-04 NOTE — PROVIDER NOTIFICATION
Brief update:    Pt NPO  Was afib RVR, now converted    Hold initial diltiazem 15 mg bolus and initiation of drip    Have ordered diltiazem 5 mg IV q4h w/ hold parameters as pt NPO and unable to take home oral diltiazem. Can reassess need for ggt if pt goes back into RVR    Grant Barrios MD  11:34 PM

## 2019-05-04 NOTE — PROGRESS NOTES
[facility]    Medicine Progress Note - Hospitalist Service       Date of Admission:  5/3/2019  Assessment & Plan     This is a  89 year old female admitted with atrial fibrillation with rapid ventricular response and acute renal failure.     Atrial fibrillation with rapid ventricular response.    Likely due to patient unable to take her diltiazem for the last 2 days due to low blood pressure and difficulty swallowing.   -Converted back to sinus rhythm before starting on diltiazem drip, continue to monitor on  IV scheduled Cardizem -continue to monitor on telemetry.      Acute kidney injury.    Likely prerenal from poor p.o. intake.  Baseline creatinine appears to be around 1.0.    On admission it was 1.33.   Improving with hydration  Continue hydration, monitor, avoid nephrotoxins     Subacute sinusitis  Throat pain  Patient initially admitted 4/11-4/16 due to recurrent sinusitis since 08/2018. Has had multiple course of abx w/o improvement. Has been followed by ENT (Dr. Irene). CT scan of sinuses on 4/11 found severe sinusitis. Was treated with short course of steroids, IV vancomycin and ceftriaxone, then transitioned to doxycycline upon discharge to TCU (completed 4/26).  Symptoms continues to get worse with sore throat.  Has had strep/ Candida negative in the past with growth of normal sera in the culture  - CT soft tissue neck on 5/3/2019 shows continued interval increase in prominence of soft tissue of Waldeyer's ring including the adenoids and palatine tonsils bilaterally-?  Inflammation versus malignancy  - Patient was given a dose of Decadron in the emergency room.   -ENT consultation appreciated, plan for direct laryngoscope E and biopsy and sinus surgery on Monday  - patient is not tolerating oral medications, so continue with IV conversion.    Severe malnutrition  In the context of acute illness or injury with more than 10% of weight loss in the last 6 months with less than 50% of intake  in the last 5 days, moderate depletion of upper arm fat loss and muscle loss in the temporal region  Patient currently unable to tolerate oral intake, appreciate nutrition service consultation  Patient not has not been taking oral intake for several days now due to inability to swallow, will start TPN       Pain in left lower extremity   Lower extremity ultrasound negative for any evidence of DVT, symptomatic management    Hypothyroidism.    Continue IV levothyroxine.     GERD.     Continue IV Protonix.      Diet: Combination Diet Regular Diet Adult    DVT Prophylaxis: Pneumatic Compression Devices  Castillo Catheter: not present  Code Status: DNR/DNI      Disposition Plan   Expected discharge: 2 - 3 days, recommended to Prior living condition versus TCU depending on evaluation by PT OT once Dysphagia evaluation completed and patient starting to take oral intake.  Entered: Elsa Keith MD 05/04/2019, 7:49 AM       The patient's care was discussed with the Attending Physician, Dr. Irene, Bedside Nurse, Patient and Patient's Family.    Elsa Keith MD  Hospitalist Service  [facility]    ______________________________________________________________________    Interval History   Patient continues to have difficulty swallowing.  No new nursing concerns    Data reviewed today: I reviewed all medications, new labs and imaging results over the last 24 hours. I personally reviewed no images or EKG's today.    Physical Exam   Vital Signs: Temp: 97.8  F (36.6  C) Temp src: Oral BP: 116/75 Pulse: 70 Heart Rate: 70 Resp: 17 SpO2: 99 % O2 Device: Nasal cannula Oxygen Delivery: 2 LPM  Weight: 108 lbs 3.2 oz  Exam:  Constitutional: Awake, alert and no distress. Appears comfortable  Head: Normocephalic. No masses, lesions, tenderness or abnormalities  ENT:Swelling on the pharynx with erythema Cardiovascular: RRR.  2+ murmurs, no rubs or JVD  Respiratory:normal WOB,b/l equal air entry, no wheezes or crackles    Gastrointestinal: Abdomen soft, non-tender. BS normal. No masses, organomegaly  : Deferred   extremities :no edema however tender left calf  , no clubbing or cyanosis    Skin: Warm and dry, no rash.      Data   Recent Labs   Lab 05/04/19  0633 05/03/19  1630 04/29/19  0647   WBC 9.5 11.8* 8.5   HGB 9.0* 9.3* 8.1*   MCV 91 91 91    206 167    140 143   POTASSIUM 3.9 3.7 3.4   CHLORIDE 111* 106 109   CO2 22 25 27   BUN 34* 32* 28   CR 1.15* 1.33* 1.11*   ANIONGAP 11 9 7   DINORAH 10.4* 11.6* 9.7   * 158* 87     Recent Results (from the past 24 hour(s))   XR Chest 2 Views    Narrative    CHEST TWO VIEWS   5/3/2019 4:57 PM     HISTORY: AFib RVR (rapid ventricular response).    COMPARISON: Chest x-ray 3/19/2019.      Impression    IMPRESSION: PA and lateral views of the chest. Lungs are hyperinflated  consistent with COPD. No infiltrate or consolidation. Heart is normal  in size. No effusions are evident. No pneumothorax.    DANIELE BIRD MD   CT Soft Tissue Neck w/o Contrast    Narrative    CT OF THE NECK WITHOUT CONTRAST  5/3/2019 6:01 PM     HISTORY: Throat pain, rule out evidence of deep space infection.    TECHNIQUE:  Axial CT images of the neck were acquired without  intravenous contrast. Coronal reconstructions were created.    COMPARISON: Soft tissue neck CT 4/11/2019, soft tissue neck CT  8/3/2018.    FINDINGS: There has been a continued interval increase in prominence  of the soft tissues of Waldeyer's ring including adenoids and palatine  tonsils since the recent comparison study. All these findings are  nonspecific and could represent inflammatory change, malignancy such  as lymphoma must be considered.    There has been an interval increase in opacification of the maxillary  sinuses bilaterally suggesting acute sinusitis. There is complete  opacification of the right frontal and right ethmoid sinuses by  hyperdense debris suggesting at least chronic sinusitis although acute  sinusitis  cannot be excluded.    There is no cervical lymphadenopathy. There are no fluid collections  or abscesses in the neck. The left submandibular gland is not  visualized and may have been resected or atrophied. The right  submandibular and bilateral parotid glands are unremarkable. The  thyroid gland is not well visualized and may have been resected or is  atrophied. The lung apices are clear.      Impression    IMPRESSION: Continued interval increase in prominence of the soft  tissues of Waldeyer's ring including the adenoids and palatine tonsils  bilaterally. While this may simply represent an inflammatory process,  malignancy such as lymphoma should be considered. Worsening paranasal  sinus disease. No fluid collections or abscesses.      Radiation dose for this scan was reduced using automated exposure  control, adjustment of the mA and/or kV according to patient size, or  iterative reconstruction technique    YIMI BRYANT MD   US Lower Extremity Venous Duplex Left    Narrative    ULTRASOUND VENOUS LOWER EXTREMITY UNILATERAL LEFT  5/4/2019 10:51 AM     HISTORY: Rule out deep vein thrombosis, pain in the calf.    COMPARISON: None.    TECHNIQUE: Ultrasound gray scale, Color Doppler flow, and spectral  Doppler waveform analysis performed.    FINDINGS:  The left common femoral, superficial femoral, popliteal and  posterior tibial veins are patent and fully compressible and  demonstrate normal venous Doppler flow. The visualized greater  saphenous vein is negative for thrombus.       Impression    IMPRESSION: No DVT demonstrated.    AZAR MENENDEZ MD     Medications     IV fluid REPLACEMENT ONLY       Reason anticoagulant not prescribed for atrial fibrillation       sodium chloride 100 mL/hr at 05/04/19 1212       diltiazem  5 mg Intravenous Q4H     gabapentin  100 mg Oral At Bedtime     [START ON 5/6/2019] levothyroxine  37.5 mcg Intravenous Daily     lipids  250 mL Intravenous Q24H     pantoprazole (PROTONIX) IV  40  mg Intravenous Daily with breakfast

## 2019-05-04 NOTE — CONSULTS
BRIEF NUTRITION NOTE      REASON FOR NOTE:  Juliana Leal is a 89 year old female seen by Registered Dietitian for Pharmacy/Nutrition to Start and Manage PN    NUTRITION HISTORY:  For full assessment please see RD note from 5/4    ASSESSED NUTRITION NEEDS PER APPROVED PRACTICE GUIDELINES:     Dosing Weight 45.4 kg (lowest wt this admission)  Estimated Energy Needs: 4892-7177 kcals (30-35 Kcal/Kg)  Justification: repletion  Estimated Protein Needs: 68-91 grams protein (1.5-2 g pro/Kg)  Justification: Repletion  Estimated Fluid Needs: 4769-8641 mL (1 mL/Kcal)  Justification: maintenance      NUTRITION INTERVENTION:  Implementation:  1. PN Composition and PN Schedule:   Step 1: Clinimix (D15, AA5) at 25mL/hr x 24 hrs + 250mL 20% lipids 3x/week. Provides 600mL, 640 kacl (14 kcal/kg), 30g amino acids (0.6g/kg), 90g dextrose, GIR 1.38 and 33% kcal from fat  Step 2: After 24hrs, if electrolytes (Phos, Mg, K) are acceptable advance to:Clinimix (D15, AA5) at 45mL/hr x 24 hrs + 250mL 20% lipids every other day. Provides 1080mL, 1017 kacl (22 kcal/kg), 54g amino acids (1.2 g/kg), 162g dextrose, GIR 2.48 and 23% kcal from fat  Step 3: After 24hrs, if electrolytes (Phos, Mg, K) are acceptable advance to: Clinimix (D15, AA5) at 60mL/hr x 24 hrs + 250mL 20% lipids 5x/week. Provides 1440mL, 1379 kacl (30 kcal/kg), 72g amino acids (1.6g/kg), 216g dextrose, GIR 3.30 and 26% kcal from fat    2. Collaboration and Referral of Nutrition care: Spoke with MD, Pharmacy and RN about TPN plan: to initiate TPN on 5/5/2019 and pt may be at risk for refeeding syndrome- plan to order replacement protocols for electrolytes and replace when low       Denia Hoyos, RD, LD

## 2019-05-04 NOTE — PLAN OF CARE
Patent is alert and oriented, heavy assist of 2 with gait belt to bedside commode. Denies chest pain and shortness of breath. NS running at 100 ML/HR. Patient initially in AFib RVR, converted around 2230. Dilt drip and IV push discontinued. Patient is NPO due to throat abscess, IV meds only, scheduled 5mg IV push dilt for rate control. Plan for ENT to consult today.

## 2019-05-04 NOTE — CONSULTS
"CLINICAL NUTRITION SERVICES  -  ASSESSMENT NOTE      Future/Additional Recommendations:   1. If pt continues to not tolerate PO intake would recommend nutrition support  2. Once pt able to tolerate oral medication or nutrition support is initiated, would recommend multivitamin   3. With diet advancement, Medical Food Supplement: Gelatein Plus at room tempeture 10am and 2pm    Malnutrition:   % Weight Loss:  > 10% in 6 months (severe malnutrition)  % Intake:  </= 50% for >/= 5 days (severe malnutrition)  Subcutaneous Fat Loss:  Upper arm region moderate depletion   Muscle Loss:  Temporal region moderate depletion, Clavicle bone region moderate depletion, Patellar region moderate depletion and Posterior calf region moderate depletion  Fluid Retention:  None noted    Malnutrition Diagnosis: Severe malnutrition  In Context of:  Acute illness or injury        REASON FOR ASSESSMENT  Juliana Leal is a 89 year old female seen by Registered Dietitian for Provider Order - recent significant wt loss, evaluation and treatment       NUTRITION HISTORY  Information obtained from Pt:    -Pt states that she has not been able to eat in some time due to throat pain and difficulty swallowing.   -Pt reports that she has done some Ensure diluted and tolerates it a little warmer than room temperature.     Per Chart review:  -Pt lives alone  -4/13 Per MD note: \"- Describes regular diet at baseline with meals TID.  She was not fond of the food in rehab and feels that staff were \"brushing aside\" her sinusitis issues.  Feels she has consistently been eating <50-75% usual intakes over the past few weeks to month.    - Describes that eating worsened the pain and that temperature extremes also cause an increase in pain\"      -5/3 Per MD note: \"The patient's daughter reports that over the past 2 months she has been in and out of rehab with throat infections. Every time she is clear of her infection and taken off antibiotics she will get another " "infection. She has just finished a course of antibiotics. The patient has been unable to ear or drink, she has lost 14 lb.\"    -h/o: CKD, neuropathy, recurrent sinuitis, oral thrush     CURRENT NUTRITION ORDERS  Diet Order:     NPO, 5/4 + Regular, 5/3    5/4: Per RN note: \"Patient is NPO due to throat abscess, IV meds only\"    Current Intake/Tolerance:  -Pt states that she has not eaten in some time. Reports that when food hits the side of her throat it hurts. Pt reports that she feels that she is \"doing better today\" [with throat pain]  -Discussed oral nutrition supplements, prefers them warmer. Pt open to trying Gelatein Plus at this time   -IVF at 100mL/hr  -5/3: Per MD note: \"pt reports loose stools today, suspect due to use of laxatives\"      NUTRITION FOCUSED PHYSICAL ASSESSMENT (NFPA)  Completed:  Yes Partial assessment visual          Observed:    Muscle wasting (refer to documentation in Malnutrition section) and Subcutaneous fat loss (refer to documentation in Malnutrition section)    Obtained from Chart/Interdisciplinary Team:  none    ANTHROPOMETRICS  Height: 5' 5\"  Weight: 108 lbs 3.2 oz  Body mass index is 18.01 kg/m .  Weight Status:  Underweight BMI <18.5  IBW: 56.8 kg  % IBW: 86%  Weight History: wt loss of 8.1 kg (14.2%) over the last 5 months   Wt Readings from Last 30 Encounters:   05/03/19 49.1 kg (108 lb 3.2 oz)   05/03/19 48.1 kg (106 lb)   04/29/19 49.3 kg (108 lb 9.6 oz)   04/24/19 50.3 kg (111 lb)   04/18/19 54.9 kg (121 lb)   04/17/19 54.9 kg (121 lb)   04/11/19 53.1 kg (117 lb)   04/03/19 51.7 kg (114 lb)   03/24/19 52 kg (114 lb 9.6 oz)   03/19/19 54.4 kg (120 lb)   02/27/19 54 kg (119 lb)   01/31/19 55.8 kg (123 lb 1.6 oz)   01/08/19 56.6 kg (124 lb 12.8 oz)   12/04/18 57.2 kg (125 lb 15.9 oz)   11/27/18 57.2 kg (126 lb)   10/12/18 57.6 kg (127 lb)   10/01/18 57.2 kg (126 lb)   08/20/18 56.2 kg (124 lb)   08/09/18 56.7 kg (125 lb)   08/03/18 55.8 kg (123 lb)   08/01/18 56.2 kg (123 lb " 14.4 oz)   07/23/18 56.7 kg (125 lb)   07/11/18 58.5 kg (129 lb 1 oz)   06/12/18 58.9 kg (129 lb 12.8 oz)   06/04/18 57.6 kg (127 lb)   04/12/18 58.5 kg (129 lb)   01/24/18 56.5 kg (124 lb 9.6 oz)   10/26/17 55.3 kg (122 lb)   06/06/17 53.5 kg (118 lb)   04/05/17 52.2 kg (115 lb)         LABS  Labs reviewed    MEDICATIONS  Medications reviewed      ASSESSED NUTRITION NEEDS PER APPROVED PRACTICE GUIDELINES:    Dosing Weight 45.4 kg (lowest wt this admission)  Estimated Energy Needs: 7253-8032 kcals (30-35 Kcal/Kg)  Justification: repletion  Estimated Protein Needs: 68-91 grams protein (1.5-2 g pro/Kg)  Justification: Repletion  Estimated Fluid Needs: 6683-1868 mL (1 mL/Kcal)  Justification: maintenance    MALNUTRITION:  % Weight Loss:  > 10% in 6 months (severe malnutrition)  % Intake:  </= 50% for >/= 5 days (severe malnutrition)  Subcutaneous Fat Loss:  Upper arm region moderate depletion   Muscle Loss:  Temporal region moderate depletion, Clavicle bone region moderate depletion, Patellar region moderate depletion and Posterior calf region moderate depletion  Fluid Retention:  None noted    Malnutrition Diagnosis: Severe malnutrition  In Context of:  Acute on chronic illness or injury    NUTRITION DIAGNOSIS:  Inadequate oral intake related to difficulty and pain with swallowing as evidenced by wt loss of 8.1 kg (14.2%) over the last 5 months and BMI of 18.01 kg/m^2      NUTRITION INTERVENTIONS  Recommendations / Nutrition Prescription  Diet Per MD  With diet advancement- Oral nutrition supplements BID  If unable to advance diet - would recommend nutrition support    .      Implementation  Nutrition education: Provided education on protein and oral nutrition supplements    .      Nutrition Goals  Pt to order at least 2 meals per day and to consume at least 50% of meals ordered and oral nutrition supplements   .      MONITORING AND EVALUATION:  Progress towards goals will be monitored and evaluated per protocol and  Practice Guidelines        Denia Hoyos RD, LD

## 2019-05-04 NOTE — PHARMACY-ADMISSION MEDICATION HISTORY
Admission medication history interview status for the 5/3/2019  admission is complete. See EPIC admission navigator for prior to admission medications     Medication history source reliability:Moderate    Actions taken by pharmacist (provider contacted, etc):med list taken from outpt visit record from 5/3 and conversation with pt's daughter.     Additional medication history information not noted on PTA med list :None    Medication reconciliation/reorder completed by provider prior to medication history? Yes    Time spent in this activity: 15 minutes    Prior to Admission medications    Medication Sig Last Dose Taking? Auth Provider   ACETAMINOPHEN PO Take 500 mg by mouth 3 times daily And q4h prn  5/3/2019 at Unknown time Yes Reported, Patient   diltiazem ER COATED BEADS (CARDIZEM CD/CARTIA XT) 300 MG 24 hr capsule Take 300 mg by mouth daily TAKE 1 CAPSULE(300 MG) BY MOUTH DAILY; HOLD if SBP <110 OR HR < 55. Call if held x 2 in a row symptomatic 5/1/2019 Yes Reported, Patient   levothyroxine (SYNTHROID/LEVOTHROID) 88 MCG tablet Take 1 tablet (88 mcg) by mouth daily 5/3/2019 at Unknown time Yes Erick Ruano MD   cetirizine (ZYRTEC) 10 MG tablet Take 1 tablet (10 mg) by mouth 2 times daily   Federico Manning MD   emollient (VANICREAM) cream Apply topically as needed for other (leg rash)   Reported, Patient   gabapentin (NEURONTIN) 100 MG capsule Take 100 mg by mouth nightly as needed   Unknown, Entered By History   gabapentin (NEURONTIN) 100 MG capsule Take 100 mg by mouth At Bedtime    Unknown, Entered By History   magic mouthwash (FIRST-MOUTHWASH BLM) compounding kit Swish and swallow 10 mLs in mouth every 6 hours as needed for mouth sores   Hector Mcpherson MD   meclizine (ANTIVERT) 12.5 MG tablet Take 1 tablet (12.5 mg) by mouth 3 times daily as needed for dizziness   Hector Mcpherson MD   Multiple Vitamins-Minerals (CENTRUM SILVER) per tablet Take 1 tablet by mouth daily   Reported, Patient   omalizumab  (XOLAIR) 150 MG injection Inject 150 mg Subcutaneous every 28 days HOLD WHILE IN TCU. Next dose 4/2/19. Can wait til after discharge.   Unknown, Entered By History   omeprazole (PRILOSEC) 20 MG CR capsule Take 1 capsule (20 mg) by mouth daily   Erick Ruano MD   phenol (CHLORASEPTIC) 1.4 % spray Take 1 spray by mouth 3 times daily (before meals) And q2h prn   Reported, Patient   sodium chloride (OCEAN) 0.65 % nasal spray Spray 2 sprays into both nostrils every hour as needed for congestion Or equivalent per kimberly. Pt. Can have at bedside.   Reported, Patient   triamcinolone (NASACORT) 55 MCG/ACT inhaler Spray 2 sprays into both nostrils daily   Erick Ruano MD

## 2019-05-04 NOTE — CONSULTS
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 Patient presents via EMS with complaint of weakness and worsening chronic throat pain. Patients pressures have been in the 100's systolic. Sugars are 170. She is on diltiazem though has not taken it in 2 days secondary to low normal BPs. Per the patient's report, she has been feeling cold and dry for the past 4 months. She says that she does not feel palpitations and has never been told that she has atrial fibrillation. She lives alone. Patient denies chest pain, shortness of breath, or drainage. The patient's daughter reports that over the past 2 months she has been in and out of rehab with throat infections She has just finished a course of antibiotics. The patient has been unable to ear or drink, she has lost 14 lb.    Juliana has struggled with nasal congestion and thick, tenacious postnasal drainage, with clinical and CT evidence of sinus disease since August 2018.  She has had progressive symptoms consisting primarily of difficulty swallowing and weight loss.  She has had little improvement despite multiple trials of oral antibiotics including culture directed therapy.  She recently completed a course of doxycycline with no significant improvement.  Imaging of her neck at the time of her presentation to the ED yesterday shows progressive enlargement of the soft tissues of Waldeyer's ring in the absence of any localized fluid collection.          "    Previous tests and diagnostic procedures: see \"Tests and Procedures\" and \"PFSH\".               Past Medical History:     Past Medical History:   Diagnosis Date     Angioedema 2017    Due to ACE      Arthritis      CKD (chronic kidney disease) stage 3, GFR 30-59 ml/min (H) 7/23/2018     Gout      Headaches      Hives 02/2017    Dr KHOURY, avoid BBLK due to this condition     Hypertension      Thyroid disease                Past Surgical History:     Past Surgical History:   Procedure Laterality Date     COLECTOMY      diverticulitis     HC LIGATION OR BIOPSY TEMPORAL ARTERY  5/10/2012    Procedure:BIOPSY ARTERY TEMPORAL; Surgeon:RUI LOREDO; Location: OR     INCISE FINGER TENDON SHEATH       PHACOEMULSIFICATION CLEAR CORNEA WITH STANDARD INTRAOCULAR LENS IMPLANT  2/29/2012    Procedure:PHACOEMULSIFICATION CLEAR CORNEA WITH STANDARD INTRAOCULAR LENS IMPLANT; RIGHT PHACOEMULSIFICATION CLEAR CORNEA WITH STANDARD INTRAOCULAR LENS IMPLANT; Surgeon:MANJIT LYONS; Location: [facility]     PHACOEMULSIFICATION CLEAR CORNEA WITH STANDARD INTRAOCULAR LENS IMPLANT  3/14/2012    Procedure:PHACOEMULSIFICATION CLEAR CORNEA WITH STANDARD INTRAOCULAR LENS IMPLANT; LEFT PHACOEMULSIFICATION CLEAR CORNEA WITH STANDARD INTRAOCULAR LENS IMPLANT ; Surgeon:MANJIT LYONS; Location: [facility]     REPAIR ATRIAL SEPTAL DEFECT                 Social History:     Daughters in town, has lived independently until recently, in and out of rehab/nursing home/hospital            Family History:   No family history on file.            Immunizations:     Immunization History   Administered Date(s) Administered     Influenza (H1N1) 01/07/2010     Influenza (High Dose) 3 valent vaccine 10/12/2017, 09/11/2018     Influenza (IIV3) PF 10/01/2010, 10/15/2012, 09/25/2014, 10/15/2016     Influenza Vaccine IM 3yrs+ 4 Valent IIV4 09/26/2013     Pneumo Conj 13-V (2010&after) 11/08/2016     Pneumococcal 23 valent 10/10/1994, 09/25/2014     TD (ADULT, 7+) 05/26/1999 "     TDAP Vaccine (Boostrix) 11/19/2012               Allergies:   Lisinopril; Prednisone; Allegra [fexofenadine]; Amoxicillin; Cipro [quinolones]; Codeine sulfate; Hydroxyzine; Iodine [gnp iodides decolorized]; Lyrica [pregabalin]; Norvasc [amlodipine besylate]; Tramadol; Zaditor; and Penicillins          Medications:     Current Facility-Administered Medications:      acetaminophen (TYLENOL) tablet 650 mg, 650 mg, Oral, Q4H PRN, Varinder Cota MD     bisacodyl (DULCOLAX) Suppository 10 mg, 10 mg, Rectal, Daily PRN, Varinder Cota MD     calcium carbonate (TUMS) chewable tablet 1,000 mg, 1,000 mg, Oral, 4x Daily PRN, Varinder Cota MD     diltiazem (CARDIZEM) injection 5 mg, 5 mg, Intravenous, Q4H, Grant Barrios MD, 5 mg at 05/04/19 0051     HYDROmorphone (PF) (DILAUDID) injection 0.2 mg, 0.2 mg, Intravenous, Q2H PRN, Varinder Cota MD     [START ON 5/6/2019] levothyroxine (SYNTHROID/LEVOTHROID) injection 37.5 mcg, 37.5 mcg, Intravenous, Daily, Varinder Cota MD     magnesium sulfate 4 g in 100 mL sterile water (premade), 4 g, Intravenous, Q4H PRN, Varinder Cota MD     melatonin tablet 1 mg, 1 mg, Oral, At Bedtime PRN, Varinder Cota MD     naloxone (NARCAN) injection 0.1-0.4 mg, 0.1-0.4 mg, Intravenous, Q2 Min PRN, Varinder Cota MD     No anticoagulants IF patient has had acute trauma/surgery or recent intracranial, GI or urinary tract bleeding. , , Other, DOES NOT GO TO MARBeata Mark Brian, MD     ondansetron (ZOFRAN-ODT) ODT tab 4 mg, 4 mg, Oral, Q6H PRN **OR** ondansetron (ZOFRAN) injection 4 mg, 4 mg, Intravenous, Q6H PRN, Varinder Cota MD     pantoprazole (PROTONIX) 40 mg IV push injection, 40 mg, Intravenous, Daily with breakfast, Varinder Cota MD     polyethylene glycol (MIRALAX/GLYCOLAX) Packet 17 g, 17 g, Oral, Daily PRN, Varinder Cota MD     potassium chloride (KLOR-CON) Packet 20-40 mEq, 20-40 mEq, Oral or Feeding Tube, Q2H  PRN, Varinder Cota MD     potassium chloride 10 mEq in 100 mL intermittent infusion with 10 mg lidocaine, 10 mEq, Intravenous, Q1H PRN, Varinder Cota MD     potassium chloride 10 mEq in 100 mL sterile water intermittent infusion (premix), 10 mEq, Intravenous, Q1H PRN, Varinder Cota MD     potassium chloride ER (K-DUR/KLOR-CON M) CR tablet 20-40 mEq, 20-40 mEq, Oral, Q2H PRN, Varinder Cota MD     prochlorperazine (COMPAZINE) injection 5 mg, 5 mg, Intravenous, Q6H PRN **OR** prochlorperazine (COMPAZINE) tablet 5 mg, 5 mg, Oral, Q6H PRN **OR** prochlorperazine (COMPAZINE) Suppository 12.5 mg, 12.5 mg, Rectal, Q12H PRN, Varinder Cota MD     senna-docusate (SENOKOT-S/PERICOLACE) 8.6-50 MG per tablet 1 tablet, 1 tablet, Oral, BID PRN **OR** senna-docusate (SENOKOT-S/PERICOLACE) 8.6-50 MG per tablet 2 tablet, 2 tablet, Oral, BID PRN, Varinder Cota MD     sodium chloride 0.9% infusion, , Intravenous, Continuous, Varinder Cota MD, Last Rate: 100 mL/hr at 05/04/19 0052          Review of Systems:   Review Of Systems  Difficulty swallowing/throat pain  Nasal secretions/drainage/congestion  weakness          Physical exam:   CONSTITUTION:    /75   Pulse 70   Temp 97.8  F (36.6  C) (Oral)   Resp 17   Wt 49.1 kg (108 lb 3.2 oz)   SpO2 99%   BMI 18.01 kg/m       Sitting up in bed, awake, alert, appropriate.  Evidently hard of hearing.  Has hearing aids--currently in need of new batteries     HEAD, FACE, SALIVARY GLANDS AND TMJ:    Inspection of Head and Face: Normal contour and symmetry. No masses, lesions, or significant scars observed.    Palpation/Percussion of the Face: No tenderness, deformity or instability.    Facial Mobility: Normal.       EYES: Ocular Motility: gaze appears conjugate in all positions; no evident nystagmus.       EAR, NOSE, MOUTH AND THROAT:    Pinnas and External Nose: Normal.    Otoscopic exam: Cerumen bilaterally, visualized portion of TMs  nml.    Nasal Interior:    Septum - Midline.    Intranasal exam--Thick desiccated mucoid nasal secretions bilaterally.  Culture taken from left nasal cavity  .    Lips, Teeth and Gums: Normal.    Oral Cavity and Oropharynx: Thick adherent desiccated appearing secretions coating palate and posterior pharyngeal wall.  No discrete masses.  Soft tissue fullness of peritonsillar region slightly   Slightly more right vs left. No trismus.  Respirations quiet.  No hoarseness.    Neck: normal symmetry; trachea midline; normal laryngeal crepitation; no adenopathy; no neck masses; no skin lesions.         Flexible fiberoptic laryngoscopy:  Scope passed through right nasal cavity--thick dry mucoid secretions.  No polyps visualized.  Nasal mucosa not very edematous nor erythematous.  Adenoid tissue  Mildly prominent.  Tongue base slightly full, right >left.  Epiglottis crisp.  Endolarynx normal in appearance.  Vocal folds normally mobile.  Culture taken from left nasal cavity.              Data:     Results for orders placed or performed during the hospital encounter of 05/03/19   XR Chest 2 Views    Narrative    CHEST TWO VIEWS   5/3/2019 4:57 PM     HISTORY: AFib RVR (rapid ventricular response).    COMPARISON: Chest x-ray 3/19/2019.      Impression    IMPRESSION: PA and lateral views of the chest. Lungs are hyperinflated  consistent with COPD. No infiltrate or consolidation. Heart is normal  in size. No effusions are evident. No pneumothorax.    DANIELE BIRD MD   CT Soft Tissue Neck w/o Contrast    Narrative    CT OF THE NECK WITHOUT CONTRAST  5/3/2019 6:01 PM     HISTORY: Throat pain, rule out evidence of deep space infection.    TECHNIQUE:  Axial CT images of the neck were acquired without  intravenous contrast. Coronal reconstructions were created.    COMPARISON: Soft tissue neck CT 4/11/2019, soft tissue neck CT  8/3/2018.    FINDINGS: There has been a continued interval increase in prominence  of the soft tissues of  Waldeyer's ring including adenoids and palatine  tonsils since the recent comparison study. All these findings are  nonspecific and could represent inflammatory change, malignancy such  as lymphoma must be considered.    There has been an interval increase in opacification of the maxillary  sinuses bilaterally suggesting acute sinusitis. There is complete  opacification of the right frontal and right ethmoid sinuses by  hyperdense debris suggesting at least chronic sinusitis although acute  sinusitis cannot be excluded.    There is no cervical lymphadenopathy. There are no fluid collections  or abscesses in the neck. The left submandibular gland is not  visualized and may have been resected or atrophied. The right  submandibular and bilateral parotid glands are unremarkable. The  thyroid gland is not well visualized and may have been resected or is  atrophied. The lung apices are clear.      Impression    IMPRESSION: Continued interval increase in prominence of the soft  tissues of Waldeyer's ring including the adenoids and palatine tonsils  bilaterally. While this may simply represent an inflammatory process,  malignancy such as lymphoma should be considered. Worsening paranasal  sinus disease. No fluid collections or abscesses.      Radiation dose for this scan was reduced using automated exposure  control, adjustment of the mA and/or kV according to patient size, or  iterative reconstruction technique    YIMI BRYANT MD   Basic metabolic panel   Result Value Ref Range    Sodium 140 133 - 144 mmol/L    Potassium 3.7 3.4 - 5.3 mmol/L    Chloride 106 94 - 109 mmol/L    Carbon Dioxide 25 20 - 32 mmol/L    Anion Gap 9 3 - 14 mmol/L    Glucose 158 (H) 70 - 99 mg/dL    Urea Nitrogen 32 (H) 7 - 30 mg/dL    Creatinine 1.33 (H) 0.52 - 1.04 mg/dL    GFR Estimate 34 (L) >60 mL/min/[1.73_m2]    GFR Estimate If Black 39 (L) >60 mL/min/[1.73_m2]    Calcium 11.6 (H) 8.5 - 10.1 mg/dL   CBC with platelets differential   Result  Value Ref Range    WBC 11.8 (H) 4.0 - 11.0 10e9/L    RBC Count 3.07 (L) 3.8 - 5.2 10e12/L    Hemoglobin 9.3 (L) 11.7 - 15.7 g/dL    Hematocrit 28.0 (L) 35.0 - 47.0 %    MCV 91 78 - 100 fl    MCH 30.3 26.5 - 33.0 pg    MCHC 33.2 31.5 - 36.5 g/dL    RDW 19.1 (H) 10.0 - 15.0 %    Platelet Count 206 150 - 450 10e9/L    Diff Method Automated Method     % Neutrophils 65.5 %    % Lymphocytes 19.7 %    % Monocytes 14.0 %    % Eosinophils 0.1 %    % Basophils 0.4 %    % Immature Granulocytes 0.3 %    Nucleated RBCs 0 0 /100    Absolute Neutrophil 7.7 1.6 - 8.3 10e9/L    Absolute Lymphocytes 2.3 0.8 - 5.3 10e9/L    Absolute Monocytes 1.7 (H) 0.0 - 1.3 10e9/L    Absolute Eosinophils 0.0 0.0 - 0.7 10e9/L    Absolute Basophils 0.1 0.0 - 0.2 10e9/L    Abs Immature Granulocytes 0.0 0 - 0.4 10e9/L    Absolute Nucleated RBC 0.0    TSH with free T4 reflex   Result Value Ref Range    TSH 1.22 0.40 - 4.00 mU/L   CRP inflammation   Result Value Ref Range    CRP Inflammation 132.0 (H) 0.0 - 8.0 mg/L   Magnesium   Result Value Ref Range    Magnesium 1.8 1.6 - 2.3 mg/dL   Lactic acid level STAT for sepsis protocol   Result Value Ref Range    Lactate for Sepsis Protocol 0.8 0.7 - 2.0 mmol/L   Magnesium    Result Value Ref Range    Magnesium 2.6 (H) 1.6 - 2.3 mg/dL   Glucose by meter   Result Value Ref Range    Glucose 115 (H) 70 - 99 mg/dL   EKG 12 lead   Result Value Ref Range    Interpretation ECG Click View Image link to view waveform and result         Assessment and Plan:   Juliana Leal is a pleasant 89 y/o admitted from [facility] yesterday with an arrhythmia, general progressive weakness, and dysphagia with throat pain.  She has had multiple courses of oral antibiotics for sinusitis with little improvement.  She has copious desiccated mucoid secretions filling her nose and coating her pharynx.  She has had sinus imaging confirming sinus disease but does not complain of facial pain/pressure.  Her primary complaint is of throat pain and  difficulty swallowing.  CT of her neck at admission yesterday shows progressive diffuse fullness of lymphoid tissue of Waldeyer's ring.  Lymphoma is in the differential diagnosis.  A left nasal culture was obtained today.  Await culture results as Juliana was recently on doxycycline as culture directed therapy for her sinuses with no significant improvement.  Fiberoptic exam was accomplished through her right nare today showing tenacious secretions in her nose and pharynx with mildly prominent tissue of her adenoid region and with diffuse swelling of lymphoid tissue of her tongue base, more prominent on the right.  This tissue is not easily amenable to bedside biopsy.  For a lymphoma workup, tissue would need to be sent fresh and this would best be accomplished when surgical pathology services are readily available--potentially Monday.  Will discuss with family but tentatively plan to proceed with direct laryngoscopy and biopsy and sinus surgery Monday.    Attestation:  I have reviewed today's vital signs, notes, medications, medication allergies, and PFSH/ROSlabs and imaging.    Sandra Irene MD

## 2019-05-04 NOTE — H&P
History and Physical     Juliana Leal MRN# [id number]   YOB: 1923 Age: 89 year old      Date of Admission:  5/3/2019    Primary care provider: Erick Ruano          Assessment and Plan:   This is a  89 year old female admitted with atrial fibrillation with rapid ventricular response and acute renal failure.    1.  Atrial fibrillation with rapid ventricular response.  The patient's been unable to take her diltiazem for the last 2 days due to low blood pressure and difficulty swallowing.  Will place patient on diltiazem drip.  The patient will be admitted to a hospital bed with telemetry.    2.  Acute kidney injury.  Likely prerenal.  Baseline creatinine appears to be around 1.0.  Today, it is 1.33.  Will volume resuscitate with normal saline morning.    3.  Oropharyngeal mass.  Patient is given a dose of Decadron in the emergency room.  We will consult ENT to evaluate further and make recommendations regarding further Decadron dosing.  No indication for antibiotics at this time.  Per patient's daughters, patient is not tolerating oral medications, so I will convert these to IV.    4.  Hypothyroidism.  Start IV levothyroxine.    5.  GERD.  Start IV Protonix.          Chief Complaint:   This patient is a 89 year old female who presents with tachycardia.    History is obtained from the patient         History of Present Illness:   Patient has long-standing throat pain dating back to August 2018.  She had multiple rounds of antibiotics for sinusitis, and treated for thrush.  Despite treatment, pain and swelling in the throat is persisted.  She is losing weight due to lack of oral intake and describes burning pain on the right side of her throat with swallowing.  The patient was recently hospitalized from April 11 to April 15 at [facility].  She was diagnosed with severe pansinusitis and treated with IV vancomycin and ceftriaxone, as well as dexamethasone.  The patient was discharged on a 10-day  course of doxycycline to [facility].   Despite a course of antibiotics, patient continued to have swelling in the throat and pain with swallowing.  She has been losing weight well at [facility] down from 54.9 kg to 49.3 kg.  Her diltiazem has been held the last 2 days due to low blood pressure with systolics into the 90s.  She presented to ENT clinic today and was noted to have a heart rate into the 130s.  Patient denies any fevers or chills.  She denies any vomiting.  She had some loose stools today but attributes this to laxative use.  She denies any muscle aches, joint aches, lower extremity edema, or rashes.  She has constant nasal congestion.  She denies cough or wheezing.  She denies chest pain or abdominal pain.  She denies dysuria or hematuria.  She denies hematochezia or melena.  She denies focal numbness or weakness.             Past Medical History:     Past Medical History:   Diagnosis Date     Angioedema 2017    Due to ACE      Arthritis      CKD (chronic kidney disease) stage 3, GFR 30-59 ml/min (H) 7/23/2018     Gout      Headaches      Hives 02/2017    Dr KHOURY, avoid BBLK due to this condition     Hypertension      Thyroid disease              Past Surgical History:     Past Surgical History:   Procedure Laterality Date     COLECTOMY      diverticulitis     HC LIGATION OR BIOPSY TEMPORAL ARTERY  5/10/2012    Procedure:BIOPSY ARTERY TEMPORAL; Surgeon:RUI LOREDO; Location: OR     INCISE FINGER TENDON SHEATH       PHACOEMULSIFICATION CLEAR CORNEA WITH STANDARD INTRAOCULAR LENS IMPLANT  2/29/2012    Procedure:PHACOEMULSIFICATION CLEAR CORNEA WITH STANDARD INTRAOCULAR LENS IMPLANT; RIGHT PHACOEMULSIFICATION CLEAR CORNEA WITH STANDARD INTRAOCULAR LENS IMPLANT; Surgeon:MANJIT LYONS; Location: [facility]     PHACOEMULSIFICATION CLEAR CORNEA WITH STANDARD INTRAOCULAR LENS IMPLANT  3/14/2012    Procedure:PHACOEMULSIFICATION CLEAR CORNEA WITH STANDARD INTRAOCULAR LENS IMPLANT; LEFT  "PHACOEMULSIFICATION CLEAR CORNEA WITH STANDARD INTRAOCULAR LENS IMPLANT ; Surgeon:MANJIT LYONS; Location: [facility]     REPAIR ATRIAL SEPTAL DEFECT               Social History:     Social History     Tobacco Use     Smoking status: Former Smoker     Types: Cigarettes     Last attempt to quit: 1960     Years since quittin.3     Smokeless tobacco: Never Used   Substance Use Topics     Alcohol use: No             Family History:   Family history reviewed and is noncontributory other than noted in HPI          Immunizations:     Immunization History   Administered Date(s) Administered     Influenza (H1N1) 2010     Influenza (High Dose) 3 valent vaccine 10/12/2017, 2018     Influenza (IIV3) PF 10/01/2010, 10/15/2012, 2014, 10/15/2016     Influenza Vaccine IM 3yrs+ 4 Valent IIV4 2013     Pneumo Conj 13-V (2010&after) 2016     Pneumococcal 23 valent 10/10/1994, 2014     TD (ADULT, 7+) 1999     TDAP Vaccine (Boostrix) 2012            Allergies:     Allergies   Allergen Reactions     Lisinopril Other (See Comments) and Anaphylaxis     Swelling around mouth     Prednisone Unknown     \"every side effect listed\"     Allegra [Fexofenadine] Swelling     Amoxicillin Itching     Cipro [Quinolones] Itching     Codeine Sulfate Other (See Comments)     Felt out of it when took it yrs ago     Hydroxyzine      Feels drunk     Iodine [Gnp Iodides Decolorized]      Lyrica [Pregabalin]      Reaction unknown     Norvasc [Amlodipine Besylate]      Mouth felt funny     Tramadol      Felt loopy     Zaditor Other (See Comments) and Swelling     Tongue swelled  Didn't feel right  Eye drops     Penicillins Rash             Medications:     Prior to Admission medications    Medication Sig Start Date End Date Taking? Authorizing Provider   ACETAMINOPHEN PO Take 650 mg by mouth 3 times daily And q4h prn    Reported, Patient   cetirizine (ZYRTEC) 10 MG tablet Take 1 tablet (10 mg) by mouth 2 " times daily 3/29/17   Federico Manning MD   diltiazem ER COATED BEADS (CARDIZEM CD/CARTIA XT) 300 MG 24 hr capsule Take 300 mg by mouth daily TAKE 1 CAPSULE(300 MG) BY MOUTH DAILY; HOLD if SBP <110 OR HR < 55. Call if held x 2 in a row symptomatic    Reported, Patient   emollient (VANICREAM) cream Apply topically as needed for other (leg rash)    Reported, Patient   gabapentin (NEURONTIN) 100 MG capsule Take 100 mg by mouth nightly as needed    Unknown, Entered By History   gabapentin (NEURONTIN) 100 MG capsule Take 100 mg by mouth At Bedtime     Unknown, Entered By History   levothyroxine (SYNTHROID/LEVOTHROID) 88 MCG tablet Take 1 tablet (88 mcg) by mouth daily 10/1/18   Erick Ruano MD   magic mouthwash (FIRST-MOUTHWASH BLM) compounding kit Swish and swallow 10 mLs in mouth every 6 hours as needed for mouth sores 4/15/19   Hector Mcpherson MD   meclizine (ANTIVERT) 12.5 MG tablet Take 1 tablet (12.5 mg) by mouth 3 times daily as needed for dizziness 4/15/19   Hector Mcpherson MD   Multiple Vitamins-Minerals (CENTRUM SILVER) per tablet Take 1 tablet by mouth daily    Reported, Patient   omalizumab (XOLAIR) 150 MG injection Inject 150 mg Subcutaneous every 28 days HOLD WHILE IN TCU. Next dose 4/2/19. Can wait til after discharge.    Unknown, Entered By History   omeprazole (PRILOSEC) 20 MG CR capsule Take 1 capsule (20 mg) by mouth daily 8/20/18   Erick Ruano MD   phenol (CHLORASEPTIC) 1.4 % spray Take 1 spray by mouth 3 times daily (before meals) And q2h prn    Reported, Patient   sodium chloride (OCEAN) 0.65 % nasal spray Spray 2 sprays into both nostrils every hour as needed for congestion Or equivalent per kimberly. Pt. Can have at bedside.    Reported, Patient   triamcinolone (NASACORT) 55 MCG/ACT inhaler Spray 2 sprays into both nostrils daily 8/22/18   Erick Ruano MD            Review of Systems:   The 10 point Review of Systems performed and is negative other than noted in the HPI           Physical  Exam:   Vitals were reviewed  Temp: 98.4  F (36.9  C) Temp src: Oral BP: (!) 88/68 Pulse: 116 Heart Rate: 135 Resp: 16 SpO2: 97 % O2 Device: None (Room air)      This is a thin frail elderly female resting comfortably in bed, no acute distress  Head: atraumatic bitemporal wasting, sclera noninjected and anicterric, oral mucosa moist with right soft palate/tonsillar swelling or mass with thick mucous  Neck: supple without spinal abnormality  Chest: clear to auscultation bilaterally, without wheezes, rhonchi, or rales.  Cardiovascular: irregular rate and rhythm, no murmurs, gallops, or rubs, no edema  Abdomen: bowel sounds normal, nontender and nondistended, no hepatosplenomegaly or masses.  Musculoskeletal: normal muscle tone with diffuse atrophy  Skin: no rashes  Lymph: no lymphadenopathy.  Neuro: cranial nerves II-XII intact, strength in all four extremities normal, reflexes normal, coordination normal.         Data:   Data reviewed today: I reviewed all medications, new labs and imaging results over the last 24 hours. I personally reviewed the EKG tracing showing afib w/ RVR and the chest x-ray image(s) showing hyperinflated lungs.    Recent Labs   Lab 05/03/19  1630 04/29/19  0647   WBC 11.8* 8.5   HGB 9.3* 8.1*   MCV 91 91    167    143   POTASSIUM 3.7 3.4   CHLORIDE 106 109   CO2 25 27   BUN 32* 28   CR 1.33* 1.11*   ANIONGAP 9 7   DINORAH 11.6* 9.7   * 87     Recent Results (from the past 24 hour(s))   XR Chest 2 Views    Narrative    CHEST TWO VIEWS   5/3/2019 4:57 PM     HISTORY: AFib RVR (rapid ventricular response).    COMPARISON: Chest x-ray 3/19/2019.      Impression    IMPRESSION: PA and lateral views of the chest. Lungs are hyperinflated  consistent with COPD. No infiltrate or consolidation. Heart is normal  in size. No effusions are evident. No pneumothorax.    DANIELE BIRD MD   CT Soft Tissue Neck w/o Contrast    Narrative    CT OF THE NECK WITHOUT CONTRAST  5/3/2019 6:01 PM      HISTORY: Throat pain, rule out evidence of deep space infection.    TECHNIQUE:  Axial CT images of the neck were acquired without  intravenous contrast. Coronal reconstructions were created.    COMPARISON: Soft tissue neck CT 4/11/2019, soft tissue neck CT  8/3/2018.    FINDINGS: There has been a continued interval increase in prominence  of the soft tissues of Waldeyer's ring including adenoids and palatine  tonsils since the recent comparison study. All these findings are  nonspecific and could represent inflammatory change, malignancy such  as lymphoma must be considered.    There has been an interval increase in opacification of the maxillary  sinuses bilaterally suggesting acute sinusitis. There is complete  opacification of the right frontal and right ethmoid sinuses by  hyperdense debris suggesting at least chronic sinusitis although acute  sinusitis cannot be excluded.    There is no cervical lymphadenopathy. There are no fluid collections  or abscesses in the neck. The left submandibular gland is not  visualized and may have been resected or atrophied. The right  submandibular and bilateral parotid glands are unremarkable. The  thyroid gland is not well visualized and may have been resected or is  atrophied. The lung apices are clear.      Impression    IMPRESSION: Continued interval increase in prominence of the soft  tissues of Waldeyer's ring including the adenoids and palatine tonsils  bilaterally. While this may simply represent an inflammatory process,  malignancy such as lymphoma should be considered. Worsening paranasal  sinus disease. No fluid collections or abscesses.      Radiation dose for this scan was reduced using automated exposure  control, adjustment of the mA and/or kV according to patient size, or  iterative reconstruction technique

## 2019-05-05 LAB
ALBUMIN SERPL-MCNC: 2.1 G/DL (ref 3.4–5)
ALP SERPL-CCNC: 54 U/L (ref 40–150)
ALT SERPL W P-5'-P-CCNC: 9 U/L (ref 0–50)
ANION GAP SERPL CALCULATED.3IONS-SCNC: 8 MMOL/L (ref 3–14)
AST SERPL W P-5'-P-CCNC: 10 U/L (ref 0–45)
BASOPHILS # BLD AUTO: 0 10E9/L (ref 0–0.2)
BASOPHILS NFR BLD AUTO: 0 %
BILIRUB DIRECT SERPL-MCNC: 0.2 MG/DL (ref 0–0.2)
BILIRUB SERPL-MCNC: 0.6 MG/DL (ref 0.2–1.3)
BUN SERPL-MCNC: 41 MG/DL (ref 7–30)
CALCIUM SERPL-MCNC: 10 MG/DL (ref 8.5–10.1)
CHLORIDE SERPL-SCNC: 114 MMOL/L (ref 94–109)
CO2 SERPL-SCNC: 23 MMOL/L (ref 20–32)
CREAT SERPL-MCNC: 0.95 MG/DL (ref 0.52–1.04)
DIFFERENTIAL METHOD BLD: ABNORMAL
EOSINOPHIL # BLD AUTO: 0 10E9/L (ref 0–0.7)
EOSINOPHIL NFR BLD AUTO: 0 %
ERYTHROCYTE [DISTWIDTH] IN BLOOD BY AUTOMATED COUNT: 19.9 % (ref 10–15)
GFR SERPL CREATININE-BSD FRML MDRD: 51 ML/MIN/{1.73_M2}
GLUCOSE BLDC GLUCOMTR-MCNC: 114 MG/DL (ref 70–99)
GLUCOSE BLDC GLUCOMTR-MCNC: 191 MG/DL (ref 70–99)
GLUCOSE SERPL-MCNC: 124 MG/DL (ref 70–99)
HCT VFR BLD AUTO: 25.4 % (ref 35–47)
HGB BLD-MCNC: 8.3 G/DL (ref 11.7–15.7)
INR PPP: 1.22 (ref 0.86–1.14)
LYMPHOCYTES # BLD AUTO: 1.6 10E9/L (ref 0.8–5.3)
LYMPHOCYTES NFR BLD AUTO: 11 %
MAGNESIUM SERPL-MCNC: 2.1 MG/DL (ref 1.6–2.3)
MCH RBC QN AUTO: 29.9 PG (ref 26.5–33)
MCHC RBC AUTO-ENTMCNC: 32.7 G/DL (ref 31.5–36.5)
MCV RBC AUTO: 91 FL (ref 78–100)
MONOCYTES # BLD AUTO: 1.9 10E9/L (ref 0–1.3)
MONOCYTES NFR BLD AUTO: 13 %
NEUTROPHILS # BLD AUTO: 11.2 10E9/L (ref 1.6–8.3)
NEUTROPHILS NFR BLD AUTO: 76 %
PHOSPHATE SERPL-MCNC: 3.5 MG/DL (ref 2.5–4.5)
PLATELET # BLD AUTO: 214 10E9/L (ref 150–450)
PLATELET # BLD EST: ABNORMAL 10*3/UL
POTASSIUM SERPL-SCNC: 4.1 MMOL/L (ref 3.4–5.3)
PREALB SERPL IA-MCNC: 7 MG/DL (ref 15–45)
PROT SERPL-MCNC: 5.5 G/DL (ref 6.8–8.8)
RBC # BLD AUTO: 2.78 10E12/L (ref 3.8–5.2)
RBC MORPH BLD: ABNORMAL
SODIUM SERPL-SCNC: 145 MMOL/L (ref 133–144)
WBC # BLD AUTO: 14.7 10E9/L (ref 4–11)

## 2019-05-05 PROCEDURE — 36569 INSJ PICC 5 YR+ W/O IMAGING: CPT

## 2019-05-05 PROCEDURE — 85025 COMPLETE CBC W/AUTO DIFF WBC: CPT | Performed by: INTERNAL MEDICINE

## 2019-05-05 PROCEDURE — 25000128 H RX IP 250 OP 636: Performed by: INTERNAL MEDICINE

## 2019-05-05 PROCEDURE — 25000125 ZZHC RX 250: Performed by: INTERNAL MEDICINE

## 2019-05-05 PROCEDURE — 25000128 H RX IP 250 OP 636: Performed by: OTOLARYNGOLOGY

## 2019-05-05 PROCEDURE — 25800030 ZZH RX IP 258 OP 636: Performed by: INTERNAL MEDICINE

## 2019-05-05 PROCEDURE — 25000125 ZZHC RX 250: Performed by: OTOLARYNGOLOGY

## 2019-05-05 PROCEDURE — 21000001 ZZH R&B HEART CARE

## 2019-05-05 PROCEDURE — 27210219 ZZH KIT SHRLOCK 5FR DBL LUM PWR P

## 2019-05-05 PROCEDURE — 82248 BILIRUBIN DIRECT: CPT | Performed by: INTERNAL MEDICINE

## 2019-05-05 PROCEDURE — 36415 COLL VENOUS BLD VENIPUNCTURE: CPT | Performed by: INTERNAL MEDICINE

## 2019-05-05 PROCEDURE — 84134 ASSAY OF PREALBUMIN: CPT | Performed by: INTERNAL MEDICINE

## 2019-05-05 PROCEDURE — 85610 PROTHROMBIN TIME: CPT | Performed by: INTERNAL MEDICINE

## 2019-05-05 PROCEDURE — 80053 COMPREHEN METABOLIC PANEL: CPT | Performed by: INTERNAL MEDICINE

## 2019-05-05 PROCEDURE — 99232 SBSQ HOSP IP/OBS MODERATE 35: CPT | Performed by: INTERNAL MEDICINE

## 2019-05-05 PROCEDURE — 84100 ASSAY OF PHOSPHORUS: CPT | Performed by: INTERNAL MEDICINE

## 2019-05-05 PROCEDURE — 25000125 ZZHC RX 250

## 2019-05-05 PROCEDURE — 83735 ASSAY OF MAGNESIUM: CPT | Performed by: INTERNAL MEDICINE

## 2019-05-05 PROCEDURE — 00000146 ZZHCL STATISTIC GLUCOSE BY METER IP

## 2019-05-05 PROCEDURE — C9113 INJ PANTOPRAZOLE SODIUM, VIA: HCPCS | Performed by: INTERNAL MEDICINE

## 2019-05-05 RX ORDER — CLINDAMYCIN PHOSPHATE 900 MG/50ML
900 INJECTION, SOLUTION INTRAVENOUS EVERY 8 HOURS
Status: COMPLETED | OUTPATIENT
Start: 2019-05-05 | End: 2019-05-06

## 2019-05-05 RX ORDER — NICOTINE POLACRILEX 4 MG
15-30 LOZENGE BUCCAL
Status: DISCONTINUED | OUTPATIENT
Start: 2019-05-05 | End: 2019-05-12

## 2019-05-05 RX ORDER — DEXAMETHASONE SODIUM PHOSPHATE 10 MG/ML
10 INJECTION, SOLUTION INTRAMUSCULAR; INTRAVENOUS ONCE
Status: COMPLETED | OUTPATIENT
Start: 2019-05-06 | End: 2019-05-05

## 2019-05-05 RX ORDER — DEXAMETHASONE SODIUM PHOSPHATE 4 MG/ML
10 INJECTION, SOLUTION INTRA-ARTICULAR; INTRALESIONAL; INTRAMUSCULAR; INTRAVENOUS; SOFT TISSUE ONCE
Status: COMPLETED | OUTPATIENT
Start: 2019-05-05 | End: 2019-05-05

## 2019-05-05 RX ORDER — DEXTROSE MONOHYDRATE 25 G/50ML
25-50 INJECTION, SOLUTION INTRAVENOUS
Status: DISCONTINUED | OUTPATIENT
Start: 2019-05-05 | End: 2019-05-12

## 2019-05-05 RX ADMIN — DILTIAZEM HYDROCHLORIDE 5 MG: 5 INJECTION INTRAVENOUS at 03:18

## 2019-05-05 RX ADMIN — SODIUM CHLORIDE 100 ML/HR: 9 INJECTION, SOLUTION INTRAVENOUS at 09:48

## 2019-05-05 RX ADMIN — CLINDAMYCIN PHOSPHATE 900 MG: 900 INJECTION, SOLUTION INTRAVENOUS at 20:55

## 2019-05-05 RX ADMIN — DILTIAZEM HYDROCHLORIDE 5 MG: 5 INJECTION INTRAVENOUS at 06:52

## 2019-05-05 RX ADMIN — DILTIAZEM HYDROCHLORIDE 5 MG: 5 INJECTION INTRAVENOUS at 17:47

## 2019-05-05 RX ADMIN — ASCORBIC ACID, VITAMIN A PALMITATE, CHOLECALCIFEROL, THIAMINE HYDROCHLORIDE, RIBOFLAVIN-5 PHOSPHATE SODIUM, PYRIDOXINE HYDROCHLORIDE, NIACINAMIDE, DEXPANTHENOL, ALPHA-TOCOPHEROL ACETATE, VITAMIN K1, FOLIC ACID, BIOTIN, CYANOCOBALAMIN: 200; 3300; 200; 6; 3.6; 6; 40; 15; 10; 150; 600; 60; 5 INJECTION, SOLUTION INTRAVENOUS at 15:58

## 2019-05-05 RX ADMIN — CLINDAMYCIN PHOSPHATE 900 MG: 900 INJECTION, SOLUTION INTRAVENOUS at 12:55

## 2019-05-05 RX ADMIN — DILTIAZEM HYDROCHLORIDE 5 MG: 5 INJECTION INTRAVENOUS at 12:53

## 2019-05-05 RX ADMIN — DEXAMETHASONE SODIUM PHOSPHATE 10 MG: 10 INJECTION, SOLUTION INTRAMUSCULAR; INTRAVENOUS at 23:57

## 2019-05-05 RX ADMIN — DEXAMETHASONE SODIUM PHOSPHATE 10 MG: 4 INJECTION, SOLUTION INTRAMUSCULAR; INTRAVENOUS at 13:00

## 2019-05-05 RX ADMIN — HYDROMORPHONE HYDROCHLORIDE 0.2 MG: 1 INJECTION, SOLUTION INTRAMUSCULAR; INTRAVENOUS; SUBCUTANEOUS at 13:24

## 2019-05-05 RX ADMIN — PANTOPRAZOLE SODIUM 40 MG: 40 INJECTION, POWDER, FOR SOLUTION INTRAVENOUS at 09:49

## 2019-05-05 RX ADMIN — I.V. FAT EMULSION 250 ML: 20 EMULSION INTRAVENOUS at 17:45

## 2019-05-05 RX ADMIN — DILTIAZEM HYDROCHLORIDE 5 MG: 5 INJECTION INTRAVENOUS at 23:56

## 2019-05-05 ASSESSMENT — ACTIVITIES OF DAILY LIVING (ADL)
ADLS_ACUITY_SCORE: 25
ADLS_ACUITY_SCORE: 25
ADLS_ACUITY_SCORE: 23
ADLS_ACUITY_SCORE: 25

## 2019-05-05 NOTE — PROGRESS NOTES
[facility]    Medicine Progress Note - Hospitalist Service       Date of Admission:  5/3/2019  Assessment & Plan     This is a  89 year old female with a history of atrial fibrillation, recurrent sinusitis since 8/ 2018, throat pain with difficulty swallowing and weight loss had presented to ENT clinic for follow-up where she was found to have a heart rate in 130s, and so was admitted with atrial fibrillation with rapid ventricular response and acute renal failure.     Subacute sinusitis  Throat pain  Patient initially admitted 4/11-4/16 due to recurrent sinusitis since 08/2018. Has had multiple course of abx w/o improvement. Has been followed by ENT (Dr. Irene). CT scan of sinuses on 4/11 found severe sinusitis. Was treated with short course of steroids, IV vancomycin and ceftriaxone, then transitioned to doxycycline upon discharge to TCU (completed 4/26).  Symptoms continues to get worse with sore throat.  Has had strep/ Candida negative in the past with growth of normal sera in the culture  - CT soft tissue neck on 5/3/2019 shows continued interval increase in prominence of soft tissue of Waldeyer's ring including the adenoids and palatine tonsils bilaterally-?  Inflammation versus malignancy   -ENT consultation appreciated, plan for sinus surgery, biopsy of lymphoid hyperplasia of Waldeyer's sending in needle aspiration/I&D of right parapharyngeal/bilateral fullness tomorrow  - patient is not tolerating oral medications, so continue with IV conversion of her home PTA medication.  Also started on TPN due to multiple days of poor p.o. Intake and severe malnutrition ( see below).    Atrial fibrillation with rapid ventricular response.    Likely due to patient unable to take her diltiazem for 2 days prior to admission due to difficulty swallowing.   -Converted back to sinus rhythm before starting on diltiazem drip, continue to monitor on  IV scheduled Cardizem -continue to monitor on  telemetry.      Acute kidney injury.    Likely prerenal from poor p.o. intake.  Baseline creatinine appears to be around 1.0.    On admission it was 1.33.   Improving with hydration  Continue hydration, monitor, avoid nephrotoxins    Severe malnutrition  In the context of acute illness or injury with more than 10% of weight loss in the last 6 months with less than 50% of intake in the last 5 days, moderate depletion of upper arm fat loss and muscle loss in the temporal region  Patient currently unable to tolerate oral intake, appreciate nutrition service consultation  Patient not has not been taking oral intake for several days now due to inability to swallow  -Nutrition consulted for initiation of TPN until she starts swallowing better with adequate oral intake       Pain in left lower extremity   Lower extremity ultrasound negative for any evidence of DVT, symptomatic management    Hypothyroidism.    Continue IV levothyroxine.     GERD.     Continue IV Protonix.      Diet: Combination Diet Regular Diet Adult    DVT Prophylaxis: Pneumatic Compression Devices  Castillo Catheter: not present  Code Status: DNR/DNI      Disposition Plan   Expected discharge: 2 - 3 days, recommended to Prior living condition versus TCU depending on evaluation by PT OT once Dysphagia evaluation completed and patient starting to take oral intake.  Entered: Elsa Keith MD 05/05/2019, 9:01 AM       The patient's care was discussed with the Attending Physician, Dr. Irene, Bedside Nurse, Patient and Patient's Family.    Elsa Keith MD  Hospitalist Service  [facility]    ______________________________________________________________________    Interval History   Patient reports some dry throat, pain controlled with current pain regimen.  No new nursing concerns.  Per nursing had choked on pill last evening    Data reviewed today: I reviewed all medications, new labs and imaging results over the last 24 hours. I personally  reviewed no images or EKG's today.    Physical Exam   Vital Signs: Temp: 97.4  F (36.3  C) Temp src: Oral BP: 129/80 Pulse: 73 Heart Rate: 89 Resp: 16 SpO2: 99 % O2 Device: Nasal cannula Oxygen Delivery: 2 LPM  Weight: 110 lbs 7.21 oz  Exam:  Constitutional: Awake, alert and no distress. Appears comfortable  Head: Normocephalic. No masses, lesions, tenderness or abnormalities  ENT:Swelling on the pharynx with erythema   Cardiovascular: RRR.  2+ murmurs, no rubs or JVD  Respiratory:normal WOB,b/l equal air entry, no wheezes or crackles   Gastrointestinal: Abdomen soft, non-tender. BS normal. No masses, organomegaly  : Deferred   extremities :no edema however tender left calf  , no clubbing or cyanosis    Skin: Warm and dry, no rash.      Data   Recent Labs   Lab 05/05/19  0631 05/04/19  0633 05/03/19  1630   WBC 14.7* 9.5 11.8*   HGB 8.3* 9.0* 9.3*   MCV 91 91 91    215 206   INR 1.22*  --   --    * 144 140   POTASSIUM 4.1 3.9 3.7   CHLORIDE 114* 111* 106   CO2 23 22 25   BUN 41* 34* 32*   CR 0.95 1.15* 1.33*   ANIONGAP 8 11 9   DINORAH 10.0 10.4* 11.6*   * 132* 158*   ALBUMIN 2.1*  --   --    PROTTOTAL 5.5*  --   --    BILITOTAL 0.6  --   --    ALKPHOS 54  --   --    ALT 9  --   --    AST 10  --   --      Recent Results (from the past 24 hour(s))   US Lower Extremity Venous Duplex Left    Narrative    ULTRASOUND VENOUS LOWER EXTREMITY UNILATERAL LEFT  5/4/2019 10:51 AM     HISTORY: Rule out deep vein thrombosis, pain in the calf.    COMPARISON: None.    TECHNIQUE: Ultrasound gray scale, Color Doppler flow, and spectral  Doppler waveform analysis performed.    FINDINGS:  The left common femoral, superficial femoral, popliteal and  posterior tibial veins are patent and fully compressible and  demonstrate normal venous Doppler flow. The visualized greater  saphenous vein is negative for thrombus.       Impression    IMPRESSION: No DVT demonstrated.    AZAR MENENDEZ MD     Medications     IV fluid  REPLACEMENT ONLY       Reason anticoagulant not prescribed for atrial fibrillation       sodium chloride 100 mL/hr at 05/04/19 2237       diltiazem  5 mg Intravenous Q4H     gabapentin  100 mg Oral At Bedtime     [START ON 5/6/2019] levothyroxine  37.5 mcg Intravenous Daily     pantoprazole (PROTONIX) IV  40 mg Intravenous Daily with breakfast     sodium chloride (PF)  10 mL Intracatheter Q8H

## 2019-05-05 NOTE — PROGRESS NOTES
[facility]  Progress Note  Dr. Sandra Irene          Interval History:   Stable with complaints of oral dryness, throat pain, dysphagia, nasal obstruction.       Medications:       diltiazem  5 mg Intravenous Q4H     gabapentin  100 mg Oral At Bedtime     insulin aspart  1-6 Units Subcutaneous Q4H     [START ON 5/6/2019] levothyroxine  37.5 mcg Intravenous Daily     lipids  250 mL Intravenous Once per day on Sun Tue Thu     pantoprazole (PROTONIX) IV  40 mg Intravenous Daily with breakfast     sodium chloride (PF)  10 mL Intracatheter Q8H                  Physical Exam:   Blood pressure 129/80, pulse 73, temperature 97.4  F (36.3  C), temperature source Oral, resp. rate 16, weight 50.1 kg (110 lb 7.2 oz), SpO2 98 %, not currently breastfeeding.  I/O last 3 completed shifts:  In: 800 [I.V.:800]  Out: -    General: awake, alert, conversant and appropriate.  Respirations quiet and non-labored.  Mild hot potato quality to voice  Nares-mucoid secretions  OC/OP: no trismus.  Slight increase in right pharyngeal/palatal swelling.           Data:     Recent Labs   Lab Test 05/05/19  0631 05/04/19  0633 05/03/19  1630  04/12/15  0640  05/09/12  0620   WBC 14.7* 9.5 11.8*   < > 8.0   < > 11.3*   HGB 8.3* 9.0* 9.3*   < > 15.3   < > 10.9*   MCV 91 91 91   < > 92   < > 93    215 206   < > 216   < > 230   INR 1.22*  --   --   --  1.02  --  1.00    < > = values in this interval not displayed.     Recent Labs   Lab Test 05/05/19  0631 05/04/19  0633 05/03/19  1630   * 144 140   POTASSIUM 4.1 3.9 3.7   CHLORIDE 114* 111* 106   CO2 23 22 25   BUN 41* 34* 32*   CR 0.95 1.15* 1.33*   ANIONGAP 8 11 9     Recent Labs   Lab Test 05/05/19  0631 03/19/19  2130 03/19/19  1438 08/07/18  1640 08/01/18  1052  04/12/18  1659  05/10/12  0550 05/09/12  0620  05/08/12  1033   ALBUMIN 2.1*  --  3.1*  --   --   --  4.2   < >  --  3.1*  --  4.2   BILITOTAL 0.6  --  0.5  --   --   --  0.2   < >  --  0.6  --  0.5   ALT 9  --  16   --   --   --  8   < >  --  24  --  20   AST 10  --  15  --   --   --  16   < >  --  14  --  17   PROTEIN  --  Negative  --  Negative Trace*   < >  --    < >  --   --    < >  --    LIPASE  --   --   --   --   --   --   --   --  51 76  --  181   AMYLASE  --   --   --   --   --   --   --   --   --  70  --   --     < > = values in this interval not displayed.              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  Scheduled for tomorrow so tissue can be presented fresh for lymphoma workup.  Given interval increase in right palatal and pharyngeal swelling, will add clindamycin and decadron.  Consent signed.

## 2019-05-05 NOTE — PLAN OF CARE
VSS.  Turning self in bed and allowing staff to turn her more.  IVF at 100ml/hr.  C/O pain to throat, redness and swelling noted. Able to take ice chips but no other liquids or food.  Plan for PICC line placement today.

## 2019-05-05 NOTE — PLAN OF CARE
NPO with swabs at bedside and sips of water as able.   ENT -Dr. Irene here in AM and scoped patient in room, nasal swab sent.  Plan is surgery on Monday with ENT to wash sinuses and biopsy abscess ( ? Lymphoma)   Dilaudid given for pain of throat- it helped ease the swallowing pain   VSS. Sinus rhythm.  Gets scheduled IV Dilt.   C/o left leg discomfort - CMS intact - US of LE Venous Duplex done  Family at bedside most of AM.

## 2019-05-06 ENCOUNTER — ANESTHESIA EVENT (OUTPATIENT)
Dept: SURGERY | Facility: CLINIC | Age: 84
DRG: 840 | End: 2019-05-06
Payer: COMMERCIAL

## 2019-05-06 ENCOUNTER — ANESTHESIA (OUTPATIENT)
Dept: SURGERY | Facility: CLINIC | Age: 84
DRG: 840 | End: 2019-05-06
Payer: COMMERCIAL

## 2019-05-06 ENCOUNTER — MEDICAL CORRESPONDENCE (OUTPATIENT)
Dept: HEALTH INFORMATION MANAGEMENT | Facility: CLINIC | Age: 84
End: 2019-05-06

## 2019-05-06 LAB
ALBUMIN SERPL-MCNC: 2.2 G/DL (ref 3.4–5)
ALP SERPL-CCNC: 53 U/L (ref 40–150)
ALT SERPL W P-5'-P-CCNC: 11 U/L (ref 0–50)
ANION GAP SERPL CALCULATED.3IONS-SCNC: 9 MMOL/L (ref 3–14)
AST SERPL W P-5'-P-CCNC: 9 U/L (ref 0–45)
BACTERIA SPEC CULT: NORMAL
BILIRUB SERPL-MCNC: 0.6 MG/DL (ref 0.2–1.3)
BUN SERPL-MCNC: 43 MG/DL (ref 7–30)
CALCIUM SERPL-MCNC: 9.7 MG/DL (ref 8.5–10.1)
CHLORIDE SERPL-SCNC: 112 MMOL/L (ref 94–109)
CO2 SERPL-SCNC: 23 MMOL/L (ref 20–32)
CREAT SERPL-MCNC: 0.93 MG/DL (ref 0.52–1.04)
GFR SERPL CREATININE-BSD FRML MDRD: 52 ML/MIN/{1.73_M2}
GLUCOSE BLDC GLUCOMTR-MCNC: 142 MG/DL (ref 70–99)
GLUCOSE BLDC GLUCOMTR-MCNC: 161 MG/DL (ref 70–99)
GLUCOSE BLDC GLUCOMTR-MCNC: 200 MG/DL (ref 70–99)
GLUCOSE BLDC GLUCOMTR-MCNC: 205 MG/DL (ref 70–99)
GLUCOSE BLDC GLUCOMTR-MCNC: 213 MG/DL (ref 70–99)
GLUCOSE SERPL-MCNC: 217 MG/DL (ref 70–99)
INR PPP: 1.19 (ref 0.86–1.14)
Lab: NORMAL
MAGNESIUM SERPL-MCNC: 1.9 MG/DL (ref 1.6–2.3)
PHOSPHATE SERPL-MCNC: 2.5 MG/DL (ref 2.5–4.5)
POTASSIUM SERPL-SCNC: 3.2 MMOL/L (ref 3.4–5.3)
POTASSIUM SERPL-SCNC: 4.1 MMOL/L (ref 3.4–5.3)
PREALB SERPL IA-MCNC: 8 MG/DL (ref 15–45)
PROT SERPL-MCNC: 5.5 G/DL (ref 6.8–8.8)
SODIUM SERPL-SCNC: 144 MMOL/L (ref 133–144)
SPECIMEN SOURCE: NORMAL

## 2019-05-06 PROCEDURE — 84100 ASSAY OF PHOSPHORUS: CPT | Performed by: INTERNAL MEDICINE

## 2019-05-06 PROCEDURE — 0CBM8ZX EXCISION OF PHARYNX, VIA NATURAL OR ARTIFICIAL OPENING ENDOSCOPIC, DIAGNOSTIC: ICD-10-PCS | Performed by: OTOLARYNGOLOGY

## 2019-05-06 PROCEDURE — 25000566 ZZH SEVOFLURANE, EA 15 MIN: Performed by: OTOLARYNGOLOGY

## 2019-05-06 PROCEDURE — 25800030 ZZH RX IP 258 OP 636: Performed by: INTERNAL MEDICINE

## 2019-05-06 PROCEDURE — 099R8ZZ DRAINAGE OF LEFT MAXILLARY SINUS, VIA NATURAL OR ARTIFICIAL OPENING ENDOSCOPIC: ICD-10-PCS | Performed by: OTOLARYNGOLOGY

## 2019-05-06 PROCEDURE — 25000131 ZZH RX MED GY IP 250 OP 636 PS 637: Performed by: INTERNAL MEDICINE

## 2019-05-06 PROCEDURE — 71000012 ZZH RECOVERY PHASE 1 LEVEL 1 FIRST HR: Performed by: OTOLARYNGOLOGY

## 2019-05-06 PROCEDURE — 25000125 ZZHC RX 250: Performed by: INTERNAL MEDICINE

## 2019-05-06 PROCEDURE — 099U8ZZ DRAINAGE OF RIGHT ETHMOID SINUS, VIA NATURAL OR ARTIFICIAL OPENING ENDOSCOPIC: ICD-10-PCS | Performed by: OTOLARYNGOLOGY

## 2019-05-06 PROCEDURE — 88365 INSITU HYBRIDIZATION (FISH): CPT | Mod: 26 | Performed by: OTOLARYNGOLOGY

## 2019-05-06 PROCEDURE — 88304 TISSUE EXAM BY PATHOLOGIST: CPT | Performed by: OTOLARYNGOLOGY

## 2019-05-06 PROCEDURE — 40000170 ZZH STATISTIC PRE-PROCEDURE ASSESSMENT II: Performed by: OTOLARYNGOLOGY

## 2019-05-06 PROCEDURE — 88305 TISSUE EXAM BY PATHOLOGIST: CPT | Performed by: OTOLARYNGOLOGY

## 2019-05-06 PROCEDURE — 00000146 ZZHCL STATISTIC GLUCOSE BY METER IP

## 2019-05-06 PROCEDURE — 88161 CYTOPATH SMEAR OTHER SOURCE: CPT | Performed by: OTOLARYNGOLOGY

## 2019-05-06 PROCEDURE — 87075 CULTR BACTERIA EXCEPT BLOOD: CPT | Performed by: OTOLARYNGOLOGY

## 2019-05-06 PROCEDURE — 36000056 ZZH SURGERY LEVEL 3 1ST 30 MIN: Performed by: OTOLARYNGOLOGY

## 2019-05-06 PROCEDURE — 25000132 ZZH RX MED GY IP 250 OP 250 PS 637: Performed by: INTERNAL MEDICINE

## 2019-05-06 PROCEDURE — 25800030 ZZH RX IP 258 OP 636: Performed by: NURSE ANESTHETIST, CERTIFIED REGISTERED

## 2019-05-06 PROCEDURE — 83735 ASSAY OF MAGNESIUM: CPT | Performed by: INTERNAL MEDICINE

## 2019-05-06 PROCEDURE — 99232 SBSQ HOSP IP/OBS MODERATE 35: CPT | Performed by: INTERNAL MEDICINE

## 2019-05-06 PROCEDURE — 88342 IMHCHEM/IMCYTCHM 1ST ANTB: CPT | Performed by: OTOLARYNGOLOGY

## 2019-05-06 PROCEDURE — 88341 IMHCHEM/IMCYTCHM EA ADD ANTB: CPT | Performed by: OTOLARYNGOLOGY

## 2019-05-06 PROCEDURE — 87077 CULTURE AEROBIC IDENTIFY: CPT | Performed by: OTOLARYNGOLOGY

## 2019-05-06 PROCEDURE — 12000000 ZZH R&B MED SURG/OB

## 2019-05-06 PROCEDURE — 87186 SC STD MICRODIL/AGAR DIL: CPT | Performed by: OTOLARYNGOLOGY

## 2019-05-06 PROCEDURE — 37000008 ZZH ANESTHESIA TECHNICAL FEE, 1ST 30 MIN: Performed by: OTOLARYNGOLOGY

## 2019-05-06 PROCEDURE — 25000128 H RX IP 250 OP 636: Performed by: NURSE ANESTHETIST, CERTIFIED REGISTERED

## 2019-05-06 PROCEDURE — 0CJY3ZZ INSPECTION OF MOUTH AND THROAT, PERCUTANEOUS APPROACH: ICD-10-PCS | Performed by: OTOLARYNGOLOGY

## 2019-05-06 PROCEDURE — 25000128 H RX IP 250 OP 636: Performed by: INTERNAL MEDICINE

## 2019-05-06 PROCEDURE — 88184 FLOWCYTOMETRY/ TC 1 MARKER: CPT | Performed by: OTOLARYNGOLOGY

## 2019-05-06 PROCEDURE — 80053 COMPREHEN METABOLIC PANEL: CPT | Performed by: INTERNAL MEDICINE

## 2019-05-06 PROCEDURE — 88311 DECALCIFY TISSUE: CPT | Mod: 26 | Performed by: OTOLARYNGOLOGY

## 2019-05-06 PROCEDURE — 88342 IMHCHEM/IMCYTCHM 1ST ANTB: CPT | Mod: 26 | Performed by: OTOLARYNGOLOGY

## 2019-05-06 PROCEDURE — 36000058 ZZH SURGERY LEVEL 3 EA 15 ADDTL MIN: Performed by: OTOLARYNGOLOGY

## 2019-05-06 PROCEDURE — 84132 ASSAY OF SERUM POTASSIUM: CPT | Performed by: OTOLARYNGOLOGY

## 2019-05-06 PROCEDURE — 88365 INSITU HYBRIDIZATION (FISH): CPT | Performed by: OTOLARYNGOLOGY

## 2019-05-06 PROCEDURE — 09BQ8ZX EXCISION OF RIGHT MAXILLARY SINUS, VIA NATURAL OR ARTIFICIAL OPENING ENDOSCOPIC, DIAGNOSTIC: ICD-10-PCS | Performed by: OTOLARYNGOLOGY

## 2019-05-06 PROCEDURE — 099Q8ZZ DRAINAGE OF RIGHT MAXILLARY SINUS, VIA NATURAL OR ARTIFICIAL OPENING ENDOSCOPIC: ICD-10-PCS | Performed by: OTOLARYNGOLOGY

## 2019-05-06 PROCEDURE — 25000125 ZZHC RX 250: Performed by: OTOLARYNGOLOGY

## 2019-05-06 PROCEDURE — 25000125 ZZHC RX 250: Performed by: NURSE ANESTHETIST, CERTIFIED REGISTERED

## 2019-05-06 PROCEDURE — 37000009 ZZH ANESTHESIA TECHNICAL FEE, EACH ADDTL 15 MIN: Performed by: OTOLARYNGOLOGY

## 2019-05-06 PROCEDURE — 88185 FLOWCYTOMETRY/TC ADD-ON: CPT | Performed by: OTOLARYNGOLOGY

## 2019-05-06 PROCEDURE — 88161 CYTOPATH SMEAR OTHER SOURCE: CPT | Mod: 26,XU | Performed by: OTOLARYNGOLOGY

## 2019-05-06 PROCEDURE — 40001004 ZZHCL STATISTIC FLOW INT 9-15 ABY TC 88188: Performed by: OTOLARYNGOLOGY

## 2019-05-06 PROCEDURE — 40000239 ZZH STATISTIC VAT ROUNDS

## 2019-05-06 PROCEDURE — 84134 ASSAY OF PREALBUMIN: CPT | Performed by: INTERNAL MEDICINE

## 2019-05-06 PROCEDURE — 88311 DECALCIFY TISSUE: CPT | Performed by: OTOLARYNGOLOGY

## 2019-05-06 PROCEDURE — C9113 INJ PANTOPRAZOLE SODIUM, VIA: HCPCS | Performed by: INTERNAL MEDICINE

## 2019-05-06 PROCEDURE — 00000160 ZZHCL STATISTIC H-SPECIAL HANDLING: Performed by: OTOLARYNGOLOGY

## 2019-05-06 PROCEDURE — 099V8ZZ DRAINAGE OF LEFT ETHMOID SINUS, VIA NATURAL OR ARTIFICIAL OPENING ENDOSCOPIC: ICD-10-PCS | Performed by: OTOLARYNGOLOGY

## 2019-05-06 PROCEDURE — 27210794 ZZH OR GENERAL SUPPLY STERILE: Performed by: OTOLARYNGOLOGY

## 2019-05-06 PROCEDURE — 87070 CULTURE OTHR SPECIMN AEROBIC: CPT | Performed by: OTOLARYNGOLOGY

## 2019-05-06 PROCEDURE — 85610 PROTHROMBIN TIME: CPT | Performed by: INTERNAL MEDICINE

## 2019-05-06 RX ORDER — MUPIROCIN 20 MG/G
OINTMENT TOPICAL
Status: DISCONTINUED
Start: 2019-05-06 | End: 2019-05-06 | Stop reason: HOSPADM

## 2019-05-06 RX ORDER — HYDROMORPHONE HYDROCHLORIDE 1 MG/ML
.3-.5 INJECTION, SOLUTION INTRAMUSCULAR; INTRAVENOUS; SUBCUTANEOUS EVERY 5 MIN PRN
Status: DISCONTINUED | OUTPATIENT
Start: 2019-05-06 | End: 2019-05-06

## 2019-05-06 RX ORDER — LIDOCAINE HYDROCHLORIDE 20 MG/ML
INJECTION, SOLUTION INFILTRATION; PERINEURAL PRN
Status: DISCONTINUED | OUTPATIENT
Start: 2019-05-06 | End: 2019-05-06

## 2019-05-06 RX ORDER — ONDANSETRON 4 MG/1
4 TABLET, ORALLY DISINTEGRATING ORAL EVERY 30 MIN PRN
Status: DISCONTINUED | OUTPATIENT
Start: 2019-05-06 | End: 2019-05-06

## 2019-05-06 RX ORDER — GLYCOPYRROLATE 0.2 MG/ML
INJECTION, SOLUTION INTRAMUSCULAR; INTRAVENOUS PRN
Status: DISCONTINUED | OUTPATIENT
Start: 2019-05-06 | End: 2019-05-06

## 2019-05-06 RX ORDER — ONDANSETRON 2 MG/ML
INJECTION INTRAMUSCULAR; INTRAVENOUS PRN
Status: DISCONTINUED | OUTPATIENT
Start: 2019-05-06 | End: 2019-05-06

## 2019-05-06 RX ORDER — NEOSTIGMINE METHYLSULFATE 1 MG/ML
VIAL (ML) INJECTION PRN
Status: DISCONTINUED | OUTPATIENT
Start: 2019-05-06 | End: 2019-05-06

## 2019-05-06 RX ORDER — NALOXONE HYDROCHLORIDE 0.4 MG/ML
.1-.4 INJECTION, SOLUTION INTRAMUSCULAR; INTRAVENOUS; SUBCUTANEOUS
Status: DISCONTINUED | OUTPATIENT
Start: 2019-05-06 | End: 2019-05-06

## 2019-05-06 RX ORDER — SODIUM CHLORIDE, SODIUM LACTATE, POTASSIUM CHLORIDE, CALCIUM CHLORIDE 600; 310; 30; 20 MG/100ML; MG/100ML; MG/100ML; MG/100ML
INJECTION, SOLUTION INTRAVENOUS CONTINUOUS
Status: DISCONTINUED | OUTPATIENT
Start: 2019-05-06 | End: 2019-05-06 | Stop reason: HOSPADM

## 2019-05-06 RX ORDER — CLINDAMYCIN PHOSPHATE 900 MG/50ML
INJECTION, SOLUTION INTRAVENOUS PRN
Status: DISCONTINUED | OUTPATIENT
Start: 2019-05-06 | End: 2019-05-06

## 2019-05-06 RX ORDER — OXYMETAZOLINE HYDROCHLORIDE 0.05 G/100ML
SPRAY NASAL PRN
Status: DISCONTINUED | OUTPATIENT
Start: 2019-05-06 | End: 2019-05-06 | Stop reason: HOSPADM

## 2019-05-06 RX ORDER — MUPIROCIN 20 MG/G
OINTMENT TOPICAL PRN
Status: DISCONTINUED | OUTPATIENT
Start: 2019-05-06 | End: 2019-05-06 | Stop reason: HOSPADM

## 2019-05-06 RX ORDER — PROPOFOL 10 MG/ML
INJECTION, EMULSION INTRAVENOUS PRN
Status: DISCONTINUED | OUTPATIENT
Start: 2019-05-06 | End: 2019-05-06

## 2019-05-06 RX ORDER — CLINDAMYCIN PHOSPHATE 900 MG/50ML
900 INJECTION, SOLUTION INTRAVENOUS EVERY 8 HOURS
Status: DISCONTINUED | OUTPATIENT
Start: 2019-05-06 | End: 2019-05-09

## 2019-05-06 RX ORDER — FENTANYL CITRATE 50 UG/ML
25-50 INJECTION, SOLUTION INTRAMUSCULAR; INTRAVENOUS
Status: DISCONTINUED | OUTPATIENT
Start: 2019-05-06 | End: 2019-05-06

## 2019-05-06 RX ORDER — FENTANYL CITRATE 50 UG/ML
INJECTION, SOLUTION INTRAMUSCULAR; INTRAVENOUS PRN
Status: DISCONTINUED | OUTPATIENT
Start: 2019-05-06 | End: 2019-05-06

## 2019-05-06 RX ORDER — ONDANSETRON 2 MG/ML
4 INJECTION INTRAMUSCULAR; INTRAVENOUS EVERY 30 MIN PRN
Status: DISCONTINUED | OUTPATIENT
Start: 2019-05-06 | End: 2019-05-06

## 2019-05-06 RX ORDER — SODIUM CHLORIDE, SODIUM LACTATE, POTASSIUM CHLORIDE, CALCIUM CHLORIDE 600; 310; 30; 20 MG/100ML; MG/100ML; MG/100ML; MG/100ML
INJECTION, SOLUTION INTRAVENOUS CONTINUOUS
Status: DISCONTINUED | OUTPATIENT
Start: 2019-05-06 | End: 2019-05-06

## 2019-05-06 RX ORDER — OXYMETAZOLINE HYDROCHLORIDE 0.05 G/100ML
SPRAY NASAL
Status: DISCONTINUED
Start: 2019-05-06 | End: 2019-05-06 | Stop reason: HOSPADM

## 2019-05-06 RX ADMIN — LIDOCAINE HYDROCHLORIDE 60 MG: 20 INJECTION, SOLUTION INFILTRATION; PERINEURAL at 12:08

## 2019-05-06 RX ADMIN — DILTIAZEM HYDROCHLORIDE 5 MG: 5 INJECTION INTRAVENOUS at 19:46

## 2019-05-06 RX ADMIN — ASCORBIC ACID, VITAMIN A PALMITATE, CHOLECALCIFEROL, THIAMINE HYDROCHLORIDE, RIBOFLAVIN-5 PHOSPHATE SODIUM, PYRIDOXINE HYDROCHLORIDE, NIACINAMIDE, DEXPANTHENOL, ALPHA-TOCOPHEROL ACETATE, VITAMIN K1, FOLIC ACID, BIOTIN, CYANOCOBALAMIN: 200; 3300; 200; 6; 3.6; 6; 40; 15; 10; 150; 600; 60; 5 INJECTION, SOLUTION INTRAVENOUS at 19:35

## 2019-05-06 RX ADMIN — INSULIN ASPART 2 UNITS: 100 INJECTION, SOLUTION INTRAVENOUS; SUBCUTANEOUS at 04:47

## 2019-05-06 RX ADMIN — INSULIN ASPART 1 UNITS: 100 INJECTION, SOLUTION INTRAVENOUS; SUBCUTANEOUS at 20:20

## 2019-05-06 RX ADMIN — GLYCOPYRROLATE 0.4 MG: 0.2 INJECTION, SOLUTION INTRAMUSCULAR; INTRAVENOUS at 13:32

## 2019-05-06 RX ADMIN — PANTOPRAZOLE SODIUM 40 MG: 40 INJECTION, POWDER, FOR SOLUTION INTRAVENOUS at 08:34

## 2019-05-06 RX ADMIN — POTASSIUM CHLORIDE 10 MEQ: 10 INJECTION, SOLUTION INTRAVENOUS at 13:00

## 2019-05-06 RX ADMIN — SODIUM CHLORIDE: 9 INJECTION, SOLUTION INTRAVENOUS at 19:42

## 2019-05-06 RX ADMIN — PHENYLEPHRINE HYDROCHLORIDE 100 MCG: 10 INJECTION, SOLUTION INTRAMUSCULAR; INTRAVENOUS; SUBCUTANEOUS at 12:37

## 2019-05-06 RX ADMIN — NEOSTIGMINE METHYLSULFATE 2.5 MG: 1 INJECTION, SOLUTION INTRAVENOUS at 13:32

## 2019-05-06 RX ADMIN — POTASSIUM CHLORIDE 10 MEQ: 10 INJECTION, SOLUTION INTRAVENOUS at 10:57

## 2019-05-06 RX ADMIN — PROPOFOL 80 MG: 10 INJECTION, EMULSION INTRAVENOUS at 12:08

## 2019-05-06 RX ADMIN — CLINDAMYCIN PHOSPHATE 900 MG: 900 INJECTION, SOLUTION INTRAVENOUS at 20:19

## 2019-05-06 RX ADMIN — DILTIAZEM HYDROCHLORIDE 5 MG: 5 INJECTION INTRAVENOUS at 16:36

## 2019-05-06 RX ADMIN — ONDANSETRON 4 MG: 2 INJECTION INTRAMUSCULAR; INTRAVENOUS at 13:27

## 2019-05-06 RX ADMIN — ROCURONIUM BROMIDE 10 MG: 10 INJECTION INTRAVENOUS at 12:54

## 2019-05-06 RX ADMIN — CLINDAMYCIN PHOSPHATE 900 MG: 18 INJECTION, SOLUTION INTRAVENOUS at 13:15

## 2019-05-06 RX ADMIN — POTASSIUM CHLORIDE 10 MEQ: 10 INJECTION, SOLUTION INTRAVENOUS at 06:53

## 2019-05-06 RX ADMIN — SUCCINYLCHOLINE CHLORIDE 100 MG: 20 INJECTION, SOLUTION INTRAMUSCULAR; INTRAVENOUS; PARENTERAL at 12:08

## 2019-05-06 RX ADMIN — INSULIN ASPART 2 UNITS: 100 INJECTION, SOLUTION INTRAVENOUS; SUBCUTANEOUS at 00:17

## 2019-05-06 RX ADMIN — POTASSIUM CHLORIDE 10 MEQ: 10 INJECTION, SOLUTION INTRAVENOUS at 08:43

## 2019-05-06 RX ADMIN — FENTANYL CITRATE 50 MCG: 50 INJECTION, SOLUTION INTRAMUSCULAR; INTRAVENOUS at 12:04

## 2019-05-06 RX ADMIN — DILTIAZEM HYDROCHLORIDE 5 MG: 5 INJECTION INTRAVENOUS at 04:47

## 2019-05-06 RX ADMIN — CLINDAMYCIN PHOSPHATE 900 MG: 900 INJECTION, SOLUTION INTRAVENOUS at 04:47

## 2019-05-06 RX ADMIN — DILTIAZEM HYDROCHLORIDE 5 MG: 5 INJECTION INTRAVENOUS at 08:37

## 2019-05-06 RX ADMIN — INSULIN ASPART 2 UNITS: 100 INJECTION, SOLUTION INTRAVENOUS; SUBCUTANEOUS at 08:36

## 2019-05-06 RX ADMIN — ROCURONIUM BROMIDE 15 MG: 10 INJECTION INTRAVENOUS at 12:25

## 2019-05-06 RX ADMIN — Medication 1 SPRAY: at 20:20

## 2019-05-06 ASSESSMENT — ACTIVITIES OF DAILY LIVING (ADL)
ADLS_ACUITY_SCORE: 22
ADLS_ACUITY_SCORE: 23
ADLS_ACUITY_SCORE: 23
ADLS_ACUITY_SCORE: 27
ADLS_ACUITY_SCORE: 27

## 2019-05-06 ASSESSMENT — ENCOUNTER SYMPTOMS
DYSRHYTHMIAS: 1
SEIZURES: 0

## 2019-05-06 NOTE — ANESTHESIA CARE TRANSFER NOTE
Patient: Juliana Leal    Procedure(s):  DIRECT LARYNGOSCOPY AND BIOPSY,  ENDOSCOPIC SINUS SURGERY. ATTEMPTED ASPIRATION OF RIGHT PHARYNX  LARYNGOSCOPY, WITH BIOPSY    Diagnosis: SORE THROAT.  Diagnosis Additional Information: No value filed.    Anesthesia Type:   General, ETT     Note:  Airway :Face Mask  Patient transferred to:PACU  Comments: Neuromuscular blockade reversed after TOF 4/4, spontaneous respirations, adequate tidal volumes, followed commands to voice, oropharynx suctioned with soft flexible catheter, extubated atraumatically, extubated with suction, airway patent after extubation.  Oxygen via facemask at 6 liters per minute to PACU. Oxygen tubing connected to wall O2 in PACU, SpO2, NiBP, and EKG monitors and alarms on and functioning, [place] Hugger warmer connected to patient gown, report on patient's clinical status given to PACU RN, RN questions answered. Handoff Report: Identifed the Patient, Identified the Reponsible Provider, Reviewed the pertinent medical history, Discussed the surgical course, Reviewed Intra-OP anesthesia mangement and issues during anesthesia, Set expectations for post-procedure period and Allowed opportunity for questions and acknowledgement of understanding      Vitals: (Last set prior to Anesthesia Care Transfer)    CRNA VITALS  5/6/2019 1315 - 5/6/2019 1355      5/6/2019             Pulse:  90    SpO2:  100 %    Resp Rate (set):  10                Electronically Signed By: ARIEL Mendoza CRNA  May 6, 2019  1:55 PM

## 2019-05-06 NOTE — PROVIDER NOTIFICATION
MARTIN Keith @ 17:11 on 5/6/19--Okay to resume parenteral nutrition post-op. Okay to increase to step 2 rate of 45 ml/hr.  Akua Salter [facility]

## 2019-05-06 NOTE — PLAN OF CARE
VSS.  TPN running, lipids finished around 0600.  Turned in bed but moves herself in bed.  Throat more swollen than previous night and more reddened , decadron given and IV abx.  Plan for surgery with ENT today.  Pt aware.  Very hard of hearing

## 2019-05-06 NOTE — PLAN OF CARE
SR with PAC's  VSS.  A/O x3    NPO but patient swabs her mouth often and wipes lips with ice chips.   PICC line placed this AM.   TPN and Lipids started.   ENT here to consent patient for surgery on Monday.  Antibiotics ordered and one dose of Decadron given.   Dilaudid x1 for mouth /throat pain.  Back of throat swollen more than yesterday- red and shifted to left.   Plan surgery tomorrow.  Tx. To [address] not done because patient too complex and continues on IV Cardizem because of NPO status.   Family at bedside and called to check in.

## 2019-05-06 NOTE — ANESTHESIA PREPROCEDURE EVALUATION
Anesthesia Pre-Procedure Evaluation    Patient: Juliana Leal   MRN: [id number] : 2/10/1923          Preoperative Diagnosis: SORE THROAT.    Procedure(s):  DIRECT LARYNGOSCOPY AND BIOPSY,  ENDOSCOPIC SINUS SURGERY.  LARYNGOSCOPY, WITH BIOPSY    Past Medical History:   Diagnosis Date     Angioedema 2017    Due to ACE      Arthritis      CKD (chronic kidney disease) stage 3, GFR 30-59 ml/min (H) 2018     Gout      Headaches      Hives 2017    Dr KHOURY, avoid BBLK due to this condition     Hypertension      Thyroid disease      Past Surgical History:   Procedure Laterality Date     COLECTOMY      diverticulitis     HC LIGATION OR BIOPSY TEMPORAL ARTERY  5/10/2012    Procedure:BIOPSY ARTERY TEMPORAL; Surgeon:RUI LOREDO; Location: OR     INCISE FINGER TENDON SHEATH       PHACOEMULSIFICATION CLEAR CORNEA WITH STANDARD INTRAOCULAR LENS IMPLANT  2012    Procedure:PHACOEMULSIFICATION CLEAR CORNEA WITH STANDARD INTRAOCULAR LENS IMPLANT; RIGHT PHACOEMULSIFICATION CLEAR CORNEA WITH STANDARD INTRAOCULAR LENS IMPLANT; Surgeon:MANJIT LYONS; Location: [facility]     PHACOEMULSIFICATION CLEAR CORNEA WITH STANDARD INTRAOCULAR LENS IMPLANT  3/14/2012    Procedure:PHACOEMULSIFICATION CLEAR CORNEA WITH STANDARD INTRAOCULAR LENS IMPLANT; LEFT PHACOEMULSIFICATION CLEAR CORNEA WITH STANDARD INTRAOCULAR LENS IMPLANT ; Surgeon:MANJIT LYONS; Location: [facility]     REPAIR ATRIAL SEPTAL DEFECT         Anesthesia Evaluation     .             ROS/MED HX    ENT/Pulmonary: Comment: Sinus disease and throat swelling      Neurologic:      (-) seizures and CVA   Cardiovascular:     (+) Dyslipidemia, hypertension----. : . . . :. dysrhythmias a-fib, .       METS/Exercise Tolerance:     Hematologic:         Musculoskeletal:         GI/Hepatic:     (+) GERD       Renal/Genitourinary:     (+) chronic renal disease, type: CRI and ARF,       Endo:     (+) thyroid problem hypothyroidism, .      Psychiatric:         Infectious Disease:        "  Malignancy:         Other:                          Physical Exam      Airway   Mallampati: III  TM distance: >3 FB  Neck ROM: full    Dental   (+) chipped and caps    Cardiovascular   Rhythm and rate: regular      Pulmonary    breath sounds clear to auscultation            Lab Results   Component Value Date    WBC 14.7 (H) 05/05/2019    HGB 8.3 (L) 05/05/2019    HCT 25.4 (L) 05/05/2019     05/05/2019    .0 (H) 05/03/2019    SED 16 06/04/2018     05/06/2019    POTASSIUM 3.2 (L) 05/06/2019    CHLORIDE 112 (H) 05/06/2019    CO2 23 05/06/2019    BUN 43 (H) 05/06/2019    CR 0.93 05/06/2019     (H) 05/06/2019    DINORAH 9.7 05/06/2019    PHOS 2.5 05/06/2019    MAG 1.9 05/06/2019    ALBUMIN 2.2 (L) 05/06/2019    PROTTOTAL 5.5 (L) 05/06/2019    ALT 11 05/06/2019    AST 9 05/06/2019    ALKPHOS 53 05/06/2019    BILITOTAL 0.6 05/06/2019    LIPASE 51 05/10/2012    AMYLASE 70 05/09/2012    PTT 34 04/12/2015    INR 1.19 (H) 05/06/2019    TSH 1.22 05/03/2019    T4 1.21 04/12/2018    T3 79 04/12/2018       Preop Vitals  BP Readings from Last 3 Encounters:   05/06/19 140/84   05/03/19 102/68   04/29/19 133/69    Pulse Readings from Last 3 Encounters:   05/04/19 73   05/03/19 106   04/29/19 88      Resp Readings from Last 3 Encounters:   05/06/19 16   05/03/19 20   04/29/19 18    SpO2 Readings from Last 3 Encounters:   05/06/19 96%   05/03/19 98%   04/29/19 96%      Temp Readings from Last 1 Encounters:   05/06/19 36.8  C (98.2  F) (Temporal)    Ht Readings from Last 1 Encounters:   04/29/19 1.651 m (5' 5\")      Wt Readings from Last 1 Encounters:   05/05/19 50.1 kg (110 lb 7.2 oz)    Estimated body mass index is 18.38 kg/m  as calculated from the following:    Height as of 4/29/19: 1.651 m (5' 5\").    Weight as of this encounter: 50.1 kg (110 lb 7.2 oz).       Anesthesia Plan      History & Physical Review  History and physical reviewed and following examination; no interval change.    ASA Status:  3 .  "   NPO Status:  > 8 hours    Plan for General and ETT     Tube preference per surgeon  Size 6      Postoperative Care      Consents  Anesthetic plan, risks, benefits and alternatives discussed with:  Patient..                 Sumaya Caicedo

## 2019-05-06 NOTE — OP NOTE
preop dx  Chronic sinusitis, pharyngeal swelling, hyperplasia of tissue of Waldeyer's ring  Post op dx same  Procedure :  Direct laryngoscopy and biopsy of lingual tonsil, attempted needle aspiration right pharynx, bilateral maxillary and ethmoid endoscopic sinus surgery  Surgeon:  Sandra GOLDEN  EBL 40 ml  Findings: no aspiratable fluid in right pharynx, copious secretions bilateral maxillary and ethmoid sinuses, Mildly hyperplastic lingual tonsil tissue,     Tissue sent: right lingual tonsil biopsy, swab of secretions right maxillary sinus, sinus contents/tissue  Complications: none

## 2019-05-06 NOTE — PROGRESS NOTES
[facility]    Medicine Progress Note - Hospitalist Service       Date of Admission:  5/3/2019  Assessment & Plan     This is a  89 year old female with a history of atrial fibrillation, recurrent sinusitis since 8/ 2018, throat pain with difficulty swallowing and weight loss had presented to ENT clinic for follow-up where she was found to have a heart rate in 130s, and so was admitted with atrial fibrillation with rapid ventricular response and acute renal failure.     Subacute sinusitis  Throat pain  Patient initially admitted 4/11-4/16 due to recurrent sinusitis since 08/2018. Has had multiple course of abx w/o improvement. Has been followed by ENT (Dr. Irene). CT scan of sinuses on 4/11 found severe sinusitis. Was treated with short course of steroids, IV vancomycin and ceftriaxone, then transitioned to doxycycline upon discharge to TCU (completed 4/26).  Symptoms continued to get worse with sore throat.  Has had strep/ Candida negative in the past with growth of normal sera in the culture  - CT soft tissue neck on 5/3/2019 shows continued interval increase in prominence of soft tissue of Waldeyer's ring including the adenoids and palatine tonsils bilaterally-?  Inflammation versus malignancy   -ENT following, appreciate input.  -  Status post direct laryngoscopy and biopsy of lingual tonsil with attempted needle aspiration of right pharynx, bilateral maxillary and ethmoid endoscopic sinus surgery -5/6/2019  -No aspirate able fluid on right pharynx per Dr. Irene(ENT), copious secretions of bilateral maxillary and ethmoid sinuses with mildly hyperplastic lingual tonsil tissue, right lingual tonsil biopsy sent and swab of secretions right maxillary sinus sent for pathology.  Intraoperative cultures have been sent.  -Saline nasal spray twice a day and IV clindamycin 3 times daily  per ENT recommendation  - patient is not tolerating oral medications, so continue with IV conversion of her home PTA  medication.  Also started on TPN due to multiple days of poor p.o. Intake and severe malnutrition ( see below).    Atrial fibrillation with rapid ventricular response.    Likely due to patient unable to take her diltiazem for 2 days prior to admission due to difficulty swallowing.   -Converted back to sinus rhythm before starting on diltiazem drip, continue to monitor on  IV scheduled Cardizem -continue to monitor on telemetry.      Acute kidney injury.    Likely prerenal from poor p.o. intake.  Baseline creatinine appears to be around 1.0.    On admission it was 1.33.   Improved with hydration  Continue hydration, monitor, avoid nephrotoxins    Severe malnutrition  In the context of acute illness or injury with more than 10% of weight loss in the last 6 months with less than 50% of intake in the last 5 days, moderate depletion of upper arm fat loss and muscle loss in the temporal region  Patient was unable to tolerate oral intake due to significant pharyngeal swelling and throat pain, so initiated on TPN appreciate nutrition service consultation  -Status post surgery hopefully her oral intake will improve, will consult speech therapy for diet recommendation.  Once oral adequate intake may be able to wean down TPN.       Pain in left lower extremity   Lower extremity ultrasound negative for any evidence of DVT, symptomatic management    Hypothyroidism.    Continue IV levothyroxine.     GERD.     Continue IV Protonix.      Diet: Regular Diet Adult    DVT Prophylaxis: Pneumatic Compression Devices  Castillo Catheter: not present  Code Status: DNR/DNI      Disposition Plan   Expected discharge: 2 - 3 days, recommended to Prior living condition versus TCU depending on evaluation by PT OT once Dysphagia evaluation completed and patient starting to take oral intake.  Entered: Elsa Keith MD 05/06/2019, 3:18 PM       The patient's care was discussed with the Attending Physician, Dr. Irene, Bedside Nurse, Patient and  Patient's Family.    Elsa Keith MD  Hospitalist Service  [facility]    ______________________________________________________________________    Interval History   Had evaluated the patient earlier today before she went for sinus surgery.  Denied any new complaints.  Postop discussed with Dr. Irene who recommended continuing IV clindamycin 900 mg 3 times daily and saline nasal spray twice daily.  Intraoperative cultures have been sent, will wait for results.    Data reviewed today: I reviewed all medications, new labs and imaging results over the last 24 hours. I personally reviewed no images or EKG's today.    Physical Exam   Vital Signs: Temp: 97.3  F (36.3  C) Temp src: Temporal BP: 123/65 Pulse: 60 Heart Rate: 62 Resp: 11 SpO2: 95 % O2 Device: Nasal cannula Oxygen Delivery: 3 LPM  Weight: 110 lbs 7.21 oz  Exam:  Constitutional: Awake, alert and no distress. Appears comfortable  Head: Normocephalic. No masses, lesions, tenderness or abnormalities  ENT:Swelling on the pharynx with erythema   Cardiovascular: RRR.  2+ murmurs, no rubs or JVD  Respiratory:normal WOB,b/l equal air entry, no wheezes or crackles   Gastrointestinal: Abdomen soft, non-tender. BS normal. No masses, organomegaly  : Deferred   extremities :no edema however tender left calf  , no clubbing or cyanosis    Skin: Warm and dry, no rash.      Data   Recent Labs   Lab 05/06/19  0455 05/05/19  0631 05/04/19  0633 05/03/19  1630   WBC  --  14.7* 9.5 11.8*   HGB  --  8.3* 9.0* 9.3*   MCV  --  91 91 91   PLT  --  214 215 206   INR 1.19* 1.22*  --   --     145* 144 140   POTASSIUM 3.2* 4.1 3.9 3.7   CHLORIDE 112* 114* 111* 106   CO2 23 23 22 25   BUN 43* 41* 34* 32*   CR 0.93 0.95 1.15* 1.33*   ANIONGAP 9 8 11 9   DINORAH 9.7 10.0 10.4* 11.6*   * 124* 132* 158*   ALBUMIN 2.2* 2.1*  --   --    PROTTOTAL 5.5* 5.5*  --   --    BILITOTAL 0.6 0.6  --   --    ALKPHOS 53 54  --   --    ALT 11 9  --   --    AST 9 10  --   --       No results found for this or any previous visit (from the past 24 hour(s)).  Medications     IV fluid REPLACEMENT ONLY       lactated ringers       Reason anticoagulant not prescribed for atrial fibrillation       sodium chloride 55 mL/hr (05/05/19 0916)       clindamycin  900 mg Intravenous Q8H     diltiazem  5 mg Intravenous Q4H     gabapentin  100 mg Oral At Bedtime     insulin aspart  1-6 Units Subcutaneous Q4H     levothyroxine  37.5 mcg Intravenous Daily     lipids  250 mL Intravenous Once per day on Sun Tue Thu     pantoprazole (PROTONIX) IV  40 mg Intravenous Daily with breakfast     sodium chloride  1 spray Both Nostrils BID     sodium chloride (PF)  10 mL Intracatheter Q8H

## 2019-05-06 NOTE — PROVIDER NOTIFICATION
Paged Dr. Irene about TPN/Lipids. OK to restart.     Diet is listed as regular but pt should not eat anything solid yet, only what is tolerated/liquids.

## 2019-05-06 NOTE — PROGRESS NOTES
Received report from PACU, pt arrived to [facility] @ 15:15. Settled pt, VSS, A/O x 4, 3 L O2 NC, refused capno, cont pulse ox, changed dressing to nasal area. Relayed PACU report to evening nurse.

## 2019-05-06 NOTE — PLAN OF CARE
SLP: Hold swallow evaluation until 5/7. Pt had recently got to her room, family members are present, pt not interested in PO intake this afternoon d/t fatigue and discomfort. Will follow up to complete evaluation 5/7 as pt is able.

## 2019-05-07 ENCOUNTER — APPOINTMENT (OUTPATIENT)
Dept: SPEECH THERAPY | Facility: CLINIC | Age: 84
DRG: 840 | End: 2019-05-07
Attending: INTERNAL MEDICINE
Payer: COMMERCIAL

## 2019-05-07 LAB
ANION GAP SERPL CALCULATED.3IONS-SCNC: 7 MMOL/L (ref 3–14)
BUN SERPL-MCNC: 49 MG/DL (ref 7–30)
CALCIUM SERPL-MCNC: 9.1 MG/DL (ref 8.5–10.1)
CHLORIDE SERPL-SCNC: 113 MMOL/L (ref 94–109)
CO2 SERPL-SCNC: 25 MMOL/L (ref 20–32)
COPATH REPORT: NORMAL
CREAT SERPL-MCNC: 0.99 MG/DL (ref 0.52–1.04)
GFR SERPL CREATININE-BSD FRML MDRD: 48 ML/MIN/{1.73_M2}
GLUCOSE BLDC GLUCOMTR-MCNC: 143 MG/DL (ref 70–99)
GLUCOSE BLDC GLUCOMTR-MCNC: 155 MG/DL (ref 70–99)
GLUCOSE BLDC GLUCOMTR-MCNC: 158 MG/DL (ref 70–99)
GLUCOSE BLDC GLUCOMTR-MCNC: 161 MG/DL (ref 70–99)
GLUCOSE BLDC GLUCOMTR-MCNC: 186 MG/DL (ref 70–99)
GLUCOSE BLDC GLUCOMTR-MCNC: 221 MG/DL (ref 70–99)
GLUCOSE SERPL-MCNC: 179 MG/DL (ref 70–99)
MAGNESIUM SERPL-MCNC: 1.8 MG/DL (ref 1.6–2.3)
PHOSPHATE SERPL-MCNC: 3.1 MG/DL (ref 2.5–4.5)
POTASSIUM SERPL-SCNC: 3.5 MMOL/L (ref 3.4–5.3)
SODIUM SERPL-SCNC: 145 MMOL/L (ref 133–144)

## 2019-05-07 PROCEDURE — 25000128 H RX IP 250 OP 636: Performed by: INTERNAL MEDICINE

## 2019-05-07 PROCEDURE — 25000132 ZZH RX MED GY IP 250 OP 250 PS 637: Performed by: INTERNAL MEDICINE

## 2019-05-07 PROCEDURE — 25800029 ZZH RX IP 258 OP 250

## 2019-05-07 PROCEDURE — 25000125 ZZHC RX 250: Performed by: INTERNAL MEDICINE

## 2019-05-07 PROCEDURE — 83735 ASSAY OF MAGNESIUM: CPT | Performed by: INTERNAL MEDICINE

## 2019-05-07 PROCEDURE — 92526 ORAL FUNCTION THERAPY: CPT | Mod: GN

## 2019-05-07 PROCEDURE — 25000125 ZZHC RX 250

## 2019-05-07 PROCEDURE — 92610 EVALUATE SWALLOWING FUNCTION: CPT | Mod: GN

## 2019-05-07 PROCEDURE — 40000239 ZZH STATISTIC VAT ROUNDS

## 2019-05-07 PROCEDURE — 80048 BASIC METABOLIC PNL TOTAL CA: CPT | Performed by: INTERNAL MEDICINE

## 2019-05-07 PROCEDURE — 00000146 ZZHCL STATISTIC GLUCOSE BY METER IP

## 2019-05-07 PROCEDURE — 12000000 ZZH R&B MED SURG/OB

## 2019-05-07 PROCEDURE — C9113 INJ PANTOPRAZOLE SODIUM, VIA: HCPCS | Performed by: INTERNAL MEDICINE

## 2019-05-07 PROCEDURE — 84100 ASSAY OF PHOSPHORUS: CPT | Performed by: INTERNAL MEDICINE

## 2019-05-07 PROCEDURE — 99232 SBSQ HOSP IP/OBS MODERATE 35: CPT | Performed by: INTERNAL MEDICINE

## 2019-05-07 RX ORDER — SALIVA STIMULANT COMB. NO.3
2 SPRAY, NON-AEROSOL (ML) MUCOUS MEMBRANE 4 TIMES DAILY PRN
Status: DISCONTINUED | OUTPATIENT
Start: 2019-05-07 | End: 2019-05-13 | Stop reason: HOSPADM

## 2019-05-07 RX ORDER — SODIUM CHLORIDE 450 MG/100ML
INJECTION, SOLUTION INTRAVENOUS CONTINUOUS
Status: DISCONTINUED | OUTPATIENT
Start: 2019-05-07 | End: 2019-05-11

## 2019-05-07 RX ORDER — SODIUM CHLORIDE 450 MG/100ML
INJECTION, SOLUTION INTRAVENOUS CONTINUOUS
Status: ACTIVE | OUTPATIENT
Start: 2019-05-07 | End: 2019-05-07

## 2019-05-07 RX ADMIN — INSULIN ASPART 1 UNITS: 100 INJECTION, SOLUTION INTRAVENOUS; SUBCUTANEOUS at 09:28

## 2019-05-07 RX ADMIN — DILTIAZEM HYDROCHLORIDE 5 MG: 5 INJECTION INTRAVENOUS at 17:19

## 2019-05-07 RX ADMIN — CLINDAMYCIN PHOSPHATE 900 MG: 900 INJECTION, SOLUTION INTRAVENOUS at 05:20

## 2019-05-07 RX ADMIN — DILTIAZEM HYDROCHLORIDE 5 MG: 5 INJECTION INTRAVENOUS at 09:14

## 2019-05-07 RX ADMIN — HYDROMORPHONE HYDROCHLORIDE 0.2 MG: 1 INJECTION, SOLUTION INTRAMUSCULAR; INTRAVENOUS; SUBCUTANEOUS at 22:51

## 2019-05-07 RX ADMIN — INSULIN ASPART 2 UNITS: 100 INJECTION, SOLUTION INTRAVENOUS; SUBCUTANEOUS at 05:17

## 2019-05-07 RX ADMIN — CLINDAMYCIN PHOSPHATE 900 MG: 900 INJECTION, SOLUTION INTRAVENOUS at 23:08

## 2019-05-07 RX ADMIN — SODIUM CHLORIDE: 4.5 INJECTION, SOLUTION INTRAVENOUS at 12:50

## 2019-05-07 RX ADMIN — CLINDAMYCIN PHOSPHATE 900 MG: 900 INJECTION, SOLUTION INTRAVENOUS at 12:28

## 2019-05-07 RX ADMIN — HYDROMORPHONE HYDROCHLORIDE 0.2 MG: 1 INJECTION, SOLUTION INTRAMUSCULAR; INTRAVENOUS; SUBCUTANEOUS at 17:19

## 2019-05-07 RX ADMIN — Medication 1 SPRAY: at 23:14

## 2019-05-07 RX ADMIN — DILTIAZEM HYDROCHLORIDE 5 MG: 5 INJECTION INTRAVENOUS at 05:18

## 2019-05-07 RX ADMIN — DILTIAZEM HYDROCHLORIDE 5 MG: 5 INJECTION INTRAVENOUS at 12:28

## 2019-05-07 RX ADMIN — DILTIAZEM HYDROCHLORIDE 5 MG: 5 INJECTION INTRAVENOUS at 00:36

## 2019-05-07 RX ADMIN — PANTOPRAZOLE SODIUM 40 MG: 40 INJECTION, POWDER, FOR SOLUTION INTRAVENOUS at 09:14

## 2019-05-07 RX ADMIN — LEVOTHYROXINE SODIUM ANHYDROUS 37.5 MCG: 100 INJECTION, POWDER, LYOPHILIZED, FOR SOLUTION INTRAVENOUS at 14:02

## 2019-05-07 RX ADMIN — INSULIN ASPART 1 UNITS: 100 INJECTION, SOLUTION INTRAVENOUS; SUBCUTANEOUS at 00:49

## 2019-05-07 RX ADMIN — I.V. FAT EMULSION 250 ML: 20 EMULSION INTRAVENOUS at 22:54

## 2019-05-07 ASSESSMENT — ACTIVITIES OF DAILY LIVING (ADL)
ADLS_ACUITY_SCORE: 24
TRANSFERRING: 1-->ASSISTIVE EQUIPMENT
COGNITION: 0 - NO COGNITION ISSUES REPORTED
AMBULATION: 1-->ASSISTIVE EQUIPMENT
DRESS: 1-->ASSISTIVE EQUIPMENT
RETIRED_COMMUNICATION: 0-->UNDERSTANDS/COMMUNICATES WITHOUT DIFFICULTY
SWALLOWING: 2-->DIFFICULTY SWALLOWING LIQUIDS
TOILETING: 1-->ASSISTIVE EQUIPMENT
FALL_HISTORY_WITHIN_LAST_SIX_MONTHS: NO
ADLS_ACUITY_SCORE: 27
ADLS_ACUITY_SCORE: 27
RETIRED_EATING: 0-->INDEPENDENT
ADLS_ACUITY_SCORE: 27
BATHING: 1-->ASSISTIVE EQUIPMENT
ADLS_ACUITY_SCORE: 27
ADLS_ACUITY_SCORE: 24
WHICH_OF_THE_ABOVE_FUNCTIONAL_RISKS_HAD_A_RECENT_ONSET_OR_CHANGE?: SWALLOWING;EATING

## 2019-05-07 NOTE — PROGRESS NOTES
CLINICAL NUTRITION SERVICES - REASSESSMENT NOTE      Recommendations Ordered by Registered Dietitian (RD): D15 AA5 at 65 mL/hr + Lipid 250 mL daily to provide:  1608 kcal (33 kcal/kg), 78 g protein (1.6 g/kg), 234 g CHO, 31% fat    Malnutrition: (5/4)  % Weight Loss:  > 10% in 6 months (severe malnutrition)  % Intake:  </= 50% for >/= 5 days (severe malnutrition)  Subcutaneous Fat Loss:  Upper arm region moderate depletion   Muscle Loss:  Temporal region moderate depletion, Clavicle bone region moderate depletion, Patellar region moderate depletion and Posterior calf region moderate depletion  Fluid Retention:  None noted     Malnutrition Diagnosis: Severe malnutrition  In Context of:  Acute illness or injury       EVALUATION OF PROGRESS TOWARD GOALS   Diet:  Regular diet per Epic but noted MD documentation indicates patient should be NPO until SLP eval completed    Nutrition Support:  Patient started on TPN 5/5, was turned off yesterday for surgery but resumed last night (at step #2), and is currently running as follows ~    Nutrition Support Parenteral:  Type of Access: PICC  Parenteral Frequency:  Continuous  Parenteral Regimen: D15 AA5 at 45 mL/hr + Lipid 250  ML 20% every other day   Total Parenteral Provisions: 1071 kcal (22 kcal/kg), 54 g protein (1.2 g/kg), 162 g CHO, 23% fat      Plan was for patient to increase TPN to goal tonight (D15 AA5 at 60 mL/hr + Lipid 250 mL 5x per week = 1379 kcal (28 kcal/kg), 72 g protein (1.5 g/kg), 216 g CHO, 26% fat       Intake/Tolerance:    Na 145 (H)  -221 - Medium sliding scale insulin   I/O 1040/670, weight 51.6 kg (up 6.2 kg from lowest documented weight)      ASSESSED NUTRITION NEEDS: (Re-estimated needs using 49.1 kg (suspecting 45.4 kg was a reported weight):  Dosing Weight:  49.1 kg   Estimated Energy Needs: 6764-1235 kcals (30-35 Kcal/Kg)  Justification: repletion  Estimated Protein Needs: 70-90 grams protein (1.5-1.8 g pro/Kg)  Justification: repletion,  post-op       NEW FINDINGS:   5/6:  Surgery --> Direct laryngoscopy and biopsy of lingual tonsil, attempted needle aspiration right pharynx, bilateral maxillary and ethmoid endoscopic sinus surgery     Previous Goals (5/4):   Pt to order at least 2 meals per day and to consume at least 50% of meals ordered and oral nutrition supplements   Evaluation: Not met    Previous Nutrition Diagnosis (5/4):   Inadequate oral intake related to difficulty and pain with swallowing as evidenced by wt loss of 8.1 kg (14.2%) over the last 5 months and BMI of 18.01 kg/m^2  Evaluation: Declining      CURRENT NUTRITION DIAGNOSIS  Inadequate parenteral nutrition infusion related to TPN increased to step #2 with plans to increase to goal as evidenced by meeting ~70% needs from current regimen     INTERVENTIONS  Recommendations / Nutrition Prescription  Increase TPN to modified goal as follows ~  D15 AA5 at 65 mL/hr + Lipid 250 mL daily to provide:  1608 kcal (33 kcal/kg), 78 g protein (1.6 g/kg), 234 g CHO, 31% fat     Implementation  PN Composition:  Increased TPN to goal as above   Collaboration and Referral of Nutrition care:  TPN changes discussed with Pharmacist - she will also increase to High sliding scale insulin.     Goals  TPN/Lipid will meet % needs while NPO/intake minimal     MONITORING AND EVALUATION:  Progress towards goals will be monitored and evaluated per protocol and Practice Guidelines    Sandra Kearney RD, LD, CNSC   Clinical Dietitian - [facility]

## 2019-05-07 NOTE — PROGRESS NOTES
[facility]    Medicine Progress Note - Hospitalist Service       Date of Admission:  5/3/2019  Assessment & Plan     This is a  89 year old female with a history of atrial fibrillation, recurrent sinusitis since 8/ 2018, throat pain with difficulty swallowing and weight loss had presented to ENT clinic for follow-up where she was found to have a heart rate in 130s, and so was admitted with atrial fibrillation with rapid ventricular response and acute renal failure.     Subacute sinusitis  Throat pain  Patient initially admitted 4/11-4/16 due to recurrent sinusitis since 08/2018. Has had multiple course of abx w/o improvement. Has been followed by ENT (Dr. Irene). CT scan of sinuses on 4/11 found severe sinusitis. Was treated with short course of steroids, IV vancomycin and ceftriaxone, then transitioned to doxycycline upon discharge to TCU (completed 4/26).  Symptoms continued to get worse with sore throat.  Has had strep/ Candida negative in the past with growth of normal sera in the culture  - CT soft tissue neck on 5/3/2019 shows continued interval increase in prominence of soft tissue of Waldeyer's ring including the adenoids and palatine tonsils bilaterally-?  Inflammation versus malignancy   -ENT following, appreciate input.  -  Status post direct laryngoscopy and biopsy of lingual tonsil with attempted needle aspiration of right pharynx, bilateral maxillary and ethmoid endoscopic sinus surgery -5/6/2019  -No aspirate able fluid on right pharynx per Dr. Irene(ENT), copious secretions of bilateral maxillary and ethmoid sinuses with mildly hyperplastic lingual tonsil tissue, right lingual tonsil biopsy sent and swab of secretions right maxillary sinus sent for pathology.  Intraoperative cultures have been sent.  -Saline nasal spray twice a day and IV clindamycin 3 times daily  per ENT recommendation  - patient was unable to take, so continue with IV conversion of her home PTA medication.  Also  started on TPN due to multiple days of poor p.o. Intake and severe malnutrition ( see below).  Resume diet once evaluated by speech and gradually wean down TPN once adequate oral intake    Atrial fibrillation with rapid ventricular response at presentation.    Likely due to patient unable to take her diltiazem for 2 days prior to admission due to difficulty swallowing.   -Converted back to sinus rhythm before starting on diltiazem drip, continue to monitor on  IV scheduled Cardizem -continue to monitor on telemetry.      Acute kidney injury.    Likely prerenal from poor p.o. intake.  Baseline creatinine appears to be around 1.0.    On admission it was 1.33.   Improved with hydration  Continue gentle hydration, monitor, avoid nephrotoxins, patient currently is euvolemic even though her weight up by 13 pounds since admission    Hypernatremia  -Mild, change IV fluid to half-normal saline at a decreased rate since started on TPN    Severe malnutrition  In the context of acute illness or injury with more than 10% of weight loss in the last 6 months with less than 50% of intake in the last 5 days, moderate depletion of upper arm fat loss and muscle loss in the temporal region  Patient was unable to tolerate oral intake due to significant pharyngeal swelling and throat pain, so initiated on TPN appreciate nutrition service consultation  -Status post surgery hopefully her oral intake will improve,  speech therapy consultation pending for diet recommendation.  Once oral adequate intake may be able to wean down TPN.       Pain in left lower extremity   Lower extremity ultrasound negative for any evidence of DVT, symptomatic management    Hypothyroidism.    Continue IV levothyroxine.     GERD.     Continue IV Protonix.      Diet: parenteral nutrition - Clinimix E  parenteral nutrition - Clinimix E  Combination Diet Full Liquid Diet; Nectar Thickened Liquids (pre-thickened or use instant food thickener)    DVT Prophylaxis:  Pneumatic Compression Devices  Castillo Catheter: not present  Code Status: DNR/DNI      Disposition Plan   Expected discharge: 2 - 3 days, recommended to Prior living condition versus TCU depending on evaluation by PT OT once Dysphagia evaluation completed and patient starting to take oral intake.  Entered: Elsa Keith MD 05/07/2019, 2:09 PM       The patient's care was discussed with the Attending Physician, Dr. Irene, Bedside Nurse, Patient and Patient's Family.    Elsa Keith MD  Hospitalist Service  [facility]    ______________________________________________________________________    Interval History   Patient denies any new complaints.  Denies any throat pain.  Was trying some Jell-O earlier this morning and denied any problems with swallowing.    Data reviewed today: I reviewed all medications, new labs and imaging results over the last 24 hours. I personally reviewed no images or EKG's today.    Physical Exam   Vital Signs: Temp: 97.4  F (36.3  C) Temp src: Axillary BP: 120/61 Pulse: 71 Heart Rate: 74 Resp: 14 SpO2: 98 % O2 Device: None (Room air) Oxygen Delivery: 1 LPM  Weight: 113 lbs 12.12 oz  Exam:  Constitutional: Awake, alert and no distress. Appears comfortable  Head: Normocephalic. No masses, lesions, tenderness or abnormalities  ENT: Nasal drip pad in place, left nare with scant fresh blood, less right palatal/peritonsillar swelling compared to preoperative and dried blood in palate  Cardiovascular: RRR.  2+ murmurs, no rubs or JVD  Respiratory:normal WOB,b/l equal air entry, no wheezes or crackles   Gastrointestinal: Abdomen soft, non-tender. BS normal. No masses, organomegaly  : Deferred   extremities :no edema however tender left calf  , no clubbing or cyanosis    Skin: Warm and dry, no rash.      Data   Recent Labs   Lab 05/07/19  0600 05/06/19  1645 05/06/19  0455 05/05/19  0631 05/04/19  0633 05/03/19  1630   WBC  --   --   --  14.7* 9.5 11.8*   HGB  --   --   --  8.3*  9.0* 9.3*   MCV  --   --   --  91 91 91   PLT  --   --   --  214 215 206   INR  --   --  1.19* 1.22*  --   --    *  --  144 145* 144 140   POTASSIUM 3.5 4.1 3.2* 4.1 3.9 3.7   CHLORIDE 113*  --  112* 114* 111* 106   CO2 25  --  23 23 22 25   BUN 49*  --  43* 41* 34* 32*   CR 0.99  --  0.93 0.95 1.15* 1.33*   ANIONGAP 7  --  9 8 11 9   DINORAH 9.1  --  9.7 10.0 10.4* 11.6*   *  --  217* 124* 132* 158*   ALBUMIN  --   --  2.2* 2.1*  --   --    PROTTOTAL  --   --  5.5* 5.5*  --   --    BILITOTAL  --   --  0.6 0.6  --   --    ALKPHOS  --   --  53 54  --   --    ALT  --   --  11 9  --   --    AST  --   --  9 10  --   --      No results found for this or any previous visit (from the past 24 hour(s)).  Medications     IV fluid REPLACEMENT ONLY       Reason anticoagulant not prescribed for atrial fibrillation       parenteral nutrition - Clinimix E       parenteral nutrition - Clinimix E 45 mL/hr at 05/07/19 0407     NaCl 30 mL/hr at 05/07/19 1250     NaCl         clindamycin  900 mg Intravenous Q8H     diltiazem  5 mg Intravenous Q4H     gabapentin  100 mg Oral At Bedtime     insulin aspart  1-12 Units Subcutaneous Q4H     levothyroxine  37.5 mcg Intravenous Daily     lipids  250 mL Intravenous Q24H     pantoprazole (PROTONIX) IV  40 mg Intravenous Daily with breakfast     sodium chloride  1 spray Both Nostrils BID     sodium chloride (PF)  10 mL Intracatheter Q8H

## 2019-05-07 NOTE — ANESTHESIA POSTPROCEDURE EVALUATION
Patient: Juliana Leal    Procedure(s):  DIRECT LARYNGOSCOPY AND BIOPSY,  ENDOSCOPIC SINUS SURGERY. ATTEMPTED ASPIRATION OF RIGHT PHARYNX  LARYNGOSCOPY, WITH BIOPSY    Diagnosis:SORE THROAT.  Diagnosis Additional Information: No value filed.    Anesthesia Type:  General, ETT    Note:  Anesthesia Post Evaluation    Patient location during evaluation: PACU  Patient participation: Able to fully participate in evaluation  Level of consciousness: awake, awake and alert and responsive to verbal stimuli  Pain management: adequate  Airway patency: patent  Cardiovascular status: acceptable  Respiratory status: acceptable  Hydration status: acceptable  PONV: none     Anesthetic complications: None          Last vitals:  Vitals:    05/07/19 0520 05/07/19 0805 05/07/19 1128   BP: 124/65 112/64 120/61   Pulse:  65 71   Resp:  14 14   Temp:  36.3  C (97.4  F) 36.3  C (97.4  F)   SpO2:  96% 98%         Electronically Signed By: Sumaya Caicedo  May 7, 2019  3:00 PM

## 2019-05-07 NOTE — PLAN OF CARE
"Discharge Planner SLP   Patient plan for discharge: Did not discuss  Current status: Pt seen for swallow evaluation. She is alert, agreeable, seated in a chair and reported she was \"willing to try anything.\" Difficulty swallowing leading up to admission, hx of dysphagia in 2012 with recommendations for puree and nectar thick liquids. Pt was assessed with thin and nectar thick liquids was well as puree. Oral phase is notable for anterior labial loss and suspected premature spillage to the pharynx. Improved oral control with nectar thick liquids. Weak cough response with thin liquids x 2 suspect related to general weakness and reduced oral control. Pt also has some difficulty with self feeding d/t mustache bandage in place. Pt tolerated nectar thick cup sips and small amounts of puree. However, pt reported increased difficulty wtih puree consistency.     Recommend full liquid, nectar thick liquid diet. Pt to take small bites/sips, encourage oral intake/hydration. Oral meds could be attempted crushed in puree or thickened liquids  - as tolerated.     Barriers to return to prior living situation: Nutrition, below baseline   Recommendations for discharge: TCU  Rationale for recommendations: SLP at next level of care for dysphagia management          Entered by: Sakshi Resendiz 05/07/2019 2:11 PM       "

## 2019-05-07 NOTE — PROGRESS NOTES
Pt denies significant pain.  C/o oral dryness.    AFVSS  Sitting up in bed, conversant  Nose:  Nasal drip pad in place.  Left nare with scant fresh blood.  OC/OP:  Slightly less right palatal/peritonsillar swelling than yesterday.  Oral mucosa dry.  Dried blood on palate.    A/P:  POD#1 s/p sinus surgery, lingual tonsil biopsy, attempted needle aspiration for possible fluid collection right pharynx.  Pathology and sinus culture pending.  Continue clindamycin pending culture results.  OK to use afrin nasal spray 2 sprays bid x 3 days is nasal bleeding bothersome.  Recommended gargling to help clear dried blood from palate.  May benefit from biotene products for mouth dryness.

## 2019-05-07 NOTE — PLAN OF CARE
Dx:Direct laryngoscopy and biopsy. Endoscopic sinus surgery. Attempt of aspiration on right parhynx. Hx:Sinusitis, HTN, CKD VS:Stable on room air. A/O:x4. Blood Sugars:141 and 161. Labs:WDL. Incisions:Nasal dressing changed x1. Small amount of bloody drainage. CWMS:+1 swelling in right hand. Pain level/controlled with: Pain in neck from sitting up. Heat applied which controlled pain. Up with: SBA. Seen by Speech therapy for swallow evaluation. Pt started on nect thick diet. Doing water rinses. No N/V. Passing gas. BM:No. Voiding. Gi:active x4. Resp:clear lung sounds. Waiting for culture results. PICC in place on right arm with TPN going.  Will continue monitor.

## 2019-05-07 NOTE — PLAN OF CARE
Pt arrived to floor at 1515. Extremely [place]. Hearing aid in L ear. VSS weaned from 3L of humidified oxygen to Room air. Blood around nostrils, changed PRN. Speech is very gargled, hard to understand. Denies pain, just wants to rest. Paged to get TPN/lipids restarted. TPN @45ml/hr, NS @55ml/hr, lipids not running at this time, q48 hours. PICC line in place. Potassium replaced, recheck in AM. Up with assist of 2, dangled. Voided x1 but missed hat. Tolerating mouth swabs.

## 2019-05-07 NOTE — PROGRESS NOTES
05/07/19 1357   General Information   Onset Date 05/03/19   Start of Care Date 05/07/19   Referring Physician Elsa Keith MD   Patient Profile Review/OT: Additional Occupational Profile Info See Profile for full history and prior level of function   Patient/Family Goals Statement None stated    Swallowing Evaluation Bedside swallow evaluation   Behaviorial Observations WFL (within functional limits)   Mode of current nutrition Oral diet   Type of oral diet Regular;Thin liquid   Respiratory Status Room air   Comments Ms. Juliana Leal is a 89-year-old female who I have been seeing since 08/2018 with chronic throat pain, nasal congestion, postnasal drainage with tenacious secretions and generalized weakness.  Her symptoms have persisted despite multiple trials of oral antibiotics with culture-directed therapy, nasal sprays and rinses, and periodic steroid administration.  She has been in and out of the hospital.  She has had a hard time clearing pharyngeal secretions, has throat pain and has lost 14 pounds.  Most recently she was admitted to the hospital when she had dysphagia severe enough she was not able to swallow her pills including diltiazem and presented to her primary care physician's office with a tachyarrhythmia.  She had imaging upon admission including a neck CT scan which shows an increased prominence of lymphoid tissue of Waldeyer's ring compared with her last scan approximately 2-1/2 weeks ago done at the time of admission at [facility].  Progressive lymphoid swelling has occurred despite culture-directed therapy with doxycycline for her sinusitis. pt has tolerated jello so far today. She difficulty swallowing leading up to admission and weight loss. Pt was seen by SLP department in May of 2012 wtih recommendations for puree solids and nectar thick liquids - on time of evaluation pt does not recall these recommendations and it appears she was likely consuming a regular diet and thin  liquids up until the past few weeks. no family present during evaluation.    Clinical Swallow Evaluation   Oral Musculature generally intact   Structural Abnormalities none present   Dentition present and adequate   Secretion Management   (dry, bloody secretions noted on soft and hard palate )   Mucosal Quality dry;sticky   Oral Labial Strength and Mobility WFL   Lingual Strength and Mobility WFL   Laryngeal Function Cough;Swallow;Voicing initiated   Oral Musculature Comments Weak cough response and reduced vocal volume and strength    Additional Documentation Yes   Swallow Eval   Feeding Assistance frequent cues/help required   Clinical Swallow Eval: Thin Liquid Texture Trial   Mode of Presentation, Thin Liquids cup;straw;self-fed   Volume of Liquid or Food Presented 1 oz    Oral Phase of Swallow Premature pharyngeal entry   Oral Residue   (anterior labial loss )   Pharyngeal Phase of Swallow impaired;coughing/choking;throat clearing   Diagnostic Statement Intermittent coughing noted with thin liquids, suspect related to reduced oral control and premature spillage to the pharyx, anterior labial spillage as well likely related to mustache bandage impacting ability to feed oneself   Clinical Swallow Eval: Nectar Thick Liquid Texture Trial   Mode of Presentation, Nectar spoon;straw;self-fed   Volume of Nectar Presented 2 oz   Oral Phase, Nectar Premature pharyngeal entry   Pharyngeal Phase, [place] intact   Diagnostic Statement Apparent improved oral control with nectar thick lqiuids, ongoing difficulty feeding self related to facial bandage, no overt s/sx of aspiration  (Pt able to cough up secretions, and dry blood after PO trial)   Clinical Swallow Eval: Puree Solid Texture Trial   Mode of Presentation, Puree spoon;self-fed   Volume of Puree Presented 1/4 teaspoon boluses   Oral Phase, Puree Poor AP movement;Residue in oral cavity   Pharyngeal Phase, Puree impaired   Diagnostic Statement Difficulty with posterior  "propulsion, pt reports the textures is too thick, no overt s/sx of aspiration, mostly single swallows per bolus    Swallow Compensations   Swallow Compensations Alternate viscosity of consistencies;Pacing;Multiple swallow   Results No difficulties noted   Esophageal Phase of Swallow   Patient reports or presents with symptoms of esophageal dysphagia Yes   Esophageal comments Intermittent burp noted    General Therapy Interventions   Planned Therapy Interventions Dysphagia Treatment   Dysphagia treatment Oropharyngeal exercise training;Modified diet education;Instruction of safe swallow strategies   Swallow Eval: Clinical Impressions   Skilled Criteria for Therapy Intervention Skilled criteria met.  Treatment indicated.   Functional Assessment Scale (FAS) 3   Treatment Diagnosis Moderate oropharyngeal dysphagia    Diet texture recommendations Full liquid;Nectar thick liquids   Recommended Feeding/Eating Techniques alternate between small bites and sips of food/liquid;hard swallow w/ each bite or sip;maintain upright posture during/after eating for 30 mins;small sips/bites   Therapy Frequency 5 times/wk   Predicted Duration of Therapy Intervention (days/wks) 1 week    Anticipated Discharge Disposition home w/ outpatient services   Risks and Benefits of Treatment have been explained. Yes   Patient, family and/or staff in agreement with Plan of Care Yes   Clinical Impression Comments Pt seen for swallow evaluation. She is alert, agreeable, seated in a chair and reported she was \"willing to try anything.\" Difficulty swallowing leading up to admission, hx of dysphagia in 2012 with recommendations for puree and nectar thick liquids. Pt was assessed with thin and nectar thick liquids was well as puree. Oral phase is notable for anterior labial loss and suspected premature spillage to the pharynx. Improved oral control with nectar thick liquids. Weak cough response with thin liquids x 2 suspect related to general weakness " and reduced oral control. Pt also has some difficulty with self feeding d/t mustache bandage in place. Pt tolerated nectar thick cup sips and small amounts of puree. However, pt reported increased difficulty wtih puree consistency. Recommend full liquid, nectar thick liquid diet. Pt to take small bites/sips, encourage oral intake/hydration. Oral meds could be attempted crushed in puree or thickened liquids  - as tolerated.    Total Evaluation Time   Total Evaluation Time (Minutes) 20

## 2019-05-07 NOTE — PLAN OF CARE
Pt a/o x4. VSS on RA. Denies pain. [place]. Drsg changed x2, bloody drainage. BG checks q4. TPN running. PICC intact. A1 gb to bedside commode x2. Voiding adequately. Tele: NSR. Continue to monitor.

## 2019-05-07 NOTE — PROGRESS NOTES
SPIRITUAL HEALTH SERVICES Progress Note  [facility]     attempted visit due to length of stay.  Patient was sleeping and didn't wake when  spoke her name a couple of times.     will try to check on patient on Thursday, or a SH colleague before then.      Varinder Dominguez   Intern

## 2019-05-08 ENCOUNTER — APPOINTMENT (OUTPATIENT)
Dept: PHYSICAL THERAPY | Facility: CLINIC | Age: 84
DRG: 840 | End: 2019-05-08
Attending: INTERNAL MEDICINE
Payer: COMMERCIAL

## 2019-05-08 LAB
ANION GAP SERPL CALCULATED.3IONS-SCNC: 7 MMOL/L (ref 3–14)
BACTERIA SPEC CULT: ABNORMAL
BASOPHILS # BLD AUTO: 0 10E9/L (ref 0–0.2)
BASOPHILS NFR BLD AUTO: 0 %
BUN SERPL-MCNC: 49 MG/DL (ref 7–30)
CALCIUM SERPL-MCNC: 8.8 MG/DL (ref 8.5–10.1)
CHLORIDE SERPL-SCNC: 111 MMOL/L (ref 94–109)
CO2 SERPL-SCNC: 25 MMOL/L (ref 20–32)
CREAT SERPL-MCNC: 0.92 MG/DL (ref 0.52–1.04)
DIFFERENTIAL METHOD BLD: ABNORMAL
EOSINOPHIL # BLD AUTO: 0.1 10E9/L (ref 0–0.7)
EOSINOPHIL NFR BLD AUTO: 1 %
ERYTHROCYTE [DISTWIDTH] IN BLOOD BY AUTOMATED COUNT: 19.6 % (ref 10–15)
GFR SERPL CREATININE-BSD FRML MDRD: 52 ML/MIN/{1.73_M2}
GLUCOSE BLDC GLUCOMTR-MCNC: 142 MG/DL (ref 70–99)
GLUCOSE BLDC GLUCOMTR-MCNC: 152 MG/DL (ref 70–99)
GLUCOSE BLDC GLUCOMTR-MCNC: 153 MG/DL (ref 70–99)
GLUCOSE BLDC GLUCOMTR-MCNC: 162 MG/DL (ref 70–99)
GLUCOSE BLDC GLUCOMTR-MCNC: 183 MG/DL (ref 70–99)
GLUCOSE BLDC GLUCOMTR-MCNC: 193 MG/DL (ref 70–99)
GLUCOSE SERPL-MCNC: 168 MG/DL (ref 70–99)
HCT VFR BLD AUTO: 24.5 % (ref 35–47)
HGB BLD-MCNC: 8.2 G/DL (ref 11.7–15.7)
LYMPHOCYTES # BLD AUTO: 3 10E9/L (ref 0.8–5.3)
LYMPHOCYTES NFR BLD AUTO: 22 %
MAGNESIUM SERPL-MCNC: 1.7 MG/DL (ref 1.6–2.3)
MCH RBC QN AUTO: 30.7 PG (ref 26.5–33)
MCHC RBC AUTO-ENTMCNC: 33.5 G/DL (ref 31.5–36.5)
MCV RBC AUTO: 92 FL (ref 78–100)
MONOCYTES # BLD AUTO: 0.4 10E9/L (ref 0–1.3)
MONOCYTES NFR BLD AUTO: 3 %
MYELOCYTES # BLD: 0.1 10E9/L
MYELOCYTES NFR BLD MANUAL: 1 %
NEUTROPHILS # BLD AUTO: 9.9 10E9/L (ref 1.6–8.3)
NEUTROPHILS NFR BLD AUTO: 73 %
PHOSPHATE SERPL-MCNC: 3 MG/DL (ref 2.5–4.5)
PLATELET # BLD AUTO: 107 10E9/L (ref 150–450)
PLATELET # BLD EST: ABNORMAL 10*3/UL
POTASSIUM SERPL-SCNC: 3.2 MMOL/L (ref 3.4–5.3)
POTASSIUM SERPL-SCNC: 4.2 MMOL/L (ref 3.4–5.3)
RBC # BLD AUTO: 2.67 10E12/L (ref 3.8–5.2)
RBC MORPH BLD: ABNORMAL
SODIUM SERPL-SCNC: 143 MMOL/L (ref 133–144)
SPECIMEN SOURCE: ABNORMAL
WBC # BLD AUTO: 13.5 10E9/L (ref 4–11)

## 2019-05-08 PROCEDURE — 93005 ELECTROCARDIOGRAM TRACING: CPT

## 2019-05-08 PROCEDURE — C9113 INJ PANTOPRAZOLE SODIUM, VIA: HCPCS | Performed by: INTERNAL MEDICINE

## 2019-05-08 PROCEDURE — 25000128 H RX IP 250 OP 636: Performed by: INTERNAL MEDICINE

## 2019-05-08 PROCEDURE — 25000125 ZZHC RX 250: Performed by: INTERNAL MEDICINE

## 2019-05-08 PROCEDURE — 00000146 ZZHCL STATISTIC GLUCOSE BY METER IP

## 2019-05-08 PROCEDURE — 40000239 ZZH STATISTIC VAT ROUNDS

## 2019-05-08 PROCEDURE — 83735 ASSAY OF MAGNESIUM: CPT | Performed by: INTERNAL MEDICINE

## 2019-05-08 PROCEDURE — 12000000 ZZH R&B MED SURG/OB

## 2019-05-08 PROCEDURE — 25000125 ZZHC RX 250

## 2019-05-08 PROCEDURE — 36415 COLL VENOUS BLD VENIPUNCTURE: CPT | Performed by: INTERNAL MEDICINE

## 2019-05-08 PROCEDURE — 84100 ASSAY OF PHOSPHORUS: CPT | Performed by: INTERNAL MEDICINE

## 2019-05-08 PROCEDURE — 85025 COMPLETE CBC W/AUTO DIFF WBC: CPT | Performed by: INTERNAL MEDICINE

## 2019-05-08 PROCEDURE — 97530 THERAPEUTIC ACTIVITIES: CPT | Mod: GP

## 2019-05-08 PROCEDURE — 97161 PT EVAL LOW COMPLEX 20 MIN: CPT | Mod: GP

## 2019-05-08 PROCEDURE — 84132 ASSAY OF SERUM POTASSIUM: CPT | Performed by: INTERNAL MEDICINE

## 2019-05-08 PROCEDURE — 25000128 H RX IP 250 OP 636: Performed by: HOSPITALIST

## 2019-05-08 PROCEDURE — 80048 BASIC METABOLIC PNL TOTAL CA: CPT | Performed by: INTERNAL MEDICINE

## 2019-05-08 PROCEDURE — 99232 SBSQ HOSP IP/OBS MODERATE 35: CPT | Performed by: INTERNAL MEDICINE

## 2019-05-08 PROCEDURE — 93010 ELECTROCARDIOGRAM REPORT: CPT | Performed by: INTERNAL MEDICINE

## 2019-05-08 RX ORDER — ACETAMINOPHEN 650 MG/1
650 SUPPOSITORY RECTAL EVERY 4 HOURS PRN
Status: DISCONTINUED | OUTPATIENT
Start: 2019-05-08 | End: 2019-05-13 | Stop reason: HOSPADM

## 2019-05-08 RX ORDER — LORAZEPAM 2 MG/ML
0.25 INJECTION INTRAMUSCULAR ONCE
Status: COMPLETED | OUTPATIENT
Start: 2019-05-08 | End: 2019-05-08

## 2019-05-08 RX ADMIN — CLINDAMYCIN PHOSPHATE 900 MG: 900 INJECTION, SOLUTION INTRAVENOUS at 04:31

## 2019-05-08 RX ADMIN — LORAZEPAM 0.25 MG: 2 INJECTION INTRAMUSCULAR; INTRAVENOUS at 20:36

## 2019-05-08 RX ADMIN — DILTIAZEM HYDROCHLORIDE 5 MG: 5 INJECTION INTRAVENOUS at 00:32

## 2019-05-08 RX ADMIN — PANTOPRAZOLE SODIUM 40 MG: 40 INJECTION, POWDER, FOR SOLUTION INTRAVENOUS at 08:15

## 2019-05-08 RX ADMIN — Medication 10 MEQ: at 09:38

## 2019-05-08 RX ADMIN — CLINDAMYCIN PHOSPHATE 900 MG: 900 INJECTION, SOLUTION INTRAVENOUS at 12:34

## 2019-05-08 RX ADMIN — HYDROMORPHONE HYDROCHLORIDE 0.2 MG: 1 INJECTION, SOLUTION INTRAMUSCULAR; INTRAVENOUS; SUBCUTANEOUS at 04:18

## 2019-05-08 RX ADMIN — DILTIAZEM HYDROCHLORIDE 5 MG: 5 INJECTION INTRAVENOUS at 21:15

## 2019-05-08 RX ADMIN — HYDROMORPHONE HYDROCHLORIDE 0.2 MG: 1 INJECTION, SOLUTION INTRAMUSCULAR; INTRAVENOUS; SUBCUTANEOUS at 11:08

## 2019-05-08 RX ADMIN — DILTIAZEM HYDROCHLORIDE 5 MG: 5 INJECTION INTRAVENOUS at 04:31

## 2019-05-08 RX ADMIN — DILTIAZEM HYDROCHLORIDE 5 MG: 5 INJECTION INTRAVENOUS at 12:30

## 2019-05-08 RX ADMIN — LEVOTHYROXINE SODIUM ANHYDROUS 37.5 MCG: 100 INJECTION, POWDER, LYOPHILIZED, FOR SOLUTION INTRAVENOUS at 14:18

## 2019-05-08 RX ADMIN — MORPHINE SULFATE 0.5 MG: 2 INJECTION, SOLUTION INTRAMUSCULAR; INTRAVENOUS at 02:07

## 2019-05-08 RX ADMIN — Medication 10 MEQ: at 16:44

## 2019-05-08 RX ADMIN — CLINDAMYCIN PHOSPHATE 900 MG: 900 INJECTION, SOLUTION INTRAVENOUS at 21:15

## 2019-05-08 RX ADMIN — I.V. FAT EMULSION 250 ML: 20 EMULSION INTRAVENOUS at 20:35

## 2019-05-08 RX ADMIN — ASCORBIC ACID, VITAMIN A PALMITATE, CHOLECALCIFEROL, THIAMINE HYDROCHLORIDE, RIBOFLAVIN-5 PHOSPHATE SODIUM, PYRIDOXINE HYDROCHLORIDE, NIACINAMIDE, DEXPANTHENOL, ALPHA-TOCOPHEROL ACETATE, VITAMIN K1, FOLIC ACID, BIOTIN, CYANOCOBALAMIN: 200; 3300; 200; 6; 3.6; 6; 40; 15; 10; 150; 600; 60; 5 INJECTION, SOLUTION INTRAVENOUS at 08:30

## 2019-05-08 RX ADMIN — DILTIAZEM HYDROCHLORIDE 5 MG: 5 INJECTION INTRAVENOUS at 16:29

## 2019-05-08 RX ADMIN — Medication 10 MEQ: at 10:54

## 2019-05-08 RX ADMIN — DILTIAZEM HYDROCHLORIDE 5 MG: 5 INJECTION INTRAVENOUS at 08:15

## 2019-05-08 RX ADMIN — Medication 10 MEQ: at 14:23

## 2019-05-08 ASSESSMENT — ACTIVITIES OF DAILY LIVING (ADL)
ADLS_ACUITY_SCORE: 25
ADLS_ACUITY_SCORE: 24
ADLS_ACUITY_SCORE: 25

## 2019-05-08 NOTE — PROGRESS NOTES
05/08/19 0801   Quick Adds   Type of Visit Initial PT Evaluation   Living Environment   Lives With alone   Living Arrangements apartment   Home Accessibility no concerns   Living Environment Comment Pt reports she has been in/out of the hosp and TCU the last 4 months with declining mobility. Prior, pt reports was living alone in her apt.    Self-Care   Usual Activity Tolerance good   Equipment Currently Used at Home grab bar, tub/shower;shower chair;walker, rolling   Functional Level Prior   Ambulation 1-->assistive equipment   Transferring 1-->assistive equipment   Toileting 1-->assistive equipment   Bathing 1-->assistive equipment   Communication 0-->understands/communicates without difficulty   Cognition 0 - no cognition issues reported   Fall history within last six months no   Number of times patient has fallen within last six months 1  (per chart)   Which of the above functional risks had a recent onset or change? ambulation;transferring;toileting   General Information   Onset of Illness/Injury or Date of Surgery - Date 05/08/19   Referring Physician Elsa Keith MD   Patient/Family Goals Statement none stated   Pertinent History of Current Problem (include personal factors and/or comorbidities that impact the POC) 89y/o F with subacute sinusitis, throat pain, a-fib with RVR and ARF. POD #2 s/p sinus surgery, lingual tonsil biopsy, attempted needle aspiration for possible fluid collection right pharynx.    Precautions/Limitations fall precautions   General Observations pt in bed, gauze over her nose. NAD, upon PT entry pt stating she urgently needs to use the BR   Cognitive Status Examination   Orientation person;place   Level of Consciousness alert   Follows Commands and Answers Questions 75% of the time   Pain Assessment   Patient Currently in Pain Yes, see Vital Sign flowsheet  (reports midback pain since yesterday)   Integumentary/Edema   Integumentary/Edema Comments slight swelling in her right hand  "  Posture    Posture Forward head position;Protracted shoulders   Range of Motion (ROM)   ROM Comment WFL B LEs   Strength   Strength Comments grossly 3+/5 bilateral LEs, pt able to move bilateral UE against gravity   Bed Mobility   Bed Mobility Comments Max A with supine to to with elevated HOB   Transfer Skills   Transfer Comments Sit to stand with max A x 2   Gait   Gait Comments able to take a few steps for stand step transfer, posterior lean   Balance   Balance Comments max A x 1-2 with standing balance with FWW due to posterior lean, min A to SBA with unsupported sitting balance   Muscle Tone   Muscle Tone no deficits were identified   General Therapy Interventions   Planned Therapy Interventions balance training;bed mobility training;gait training;strengthening;transfer training   Clinical Impression   Criteria for Skilled Therapeutic Intervention yes, treatment indicated   PT Diagnosis impaired gait/transfers   Influenced by the following impairments impaired strength, impaired balance, impaired activity tolerance   Functional limitations due to impairments poor tolerance to self care and functional mobility    Clinical Presentation Stable/Uncomplicated   Clinical Presentation Rationale based on clinical presentation   Clinical Decision Making (Complexity) Low complexity   Therapy Frequency` 5 times/week   Predicted Duration of Therapy Intervention (days/wks) 1 week   Anticipated Discharge Disposition Transitional Care Facility   Risk & Benefits of therapy have been explained Yes   Patient, Family & other staff in agreement with plan of care Yes   [facility] Cell[employer] TM \"6 Clicks\"   2016, Trustees of [facility], under license to [employer].  All rights reserved.   6 Clicks Short Forms Basic Mobility Inpatient Short Form   [facility] [employer]  \"6 Clicks\" V.2 Basic Mobility Inpatient Short Form   1. Turning from your back to your side while in a flat bed without using bedrails? 2 - A Lot "   2. Moving from lying on your back to sitting on the side of a flat bed without using bedrails? 2 - A Lot   3. Moving to and from a bed to a chair (including a wheelchair)? 1 - Total   4. Standing up from a chair using your arms (e.g., wheelchair, or bedside chair)? 1 - Total   5. To walk in hospital room? 1 - Total   6. Climbing 3-5 steps with a railing? 1 - Total   Basic Mobility Raw Score (Score out of 24.Lower scores equate to lower levels of function) 8   Total Evaluation Time   Total Evaluation Time (Minutes) 10

## 2019-05-08 NOTE — PLAN OF CARE
SLP: Attempted to see patient for swallow treatment at bedside, but was dry heaving with nurse and CNA present while up in a wheelchair. Will reschedule for 5/9/19.

## 2019-05-08 NOTE — CONSULTS
Pharmacy Pain Consult (Brief Note)    I attempted to meet with Juliana earlier today and she was very sleep which aligns with a conversation with her nurse. It sounds like she is very sensitive to even the low doses of opiates (hydromorphone 0.2mg IV). With my second visit she was more alert.     She reported mouth discomfort; seemed to be more related to dry mouth. It was not clear how much other discomfort that she has at this moment. As I understand form her nurse, when her daughters were visiting earlier today she had more pain. Appreciate her nurses advice that I may be able to gain more insight when her daughters are present. It appears now that is somewhat comfortable.     Chronic Conditions:  Angioedema 2017--Due to ACE     Arthritis      CKD (chronic kidney disease) stage 3, CR CL 28.9mL/min    Gout    * Weight loss    Headaches      Hypertension      Thyroid disease      Admitted for throat/sinus  procedure; biopsy.     Recommendations:  1) I will plant to revisit tomorrow.   2) Considerations may be the following:     * Morphine mouth rinse and swallow/spit dose to be determined; probably 4 mg/ per 2 mL.       *mirtazipine 15 mg ODT once daily at bedtime      * lidocaine 5% ointment thin layer for back pain TID    I did not order anything at this time.    Thanks for consult!  Abram Benz   [phone]

## 2019-05-08 NOTE — PROGRESS NOTES
Palliative consult received for goals of care and pain management.     Case reviewed at length with Dr. Keith. At this time, it is not clear to me that pt has an underlying chronic and terminal illness, which is otherwise what would qualify her to see our team.     She has dysphagia that has been present for the last year, along with associated weight loss. She also has a history of dysphagia in 2012 and was prescribed a modified textured diet at that time.     Per Dr. Keith and chart review, pt is post op day 2 from ENT surgery. Biopsy results are pending, but I do not necessarily see any indication that the bx will demonstrate malignancy. There is also no indication that the dysphagia could not in theory improve with therapy and support. I discussed this with SLP, Maribell, who is present in the work room when I reviewed case with Dr. Keith.     Per Dr. Keith, pt has been initiated on TPN and is working with SLP. She was able to eat some yesterday, but today is in a lot of pain (located all over her body), and is somnolent (has received a couple doses of dilaudid 0.2mg IV).    At this time, I think it is very reasonable to give pt time to recover from surgery, and see how she does with the support of TPN and SLP therapy. In the coming weeks, it is reasonable to continue to evaluate for improvement, and continue goals of care discussions if pt is not functionally getting better, or feeling better.    As far as pain management, I would limit opioid use in the setting of no known indicator of pain, other than possible discomfort with low mobility. Will recommend involvement of the inpatient pain service for management of mild pain syndrome.    Palliative consult canceled. Please do not hesitate to reach out to me if further questions arise. Thanks.    Radha GEORGE, [facility]  Palliative Medicine   Pager: [phone]

## 2019-05-08 NOTE — PLAN OF CARE
A&O. VSS, pt restless, c/o neck pain, low back pain, assistance with repo frequently. Heat applied. LS clear/diminished. BS hypoactive. Gas+, BM-. Voiding adequately. Bloody drainage from L nostril. Dressing changed. Pt states she cannot swallow, refused thickened liquids/pills. Mouth swabbed. Dilaudid 0.2mg once for ear pain, once for low back pain rated 10+. BG Q4, 155, 158. TPN at 65ml/hr. Lipids at 20.8ml.hr; 0.45NS at 10ml/hr

## 2019-05-08 NOTE — PROGRESS NOTES
[facility] Otolaryngology  Progress Note  Dr. Sandra Irene          Interval History:   Continues to have scant fresh epistaxis L>R.  Primary complaint this AM is of back pain.         Medications:       clindamycin  900 mg Intravenous Q8H     diltiazem  5 mg Intravenous Q4H     gabapentin  100 mg Oral At Bedtime     insulin aspart  1-12 Units Subcutaneous Q4H     levothyroxine  37.5 mcg Intravenous Daily     lipids  250 mL Intravenous Q24H     pantoprazole (PROTONIX) IV  40 mg Intravenous Daily with breakfast     sodium chloride  1 spray Both Nostrils BID     sodium chloride (PF)  10 mL Intracatheter Q8H                  Physical Exam:   Blood pressure 114/59, pulse 85, temperature 98.3  F (36.8  C), temperature source Oral, resp. rate 18, weight 51.2 kg (112 lb 14 oz), SpO2 98 %, not currently breastfeeding.  I/O last 3 completed shifts:  In: 1812 [I.V.:146]  Out: 825 [Urine:825]  Awake and alert in bed  Nose: scant but fresh epistaxis L>R.  Nasal drip pad in place.  OC/OP:  Dried blood on palate.  No fresh blood in pharynx. Stable right palatal/peritonsillar fullness.  No trismus.  Voice: mild hot potato quality  Respirations quiet  Neck: no masses/adenopathy           Data:     Recent Labs   Lab Test 05/08/19  0843 05/06/19  0455 05/05/19  0631 05/04/19  0633  04/12/15  0640   WBC 13.5*  --  14.7* 9.5   < > 8.0   HGB 8.2*  --  8.3* 9.0*   < > 15.3   MCV 92  --  91 91   < > 92   *  --  214 215   < > 216   INR  --  1.19* 1.22*  --   --  1.02    < > = values in this interval not displayed.     Recent Labs   Lab Test 05/08/19  0600 05/07/19  0600 05/06/19  1645 05/06/19  0455    145*  --  144   POTASSIUM 3.2* 3.5 4.1 3.2*   CHLORIDE 111* 113*  --  112*   CO2 25 25  --  23   BUN 49* 49*  --  43*   CR 0.92 0.99  --  0.93   ANIONGAP 7 7  --  9     Recent Labs   Lab Test 05/06/19  0455 05/05/19  0631 03/19/19  2130 03/19/19  1438 08/07/18  1640 08/01/18  1052  05/10/12  0550 05/09/12  0620  05/08/12  1033    ALBUMIN 2.2* 2.1*  --  3.1*  --   --    < >  --  3.1*  --  4.2   BILITOTAL 0.6 0.6  --  0.5  --   --    < >  --  0.6  --  0.5   ALT 11 9  --  16  --   --    < >  --  24  --  20   AST 9 10  --  15  --   --    < >  --  14  --  17   PROTEIN  --   --  Negative  --  Negative Trace*   < >  --   --    < >  --    LIPASE  --   --   --   --   --   --   --  51 76  --  181   AMYLASE  --   --   --   --   --   --   --   --  70  --   --     < > = values in this interval not displayed.              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  Final culture results pending, continue clindamycin for now.  Biopsy shows polytypic B cells.  Continues to have scant epistaxis.  Appreciate speech/swallow therapy help--has had chronic dysphagia with recent acute exacerbation leading up to hospitalization and weight loss of approximately 15 lbs in past 6 mo.

## 2019-05-08 NOTE — PROGRESS NOTES
[facility]    Medicine Progress Note - Hospitalist Service       Date of Admission:  5/3/2019  Assessment & Plan     This is a  89 year old female with a history of atrial fibrillation, recurrent sinusitis since 8/ 2018, throat pain with difficulty swallowing and weight loss had presented to ENT clinic for follow-up where she was found to have a heart rate in 130s, and so was admitted with atrial fibrillation with rapid ventricular response and acute renal failure.     Subacute sinusitis  Throat pain  Patient initially admitted 4/11-4/16 due to recurrent sinusitis since 08/2018. Has had multiple course of abx w/o improvement. Has been followed by ENT (Dr. Irene). CT scan of sinuses on 4/11 found severe sinusitis. Was treated with short course of steroids, IV vancomycin and ceftriaxone, then transitioned to doxycycline upon discharge to TCU (completed 4/26).  Symptoms continued to get worse with sore throat.  Has had strep/ Candida negative in the past with growth of normal sera in the culture  - CT soft tissue neck on 5/3/2019 shows continued interval increase in prominence of soft tissue of Waldeyer's ring including the adenoids and palatine tonsils bilaterally-?  Inflammation versus malignancy   -ENT following, appreciate input.  -  Status post direct laryngoscopy and biopsy of lingual tonsil with attempted needle aspiration of right pharynx, bilateral maxillary and ethmoid endoscopic sinus surgery -5/6/2019  -No aspiratable fluid on right pharynx per Dr. Irene(ENT), copious secretions of bilateral maxillary and ethmoid sinuses with mildly hyperplastic lingual tonsil tissue, right lingual tonsil biopsy sent and swab of secretions right maxillary sinus sent for pathology.  Intraoperative cultures have been sent.  -Saline nasal spray twice a day and IV clindamycin 3 times daily  per ENT recommendation  - patient was unable to take orally, so continue with IV conversion of her home PTA medication.   Also started on TPN due to multiple days of poor p.o. Intake and severe malnutrition ( see below).  Resume diet once evaluated by speech and gradually wean down TPN once adequate oral intake    Generalized body ache/back pain  -Patient has been complaining of generalized body ache, likely from being in the uncomfortable position  -Patient was somnolent at the time of my evaluation.  Limited options secondary to her being unable to swallow food/medication  -Discussed with palliative care who recommended inpatient pain management consultation for symptomatic management of pain without causing her more sedated    Leukocytosis  -Likely from acute stress, currently on IV clindamycin for severe sinusitis, continue  -Maxillary fluid growing coagulase-negative staph    Atrial fibrillation with rapid ventricular response at presentation.    Likely due to patient unable to take her diltiazem for 2 days prior to admission due to difficulty swallowing.   -Converted back to sinus rhythm before starting on diltiazem drip, continue to monitor on  IV scheduled Cardizem -continue to monitor on telemetry.      Acute kidney injury.    Likely prerenal from poor p.o. intake.  Baseline creatinine appears to be around 1.0.    On admission it was 1.33.   Improved with hydration  Continue gentle hydration, monitor, avoid nephrotoxins, patient currently is euvolemic even though her weight upsince admission    Hypernatremia  -Mild on 5/7/2019, changed IV fluid to half-normal saline at a decreased rate, improved today    Severe malnutrition  In the context of acute illness or injury with more than 10% of weight loss in the last 6 months with less than 50% of intake in the last 5 days, moderate depletion of upper arm fat loss and muscle loss in the temporal region  Patient was unable to tolerate oral intake due to significant pharyngeal swelling and throat pain, so initiated on TPN appreciate nutrition service consultation  -Status post surgery  hopefully her oral intake will improve,  speech therapy following.  Once oral adequate intake may be able to wean down TPN.       Pain in left lower extremity   Lower extremity ultrasound negative for any evidence of DVT, symptomatic management    Hypothyroidism.    Continue IV levothyroxine.     GERD.     Continue IV Protonix.      Diet: parenteral nutrition - Clinimix E  Combination Diet Full Liquid Diet; Nectar Thickened Liquids (pre-thickened or use instant food thickener)    DVT Prophylaxis: Pneumatic Compression Devices  Castillo Catheter: not present  Code Status: DNR/DNI      Disposition Plan   Expected discharge: 2 - 3 days, recommended toTCU once adequate oral intake and cleared by all consultants    Entered: Elsa eKith MD 05/08/2019, 2:00 PM       The patient's care was discussed with the Bedside Nurse, Patient and Patient's Family, palliative care team.    Elsa Keith MD  Hospitalist Service  [facility]    ______________________________________________________________________    Interval History   Patient was somnolent at the time of my evaluation, would wake up on verbal stimulus and start complaining of pain on her back of her neck, low back.  Per nursing has not taken anything orally today.    Data reviewed today: I reviewed all medications, new labs and imaging results over the last 24 hours. I personally reviewed no images or EKG's today.    Physical Exam   Vital Signs: Temp: 98.3  F (36.8  C) Temp src: Oral BP: 114/59 Pulse: 85 Heart Rate: 89 Resp: 15 SpO2: 98 % O2 Device: None (Room air)    Weight: 112 lbs 14.01 oz  Exam:  Constitutional: Sleepy but arousable, appears uncomfortable  Head: Normocephalic. No masses, lesions, tenderness or abnormalities  ENT: Nasal drip pad in place, left nare with scant fresh blood, less right palatal/peritonsillar swelling compared to preoperative and dried blood in palate  Cardiovascular: RRR.  2+ murmurs, no rubs or JVD  Respiratory:normal  WOB,b/l equal air entry, no wheezes or crackles   Gastrointestinal: Abdomen soft, non-tender. BS normal. No masses, organomegaly  : Deferred   extremities :no edema however tender left calf  , no clubbing or cyanosis    Skin: Warm and dry, no rash.      Data   Recent Labs   Lab 05/08/19  0843 05/08/19  0600 05/07/19  0600 05/06/19  1645 05/06/19  0455 05/05/19  0631 05/04/19  0633   WBC 13.5*  --   --   --   --  14.7* 9.5   HGB 8.2*  --   --   --   --  8.3* 9.0*   MCV 92  --   --   --   --  91 91   *  --   --   --   --  214 215   INR  --   --   --   --  1.19* 1.22*  --    NA  --  143 145*  --  144 145* 144   POTASSIUM  --  3.2* 3.5 4.1 3.2* 4.1 3.9   CHLORIDE  --  111* 113*  --  112* 114* 111*   CO2  --  25 25  --  23 23 22   BUN  --  49* 49*  --  43* 41* 34*   CR  --  0.92 0.99  --  0.93 0.95 1.15*   ANIONGAP  --  7 7  --  9 8 11   DINORAH  --  8.8 9.1  --  9.7 10.0 10.4*   GLC  --  168* 179*  --  217* 124* 132*   ALBUMIN  --   --   --   --  2.2* 2.1*  --    PROTTOTAL  --   --   --   --  5.5* 5.5*  --    BILITOTAL  --   --   --   --  0.6 0.6  --    ALKPHOS  --   --   --   --  53 54  --    ALT  --   --   --   --  11 9  --    AST  --   --   --   --  9 10  --      No results found for this or any previous visit (from the past 24 hour(s)).  Medications     IV fluid REPLACEMENT ONLY       Reason anticoagulant not prescribed for atrial fibrillation       parenteral nutrition - Clinimix E 65 mL/hr at 05/08/19 0830     NaCl 10 mL/hr at 05/07/19 2131       clindamycin  900 mg Intravenous Q8H     diltiazem  5 mg Intravenous Q4H     gabapentin  100 mg Oral At Bedtime     insulin aspart  1-12 Units Subcutaneous Q4H     levothyroxine  37.5 mcg Intravenous Daily     lipids  250 mL Intravenous Q24H     pantoprazole (PROTONIX) IV  40 mg Intravenous Daily with breakfast     sodium chloride  1 spray Both Nostrils BID     sodium chloride (PF)  10 mL Intracatheter Q8H

## 2019-05-08 NOTE — PROGRESS NOTES
Consult received.  Chart reviewed.  Pathology report is pending.  Flow cytometry reveals polytypic B cells.  Will wait for the pathology report and see the patient after that.

## 2019-05-08 NOTE — PLAN OF CARE
Discharge Planner PT   Patient plan for discharge: none stated  Current status: PT consult received. Chart reviewed. 89y/o F with subacute sinusitis, throat pain, a-fib with RVR and ARF. POD #2 s/p sinus surgery, lingual tonsil biopsy, attempted needle aspiration for possible fluid collection right pharynx. PLOF: Pt reports she has been in/out of the hosp and TCU the last 4 months with declining mobility. Prior, pt reports was living alone in apt. Per chart (PT julio on 4/14/19: able to ambulate 250ft)    Upon entry, pt stating she needs to urgently use the bathroom. Max A with supine to sit, max A with seated scooting. Max A x 2 with stand step transfer to the commode without assistive device. Max A x 2 with sit to stand from commode, EOB and chair with FWW. Attempted to have pt sit in the bedside chair, however only able to tolerate it for a few minutes due to fatigue. Returned pt to supine with max A x 2. Dependent with pericares. High fall risk due to generalized weakness, impaired balance, fear of falling. Gross deconditioning. Pt reports has difficulty pushing her call light, therefore gave pt the grey push button.   Barriers to return to prior living situation: level of assist for return home, none for TCU  Recommendations for discharge: TCU  Rationale for recommendations: Pt will benefit from continued skilled PT at TCU to maximize her level of mobility and independence.        Entered by: Trina Mabry 05/08/2019 8:39 AM

## 2019-05-08 NOTE — PROGRESS NOTES
SW: Received call from [employer] ([phone]), pt was open to Home RN/PT/OT/SLP/HHA services prior to admission. SW will update home care agency as able.

## 2019-05-08 NOTE — PLAN OF CARE
A&Ox4. VSS on RA. Up with x1/x2 gait belt and FWW. [place], hearing aid in L ear. Blood from L nostril, drsg changed PRN. BG checks q4 hours. TPN and lipids infusing, fluids TKO. PICC line infusing. Voiding adequately to commode. 10/10 lower back pain controlled with PRN morphine and dilaudid and repositioning. R hand +1/+2 edema. Mepilex on coccyx. Full liquid/necator thick.Tele NSR, EKG done.

## 2019-05-09 ENCOUNTER — APPOINTMENT (OUTPATIENT)
Dept: SPEECH THERAPY | Facility: CLINIC | Age: 84
DRG: 840 | End: 2019-05-09
Payer: COMMERCIAL

## 2019-05-09 ENCOUNTER — APPOINTMENT (OUTPATIENT)
Dept: PHYSICAL THERAPY | Facility: CLINIC | Age: 84
DRG: 840 | End: 2019-05-09
Payer: COMMERCIAL

## 2019-05-09 LAB
ALBUMIN SERPL-MCNC: 1.8 G/DL (ref 3.4–5)
ALP SERPL-CCNC: 51 U/L (ref 40–150)
ALT SERPL W P-5'-P-CCNC: 13 U/L (ref 0–50)
ANION GAP SERPL CALCULATED.3IONS-SCNC: 8 MMOL/L (ref 3–14)
ANISOCYTOSIS BLD QL SMEAR: ABNORMAL
AST SERPL W P-5'-P-CCNC: 13 U/L (ref 0–45)
BASOPHILS # BLD AUTO: 0 10E9/L (ref 0–0.2)
BASOPHILS NFR BLD AUTO: 0 %
BILIRUB SERPL-MCNC: 0.6 MG/DL (ref 0.2–1.3)
BUN SERPL-MCNC: 51 MG/DL (ref 7–30)
CALCIUM SERPL-MCNC: 8.7 MG/DL (ref 8.5–10.1)
CHLORIDE SERPL-SCNC: 108 MMOL/L (ref 94–109)
CO2 SERPL-SCNC: 23 MMOL/L (ref 20–32)
CREAT SERPL-MCNC: 0.91 MG/DL (ref 0.52–1.04)
DIFFERENTIAL METHOD BLD: ABNORMAL
EOSINOPHIL # BLD AUTO: 0.5 10E9/L (ref 0–0.7)
EOSINOPHIL NFR BLD AUTO: 4 %
ERYTHROCYTE [DISTWIDTH] IN BLOOD BY AUTOMATED COUNT: 19.4 % (ref 10–15)
GFR SERPL CREATININE-BSD FRML MDRD: 53 ML/MIN/{1.73_M2}
GLUCOSE BLDC GLUCOMTR-MCNC: 115 MG/DL (ref 70–99)
GLUCOSE BLDC GLUCOMTR-MCNC: 128 MG/DL (ref 70–99)
GLUCOSE BLDC GLUCOMTR-MCNC: 129 MG/DL (ref 70–99)
GLUCOSE BLDC GLUCOMTR-MCNC: 140 MG/DL (ref 70–99)
GLUCOSE BLDC GLUCOMTR-MCNC: 144 MG/DL (ref 70–99)
GLUCOSE BLDC GLUCOMTR-MCNC: 155 MG/DL (ref 70–99)
GLUCOSE SERPL-MCNC: 129 MG/DL (ref 70–99)
HCT VFR BLD AUTO: 21.8 % (ref 35–47)
HGB BLD-MCNC: 7.3 G/DL (ref 11.7–15.7)
LACTATE BLD-SCNC: 1.3 MMOL/L (ref 0.7–2)
LYMPHOCYTES # BLD AUTO: 1.3 10E9/L (ref 0.8–5.3)
LYMPHOCYTES NFR BLD AUTO: 10 %
MCH RBC QN AUTO: 30.7 PG (ref 26.5–33)
MCHC RBC AUTO-ENTMCNC: 33.5 G/DL (ref 31.5–36.5)
MCV RBC AUTO: 92 FL (ref 78–100)
METAMYELOCYTES # BLD: 0.3 10E9/L
METAMYELOCYTES NFR BLD MANUAL: 2 %
MONOCYTES # BLD AUTO: 0.9 10E9/L (ref 0–1.3)
MONOCYTES NFR BLD AUTO: 7 %
MYELOCYTES # BLD: 0.1 10E9/L
MYELOCYTES NFR BLD MANUAL: 1 %
NEUTROPHILS # BLD AUTO: 10 10E9/L (ref 1.6–8.3)
NEUTROPHILS NFR BLD AUTO: 76 %
PHOSPHATE SERPL-MCNC: 3.1 MG/DL (ref 2.5–4.5)
PLATELET # BLD AUTO: 89 10E9/L (ref 150–450)
PLATELET # BLD EST: ABNORMAL 10*3/UL
POTASSIUM SERPL-SCNC: 3.8 MMOL/L (ref 3.4–5.3)
PROT SERPL-MCNC: 4.4 G/DL (ref 6.8–8.8)
RBC # BLD AUTO: 2.38 10E12/L (ref 3.8–5.2)
SODIUM SERPL-SCNC: 139 MMOL/L (ref 133–144)
WBC # BLD AUTO: 13.1 10E9/L (ref 4–11)

## 2019-05-09 PROCEDURE — C9113 INJ PANTOPRAZOLE SODIUM, VIA: HCPCS | Performed by: INTERNAL MEDICINE

## 2019-05-09 PROCEDURE — 25000125 ZZHC RX 250

## 2019-05-09 PROCEDURE — 80053 COMPREHEN METABOLIC PANEL: CPT | Performed by: INTERNAL MEDICINE

## 2019-05-09 PROCEDURE — 25000125 ZZHC RX 250: Performed by: INTERNAL MEDICINE

## 2019-05-09 PROCEDURE — 12000000 ZZH R&B MED SURG/OB

## 2019-05-09 PROCEDURE — 84100 ASSAY OF PHOSPHORUS: CPT | Performed by: INTERNAL MEDICINE

## 2019-05-09 PROCEDURE — 25000132 ZZH RX MED GY IP 250 OP 250 PS 637: Performed by: INTERNAL MEDICINE

## 2019-05-09 PROCEDURE — 25000128 H RX IP 250 OP 636: Performed by: INTERNAL MEDICINE

## 2019-05-09 PROCEDURE — 97110 THERAPEUTIC EXERCISES: CPT | Mod: GP

## 2019-05-09 PROCEDURE — 83605 ASSAY OF LACTIC ACID: CPT | Performed by: INTERNAL MEDICINE

## 2019-05-09 PROCEDURE — 97530 THERAPEUTIC ACTIVITIES: CPT | Mod: GP

## 2019-05-09 PROCEDURE — 92526 ORAL FUNCTION THERAPY: CPT | Mod: GN | Performed by: SPEECH-LANGUAGE PATHOLOGIST

## 2019-05-09 PROCEDURE — 85025 COMPLETE CBC W/AUTO DIFF WBC: CPT | Performed by: INTERNAL MEDICINE

## 2019-05-09 PROCEDURE — 99232 SBSQ HOSP IP/OBS MODERATE 35: CPT | Performed by: INTERNAL MEDICINE

## 2019-05-09 PROCEDURE — 40000239 ZZH STATISTIC VAT ROUNDS

## 2019-05-09 PROCEDURE — 00000146 ZZHCL STATISTIC GLUCOSE BY METER IP

## 2019-05-09 RX ORDER — LORAZEPAM 2 MG/ML
0.25 INJECTION INTRAMUSCULAR EVERY 8 HOURS PRN
Status: DISCONTINUED | OUTPATIENT
Start: 2019-05-09 | End: 2019-05-12

## 2019-05-09 RX ADMIN — DILTIAZEM HYDROCHLORIDE 5 MG: 5 INJECTION INTRAVENOUS at 14:06

## 2019-05-09 RX ADMIN — DILTIAZEM HYDROCHLORIDE 5 MG: 5 INJECTION INTRAVENOUS at 05:16

## 2019-05-09 RX ADMIN — DILTIAZEM HYDROCHLORIDE 5 MG: 5 INJECTION INTRAVENOUS at 22:02

## 2019-05-09 RX ADMIN — LEVOTHYROXINE SODIUM ANHYDROUS 37.5 MCG: 100 INJECTION, POWDER, LYOPHILIZED, FOR SOLUTION INTRAVENOUS at 14:14

## 2019-05-09 RX ADMIN — DILTIAZEM HYDROCHLORIDE 5 MG: 5 INJECTION INTRAVENOUS at 00:56

## 2019-05-09 RX ADMIN — DILTIAZEM HYDROCHLORIDE 5 MG: 5 INJECTION INTRAVENOUS at 09:54

## 2019-05-09 RX ADMIN — Medication 1 SPRAY: at 21:19

## 2019-05-09 RX ADMIN — DILTIAZEM HYDROCHLORIDE 5 MG: 5 INJECTION INTRAVENOUS at 18:15

## 2019-05-09 RX ADMIN — GABAPENTIN 100 MG: 100 CAPSULE ORAL at 21:09

## 2019-05-09 RX ADMIN — CLINDAMYCIN PHOSPHATE 900 MG: 900 INJECTION, SOLUTION INTRAVENOUS at 05:12

## 2019-05-09 RX ADMIN — PANTOPRAZOLE SODIUM 40 MG: 40 INJECTION, POWDER, FOR SOLUTION INTRAVENOUS at 09:54

## 2019-05-09 RX ADMIN — I.V. FAT EMULSION 250 ML: 20 EMULSION INTRAVENOUS at 21:10

## 2019-05-09 RX ADMIN — LORAZEPAM 0.25 MG: 2 INJECTION INTRAMUSCULAR; INTRAVENOUS at 13:20

## 2019-05-09 RX ADMIN — CLINDAMYCIN PHOSPHATE 900 MG: 900 INJECTION, SOLUTION INTRAVENOUS at 14:06

## 2019-05-09 RX ADMIN — ASCORBIC ACID, VITAMIN A PALMITATE, CHOLECALCIFEROL, THIAMINE HYDROCHLORIDE, RIBOFLAVIN-5 PHOSPHATE SODIUM, PYRIDOXINE HYDROCHLORIDE, NIACINAMIDE, DEXPANTHENOL, ALPHA-TOCOPHEROL ACETATE, VITAMIN K1, FOLIC ACID, BIOTIN, CYANOCOBALAMIN: 200; 3300; 200; 6; 3.6; 6; 40; 15; 10; 150; 600; 60; 5 INJECTION, SOLUTION INTRAVENOUS at 16:39

## 2019-05-09 ASSESSMENT — ACTIVITIES OF DAILY LIVING (ADL)
ADLS_ACUITY_SCORE: 25

## 2019-05-09 NOTE — PROGRESS NOTES
Called by nursing staff with report of patient with anxiety requesting ativan, order for low dose ativan submitted.

## 2019-05-09 NOTE — PLAN OF CARE
Discharge Planner SLP   Patient plan for discharge: Not addressed.   Current status: SLP: Patient seen for swallow treatment at bedside, she was dry heaving. Looked in her oral cavity and it was full of dried crusted blood covering hard and soft palate. Gentle aggressive oral cares completed with rinse and spit/suction. Able to remove it with a swab. She was able to take 2 ice chips and did some spitting. She did not want to trial any PO at this time. Recommend: 1. Continue with previous diet recommended full liquids nectar thick liquids as tolerated. May have a few small ice chips with RN supervision for comfort.   Barriers to return to prior living situation: Deconditioning and dysphagia  Recommendations for discharge: TCU  Rationale for recommendations: Will need on going ST needs for swallowing to safely advance diet as tolerated. Patient is below her baseline.        Entered by: Lily Alva 05/09/2019 9:08 AM

## 2019-05-09 NOTE — PROGRESS NOTES
[facility]    Medicine Progress Note - Hospitalist Service       Date of Admission:  5/3/2019  Assessment & Plan     This is a  89 year old female with a history of atrial fibrillation, recurrent sinusitis since 8/ 2018, throat pain with difficulty swallowing and weight loss had presented to ENT clinic for follow-up where she was found to have a heart rate in 130s, and so was admitted with atrial fibrillation with rapid ventricular response and acute renal failure.     Subacute sinusitis  Throat pain  Patient initially admitted 4/11-4/16 due to recurrent sinusitis since 08/2018. Has had multiple course of abx w/o improvement. Has been followed by ENT (Dr. Irene). CT scan of sinuses on 4/11 found severe sinusitis. Was treated with short course of steroids, IV vancomycin and ceftriaxone, then transitioned to doxycycline upon discharge to TCU (completed 4/26).  Symptoms continued to get worse with sore throat.  Has had strep/ Candida negative in the past with growth of normal sera in the culture  - CT soft tissue neck on 5/3/2019 shows continued interval increase in prominence of soft tissue of Waldeyer's ring including the adenoids and palatine tonsils bilaterally-?  Inflammation versus malignancy   -ENT following, appreciate input.  -  Status post direct laryngoscopy and biopsy of lingual tonsil with attempted needle aspiration of right pharynx, bilateral maxillary and ethmoid endoscopic sinus surgery -5/6/2019  -No aspiratable fluid on right pharynx per Dr. Irene(ENT), copious secretions of bilateral maxillary and ethmoid sinuses with mildly hyperplastic lingual tonsil tissue, right lingual tonsil biopsy sent and swab of secretions right maxillary sinus sent for pathology.  Flow cytometry reveals polytypic B cells, final pathology pending, however upon discussion with Dr. 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  Likely will have  final report tomorrow  -Dr. Rosa aware and following, awaiting for final path  -Intraoperative cultures growing coagulase-negative staph however on discussion with ENT, its likely noncontributory and recommended stopping clindamycin  -Saline nasal spray twice a day   - patient was unable to take orally, so continue with IV conversion of her home PTA medication.  Also started on TPN due to multiple days of poor p.o. Intake and severe malnutrition ( see below).  Resume diet gradually wean down TPN once adequate oral intake    Generalized body ache/back pain  -Patient has been complaining of generalized body ache, likely from being in the uncomfortable position  -Patient was somnolent at the time of my evaluation.  Limited options secondary to her being unable to swallow food/medication  -Discussed with palliative care who recommended inpatient pain management consultation for symptomatic management of pain without causing her more sedated    Anxiety  -Low-dose Ativan helped overnight.  PRN Ativan ordered    Leukocytosis  -Likely from acute stress, currently on IV clindamycin for severe sinusitis, continue  -Maxillary fluid growing coagulase-negative staph, however upon discussion with ENT this is likely noncontributory and so recommended stopping clindamycin.    Atrial fibrillation with rapid ventricular response at presentation.    Likely due to patient unable to take her diltiazem for 2 days prior to admission due to difficulty swallowing.   -Converted back to sinus rhythm before starting on diltiazem drip, continue to monitor on  IV scheduled Cardizem -continue to monitor on telemetry.      Acute kidney injury.    Likely prerenal from poor p.o. intake.  Baseline creatinine appears to be around 1.0.    On admission it was 1.33.   Improved with hydration  Continue gentle hydration, monitor, avoid nephrotoxins, patient currently is euvolemic even though her weight upsince admission    Hypernatremia  -Mild on  5/7/2019, changed IV fluid to half-normal saline at a decreased rate, improved now    Severe malnutrition  In the context of acute illness or injury with more than 10% of weight loss in the last 6 months with less than 50% of intake in the last 5 days, moderate depletion of upper arm fat loss and muscle loss in the temporal region  Patient was unable to tolerate oral intake due to significant pharyngeal swelling and throat pain, so initiated on TPN appreciate nutrition service consultation  -Status post surgery hopefully her oral intake will improve,  speech therapy following.  Once oral adequate intake may be able to wean down TPN.  -If swallowing does not improve and patient and family do not choose hospice/comfort measures may benefit from feeding tube prior to discharge       Pain in left lower extremity   Lower extremity ultrasound negative for any evidence of DVT, symptomatic management    Hypothyroidism.    Continue IV levothyroxine.     GERD.     Continue IV Protonix.      Diet: parenteral nutrition - Clinimix E  Combination Diet Full Liquid Diet; Nectar Thickened Liquids (pre-thickened or use instant food thickener)    DVT Prophylaxis: Pneumatic Compression Devices  Castillo Catheter: not present  Code Status: DNR/DNI      Disposition Plan   Expected discharge: 2 - 3 days, recommended toTCU once adequate oral intake and cleared by all consultants    Entered: Elsa Keith MD 05/09/2019, 3:59 PM       The patient's care was discussed with the Bedside Nurse, Patient and Patient's Family, palliative care team.    Elsa Keith MD  Hospitalist Service  [facility]    ______________________________________________________________________    Interval History   Patient was anxious overnight and was given some Ativan which helped.  felt better earlier today.  However still sometimes gets anxious.    Data reviewed today: I reviewed all medications, new labs and imaging results over the last 24 hours. I  personally reviewed no images or EKG's today.    Physical Exam   Vital Signs: Temp: 98.1  F (36.7  C) Temp src: Oral BP: 113/66 Pulse: 76 Heart Rate: 88 Resp: 16 SpO2: 92 % O2 Device: None (Room air)    Weight: 115 lbs 8.34 oz  Exam:  Constitutional: Awake and alert, appears comfortable today  Head: Normocephalic. No masses, lesions, tenderness or abnormalities  ENT: Nasal drip pad in place, left nare with scant fresh blood, less right palatal/peritonsillar swelling compared to preoperative and dried blood in palate  Cardiovascular: RRR.  2+ murmurs, no rubs or JVD  Respiratory:normal WOB,b/l equal air entry, no wheezes or crackles   Gastrointestinal: Abdomen soft, non-tender. BS normal. No masses, organomegaly  : Deferred   extremities :no edema, no clubbing or cyanosis    Skin: Warm and dry, no rash.      Data   Recent Labs   Lab 05/09/19  0511 05/08/19  2222 05/08/19  0843 05/08/19  0600 05/07/19  0600  05/06/19  0455 05/05/19  0631   WBC 13.1*  --  13.5*  --   --   --   --  14.7*   HGB 7.3*  --  8.2*  --   --   --   --  8.3*   MCV 92  --  92  --   --   --   --  91   PLT 89*  --  107*  --   --   --   --  214   INR  --   --   --   --   --   --  1.19* 1.22*     --   --  143 145*  --  144 145*   POTASSIUM 3.8 4.2  --  3.2* 3.5   < > 3.2* 4.1   CHLORIDE 108  --   --  111* 113*  --  112* 114*   CO2 23  --   --  25 25  --  23 23   BUN 51*  --   --  49* 49*  --  43* 41*   CR 0.91  --   --  0.92 0.99  --  0.93 0.95   ANIONGAP 8  --   --  7 7  --  9 8   DINORAH 8.7  --   --  8.8 9.1  --  9.7 10.0   *  --   --  168* 179*  --  217* 124*   ALBUMIN 1.8*  --   --   --   --   --  2.2* 2.1*   PROTTOTAL 4.4*  --   --   --   --   --  5.5* 5.5*   BILITOTAL 0.6  --   --   --   --   --  0.6 0.6   ALKPHOS 51  --   --   --   --   --  53 54   ALT 13  --   --   --   --   --  11 9   AST 13  --   --   --   --   --  9 10    < > = values in this interval not displayed.     No results found for this or any previous visit (from the  past 24 hour(s)).  Medications     IV fluid REPLACEMENT ONLY       Reason anticoagulant not prescribed for atrial fibrillation       parenteral nutrition - Clinimix E 65 mL/hr at 05/08/19 0830     NaCl 10 mL/hr at 05/07/19 2131       clindamycin  900 mg Intravenous Q8H     diltiazem  5 mg Intravenous Q4H     gabapentin  100 mg Oral At Bedtime     insulin aspart  1-12 Units Subcutaneous Q4H     levothyroxine  37.5 mcg Intravenous Daily     lipids  250 mL Intravenous Q24H     pantoprazole (PROTONIX) IV  40 mg Intravenous Daily with breakfast     sodium chloride  1 spray Both Nostrils BID     sodium chloride (PF)  10 mL Intracatheter Q8H

## 2019-05-09 NOTE — PLAN OF CARE
Discharge Planner PT   Patient plan for discharge: none stated  Current status: Pt agreeable to PT session. [place] despite hearing aids in. Supine to sit with min A and cues with railing and elevated HOB. Pt tolerated sitting at EOB x 8 min with slight reports of lightheadedness. BP=95/52mmHg, YD=870. Worked on erect posture in sitting as pt demos slumped forward trunk. Mod A with seated scooting with cues. Sit to supine with mod A. Pt instructed with bilateral LE ex and encouraged to perform throughout the day.   Barriers to return to prior living situation: level of assist for return home, none for TCU  Recommendations for discharge: TCU  Rationale for recommendations: Pt will benefit from continued skilled PT at TCU to maximize her level of mobility and independence.            Entered by: Trina Mabry 05/09/2019 8:36 AM

## 2019-05-09 NOTE — PLAN OF CARE
AOx4, lethargic at times. VSS. Full liquid/nectar thick diet - refusing anything PO. Duble lumen PICC TPN @ 65ml/hr, lipids @ 20.8ml/hr, 1/2 NS @ TKO + abx. Blood sugars q4 - 162. Tele: NSR. A2 + walker. [place] - hearing aids in. Denies pain - but if pain starts admin morphine vs dilaudid. Congested cough - makes pt anxious, ativan x1 given. Heme/onc + pain consults 5/9. Discharge pending.

## 2019-05-09 NOTE — PROVIDER NOTIFICATION
MD Notification    Notified Person: MD    Notified Person Name: Samuel    Notification Date/Time: 5/8 2020    Notification Interaction: Paged    Purpose of Notification: Increased anxiety, family requesting IV med    Orders Received: ativan one time dose ordered    Comments:

## 2019-05-09 NOTE — PLAN OF CARE
AVSS, A&O x4. LS diminished. BS hypoactive, passing flatus. Generalized weakness. Incisions UTV, dressing place under nose, changed. Throat with dried drainage, most removed (see speech's note). Denies pain. Voiding. Incontinent at times. Up with 1 to commode. Full liquid with nectar thick diet, refusing PO. Tolerated some ice chips. TPN at 65mls/hr. Right PICC. Ativan given x1 for anxiety.     Lactic fired this am, lactic level back at 1.3. Continue to monitor.

## 2019-05-09 NOTE — PROVIDER NOTIFICATION
Notified Dr Keith regarding pt anxious. Received one time dose of ativan last evening. Could we get ativan ordered?  Dr Keith put in orders for prn ativan.

## 2019-05-09 NOTE — PLAN OF CARE
Alert and oriented x 4. Up with A1-2 with GB/WW. Episode of sedation + dizziness following IV Dilaudid X 1, family requests use of morphine vs Dilaudid. Pt c/o intermittent LBP, occasional mouth/throat pain. Pain consult ordered, plan to complete tomorrow when family present. Continent, up to BSC. Tolerated very small sips thickened water, otherwise refused po. Allowed oral cares intermittently. TPN running. Potassium replaced, to be rechecked @ 2000. IV abx continued. Plan for hematology/onc consult tomorrow based on biopsy results.

## 2019-05-09 NOTE — PLAN OF CARE
VSS on RA, A/O x4, lethargic at times. Lung sounds diminished, BS diminished, generalized weakness. Incisions UTV, dressing place under nose with scant dried drainage. Patient denied pain. Voiding adequately. Up with assist of 2 - inconsistent, ambulated to bathroom later in the morning and did well, though appeared very anxious, verbalizing that she was going to fall. Full liquid with nectar thick diet, though refusing PO. TPN/Lipids running - R PICC.

## 2019-05-09 NOTE — PROGRESS NOTES
[facility] Otolaryngology  Progress Note  Dr. Sandra Irene          Interval History:   Juliana has complaints of generalized pain and is experiencing episodes of anxiety.         Medications:       diltiazem  5 mg Intravenous Q4H     gabapentin  100 mg Oral At Bedtime     insulin aspart  1-12 Units Subcutaneous Q4H     levothyroxine  37.5 mcg Intravenous Daily     lipids  250 mL Intravenous Q24H     pantoprazole (PROTONIX) IV  40 mg Intravenous Daily with breakfast     sodium chloride  1 spray Both Nostrils BID     sodium chloride (PF)  10 mL Intracatheter Q8H                  Physical Exam:   Blood pressure 113/66, pulse 76, temperature 98.1  F (36.7  C), temperature source Oral, resp. rate 16, weight 52.4 kg (115 lb 8.3 oz), SpO2 92 %, not currently breastfeeding.  I/O last 3 completed shifts:  In: 192.17 [I.V.:192.17]  Out: 825 [Urine:825]   Sitting up, alert, appropriate, frail appearing  Nasal drip pad in place with scant bloody drainage.  Drainage slowing, no longer appears fresh.  OC/OP: dry mucosa, fewer dry secretions on palate today.  No trismus.           Data:     Recent Labs   Lab Test 05/09/19 0511 05/08/19  0843 05/06/19  0455 05/05/19  0631  04/12/15  0640   WBC 13.1* 13.5*  --  14.7*   < > 8.0   HGB 7.3* 8.2*  --  8.3*   < > 15.3   MCV 92 92  --  91   < > 92   PLT 89* 107*  --  214   < > 216   INR  --   --  1.19* 1.22*  --  1.02    < > = values in this interval not displayed.     Recent Labs   Lab Test 05/09/19 0511 05/08/19  2222 05/08/19  0600 05/07/19  0600     --  143 145*   POTASSIUM 3.8 4.2 3.2* 3.5   CHLORIDE 108  --  111* 113*   CO2 23  --  25 25   BUN 51*  --  49* 49*   CR 0.91  --  0.92 0.99   ANIONGAP 8  --  7 7     Recent Labs   Lab Test 05/09/19 0511 05/06/19  0455 05/05/19  0631 03/19/19  2130  08/07/18  1640 08/01/18  1052  05/10/12  0550 05/09/12  0620  05/08/12  1033   ALBUMIN 1.8* 2.2* 2.1*  --    < >  --   --    < >  --  3.1*  --  4.2   BILITOTAL 0.6 0.6 0.6  --    < >  --    --    < >  --  0.6  --  0.5   ALT 13 11 9  --    < >  --   --    < >  --  24  --  20   AST 13 9 10  --    < >  --   --    < >  --  14  --  17   PROTEIN  --   --   --  Negative  --  Negative Trace*   < >  --   --    < >  --    LIPASE  --   --   --   --   --   --   --   --  51 76  --  181   AMYLASE  --   --   --   --   --   --   --   --   --  70  --   --     < > = values in this interval not displayed.              Assessment and Plan:   89 y/o POD #3 s/p sinus surgery, lingual tonsil biopsy, attempted I and D of pharynx.  Nasal drainage decreasing.  Coag neg staph on culture--typically not considered pathogenic in nose/sinuses--discontinue clindamycin (discussed with Dr. Keith).  Call received from Dr. Chester Hines that final pathology results should be available tomorrow but sinus specimen concerning for possible B cell lymphoma.  Discussed with Dr. ELOISE Rosa of oncology planning to see the patient/family tomorrow.

## 2019-05-10 ENCOUNTER — APPOINTMENT (OUTPATIENT)
Dept: PHYSICAL THERAPY | Facility: CLINIC | Age: 84
DRG: 840 | End: 2019-05-10
Payer: COMMERCIAL

## 2019-05-10 PROBLEM — N18.30 CKD (CHRONIC KIDNEY DISEASE) STAGE 3, GFR 30-59 ML/MIN (H): Status: ACTIVE | Noted: 2018-07-23

## 2019-05-10 PROBLEM — R13.10 DYSPHAGIA: Status: ACTIVE | Noted: 2019-05-10

## 2019-05-10 PROBLEM — D63.8 ANEMIA OF CHRONIC DISEASE: Status: ACTIVE | Noted: 2019-05-10

## 2019-05-10 PROBLEM — C85.10 LARGE B-CELL LYMPHOMA (H): Status: ACTIVE | Noted: 2019-05-10

## 2019-05-10 PROBLEM — E43 SEVERE MALNUTRITION (H): Status: ACTIVE | Noted: 2019-05-10

## 2019-05-10 LAB
ANION GAP SERPL CALCULATED.3IONS-SCNC: 6 MMOL/L (ref 3–14)
ANISOCYTOSIS BLD QL SMEAR: ABNORMAL
BASOPHILS # BLD AUTO: 0 10E9/L (ref 0–0.2)
BASOPHILS NFR BLD AUTO: 0 %
BUN SERPL-MCNC: 40 MG/DL (ref 7–30)
CALCIUM SERPL-MCNC: 8.5 MG/DL (ref 8.5–10.1)
CHLORIDE SERPL-SCNC: 105 MMOL/L (ref 94–109)
CO2 SERPL-SCNC: 28 MMOL/L (ref 20–32)
COPATH REPORT: NORMAL
CREAT SERPL-MCNC: 0.83 MG/DL (ref 0.52–1.04)
DIFFERENTIAL METHOD BLD: ABNORMAL
EOSINOPHIL # BLD AUTO: 0.4 10E9/L (ref 0–0.7)
EOSINOPHIL NFR BLD AUTO: 3 %
ERYTHROCYTE [DISTWIDTH] IN BLOOD BY AUTOMATED COUNT: 19.3 % (ref 10–15)
GFR SERPL CREATININE-BSD FRML MDRD: 60 ML/MIN/{1.73_M2}
GLUCOSE BLDC GLUCOMTR-MCNC: 120 MG/DL (ref 70–99)
GLUCOSE BLDC GLUCOMTR-MCNC: 127 MG/DL (ref 70–99)
GLUCOSE BLDC GLUCOMTR-MCNC: 132 MG/DL (ref 70–99)
GLUCOSE BLDC GLUCOMTR-MCNC: 134 MG/DL (ref 70–99)
GLUCOSE BLDC GLUCOMTR-MCNC: 136 MG/DL (ref 70–99)
GLUCOSE BLDC GLUCOMTR-MCNC: 160 MG/DL (ref 70–99)
GLUCOSE BLDC GLUCOMTR-MCNC: 217 MG/DL (ref 70–99)
GLUCOSE SERPL-MCNC: 128 MG/DL (ref 70–99)
HCT VFR BLD AUTO: 21.5 % (ref 35–47)
HGB BLD-MCNC: 7.3 G/DL (ref 11.7–15.7)
INR PPP: 1.19 (ref 0.86–1.14)
INTERPRETATION ECG - MUSE: NORMAL
LYMPHOCYTES # BLD AUTO: 2.3 10E9/L (ref 0.8–5.3)
LYMPHOCYTES NFR BLD AUTO: 17 %
MAGNESIUM SERPL-MCNC: 1.6 MG/DL (ref 1.6–2.3)
MCH RBC QN AUTO: 30.7 PG (ref 26.5–33)
MCHC RBC AUTO-ENTMCNC: 34 G/DL (ref 31.5–36.5)
MCV RBC AUTO: 90 FL (ref 78–100)
METAMYELOCYTES # BLD: 0.3 10E9/L
METAMYELOCYTES NFR BLD MANUAL: 2 %
MONOCYTES # BLD AUTO: 1.4 10E9/L (ref 0–1.3)
MONOCYTES NFR BLD AUTO: 10 %
NEUTROPHILS # BLD AUTO: 9.2 10E9/L (ref 1.6–8.3)
NEUTROPHILS NFR BLD AUTO: 68 %
NRBC # BLD AUTO: 0.1 10*3/UL
NRBC BLD AUTO-RTO: 1 /100
PHOSPHATE SERPL-MCNC: 3.2 MG/DL (ref 2.5–4.5)
PLATELET # BLD AUTO: 87 10E9/L (ref 150–450)
PLATELET # BLD EST: ABNORMAL 10*3/UL
POLYCHROMASIA BLD QL SMEAR: SLIGHT
POTASSIUM SERPL-SCNC: 3.7 MMOL/L (ref 3.4–5.3)
RBC # BLD AUTO: 2.38 10E12/L (ref 3.8–5.2)
SODIUM SERPL-SCNC: 139 MMOL/L (ref 133–144)
WBC # BLD AUTO: 13.6 10E9/L (ref 4–11)

## 2019-05-10 PROCEDURE — 25000128 H RX IP 250 OP 636: Performed by: INTERNAL MEDICINE

## 2019-05-10 PROCEDURE — 40000239 ZZH STATISTIC VAT ROUNDS

## 2019-05-10 PROCEDURE — 97530 THERAPEUTIC ACTIVITIES: CPT | Mod: GP

## 2019-05-10 PROCEDURE — C9113 INJ PANTOPRAZOLE SODIUM, VIA: HCPCS | Performed by: INTERNAL MEDICINE

## 2019-05-10 PROCEDURE — 85025 COMPLETE CBC W/AUTO DIFF WBC: CPT | Performed by: INTERNAL MEDICINE

## 2019-05-10 PROCEDURE — 85610 PROTHROMBIN TIME: CPT | Performed by: INTERNAL MEDICINE

## 2019-05-10 PROCEDURE — 25000125 ZZHC RX 250: Performed by: INTERNAL MEDICINE

## 2019-05-10 PROCEDURE — 00000146 ZZHCL STATISTIC GLUCOSE BY METER IP

## 2019-05-10 PROCEDURE — 99221 1ST HOSP IP/OBS SF/LOW 40: CPT | Performed by: INTERNAL MEDICINE

## 2019-05-10 PROCEDURE — 99233 SBSQ HOSP IP/OBS HIGH 50: CPT | Performed by: INTERNAL MEDICINE

## 2019-05-10 PROCEDURE — 83735 ASSAY OF MAGNESIUM: CPT | Performed by: INTERNAL MEDICINE

## 2019-05-10 PROCEDURE — 80048 BASIC METABOLIC PNL TOTAL CA: CPT | Performed by: INTERNAL MEDICINE

## 2019-05-10 PROCEDURE — 93005 ELECTROCARDIOGRAM TRACING: CPT

## 2019-05-10 PROCEDURE — 93010 ELECTROCARDIOGRAM REPORT: CPT | Mod: 76 | Performed by: INTERNAL MEDICINE

## 2019-05-10 PROCEDURE — 84100 ASSAY OF PHOSPHORUS: CPT | Performed by: INTERNAL MEDICINE

## 2019-05-10 PROCEDURE — 12000000 ZZH R&B MED SURG/OB

## 2019-05-10 RX ORDER — FENTANYL CITRATE 50 UG/ML
25-50 INJECTION, SOLUTION INTRAMUSCULAR; INTRAVENOUS EVERY 5 MIN PRN
Status: CANCELLED | OUTPATIENT
Start: 2019-05-10

## 2019-05-10 RX ORDER — NALOXONE HYDROCHLORIDE 0.4 MG/ML
.1-.4 INJECTION, SOLUTION INTRAMUSCULAR; INTRAVENOUS; SUBCUTANEOUS
Status: CANCELLED | OUTPATIENT
Start: 2019-05-10

## 2019-05-10 RX ORDER — FLUMAZENIL 0.1 MG/ML
0.2 INJECTION, SOLUTION INTRAVENOUS
Status: CANCELLED | OUTPATIENT
Start: 2019-05-10

## 2019-05-10 RX ORDER — DEXAMETHASONE SODIUM PHOSPHATE 4 MG/ML
4 INJECTION, SOLUTION INTRA-ARTICULAR; INTRALESIONAL; INTRAMUSCULAR; INTRAVENOUS; SOFT TISSUE ONCE
Status: COMPLETED | OUTPATIENT
Start: 2019-05-10 | End: 2019-05-10

## 2019-05-10 RX ADMIN — DILTIAZEM HYDROCHLORIDE 5 MG: 5 INJECTION INTRAVENOUS at 10:21

## 2019-05-10 RX ADMIN — DILTIAZEM HYDROCHLORIDE 5 MG: 5 INJECTION INTRAVENOUS at 17:13

## 2019-05-10 RX ADMIN — HYDROMORPHONE HYDROCHLORIDE 0.2 MG: 1 INJECTION, SOLUTION INTRAMUSCULAR; INTRAVENOUS; SUBCUTANEOUS at 17:20

## 2019-05-10 RX ADMIN — DILTIAZEM HYDROCHLORIDE 5 MG: 5 INJECTION INTRAVENOUS at 21:07

## 2019-05-10 RX ADMIN — PANTOPRAZOLE SODIUM 40 MG: 40 INJECTION, POWDER, FOR SOLUTION INTRAVENOUS at 09:22

## 2019-05-10 RX ADMIN — Medication 1 SPRAY: at 09:25

## 2019-05-10 RX ADMIN — Medication 1 SPRAY: at 20:46

## 2019-05-10 RX ADMIN — DILTIAZEM HYDROCHLORIDE 5 MG: 5 INJECTION INTRAVENOUS at 06:33

## 2019-05-10 RX ADMIN — DEXAMETHASONE SODIUM PHOSPHATE 4 MG: 4 INJECTION, SOLUTION INTRAMUSCULAR; INTRAVENOUS at 14:31

## 2019-05-10 RX ADMIN — DILTIAZEM HYDROCHLORIDE 5 MG: 5 INJECTION INTRAVENOUS at 13:41

## 2019-05-10 RX ADMIN — DILTIAZEM HYDROCHLORIDE 5 MG: 5 INJECTION INTRAVENOUS at 02:18

## 2019-05-10 RX ADMIN — LORAZEPAM 0.25 MG: 2 INJECTION INTRAMUSCULAR; INTRAVENOUS at 09:22

## 2019-05-10 ASSESSMENT — ACTIVITIES OF DAILY LIVING (ADL)
ADLS_ACUITY_SCORE: 25

## 2019-05-10 NOTE — PROGRESS NOTES
CLINICAL NUTRITION SERVICES - REASSESSMENT NOTE      Malnutrition: (5/4)  % Weight Loss:  > 10% in 6 months (severe malnutrition)  % Intake:  </= 50% for >/= 5 days (severe malnutrition)  Subcutaneous Fat Loss:  Upper arm region moderate depletion   Muscle Loss:  Temporal region moderate depletion, Clavicle bone region moderate depletion, Patellar region moderate depletion and Posterior calf region moderate depletion  Fluid Retention:  None noted     Malnutrition Diagnosis: Severe malnutrition  In Context of:  Acute illness or injury       EVALUATION OF PROGRESS TOWARD GOALS   Diet:  Full Liquid Nectar Thick     Nutrition Support:  Patient's TPN was increased to goal on 5/7 and continues as follows ~    Nutrition Support Parenteral:  Type of Access: PICC  Parenteral Frequency:  Continuous  Parenteral Regimen: D15 AA5 at 65 mL/hr + Lipid 250 mL daily   Total Parenteral Provisions: 1608 kcal (33 kcal/kg), 78 g protein (1.6 g/kg), 234 g CHO, 31% fat       Intake/Tolerance:    Oral intake quite poor (240 mL yesterday) - Noted patient with lots of pain which is likely affecting intake   Labs noted and acceptable  BGM < 150 - High sliding scale insulin  Weight 52.4 kg (up 3.3 kg from admit), I/O incomplete       ASSESSED NUTRITION NEEDS:  Dosing Weight:  49.1 kg   Estimated Energy Needs: 7351-5327 kcals (30-35 Kcal/Kg)  Justification: repletion  Estimated Protein Needs: 70-90 grams protein (1.5-1.8 g pro/Kg)  Justification: repletion, post-op       NEW FINDINGS:   Path pending for oropharyngeal mass (s/p surgery on 5/6)  5/7:  SLP eval = Full Liquid Nectar Thick     Noted possible discharge with TPN vs. TF vs. Hospice     Previous Goals (5/7):   TPN/Lipid will meet % needs while NPO/intake minimal   Evaluation: Met    Previous Nutrition Diagnosis (5/7):   Inadequate parenteral nutrition infusion related to TPN increased to step #2 with plans to increase to goal as evidenced by meeting ~70% needs from current regimen    Evaluation: Resolved       CURRENT NUTRITION DIAGNOSIS  No nutrition diagnosis identified at this time     INTERVENTIONS  Recommendations / Nutrition Prescription  Continue current TPN regimen as above     Implementation  Collaboration and Referral of Nutrition care:  Discussed with Pharmacist - no TPN changes today     Goals  TPN and Lipid will continue to meet needs while intake poor/minimal     MONITORING AND EVALUATION:  Progress towards goals will be monitored and evaluated per protocol and Practice Guidelines    Sandra Kearney RD, LD, CNSC   Clinical Dietitian - [facility]

## 2019-05-10 NOTE — CONSULTS
Consult Date:  05/10/2019      REQUESTING PHYSICIAN:    This consult has been requested by Dr. Sandra Irene for lymphoma.      HISTORY OF PRESENT ILLNESS:    Ms. Leal is a 89-year-old female who has not been feeling well for the last 7 to 8 months.  She has been having pain in her throat since fall of 2018.  It has been treated multiple times with antibiotics and it did not improve.  Her appetite has been decreased.  Because of that, she has been losing weight.  She has been getting weaker.      The patient was brought to the emergency room on 05/03/2019 because of tachycardia.  She had multiple investigations done.   -CBC revealed anemia with hemoglobin of 9.3.   -CMP revealed minor electrolyte abnormalities.      The patient has had multiple imaging studies done in the last few weeks.  CT sinus on 04/11/2019 revealed marked mucosal thickening throughout the paranasal sinuses.  There is occlusion of right maxillary sinus ostium and infundibulum.  CT neck on 04/11/2019 revealed nonspecific prominence of right and left palatine tonsil.  Followup CT neck on 05/03/2019 revealed a continued increase in prominence of the soft tissues of Waldeyer's ring.      The patient was seen by Dr. Irene.  The patient had bilateral endoscopic maxillary and ethmoid sinus surgery and biopsy of lingular tonsillar tissue on 05/06/2019.  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      I met with the patient.  Multiple family members were present.  As per the family, patient's quality of life has been deteriorating since fall of 2018.  She has progressive throat pain.  Appetite has been decreased.  She has been losing weight.  She is very weak.  She has failure to thrive.  She cannot walk independently.  She is not in severe pain.  No high-grade fever or chills.      All other review of systems negative.      ALLERGIES:  REVIEWED.      MEDICATIONS:  Reviewed.      PAST MEDICAL  HISTORY:  Reviewed.      PAST SURGICAL HISTORY:  Reviewed.       SOCIAL HISTORY:  Reviewed.      LABORATORY DATA:  Reviewed.      ASSESSMENT:   1.  A 89-year-old female with newly diagnosed diffuse large B-cell lymphoma.   2.  Throat pain, decreased appetite and weight loss secondary to lymphoma.   3.  Multiple other medical problems including hypertension and chronic kidney disease.      RECOMMENDATIONS:   1.  I had a long discussion with the patient and family members.  Reviewed the result of the biopsy.  It reveals diffuse large B-cell lymphoma.  I explained to them that lymphoma is involving the sinuses and that is causing the chronic sinusitis.      2. Discussed regarding diffuse large B-cell lymphoma.  This is aggressive non-Hodgkin's lymphoma.  It is treated with chemotherapy.  The standard chemotherapy is R-CHOP.     Discussed regarding treatment options for this elderly lady.  The patient is 89.  She is frail.  She cannot take care of herself.  Her ECOG PS is 3. We discussed regarding chemotherapy with R-CHOP.  We also discussed regarding reduced dose chemotherapy.  We also discussed regarding comfort care with hospice.      The patient is elderly and frail.  With chemotherapy, she can have multiple complications and die from it.  These were all discussed with the patient and family.      After a long discussion, patient decided for hospice care.  She does not want any chemotherapy.  Family is supportive of patient's decision.  The patient mentioned that her   of lung cancer and was on hospice.  He also did not take chemotherapy.  They were very happy with the hospice service.      I will get a hospice consult.  The patient has pain in the throat.  I would recommend giving her some steroids.  It may help with the pain.  Also, steroid can help with any swelling associated with lymphoma. Steroid is a component of lymphoma treatment.  The patient can be given either oral prednisone or dexamethasone.        3.  The patient and family had a few questions, which were all answered.  I discussed the case with Dr. Cook and Dr. Irene.      Thanks for the consult.      Total time spent 45 minutes, more than 50% of the time spent in counseling and coordination of care.         BELEN CANCHOLA MD             D: 05/10/2019   T: 05/10/2019   MT: MARTINA      Name:     KEITH LACY   MRN:      [id number]        Account:       [id number]   :      02/10/1923           Consult Date:  05/10/2019      Document: [id number]       cc: Sandra Irene MD

## 2019-05-10 NOTE — PROGRESS NOTES
[facility] Otolaryngology  Progress Note  Dr. Sandra Irene          Interval History:   Juliana continues to complain of fatigue, general pain, anxiety.       Medications:       diltiazem  5 mg Intravenous Q4H     gabapentin  100 mg Oral At Bedtime     insulin aspart  1-12 Units Subcutaneous Q4H     levothyroxine  37.5 mcg Intravenous Daily     lipids  250 mL Intravenous Q24H     pantoprazole (PROTONIX) IV  40 mg Intravenous Daily with breakfast     sodium chloride  1 spray Both Nostrils BID     sodium chloride (PF)  10 mL Intracatheter Q8H                  Physical Exam:   Blood pressure 109/65, pulse 90, temperature 98.4  F (36.9  C), temperature source Oral, resp. rate 16, weight 52.4 kg (115 lb 8.3 oz), SpO2 93 %, not currently breastfeeding.  I/O last 3 completed shifts:  In: 4549 [P.O.:240; I.V.:1099]  Out: -   Alert, sitting up, appropriate, frail  Nose: nasal drip pad in place with moderate blood tinged mucoid drainage  OC/OP: dry oral mucosa,  No dried blood or active drainage.  Stable right palatal and peritonsillar fullness           Data:     Recent Labs   Lab Test 05/10/19  1130 05/09/19  0511 05/08/19  0843 05/06/19  0455 05/05/19  0631   WBC 13.6* 13.1* 13.5*  --  14.7*   HGB 7.3* 7.3* 8.2*  --  8.3*   MCV 90 92 92  --  91   PLT 87* 89* 107*  --  214   INR 1.19*  --   --  1.19* 1.22*     Recent Labs   Lab Test 05/10/19  0547 05/09/19  0511 05/08/19  2222 05/08/19  0600    139  --  143   POTASSIUM 3.7 3.8 4.2 3.2*   CHLORIDE 105 108  --  111*   CO2 28 23  --  25   BUN 40* 51*  --  49*   CR 0.83 0.91  --  0.92   ANIONGAP 6 8  --  7     Recent Labs   Lab Test 05/09/19  0511 05/06/19  0455 05/05/19  0631 03/19/19  2130  08/07/18  1640 08/01/18  1052  05/10/12  0550 05/09/12  0620  05/08/12  1033   ALBUMIN 1.8* 2.2* 2.1*  --    < >  --   --    < >  --  3.1*  --  4.2   BILITOTAL 0.6 0.6 0.6  --    < >  --   --    < >  --  0.6  --  0.5   ALT 13 11 9  --    < >  --   --    < >  --  24  --  20   AST 13 9 10   --    < >  --   --    < >  --  14  --  17   PROTEIN  --   --   --  Negative  --  Negative Trace*   < >  --   --    < >  --    LIPASE  --   --   --   --   --   --   --   --  51 76  --  181   AMYLASE  --   --   --   --   --   --   --   --   --  70  --   --     < > = values in this interval not displayed.              Assessment and Plan:   Juliana is POD #4 s/p sinus surgery, DL and lingual tonsil biopsy, and attempted I and D of right pharynx.  I received a call from Dr. Chester Hines regarding surgical pathology indicating preliminary results are consistent with a diffuse large B cell lymphoma and that final pathology should be released later today.  These results were discussed with Dr. ELOISE Rosa of oncology who indicated he would meet with the patient and family today to discuss the pathology results, prognosis, treatment options.  I have discussed the lymphoma diagnosis with Juliana and her daughter Suzi and they will await input from oncology.

## 2019-05-10 NOTE — PROGRESS NOTES
Patient seen.  Full consult to follow.    89-year-old female with newly diagnosed diffuse large B-cell lymphoma.  Treatment for diffuse large B-cell lymphoma is chemotherapy.  Standard chemotherapy is with R-CHOP.  This was discussed with the patient and family.  Patient does not want any chemotherapy.  Family is supportive of that.    Plan:  -Hospice consult.

## 2019-05-10 NOTE — PROGRESS NOTES
Interventional Radiology Pre-Procedure Sedation Assessment   Time of Assessment: 1:29 PM    Expected Level: Moderate Sedation    Indication: Sedation is required for the following type of Procedure: Gastrostomy tube placement with IV moderate sedation    Sedation and procedural consent: Risks, benefits and alternatives were discussed with Patient and Relative Son and Daughter    PO Intake: Appropriately NPO for procedure    ASA Class: Class 3 - SEVERE SYSTEMIC DISEASE, DEFINITE FUNCTIONAL LIMITATIONS.    Mallampati: Grade 2:  Soft palate, base of uvula, tonsillar pillars, and portion of posterior pharyngeal wall visible    Lungs: Lungs Clear with good breath sounds bilaterally    Heart: Normal heart sounds and rate    History and physical reviewed and no updates needed. I have reviewed the lab findings, diagnostic data, medications, and the plan for sedation. I have determined this patient to be an appropriate candidate for the planned sedation and procedure and have reassessed the patient IMMEDIATELY PRIOR to sedation and procedure.    Leelee Francisco, ARIEL CNP

## 2019-05-10 NOTE — CONSULTS
Care Transition Initial Assessment - SW     Met with: Patient and Family    Principal Problem:    Atrial fibrillation with RVR (H)  Active Problems:    Essential hypertension    CKD (chronic kidney disease) stage 3, GFR 30-59 ml/min (H)    Sinusitis, chronic    Large B-cell lymphoma (H)    Severe malnutrition (H)    Anemia of chronic disease    Dysphagia -- S/P G-tube 5/10/19       DATA  Lives With: alone   Living Arrangements: apartment  Quality of Family Relationships: involved, supportive  Description of Support System: Involved, Supportive  Who is your support system?: Children, Other (specify)(grandchildren )  Support Assessment: Adequate family and caregiver support.   Identified issues/concerns regarding health management: Hospice / discharge planning.      Quality of Family Relationships: involved, supportive    ASSESSMENT  Cognitive Status:  Awake, sleepy   Concerns to be addressed: Per SW consult for discharge planning, pt admitted 5/3 with a-fib with RVR. Pt has new dx of large B-cell lymphoma. ADRIÁN met with pt, daughter Millie and granddaughter Radha at bedside. Family in agreement with hospice recommendations and would like to use [facility] as pt's  had a good experience with [facility]. Per pt daughter they will plan to take pt home and provide care through a combination of family and private duty nursing agency. ? Transport (likely [facility] stretcher). SW has scheduled hospice consult for pt at 1300 tomorrow with [facility].     Call from [facility] - were providing RN, PT, OT, SP, HHA, they would like H&P, discharge orders faxed to [phone] for their records.      PLAN  Financial costs for the patient includes: Private duty RN   Patient given options and choices for discharge: Home with hospice   Patient/family is agreeable to the plan?  Yes  Patient Goals and Preferences: Home with [facility]  Patient anticipates discharging to:  Home with [facility]     Joya Campa  LSW

## 2019-05-10 NOTE — PLAN OF CARE
"Attempted to see patient for swallowing treatment.  She is sleeping soundly in chair, family requesting that she be allowed to sleep because she \"has just been told she has cancer.\"  This has not been confirmed per SLP chart review today, however, will respect family wishes and reattempt swallowing treatment as appropriate.    "

## 2019-05-10 NOTE — CONSULTS
"Care Coordination:    Sent message to Home Infusion team: per MD, pt will likely want to discharge to home with TPN or tube feeds (they may place a feeding tube), can you check benefits?    + still awaiting result of benefit check as of 1600 5/10/19    Previously pt was looking at TCU (SW previously following), but now would like to discharge to home and possibly with hospice. SW will continue to follow for potential hospice needs, Care Coordinator following for TPN vs enteral nutrition and possible resumption of homecare (previously open to [facility] RN/PT/OT/ST/HHA).     Emma Aleman RN, BSN  [facility] Care Coordinator   Mobile Phone: [phone]      Care Transition Initial Assessment - RN    DATA   Active Problems:    Atrial fibrillation with RVR (H)    Large B-cell lymphoma (H)       Cognitive Status: awake, alert and oriented.  Contact information and PCP information verified: Yes, Dr. Ruano ([place] PCP)  Lives With: alone   Living Arrangements: apartment       Insurance concerns: Other  + checking benefits for Home Infusion for TPN vs. Enteral feedings, will update when check completed:    ASSESSMENT  Patient currently receives the following services:    + [employer] ([phone]), pt was open to Home RN/PT/OT/SLP/HHA   + Family supportive        Identified issues/concerns regarding health management:   + Unable to swallow, needs another form of nutrition  + Is considering Hospice, SW following     Per Palliative note 5/8/19, \"it is not clear to me that pt has an underlying chronic and terminal illness, which is otherwise what would qualify her to see our team.\"     PLAN  Financial costs for the patient include: copays, and will check on home infusion / enteral nutrition costs.  Patient given options and choices for discharge: will provide choices when more information known.  Patient/family is agreeable to the plan?  N/A  Patient anticipates discharging to: TCU vs. Home with Home Health vs " Home with Home Hospice.    Patient anticipates needs for home equipment: Yes, nuritions supplies  Plan/Disposition: Home (pt's goal)  Appointments:     + to be scheduled     Care  (CTS) will continue to follow as needed.

## 2019-05-10 NOTE — PLAN OF CARE
Juliana is alert, but NPO for G tube placement this pm.  Educated patient and family re: swallowing goals of care, plan to encourage some nectar thick full liquids for pleasure and to maintain/improve oropharyngeal muscle function as tolerated following G tube placement.

## 2019-05-10 NOTE — PROGRESS NOTES
SW:  D: Call from [facility] who informs that they do not have any beds at this time and cannot accept TPN.  Call from [facility] who informs that they can clinically accept pt, however their pharmacy cannot fill TPN on the weekend, therefore they cannot accept TPN pt's on the weekend, could take her Monday. Per chart review, pt transitioning to Gtube this evening.     SW consulted for hospice. Pt and family just learned of new cancer dx. Per RN, pt and family's goal if for pt to go home, not to a facility. SW will complete consult this afternoon.       P: SW will continue to follow for discharge planning.    HUBER Perez

## 2019-05-10 NOTE — PROGRESS NOTES
[facility]    Hospitalist Progress Note    Assessment & Plan   Juliana Leal is a 89 year old female who was admitted on 5/3/2019 with afib w RVR, and found to have severe dysphagia, chronic sinusitis, and Large B-cell lymphoma:    Impression:   Principal Problem:    Atrial fibrillation with RVR (H)   -- controlled with IV Diltiazem    Active Problems:    Essential hypertension    CKD (chronic kidney disease) stage 3, GFR 30-59 ml/min (H)    Sinusitis, chronic    Large B-cell lymphoma (H)   -- doesn't want aggressive chemo   -- Steroids (Prednisone 100 mg daily x 5 days)    Severe malnutrition (H)   -- G-tube with tube feedings    Anemia of chronic disease    Dysphagia -- S/P G-tube 5/10/19      Plan:  Discussed with patient, daughter, son-in-law, and Dr. Rosa.      DVT Prophylaxis: Pneumatic Compression Devices  Code Status: DNR/DNI    Disposition: Expected discharge in 1 day once feeding tube in place.  They want hospice care at home.      Serge Garza MD  Pager [phone]  Cell Phone [phone]  Text Page (7am to 6pm)    Interval History   Mild sore throat, but still not able to swallow.      Physical Exam   Temp: 98.3  F (36.8  C) Temp src: Oral BP: 130/76 Pulse: 90 Heart Rate: 93 Resp: 16 SpO2: 95 % O2 Device: None (Room air)    Vitals:    05/07/19 0600 05/08/19 0609 05/09/19 0600   Weight: 51.6 kg (113 lb 12.1 oz) 51.2 kg (112 lb 14 oz) 52.4 kg (115 lb 8.3 oz)     Vital Signs with Ranges  Temp:  [98.1  F (36.7  C)-98.4  F (36.9  C)] 98.3  F (36.8  C)  Pulse:  [76-90] 90  Heart Rate:  [] 93  Resp:  [16] 16  BP: (109-141)/(60-83) 130/76  SpO2:  [92 %-97 %] 95 %  I/O last 3 completed shifts:  In: 4549 [P.O.:240; I.V.:1099]  Out: -     # Pain Assessment:  Current Pain Score 5/10/2019   Patient currently in pain? denies   Pain score (0-10) -   Pain location -   Pain descriptors -   - Juliana is experiencing pain due to sore throat. Pain management was discussed and the plan was  created in a collaborative fashion.  Juliana's response to the current recommendations: engaged  - see orders      Constitutional: Awake, alert, cooperative, no apparent distress, [facility]  Respiratory: Clear to auscultation bilaterally, no crackles or wheezing. No tenderness over frontal or maxillary sinuses   Cardiovascular: Regular rate and rhythm, normal S1 and S2, and no murmur noted  GI: Normal bowel sounds, soft, non-distended, non-tender  Extrem: No calf tenderness, no ankle edema  Neuro: Ox3, no focal motor or sensory deficits but generalized weakness    Medications     Reason anticoagulant not prescribed for atrial fibrillation       parenteral nutrition - Clinimix E 65 mL/hr at 05/10/19 1037     NaCl 10 mL/hr at 05/07/19 2131       dexamethasone  4 mg Intravenous Once     diltiazem  5 mg Intravenous Q4H     gabapentin  100 mg Oral At Bedtime     insulin aspart  1-12 Units Subcutaneous Q4H     sodium chloride  1 spray Both Nostrils BID     sodium chloride (PF)  10 mL Intracatheter Q8H       Data   Recent Labs   Lab 05/10/19  1130 05/10/19  0547 05/09/19  0511 05/08/19  2222 05/08/19  0843 05/08/19  0600  05/06/19  0455 05/05/19  0631   WBC 13.6*  --  13.1*  --  13.5*  --   --   --  14.7*   HGB 7.3*  --  7.3*  --  8.2*  --   --   --  8.3*   MCV 90  --  92  --  92  --   --   --  91   PLT 87*  --  89*  --  107*  --   --   --  214   INR 1.19*  --   --   --   --   --   --  1.19* 1.22*   NA  --  139 139  --   --  143   < > 144 145*   POTASSIUM  --  3.7 3.8 4.2  --  3.2*   < > 3.2* 4.1   CHLORIDE  --  105 108  --   --  111*   < > 112* 114*   CO2  --  28 23  --   --  25   < > 23 23   BUN  --  40* 51*  --   --  49*   < > 43* 41*   CR  --  0.83 0.91  --   --  0.92   < > 0.93 0.95   ANIONGAP  --  6 8  --   --  7   < > 9 8   DINORAH  --  8.5 8.7  --   --  8.8   < > 9.7 10.0   GLC  --  128* 129*  --   --  168*   < > 217* 124*   ALBUMIN  --   --  1.8*  --   --   --   --  2.2* 2.1*   PROTTOTAL  --   --  4.4*  --   --   --   --  5.5*  5.5*   BILITOTAL  --   --  0.6  --   --   --   --  0.6 0.6   ALKPHOS  --   --  51  --   --   --   --  53 54   ALT  --   --  13  --   --   --   --  11 9   AST  --   --  13  --   --   --   --  9 10    < > = values in this interval not displayed.       Imaging:   No results found for this or any previous visit (from the past 24 hour(s)).

## 2019-05-10 NOTE — PLAN OF CARE
A&Ox4. AVSS ex tachy at times. [place]. LS diminished. BS hypoactive, flatus present. Scant drainage from nostrils. Dried drainage removed from throat and mouth with suction and swabs.Throat dry, use of swabs and lip moisturizer. Denied pain. Ativan given x1. Up with assist x2. Turn and repo Q2. Up in chair with weight shifted. Incontinent. Coccyx mepilex dressing CDI. NPO for IR tube placement this afternoon. TPN running at 65mls/hr, to be discontinued at midnight. CMS intact.

## 2019-05-10 NOTE — PLAN OF CARE
Alert/Habematolel, drainage from nares, dressing changed, saline spray in use. Difficulty swallowing, refusing soft foods, bites of applesauce  and ice chips given.  Dressing changed coccyx pressure area. TPN/lipids infusing, no c/o pain.

## 2019-05-10 NOTE — PLAN OF CARE
Discharge Planner PT   Patient plan for discharge: none stated  Current status: Pt agreeable to PT session and up to the recliner with encouragement. Pt required assist placing her hearing aids in. Was incontinent with urine during session. Min A with rolling, pt able to bridge with cues to change diaper. Min A with supine to sit with LEs and mod A with trunk with cues and elevated HOB. Min A with seated scooting. Mod A x 2 with sit to stand with FWW due to posterior lean, stand step transfer with FWW to the recliner. Vitals stable post transfer. FQ=098/79mmHg, HR=85bpm.    Barriers to return to prior living situation: level of assist for return home, none for TCU  Recommendations for discharge: TCU  Rationale for recommendations: Pt will benefit from continued skilled PT at TCU to maximize her level of mobility and independence.            Entered by: Trina Mabry 05/10/2019 8:28 AM

## 2019-05-11 ENCOUNTER — APPOINTMENT (OUTPATIENT)
Dept: INTERVENTIONAL RADIOLOGY/VASCULAR | Facility: CLINIC | Age: 84
DRG: 840 | End: 2019-05-11
Attending: INTERNAL MEDICINE
Payer: COMMERCIAL

## 2019-05-11 LAB
GLUCOSE BLDC GLUCOMTR-MCNC: 107 MG/DL (ref 70–99)
GLUCOSE BLDC GLUCOMTR-MCNC: 107 MG/DL (ref 70–99)
GLUCOSE BLDC GLUCOMTR-MCNC: 108 MG/DL (ref 70–99)
GLUCOSE BLDC GLUCOMTR-MCNC: 109 MG/DL (ref 70–99)
GLUCOSE BLDC GLUCOMTR-MCNC: 110 MG/DL (ref 70–99)
GLUCOSE BLDC GLUCOMTR-MCNC: 94 MG/DL (ref 70–99)

## 2019-05-11 PROCEDURE — 25000128 H RX IP 250 OP 636

## 2019-05-11 PROCEDURE — 12000000 ZZH R&B MED SURG/OB

## 2019-05-11 PROCEDURE — C1769 GUIDE WIRE: HCPCS

## 2019-05-11 PROCEDURE — 0DH63UZ INSERTION OF FEEDING DEVICE INTO STOMACH, PERCUTANEOUS APPROACH: ICD-10-PCS | Performed by: RADIOLOGY

## 2019-05-11 PROCEDURE — 27210905 ZZH KIT CR7

## 2019-05-11 PROCEDURE — 27210742 ZZH CATH CR1

## 2019-05-11 PROCEDURE — 27210915 ZZ H TUBE GASTRO CR4

## 2019-05-11 PROCEDURE — 25000128 H RX IP 250 OP 636: Performed by: INTERNAL MEDICINE

## 2019-05-11 PROCEDURE — 40000239 ZZH STATISTIC VAT ROUNDS

## 2019-05-11 PROCEDURE — 99232 SBSQ HOSP IP/OBS MODERATE 35: CPT | Performed by: INTERNAL MEDICINE

## 2019-05-11 PROCEDURE — 00000146 ZZHCL STATISTIC GLUCOSE BY METER IP

## 2019-05-11 PROCEDURE — 25000132 ZZH RX MED GY IP 250 OP 250 PS 637: Performed by: INTERNAL MEDICINE

## 2019-05-11 PROCEDURE — 25000125 ZZHC RX 250

## 2019-05-11 PROCEDURE — 49440 PLACE GASTROSTOMY TUBE PERC: CPT

## 2019-05-11 PROCEDURE — 25800029 ZZH RX IP 258 OP 250

## 2019-05-11 PROCEDURE — 27210735 ZZH ACCESSORY CR12

## 2019-05-11 RX ORDER — CETIRIZINE HYDROCHLORIDE 10 MG/1
10 TABLET ORAL DAILY
Status: DISCONTINUED | OUTPATIENT
Start: 2019-05-11 | End: 2019-05-13 | Stop reason: HOSPADM

## 2019-05-11 RX ORDER — FENTANYL CITRATE 50 UG/ML
25-50 INJECTION, SOLUTION INTRAMUSCULAR; INTRAVENOUS EVERY 5 MIN PRN
Status: DISCONTINUED | OUTPATIENT
Start: 2019-05-11 | End: 2019-05-11

## 2019-05-11 RX ORDER — DEXAMETHASONE SODIUM PHOSPHATE 4 MG/ML
4 INJECTION, SOLUTION INTRA-ARTICULAR; INTRALESIONAL; INTRAMUSCULAR; INTRAVENOUS; SOFT TISSUE ONCE
Status: COMPLETED | OUTPATIENT
Start: 2019-05-11 | End: 2019-05-11

## 2019-05-11 RX ORDER — DEXTROSE MONOHYDRATE 25 G/50ML
25-50 INJECTION, SOLUTION INTRAVENOUS
Status: DISCONTINUED | OUTPATIENT
Start: 2019-05-11 | End: 2019-05-11

## 2019-05-11 RX ORDER — NALOXONE HYDROCHLORIDE 0.4 MG/ML
.1-.4 INJECTION, SOLUTION INTRAMUSCULAR; INTRAVENOUS; SUBCUTANEOUS
Status: DISCONTINUED | OUTPATIENT
Start: 2019-05-11 | End: 2019-05-11

## 2019-05-11 RX ORDER — LIDOCAINE HYDROCHLORIDE 10 MG/ML
INJECTION, SOLUTION INFILTRATION; PERINEURAL
Status: COMPLETED
Start: 2019-05-11 | End: 2019-05-11

## 2019-05-11 RX ORDER — LEVOTHYROXINE SODIUM 88 UG/1
88 TABLET ORAL DAILY
Status: DISCONTINUED | OUTPATIENT
Start: 2019-05-12 | End: 2019-05-13 | Stop reason: HOSPADM

## 2019-05-11 RX ORDER — DILTIAZEM HCL 60 MG
60 TABLET ORAL
Status: DISCONTINUED | OUTPATIENT
Start: 2019-05-11 | End: 2019-05-11

## 2019-05-11 RX ORDER — NICOTINE POLACRILEX 4 MG
15-30 LOZENGE BUCCAL
Status: DISCONTINUED | OUTPATIENT
Start: 2019-05-11 | End: 2019-05-11

## 2019-05-11 RX ORDER — FLUMAZENIL 0.1 MG/ML
0.2 INJECTION, SOLUTION INTRAVENOUS
Status: DISCONTINUED | OUTPATIENT
Start: 2019-05-11 | End: 2019-05-11

## 2019-05-11 RX ORDER — LEVOTHYROXINE SODIUM 88 UG/1
88 TABLET ORAL DAILY
Status: DISCONTINUED | OUTPATIENT
Start: 2019-05-11 | End: 2019-05-11

## 2019-05-11 RX ORDER — DILTIAZEM HYDROCHLORIDE 5 MG/ML
5 INJECTION INTRAVENOUS EVERY 4 HOURS PRN
Status: DISCONTINUED | OUTPATIENT
Start: 2019-05-11 | End: 2019-05-12

## 2019-05-11 RX ORDER — FENTANYL CITRATE 50 UG/ML
INJECTION, SOLUTION INTRAMUSCULAR; INTRAVENOUS
Status: COMPLETED
Start: 2019-05-11 | End: 2019-05-11

## 2019-05-11 RX ADMIN — FENTANYL CITRATE 25 MCG: 50 INJECTION, SOLUTION INTRAMUSCULAR; INTRAVENOUS at 15:56

## 2019-05-11 RX ADMIN — LIDOCAINE HYDROCHLORIDE 8 ML: 10 INJECTION, SOLUTION INFILTRATION; PERINEURAL at 16:05

## 2019-05-11 RX ADMIN — GABAPENTIN 100 MG: 100 CAPSULE ORAL at 22:50

## 2019-05-11 RX ADMIN — DEXAMETHASONE SODIUM PHOSPHATE 4 MG: 4 INJECTION, SOLUTION INTRAMUSCULAR; INTRAVENOUS at 09:36

## 2019-05-11 RX ADMIN — GLUCAGON HYDROCHLORIDE 1 MG: 1 INJECTION, POWDER, FOR SOLUTION INTRAMUSCULAR; INTRAVENOUS; SUBCUTANEOUS at 15:56

## 2019-05-11 RX ADMIN — ONDANSETRON 4 MG: 2 INJECTION INTRAMUSCULAR; INTRAVENOUS at 18:08

## 2019-05-11 RX ADMIN — MORPHINE SULFATE 0.5 MG: 2 INJECTION, SOLUTION INTRAMUSCULAR; INTRAVENOUS at 19:58

## 2019-05-11 RX ADMIN — SODIUM CHLORIDE: 4.5 INJECTION, SOLUTION INTRAVENOUS at 01:23

## 2019-05-11 RX ADMIN — MIDAZOLAM HYDROCHLORIDE 0.5 MG: 1 INJECTION, SOLUTION INTRAMUSCULAR; INTRAVENOUS at 15:55

## 2019-05-11 RX ADMIN — Medication 1 SPRAY: at 08:26

## 2019-05-11 RX ADMIN — LORAZEPAM 0.25 MG: 2 INJECTION INTRAMUSCULAR; INTRAVENOUS at 13:55

## 2019-05-11 RX ADMIN — Medication 1 SPRAY: at 22:54

## 2019-05-11 ASSESSMENT — ACTIVITIES OF DAILY LIVING (ADL)
ADLS_ACUITY_SCORE: 26
ADLS_ACUITY_SCORE: 24
ADLS_ACUITY_SCORE: 26
ADLS_ACUITY_SCORE: 24

## 2019-05-11 NOTE — IR NOTE
Interventional Radiology Intra-procedural Nursing Note    Patient Name: Juliana Leal  Medical Record Number: [id number]  Today's Date: May 11, 2019    Start Time: 1543  End of procedure time: 1603  Procedure: gastrostomy tube placement  Report given to: station 33  Time pt departs:  1615  : n/a    Other Notes:   Patient arrives to Ir [address]. Identification confirmed and consent verified. Patient was then assisted onto procedure table, positioned safely and connected to monitoring equipment. Access site abdomen was prepped and patient draped under sterile technique.     Versed 0.5mg IV  Fentanyl 25mcg IV  Glucagon 1mg IV    Patient tolerated procedure well. VSS. Patient alert, respirations regular and unlabored, no c/o pain at this time.   Procedure access site g-tube is c/d/i  Patient transferred back to Station 33 in stable condition accompanied by transport.    Andria Goodman RN on 5/11/2019 at 4:15 PM

## 2019-05-11 NOTE — PLAN OF CARE
A/Ox4 ex forgetful. NPO. BS-hypo. Incontinent of bowel and bladder. Up with assist of 2. Q2 turn and repo. Hard of hearing. Mepilex on coccyx due to blanchable redness. Plan is to have g-tube placed and discharge home on hospice.

## 2019-05-11 NOTE — PLAN OF CARE
Pt lethargic at times, oriented x 4. VSS on RA. IV Dilaudid x 1 for pain. Scant dried nasal drainage. Using swabs for mouth. . NPO - G-tube placement rescheduled for tomorrow. TPN running - to stop later this evening. PICC on RUE, patent. Up A2/lift. Incontinent. Scheduled IV Diltiazem. Mepilex on bottom. Continue to monitor.

## 2019-05-11 NOTE — PROGRESS NOTES
Consult received for new TF (G-tube)    MD ordered TF regimen:  1/2 can Isosource 1.5 given 3 times per day via G-tube = 562 cals, 26 gm pro  Water flushes of 150 mL TID    Will f/u on tolerance tomorrow and establish TF goals (4 cans per day will meet basic maintenance needs = 1500 cals, 638 gm pro)

## 2019-05-11 NOTE — PLAN OF CARE
[id number]   Pt is A&Ox4 but forgetful. Lethargic throughout shift. VSS on RA. Denies pain. Bloody drainage from nares at times. NPO for G tube placement, pt also states cannot eat or swallow pills. LS dim, HR irregular- tele discontinued. TPN running through PICC and IVF @ TKO rate- TPN discontinue tonight when bag complete. Incontinent at times. BS hypo, + flatus. Redness blanchable on coccyx, mepilex changed this shift. Up with A/2, generalized weakness. Turn/repo Q2. Plan for discharge with hospice. Continue to monitor.

## 2019-05-11 NOTE — PROGRESS NOTES
ADRIÁN  I: SW was updated that patient and family have elected for hospice at home.  hospice will plan to deliver equipment to patient's home on 5/12. Patient had FT placed, needs to ensure they are tolerating before discharge. [facility]. Transportation will need to be arranged once discharge is confirmed. SW will need to update  hospice once it has been determined patient is ready for discharge.     P: SW will continue to follow and assist as needed.    Jayna Styles, HUBER   *[phone]

## 2019-05-11 NOTE — PROGRESS NOTES
"Care Coordination:    Continuing to follow.  [facility] is working to determine if patient has coverage for tube feedings at home, vs. other with her insurance.  + Per [facility] and  [facility]osp[facility], \"Either way the patient is going to be covered 100% either with their insurance or the Hospice covering.\"    Also per [facility],   \"we would still need to call and confirm who we would need to speak to if there are billing issues and we need a verbal confirmation from someone there that they will cover this patient s services.   If the patient decides to go to Hospice please let us know what Hospice and we can call and get confirmation on what we need to further our process.    If patient is not wanting Hospice I can go ahead and round this out with the insurance.   Just keep us in the loop as I know the patient is making decisions.\"    Addendum 1:   Spoke with Hospice RN after consult completed, and relayed the following to [facility] via email:   \"Patient plans to sign up with hospice, either before leaving the hospital or once she gets home--this will not be today.  Feeding would start probably tomorrow, anticipating boluses, but they don't have the nutrition recommendations yet.  Regarding billing questions, the Hospice RN says to call triage at [phone] and they will transfer you to the right persons for the verbal confirmation about services. \"  Paged MD per discussion,   Pt will need a nutrition consult for recommendations and orders regarding initiation of tube feedings. Thank you.      Addendum 2:  [facility] ([facility]) called, indicating they cannot start services until their questions about billing are answered.  I again provided the phone number that was given to me, as I do not have access to the [facility] billing practices.  [facility] is concerned that if they call it will be an after-hours individual.  As I am employed by the hospital and not by hospice, I encouraged them to call the number " recommended by Hospice RN as above.      Emma Aleman RN, BSN  [facility] Care Coordinator   Mobile Phone: [phone]

## 2019-05-11 NOTE — CONSULTS
Writer met with patient, Daughter Millie, Son, Zane and granddaughters Radha and Asha in patients room on the 3rd floor at [facility] to discuss Hospice philosophy, benefits and services under Medicare. Patient is alert and oriented, and participated in meeting. Patient is a 89 y.o. female who was admitted on 5/3/2019 with afib w RVR, and found to have severe dysphagia, chronic sinusitis, and diagnosed with Large B-cell lymphoma. Patient is awaiting GTube placement today at 2pm. Patient will be discharged once she is able to tolerate tube feeding with possible discharge tomorrow 5/12/19. Hospital SW will follow up with discharge plans. Patient will need medical transportation upon discharge. Patient will be discharging back to her apartment in [place] with hospice services and family support. When discharge plans are known Hospice consents will need to be signed, Daughter Millie is Health Care Agent and will be available tomorrow. SW to call  hospice at [phone] to arrange for an admission team to meet family at the hospital to sign Hospice consents, tomorrow. The following medical equipment has been ordered through [facility] and will be delivered before noon tomorrow 5/12/19: Hospital bed with 1/2 rails, OBT, BSC and  wheelchair/cushion. Daughter is aware of this. When discharge plans are known, Emma, Care Coordinator will arrange for  [facility] to deliver tube feeding food and supplies to patients home.     Discharging MD... Please order the following comfort medications per Hospice and send with patient at time of discharge:   Lorazepam 2 mg/mL   Take 0.125 to 0.25 mL (0.25 to 0.5 mg) PO/SL/UT q4hr prn for anxiety or restlessness  Haloperidol 2 mg/mL   Take  0.25 mL to 0.5 mL (0.5 to 1 mg) PO/SL q6hr prn for nausea or terminal agitation  Atropine sulfate 1 percent drops, place 2 to 4 drops PO/SL/BUC q2hr prn for excess secretions  Senna 8.6 mg tabs, take 1 to 2 tabs PO BID prn for  constipation  Acetaminophen 650 mg supp   Insert 1 supp NV q4h PRN for fever or mild pain  Bisacodyl 10 mg supp  Insert 1 supp NV QD to BID prn for constipation  Hydromorphone 2 mg/mL Take 0.1 mL to 0.2 mL (1 to 2 mg)  PO/SL q2hr prn for pain or dyspnea    Coordinated discharged planning with Emma, Care Coordinator, [facility] and [facility]. Please call  hospice at [phone] with any questions or when discharge plans are known.    Thank you for this referral,  Allie Germain, RN

## 2019-05-11 NOTE — PROGRESS NOTES
[facility]    Hospitalist Progress Note    Assessment & Plan   Juliana Leal is a 89 year old female who was admitted on 5/3/2019 with afib w RVR, and found to have severe dysphagia, chronic sinusitis, and Diffuse Large B-cell lymphoma:    Impression:   Principal Problem:    Large B-cell lymphoma (H)   -- doesn't want aggressive chemo   -- Steroids (Prednisone 100 mg daily x 5 days)      Dysphagia    -- awaiting G-tube placement today      Atrial fibrillation with RVR (H)   -- PRN IV Diltiazem   -- start Diltiazem per G-tube once placed    Active Problems:    Essential hypertension    CKD (chronic kidney disease) stage 3, GFR 30-59 ml/min (H)    Sinusitis, chronic    Large B-cell lymphoma (H)   -- doesn't want aggressive chemo   -- Steroids (Prednisone 100 mg daily x 5 days)    Severe malnutrition (H)   -- G-tube with tube feedings (once placed)    Anemia of chronic disease    Dysphagia -- S/P G-tube 5/10/19      Plan:  G-tube some time this AM, change meds to G-tube after that and start start tube feedings.     DVT Prophylaxis: Pneumatic Compression Devices  Code Status: DNR/DNI    Disposition: Expected discharge in 1 day once feeding tube in place.  They want hospice care at home.      Serge Garza MD  Pager [phone]  Cell Phone [phone]  Text Page (7am to 6pm)    Interval History   Mild sore throat, is NPO for G-tube placement per IR this AM (Radiology not able to do it yesterday afternoon).      Physical Exam   Temp: 97.6  F (36.4  C) Temp src: Oral BP: 129/79 Pulse: 75 Heart Rate: 79 Resp: 18 SpO2: 95 % O2 Device: None (Room air)    Vitals:    05/07/19 0600 05/08/19 0609 05/09/19 0600   Weight: 51.6 kg (113 lb 12.1 oz) 51.2 kg (112 lb 14 oz) 52.4 kg (115 lb 8.3 oz)     Vital Signs with Ranges  Temp:  [97.3  F (36.3  C)-98.3  F (36.8  C)] 97.6  F (36.4  C)  Pulse:  [75-91] 75  Heart Rate:  [79-93] 79  Resp:  [16-18] 18  BP: (109-130)/(60-79) 129/79  SpO2:  [94 %-96 %] 95  %  No intake/output data recorded.    # Pain Assessment:  Current Pain Score 5/10/2019   Patient currently in pain? denies   Pain score (0-10) -   Pain location -   Pain descriptors -   - Juliana is experiencing pain due to sore throat. Pain management was discussed and the plan was created in a collaborative fashion.  Juliana's response to the current recommendations: engaged  - see orders      Constitutional: Awake, alert, cooperative, no apparent distress, [place]  Respiratory: Clear to auscultation bilaterally, no crackles or wheezing. No tenderness over frontal or maxillary sinuses   Cardiovascular: Regular rate and rhythm, normal S1 and S2, and no murmur noted  GI: Normal bowel sounds, soft, non-distended, non-tender  Extrem: No calf tenderness, no ankle edema  Neuro: seems to answer questions appropriately, very hard of hearing, no focal motor or sensory deficits but generalized weakness    Medications     Reason anticoagulant not prescribed for atrial fibrillation         cetirizine  10 mg Per G Tube Daily     dexamethasone  4 mg Per G Tube BID 09 12     diltiazem  60 mg Oral 4x Daily w/meals     gabapentin  100 mg Oral At Bedtime     insulin aspart  1-12 Units Subcutaneous Q4H     levothyroxine  88 mcg Per G Tube Daily     sodium chloride  1 spray Both Nostrils BID     sodium chloride (PF)  10 mL Intracatheter Q8H       Data   Recent Labs   Lab 05/10/19  1130 05/10/19  0547 05/09/19  0511 05/08/19  2222 05/08/19  0843 05/08/19  0600  05/06/19  0455 05/05/19  0631   WBC 13.6*  --  13.1*  --  13.5*  --   --   --  14.7*   HGB 7.3*  --  7.3*  --  8.2*  --   --   --  8.3*   MCV 90  --  92  --  92  --   --   --  91   PLT 87*  --  89*  --  107*  --   --   --  214   INR 1.19*  --   --   --   --   --   --  1.19* 1.22*   NA  --  139 139  --   --  143   < > 144 145*   POTASSIUM  --  3.7 3.8 4.2  --  3.2*   < > 3.2* 4.1   CHLORIDE  --  105 108  --   --  111*   < > 112* 114*   CO2  --  28 23  --   --  25   < > 23 23   BUN  --   40* 51*  --   --  49*   < > 43* 41*   CR  --  0.83 0.91  --   --  0.92   < > 0.93 0.95   ANIONGAP  --  6 8  --   --  7   < > 9 8   DINORAH  --  8.5 8.7  --   --  8.8   < > 9.7 10.0   GLC  --  128* 129*  --   --  168*   < > 217* 124*   ALBUMIN  --   --  1.8*  --   --   --   --  2.2* 2.1*   PROTTOTAL  --   --  4.4*  --   --   --   --  5.5* 5.5*   BILITOTAL  --   --  0.6  --   --   --   --  0.6 0.6   ALKPHOS  --   --  51  --   --   --   --  53 54   ALT  --   --  13  --   --   --   --  11 9   AST  --   --  13  --   --   --   --  9 10    < > = values in this interval not displayed.       Imaging:   No results found for this or any previous visit (from the past 24 hour(s)).

## 2019-05-11 NOTE — PROGRESS NOTES
G-tube placed this evening in IR. Back to room around 1600. G-tube patent. Per IR, g-tube can not be used for 8-hours. Pt and family updated and aware. Lots of family members at bedside. Got PRN IV Zofran x 1 for c/o nausea. No emesis. Inc of B&B. Turned and repositioned. No fever. Off oxygen now. VSS.

## 2019-05-12 LAB
ALBUMIN UR-MCNC: NEGATIVE MG/DL
APPEARANCE UR: ABNORMAL
BILIRUB UR QL STRIP: NEGATIVE
COLOR UR AUTO: YELLOW
GLUCOSE BLDC GLUCOMTR-MCNC: 106 MG/DL (ref 70–99)
GLUCOSE BLDC GLUCOMTR-MCNC: 81 MG/DL (ref 70–99)
GLUCOSE BLDC GLUCOMTR-MCNC: 97 MG/DL (ref 70–99)
GLUCOSE UR STRIP-MCNC: NEGATIVE MG/DL
HGB UR QL STRIP: NEGATIVE
KETONES UR STRIP-MCNC: NEGATIVE MG/DL
LEUKOCYTE ESTERASE UR QL STRIP: NEGATIVE
MUCOUS THREADS #/AREA URNS LPF: PRESENT /LPF
NITRATE UR QL: NEGATIVE
PH UR STRIP: 5.5 PH (ref 5–7)
PHOSPHATE SERPL-MCNC: 4.2 MG/DL (ref 2.5–4.5)
POTASSIUM SERPL-SCNC: 4.4 MMOL/L (ref 3.4–5.3)
RBC #/AREA URNS AUTO: 1 /HPF (ref 0–2)
SOURCE: ABNORMAL
SP GR UR STRIP: 1.01 (ref 1–1.03)
SQUAMOUS #/AREA URNS AUTO: <1 /HPF (ref 0–1)
UROBILINOGEN UR STRIP-MCNC: NORMAL MG/DL (ref 0–2)
WBC #/AREA URNS AUTO: 2 /HPF (ref 0–5)

## 2019-05-12 PROCEDURE — 25000131 ZZH RX MED GY IP 250 OP 636 PS 637: Performed by: INTERNAL MEDICINE

## 2019-05-12 PROCEDURE — 25000132 ZZH RX MED GY IP 250 OP 250 PS 637: Performed by: INTERNAL MEDICINE

## 2019-05-12 PROCEDURE — 99232 SBSQ HOSP IP/OBS MODERATE 35: CPT | Performed by: INTERNAL MEDICINE

## 2019-05-12 PROCEDURE — 40000239 ZZH STATISTIC VAT ROUNDS

## 2019-05-12 PROCEDURE — 00000146 ZZHCL STATISTIC GLUCOSE BY METER IP

## 2019-05-12 PROCEDURE — 84100 ASSAY OF PHOSPHORUS: CPT | Performed by: INTERNAL MEDICINE

## 2019-05-12 PROCEDURE — 12000000 ZZH R&B MED SURG/OB

## 2019-05-12 PROCEDURE — 25000125 ZZHC RX 250: Performed by: INTERNAL MEDICINE

## 2019-05-12 PROCEDURE — 81001 URINALYSIS AUTO W/SCOPE: CPT | Performed by: INTERNAL MEDICINE

## 2019-05-12 PROCEDURE — 27210429 ZZH NUTRITION PRODUCT INTERMEDIATE LITER

## 2019-05-12 PROCEDURE — 84132 ASSAY OF SERUM POTASSIUM: CPT | Performed by: INTERNAL MEDICINE

## 2019-05-12 PROCEDURE — 25000128 H RX IP 250 OP 636: Performed by: INTERNAL MEDICINE

## 2019-05-12 RX ORDER — DILTIAZEM HYDROCHLORIDE 30 MG/1
45 TABLET, FILM COATED ORAL
Status: DISCONTINUED | OUTPATIENT
Start: 2019-05-12 | End: 2019-05-13 | Stop reason: HOSPADM

## 2019-05-12 RX ORDER — MORPHINE SULFATE 100 MG/5ML
5 SOLUTION ORAL
Status: DISCONTINUED | OUTPATIENT
Start: 2019-05-12 | End: 2019-05-13 | Stop reason: HOSPADM

## 2019-05-12 RX ADMIN — ORAL VEHICLES - SUSP 4 MG: SUSPENSION at 09:47

## 2019-05-12 RX ADMIN — ORAL VEHICLES - SUSP 4 MG: SUSPENSION at 21:21

## 2019-05-12 RX ADMIN — MORPHINE SULFATE 0.5 MG: 2 INJECTION, SOLUTION INTRAMUSCULAR; INTRAVENOUS at 00:44

## 2019-05-12 RX ADMIN — Medication 0.26 MG: at 21:29

## 2019-05-12 RX ADMIN — MORPHINE SULFATE 0.5 MG: 2 INJECTION, SOLUTION INTRAMUSCULAR; INTRAVENOUS at 09:47

## 2019-05-12 RX ADMIN — Medication 1 SPRAY: at 09:52

## 2019-05-12 RX ADMIN — GABAPENTIN 100 MG: 100 CAPSULE ORAL at 21:21

## 2019-05-12 RX ADMIN — Medication 1 SPRAY: at 21:19

## 2019-05-12 RX ADMIN — LORAZEPAM 0.25 MG: 2 INJECTION INTRAMUSCULAR; INTRAVENOUS at 11:04

## 2019-05-12 RX ADMIN — LEVOTHYROXINE SODIUM 88 MCG: 88 TABLET ORAL at 09:47

## 2019-05-12 RX ADMIN — CETIRIZINE HYDROCHLORIDE 10 MG: 10 TABLET, FILM COATED ORAL at 09:47

## 2019-05-12 RX ADMIN — MORPHINE SULFATE 5 MG: 100 SOLUTION ORAL at 19:28

## 2019-05-12 ASSESSMENT — ACTIVITIES OF DAILY LIVING (ADL)
ADLS_ACUITY_SCORE: 25
ADLS_ACUITY_SCORE: 25
ADLS_ACUITY_SCORE: 24

## 2019-05-12 NOTE — PLAN OF CARE
Alert but disoriented to time and situation. AVSS on room air. BS-hypo. NPO. Hard of hearing. G-tube placed yesterday evening flushing easily. Mepilex on coccyx for blanchable redness. Up with assist of 2. Incontinent of bowel and bladder. Plan to discharge homer on hospice once feeding is tolerated.

## 2019-05-12 NOTE — PROGRESS NOTES
Due for routine 7 day picc dressing change.  Site without sxs.  Hospice following.  GT inserted yesterday with plan of pt going home on feedings vs TPN via Picc.  No other meds being given IV.  Defer dressing change, await Picc removal order.

## 2019-05-12 NOTE — PLAN OF CARE
PT: Discussed with family. Pt and family have chosen to discharge to home with hospice. Pt declines any further therapy in the hospital but daughter would like speech therapy to come by today.    PT goals not met.    Physical Therapy Discharge Summary    Reason for therapy discharge:    Patient/family request discontinuation of services.    Progress towards therapy goal(s). See goals on Care Plan in [facility] electronic health record for goal details.  Goals not met.  Barriers to achieving goals:   transition to hospice .    Therapy recommendation(s):    No further therapy is recommended.

## 2019-05-12 NOTE — PROVIDER NOTIFICATION
MD Notification    Notified Person: MD    Notified Person Name: Joey    Notification Date/Time: 05/12/19 @ 11:19 AM     Notification Interaction: web paged MD    Purpose of Notification: Patient's family at bedside, would like to talk with you. Also, can we change morphine and Ativan to high concentration instead of IV?     Orders Received:    Comments: 11:32 AM - MD will be by to see patient and family in about 30 min. MD also stated patient likely will discharge home tomorrow.

## 2019-05-12 NOTE — PLAN OF CARE
SLP: Attempted to see patient for swallow treatment. Per her nurse at bedside was recently given ativan and resting. Has G-tube for nutrition and plans for discharging home on hospice.   Speech Language Therapy Discharge Summary    Reason for therapy discharge:    Patient/family request discontinuation of services.    Progress towards therapy goal(s). See goals on Care Plan in [facility] electronic health record for goal details.  Goals not met.  Barriers to achieving goals:   discharge from facility.    Therapy recommendation(s):    No further therapy is recommended.

## 2019-05-12 NOTE — PROGRESS NOTES
[facility]    Hospitalist Progress Note    Assessment & Plan   Juliana Leal is a 89 year old female who was admitted on 5/3/2019 with afib w RVR, and found to have severe dysphagia, chronic sinusitis, and Diffuse Large B-cell lymphoma:    Impression:   Principal Problem:    Large B-cell lymphoma (H)   -- doesn't want aggressive chemo   -- Steroids (Prednisone 100 mg daily x 5 days q mo when discharged)   -- getting Decadron 4 mg bid now      Dysphagia    -- G-tube placed yesterday, getting tube feedings       Atrial fibrillation with RVR (H)   -- start Diltiazem 45 mg qid per G-tube    Active Problems:    Essential hypertension    CKD (chronic kidney disease) stage 3, GFR 30-59 ml/min (H)    Sinusitis, chronic    Large B-cell lymphoma (H)   -- doesn't want aggressive chemo   -- Steroids (Prednisone 100 mg daily x 5 days qMon on discharge)    Severe malnutrition (H)   -- G-tube with tube feedings started     Anemia of chronic disease    Dysphagia -- S/P G-tube 5/11/19      Plan:  Discussed with family -- planning home with hospice care tomorrow if tolerating tube feedings.      DVT Prophylaxis: Pneumatic Compression Devices  Code Status: DNR/DNI    Disposition: Expected discharge home with home care and hospice tomorrow.      Serge Garza MD  Pager [phone]  Cell Phone [phone]  Text Page (7am to 6pm)    Interval History   Complains of moderate pain at G-tube site.  Did have nose bleed this AM.      Physical Exam   Temp: 97.6  F (36.4  C) Temp src: Oral BP: 117/66 Pulse: 78 Heart Rate: 94 Resp: 16 SpO2: 96 % O2 Device: None (Room air)    Vitals:    05/08/19 0609 05/09/19 0600 05/12/19 0600   Weight: 51.2 kg (112 lb 14 oz) 52.4 kg (115 lb 8.3 oz) 52 kg (114 lb 10.2 oz)     Vital Signs with Ranges  Temp:  [97.5  F (36.4  C)-98.4  F (36.9  C)] 97.6  F (36.4  C)  Pulse:  [] 78  Heart Rate:  [] 94  Resp:  [12-20] 16  BP: ()/(56-84) 117/66  SpO2:  [83 %-98 %] 96 %  No  intake/output data recorded.    # Pain Assessment:  Current Pain Score 5/12/2019   Patient currently in pain? yes   Pain score (0-10) 10   Pain location -   Pain descriptors -   - Juliana is experiencing pain due to sore throat. Pain management was discussed and the plan was created in a collaborative fashion.  Juliana's response to the current recommendations: engaged  - see orders      Constitutional: Awake, alert, cooperative, no apparent distress, [place]  Respiratory: Clear to auscultation bilaterally, no crackles or wheezing. No tenderness over frontal or maxillary sinuses   Cardiovascular: Regular rate and rhythm, normal S1 and S2, and no murmur noted  GI: Normal bowel sounds, soft, slight tender around G-tube site but it appears  Normal   Extrem: No calf tenderness, no ankle edema  Neuro: seems to answer questions appropriately, very hard of hearing, no focal motor or sensory deficits but generalized weakness    Medications     Reason anticoagulant not prescribed for atrial fibrillation         cetirizine  10 mg Per G Tube Daily     dexamethasone  4 mg Per G Tube BID     diltiazem  45 mg Oral 4x Daily AC & HS     gabapentin  100 mg Oral At Bedtime     levothyroxine  88 mcg Per G Tube Daily     sodium chloride  1 spray Both Nostrils BID     sodium chloride (PF)  10 mL Intracatheter Q8H       Data   Recent Labs   Lab 05/10/19  1130 05/10/19  0547 05/09/19  0511 05/08/19  2222 05/08/19  0843 05/08/19  0600  05/06/19  0455   WBC 13.6*  --  13.1*  --  13.5*  --   --   --    HGB 7.3*  --  7.3*  --  8.2*  --   --   --    MCV 90  --  92  --  92  --   --   --    PLT 87*  --  89*  --  107*  --   --   --    INR 1.19*  --   --   --   --   --   --  1.19*   NA  --  139 139  --   --  143   < > 144   POTASSIUM  --  3.7 3.8 4.2  --  3.2*   < > 3.2*   CHLORIDE  --  105 108  --   --  111*   < > 112*   CO2  --  28 23  --   --  25   < > 23   BUN  --  40* 51*  --   --  49*   < > 43*   CR  --  0.83 0.91  --   --  0.92   < > 0.93   ANIONGAP   --  6 8  --   --  7   < > 9   DINORAH  --  8.5 8.7  --   --  8.8   < > 9.7   GLC  --  128* 129*  --   --  168*   < > 217*   ALBUMIN  --   --  1.8*  --   --   --   --  2.2*   PROTTOTAL  --   --  4.4*  --   --   --   --  5.5*   BILITOTAL  --   --  0.6  --   --   --   --  0.6   ALKPHOS  --   --  51  --   --   --   --  53   ALT  --   --  13  --   --   --   --  11   AST  --   --  13  --   --   --   --  9    < > = values in this interval not displayed.       Imaging:   Recent Results (from the past 24 hour(s))   IR Gastrostomy Tube Percutaneous Plcmnt    Narrative    G-TUBE PLACEMENT 5/11/2019 4:06 PM     HISTORY: Requires nutritional support. Unable to take adequate oral  intake.    DESCRIPTION OF PROCEDURE: After obtaining informed consent, the  patient was placed in a supine position on the fluoroscopy table. The  liver edge was marked. A nasogastric tube was placed into the stomach.  1 mg glucagon was given intravenously. The stomach was inflated with  air.     Two T-TAC suture anchors were used to enter the stomach. A small skin  incision was made in between the T-TAC entry sites. An 18-gauge needle  was used to enter the stomach through the incision. A wire was passed  and curled in the stomach. A tract for the G-tube was dilated. An 18  Greek peel-away sheath was placed. Through the peel-away sheath, a 14  Greek G-tube was placed. The balloon was inflated with a mixture of 1  mL contrast and 9 mL saline. Balloon was pulled back so that it was  against the anterior stomach wall. Contrast was injected to document  adequate positioning. A fluoroscopic image was saved to document tube  position.     I determined this patient to be an appropriate candidate for the  planned sedation and procedure and reassessed the patient immediately  prior to sedation and procedure. Moderate intravenous conscious  sedation was supervised by me. The patient was independently monitored  by a registered nurse assigned to the Department of  radiology using  automated blood pressure, EKG and pulse oximetry. The patient  tolerated the procedure well. There were no immediate postprocedure  complications. The patient's vital signs were monitored by radiology  nursing staff under my supervision and remained stable throughout the  study. Radiation dose for this scan was reduced using automated  exposure control, adjustment of the mA and/or kV according to patient  size, or iterative reconstruction technique.    MEDICATIONS: 0.5 mg Versed, 25 mcg fentanyl    SEDATION TIME: 10 minutes    FLUOROSCOPY TIME: 2.3 minutes    RADIATION DOSE: 3.37 mGy Air Kerma    NUMBER OF FLUOROSCOPIC IMAGES: 1      Impression    IMPRESSION: G-tube placed as above.    FRANCISCO MCCALL MD

## 2019-05-12 NOTE — PROGRESS NOTES
Discharge Planner   Discharge Plans in progress: Patient planning to discharge to home on [facility]. Hospice equipment to be delivered to home on 5/12/19.  Barriers to discharge plan: discharge day pending  Follow up plan: SW to arrange for stretcher ride/ medical transport once discharge readiness is determined.  SW to continue to follow and assist with discharge planning.         Entered by: Maya Ga 05/12/2019 11:26 AM

## 2019-05-13 ENCOUNTER — HOME INFUSION (PRE-WILLOW HOME INFUSION) (OUTPATIENT)
Dept: PHARMACY | Facility: CLINIC | Age: 84
End: 2019-05-13

## 2019-05-13 ENCOUNTER — PATIENT OUTREACH (OUTPATIENT)
Dept: CARE COORDINATION | Facility: CLINIC | Age: 84
End: 2019-05-13

## 2019-05-13 ENCOUNTER — TELEPHONE (OUTPATIENT)
Dept: INTERNAL MEDICINE | Facility: CLINIC | Age: 84
End: 2019-05-13

## 2019-05-13 VITALS
RESPIRATION RATE: 16 BRPM | SYSTOLIC BLOOD PRESSURE: 104 MMHG | BODY MASS INDEX: 19.08 KG/M2 | OXYGEN SATURATION: 96 % | DIASTOLIC BLOOD PRESSURE: 53 MMHG | HEART RATE: 74 BPM | TEMPERATURE: 98.3 F | WEIGHT: 114.64 LBS

## 2019-05-13 LAB
BACTERIA SPEC CULT: NORMAL
Lab: NORMAL
SPECIMEN SOURCE: NORMAL

## 2019-05-13 PROCEDURE — 25000132 ZZH RX MED GY IP 250 OP 250 PS 637: Performed by: INTERNAL MEDICINE

## 2019-05-13 PROCEDURE — 99239 HOSP IP/OBS DSCHRG MGMT >30: CPT | Performed by: INTERNAL MEDICINE

## 2019-05-13 RX ORDER — CETIRIZINE HYDROCHLORIDE 10 MG/1
10 TABLET ORAL 2 TIMES DAILY PRN
Qty: 30 TABLET
Start: 2019-05-13

## 2019-05-13 RX ORDER — MORPHINE SULFATE 100 MG/5ML
5 SOLUTION ORAL
Qty: 30 ML | Refills: 0 | Status: SHIPPED | OUTPATIENT
Start: 2019-05-13

## 2019-05-13 RX ORDER — LEVOTHYROXINE SODIUM 88 UG/1
88 TABLET ORAL DAILY
Qty: 90 TABLET | Refills: 2
Start: 2019-05-13

## 2019-05-13 RX ORDER — LORAZEPAM 2 MG/ML
0.25 CONCENTRATE ORAL EVERY 4 HOURS PRN
Qty: 30 ML | Refills: 0 | Status: SHIPPED | OUTPATIENT
Start: 2019-05-13

## 2019-05-13 RX ORDER — POLYETHYLENE GLYCOL 3350 17 G/17G
17 POWDER, FOR SOLUTION ORAL DAILY PRN
Start: 2019-05-13

## 2019-05-13 RX ORDER — SALIVA STIMULANT COMB. NO.3
2 SPRAY, NON-AEROSOL (ML) MUCOUS MEMBRANE 4 TIMES DAILY PRN
Qty: 1 BOTTLE | Refills: 1 | Status: SHIPPED | OUTPATIENT
Start: 2019-05-13

## 2019-05-13 RX ORDER — DILTIAZEM HYDROCHLORIDE 30 MG/1
45 TABLET, FILM COATED ORAL
Qty: 180 TABLET | Refills: 1 | Status: SHIPPED | OUTPATIENT
Start: 2019-05-13

## 2019-05-13 RX ORDER — MECLIZINE HCL 12.5 MG 12.5 MG/1
12.5 TABLET ORAL 3 TIMES DAILY PRN
Start: 2019-05-13

## 2019-05-13 RX ORDER — ONDANSETRON 4 MG/1
4 TABLET, ORALLY DISINTEGRATING ORAL EVERY 4 HOURS PRN
Qty: 20 TABLET | Refills: 1 | Status: SHIPPED | OUTPATIENT
Start: 2019-05-13

## 2019-05-13 RX ADMIN — Medication 1 SPRAY: at 09:03

## 2019-05-13 RX ADMIN — CETIRIZINE HYDROCHLORIDE 10 MG: 10 TABLET, FILM COATED ORAL at 09:02

## 2019-05-13 RX ADMIN — MORPHINE SULFATE 5 MG: 100 SOLUTION ORAL at 05:18

## 2019-05-13 RX ADMIN — Medication 0.26 MG: at 16:35

## 2019-05-13 RX ADMIN — DILTIAZEM HYDROCHLORIDE 45 MG: 30 TABLET, FILM COATED ORAL at 09:02

## 2019-05-13 RX ADMIN — LEVOTHYROXINE SODIUM 88 MCG: 88 TABLET ORAL at 09:02

## 2019-05-13 RX ADMIN — Medication 0.26 MG: at 11:16

## 2019-05-13 RX ADMIN — DILTIAZEM HYDROCHLORIDE 45 MG: 30 TABLET, FILM COATED ORAL at 16:34

## 2019-05-13 RX ADMIN — MORPHINE SULFATE 5 MG: 100 SOLUTION ORAL at 11:15

## 2019-05-13 RX ADMIN — MORPHINE SULFATE 5 MG: 100 SOLUTION ORAL at 16:34

## 2019-05-13 ASSESSMENT — ACTIVITIES OF DAILY LIVING (ADL)
ADLS_ACUITY_SCORE: 24

## 2019-05-13 NOTE — TELEPHONE ENCOUNTER
Reason for Call:  Other FYI  Detailed comments: hospice starting today 05/13/19 with  attending MD    Phone Number Patient can be reached at: Other phone number:  Hospice #[phone]    Best Time: soon    Can we leave a detailed message on this number?     Call taken on 5/13/2019 at 2:32 PM by Nati Meeks

## 2019-05-13 NOTE — PLAN OF CARE
A & O x 2.  VSS on RA.  PICC removed.  Pain and anxiety managed w/ morphine & ativan.  g-tube patent; Bolus feeding given at 1100.  Family instructed on  How to give feedings and medications via feeding tube & sublingual medications.  Bedrest.  Incontinent of urine and stool.  LS diminished.  Discharging to home on hospice at 1700 today.

## 2019-05-13 NOTE — PLAN OF CARE
Alert, orientation varies, confused at times. Patient slept majority of afternoon and evening. VS stable on room air. Bedrest this shift, turned/repositioned Q2H and for comfort. Voided per bedpan once, otherwise incontinent. Morphine and ativan given for pain and comfort. G-tube in place, tube feed given x2, patient tolerating well so far. Oral cares performed multiple times, patient reported dry mouth. Plan for patient to discharge home on hospice home care.

## 2019-05-13 NOTE — PLAN OF CARE
A&Ox2-3. Mild HoTN noted, Diltiazem held. AOVSS on RA. Morphine and Lorazepam given for pain and comfort. T/R q2. Incontinent at times, though did void per BPx1. Small stool x1. Gtube patent. TID bolus feedings. Plan for discharge home to hospice today.

## 2019-05-13 NOTE — PROGRESS NOTES
Clinic Care Coordination Contact    Situation: Patient chart reviewed by care coordinator.    Background: Patient was hospitalized at [facility] from 4/11/2019 to 4/16/2019 with diagnosis of Severe sinusitis with pharyngitis with vertigo. Pt was admitted to TCU 4/16/2019.     Assessment: Pt was discharged from TCU 4/30/2019 home with home care: OT/PT/ST, RN, HHA.  Pt was then readmitted to the hospital for A-fib, RVR, Dehydration.  Pt was discharged 5/3/2019 home on Hospice.      Plan/Recommendations: No further outreaches will be made at this time unless a new referral is made or a change in the pt's status occurs.       HUBER Taylor  Clinic Care Coordinator  [facility]  [phone]  [email or web address]

## 2019-05-13 NOTE — CONSULTS
11:00am Hospice admission completed this am for this 89 year old female with diagnosis of Diffuse Large B-cell Lymphoma. Pt was admitted with A Fib with rapid rapid ventricular response and acute renal failure, found to have severe dysphagia, chronic sinusitis, and Large B-cell lymphoma. Throat pain has been a frustrating symptom since 8/2018, has had several rounds of antibiotics for sinusitis, and treated for thrush. Pt continues with wt loss due to decreased oral intake from throat discomfort.  She was recently hospitalized from April 11 to April 15 at [facility], diagnosed with severe pansinusitis and treated with IV vancomycin and ceftriaxone, as well as dexamethasone. Despite a course of antibiotics, patient continued to have swelling in the throat and pain with swallowing, with a weight loss of 54.9 kg to 49.3 kg. Gastrostomy Tube Percutaneous Placement was completed  5/11/19.       Hospice consents were signed by pt's son/Zane. Education regarding administering  the Isosource 1.5 jamal/ml concentration via G tube/ 3 cans daily was provided by pt's primary nurse/Yanira. Writer reviewed dosages of Halopoeridol, Lorazepam, and Morphine using an educational syringe, Millie and Zane both voiced understanding.        POLST completed, copy given to Yanira/RN that will be needed for transport. Transport is scheduled for 5:00pm. Hospice comfort meds to be filled at hospital and sent with pt at time of discharge. Medical equip has been delivered. Completion of Hospice Admission is scheduled for tomorrow at 9:30am at pt's [facility].  I want to thank you for this referral. Any questions or concerns please call [facility] at [phone].  Vane Benitez RN   Admissions Clinician

## 2019-05-13 NOTE — PROGRESS NOTES
SW:    D: Following for discharge. Pt is medically stable & will discharge to home today on  hospice. SW setup transport via stretcher @ 1700, form faxed. ADRIÁN updated  hospice on discharge plans. SW updated pt & family on discharge plans. Meds to be filled prior to discharge at [facility].   P: Continue to follow as needed.    HUBER Cornelius

## 2019-05-13 NOTE — CONSULTS
Pt will be hospital discharged to home today. Please have the following comfort medications sent with pt at time of discharge per hospice orders.  1. Atropine Sulfate 1% drops admin 2-4 drops  Per G tube /po/sl/buccal q 2hrs PRN increased oral secretions.  2. Acetaminophen 650mg supp  Admin 1 supp rectally q 4hrs PRN fever/minor pain.  3. Bisacodyl supp 10mg admin 1 supp rectally daily prn constipation.  4. Lorazepam 2mg/1ml admin 0.125ml/0.25mg to 0.25ml/0.5mg per g tube/po/sl q 4hrs PRN anxiety/restlessness  5.  Haloperidol 2mg/1ml admin 0.25ml/0.5mg to 0.5ml/1mg per  g tube/po/sl q 6hrs prn nausea/agitation  6. Morphine Sulfate 20mg/1ml admin 0.125ml/2.5mg to 0.25ml/5 mg per g tube/po/sl q 2hrs PRN pain/dyspnea  7. Senna liquid form 8.8mg/5ml admin 5 to 10ml per g tube daily PRN constipation.   Also any additional medications for comfort that are presently being admin in hospital.   Any questions please call [facility] [phone]. Pt's discharge is scheduled for 5:00pm this afternoon.  Vane Benitez RN  Admissions Clinician

## 2019-05-13 NOTE — DISCHARGE SUMMARY
[facility]    Discharge Summary  Hospitalist    Date of Admission:  5/3/2019  Date of Discharge:  5/13/2019  Discharging Provider: Serge Garza MD    Discharge Diagnoses   Principal Problem:    Large B-cell lymphoma (H)  Active Problems:    Essential hypertension    CKD (chronic kidney disease) stage 3, GFR 30-59 ml/min (H)    Sinusitis, chronic    Atrial fibrillation with RVR (H)    Severe malnutrition (H)    Anemia of chronic disease    Dysphagia -- S/P G-tube 5/10/19      History of Present Illness     89 year old female presents with several months of sinusitis, elevated creatinine, and atrail fib with RVR and dysphagia and odynophagia.     Hospital Course   Admitted medicine floor on telemetry.  Seen by ENT, Dr. Sandra Irene, who performed Flexible fiberoptic laryngoscopy:  Scope passed through right nasal cavity--thick dry mucoid secretions.  No polyps visualized.  Nasal mucosa not very edematous nor erythematous.  Adenoid tissue  Mildly prominent.  Tongue base slightly full, right >left.  Epiglottis crisp.  Endolarynx normal in appearance.  Vocal folds normally mobile.  Culture taken from left nasal cavity.  Did bx right paranasal sinus and came back with Diffuse Large B cell lymphoma    Seen by Oncology, Dr. Rosa, who discussed the option of Chemotherapy, but given her age and poor performance status decided no chemo, but will given 5 day course of steroids monthly which may help with symptoms.  She did have a G-tube placed for tube feedings and her medications (she was unable to swallow), and discharged home with hospice care and her family to supervise her care at home.     Serge Garza MD  Pager: [phone]  Cell Phone:  [phone]       Significant Results and Procedures   As above    Pending Results   These results will be followed up by Dr. Garza  Unresulted Labs Ordered in the Past 30 Days of this Admission     No orders found from 3/4/2019 to 5/4/2019.           Code Status   DNR / DNI       Primary Care Physician   Erick Ruano    Physical Exam   Temp: 97.4  F (36.3  C) Temp src: Axillary BP: 111/57 Pulse: 72 Heart Rate: 74 Resp: 16 SpO2: 94 % O2 Device: None (Room air)    Vitals:    05/08/19 0609 05/09/19 0600 05/12/19 0600   Weight: 51.2 kg (112 lb 14 oz) 52.4 kg (115 lb 8.3 oz) 52 kg (114 lb 10.2 oz)     Vital Signs with Ranges  Temp:  [97.4  F (36.3  C)-97.7  F (36.5  C)] 97.4  F (36.3  C)  Pulse:  [72-93] 72  Heart Rate:  [74-80] 74  Resp:  [14-16] 16  BP: ()/(53-67) 111/57  SpO2:  [93 %-98 %] 94 %  I/O last 3 completed shifts:  In: 875 [NG/GT:625]  Out: 500 [Urine:500]    Exam on discharge:   Lungs clear  CV with irregular S1 S2  Very hard of hearing     # Discharge Pain Plan:   - Patient currently has NO PAIN and is not being prescribed pain medications on discharge.      Discharge Disposition   Discharged to home with home care and hospice  Condition at discharge: Poor    Consultations This Hospital Stay   ENT IP CONSULT  ENT IP CONSULT  NUTRITION SERVICES ADULT IP CONSULT  PHARMACY IP CONSULT  PHARMACY IP CONSULT  PHARMACY IP CONSULT  PHARMACY/NUTRITION TO START AND MANAGE TPN  VASCULAR ACCESS ADULT IP CONSULT  PHARMACY IP CONSULT  SPEECH LANGUAGE PATH ADULT IP CONSULT  PHARMACY IP CONSULT  PHYSICAL THERAPY ADULT IP CONSULT  PALLIATIVE CARE ADULT IP CONSULT  PAIN MANAGEMENT ADULT IP CONSULT  HEMATOLOGY & ONCOLOGY IP CONSULT  CARE TRANSITION RN/SW IP CONSULT  SOCIAL WORK IP CONSULT  SOCIAL WORK IP CONSULT  NUTRITION SERVICES ADULT IP CONSULT    Time Spent on this Encounter   I spent a total of 35 minutes discharging this patient.     Discharge Orders      Home infusion referral      Home care nursing referral      Discharge Instructions    If questions or problems arise regarding tube function (e.g. leaking, dislodges, etc.) Contact Interventional Radiology department 24 hours a day.    For procedures that were done at [facility]:  8 AM - 4 PM  Monday through Friday Contact   [phone]  For afterhours and weekends: [phone]   Ask for the Interventional Radiologist on call.       IF DIRECTED by the RADIOLOGIST, related to specific problems with the tube functioning,  go to the Emergency Department.     Discharge Instructions    If questions or problems arise regarding tube function (e.g. leaking, dislodges, etc.) Contact Interventional Radiology department 24 hours a day.    For procedures that were done at [facility]:    8 AM - 4 PM Monday through Friday Contact the Nurse Line [phone]  For afterhours and weekends [phone]    Ask for the Interventional Radiologist-on call.     For procedures that were done at [facility]:  8 AM - 4 PM Monday through Friday Contact   [phone]  For afterhours and weekends: [phone]   Ask for the Interventional Radiologist on call.       IF DIRECTED by the RADIOLOGIST, related to specific problems with the tube functioning,  go to the Emergency Department.     Reason for your hospital stay    Diffuse B cell Lymphoma     Follow-up and recommended labs and tests     Follow up with primary care provider, Erick Ruano, in 1-2 weeks as needed, and will be followed by hospice home care.     Activity    Your activity upon discharge: activity as tolerated     Discharge Instructions    Call Dr. Cook at Pager [phone] if questions, or Cell Phone [phone].     MD face to face encounter    Documentation of Face to Face and Certification for Home Health Services    I certify that patient: Juliana Leal is under my care and that I, or a nurse practitioner or physician's assistant working with me, had a face-to-face encounter that meets the physician face-to-face encounter requirements with this patient on: 5/13/2019.    This encounter with the patient was in whole, or in part, for the following medical condition, which is the primary reason for home health care: lymphoma.    I certify that,  based on my findings, the following services are medically necessary home health services: Nursing.    My clinical findings support the need for the above services because: Nurse is needed: To provide caregiver training to assist with: G-tube feedings..    Further, I certify that my clinical findings support that this patient is homebound (i.e. absences from home require considerable and taxing effort and are for medical reasons or [employer] services or infrequently or of short duration when for other reasons) because: Patient is bedbound due to: generalized weakness..    Based on the above findings. I certify that this patient is confined to the home and needs intermittent skilled nursing care, physical therapy and/or speech therapy.  The patient is under my care, and I have initiated the establishment of the plan of care.  This patient will be followed by a physician who will periodically review the plan of care.  Physician/Provider to provide follow up care: Erick Ruano    Attending [facility] physician (the Medicare certified [place] provider): Serge Garza  Physician Signature: See electronic signature associated with these discharge orders.  Date: 5/13/2019     DNR/DNI    Hospice care     Diet    Follow this diet upon discharge: Orders Placed This Encounter      Adult Formula Bolus Feeding: TID Isosource 1.5; Route: Gastrostomy; 1 can 3 times a day with 200 ml water flush 4 times a day, and flush with 30 ml water after giving medications.       May take oral liquids as tolerated.     Discharge Medications   Current Discharge Medication List      START taking these medications    Details   artificial saliva (BIOTENE MT) SOLN solution Take 2 mLs (2 sprays) by mouth 4 times daily as needed for dry mouth  Qty: 1 Bottle, Refills: 1    Comments: Future refills by PCP Dr. Erick Ruano with phone number [phone].  Associated Diagnoses: Large B-cell lymphoma (H)      dexamethasone (DECADRON) 1 MG/ML  alcohol-free oral solution 4 mLs (4 mg) by Per G Tube route 2 times daily for 5 days Give the first 5 days of each month.  Qty: 40 mL, Refills: 0    Comments: Future refills by PCP Dr. Erick Ruano with phone number [phone].  Associated Diagnoses: Large B-cell lymphoma (H)      diltiazem (CARDIZEM) 30 MG tablet Take 1.5 tablets (45 mg) by mouth 4 times daily (before meals and nightly)  Qty: 180 tablet, Refills: 1    Comments: Future refills by PCP Dr. Erick Ruano with phone number [phone].  Associated Diagnoses: Atrial fibrillation with RVR (H)      LORazepam (LORAZEPAM INTENSOL) 2 MG/ML (HIGH CONC) solution Place 0.13 mLs (0.26 mg) under the tongue every 4 hours as needed for anxiety or nausea  Qty: 30 mL, Refills: 0    Comments: Future refills by PCP Dr. Erick Ruano with phone number [phone].  Associated Diagnoses: Large B-cell lymphoma (H)      morphine sulfate HIGH CONCENTRATE (ROXANOL) 10 mg/0.5 mL (HIGH CONC) solution Place 0.25 mLs (5 mg) under the tongue every 2 hours as needed for moderate to severe pain  Qty: 30 mL, Refills: 0    Associated Diagnoses: Large B-cell lymphoma (H)      ondansetron (ZOFRAN-ODT) 4 MG ODT tab Take 1 tablet (4 mg) by mouth every 4 hours as needed for nausea or vomiting  Qty: 20 tablet, Refills: 1    Comments: Future refills by PCP Dr. Erick Ruano with phone number [phone].  Associated Diagnoses: Large B-cell lymphoma (H)      polyethylene glycol (MIRALAX/GLYCOLAX) packet 17 g by Per G Tube route daily as needed for constipation    Associated Diagnoses: Drug-induced constipation         CONTINUE these medications which have CHANGED    Details   acetaminophen (TYLENOL) 32 mg/mL liquid 20.3 mLs (650 mg) by Per G Tube route every 4 hours as needed for fever or mild pain  Qty: 236 mL, Refills: 1    Associated Diagnoses: Throat pain      cetirizine (ZYRTEC) 10 MG tablet 1 tablet (10 mg) by Per G Tube route 2 times daily as needed for allergies  Qty: 30 tablet     "Associated Diagnoses: Need for pneumococcal vaccination      levothyroxine (SYNTHROID/LEVOTHROID) 88 MCG tablet 1 tablet (88 mcg) by Per G Tube route daily  Qty: 90 tablet, Refills: 2    Associated Diagnoses: Acquired hypothyroidism      meclizine (ANTIVERT) 12.5 MG tablet 1 tablet (12.5 mg) by Per G Tube route 3 times daily as needed for dizziness    Associated Diagnoses: Vertigo         CONTINUE these medications which have NOT CHANGED    Details   emollient (VANICREAM) cream Apply topically as needed for other (leg rash)      sodium chloride (OCEAN) 0.65 % nasal spray Spray 2 sprays into both nostrils every hour as needed for congestion Or equivalent per kimberly. Pt. Can have at bedside.      triamcinolone (NASACORT) 55 MCG/ACT inhaler Spray 2 sprays into both nostrils daily  Qty: 16.5 mL, Refills: 1    Associated Diagnoses: Nasal congestion         STOP taking these medications       diltiazem ER COATED BEADS (CARDIZEM CD/CARTIA XT) 300 MG 24 hr capsule Comments:   Reason for Stopping:         gabapentin (NEURONTIN) 100 MG capsule Comments:   Reason for Stopping:         gabapentin (NEURONTIN) 100 MG capsule Comments:   Reason for Stopping:         magic mouthwash (FIRST-MOUTHWASH BLM) compounding kit Comments:   Reason for Stopping:         Multiple Vitamins-Minerals (CENTRUM SILVER) per tablet Comments:   Reason for Stopping:         omalizumab (XOLAIR) 150 MG injection Comments:   Reason for Stopping:         omeprazole (PRILOSEC) 20 MG CR capsule Comments:   Reason for Stopping:         phenol (CHLORASEPTIC) 1.4 % spray Comments:   Reason for Stopping:             Allergies   Allergies   Allergen Reactions     Lisinopril Other (See Comments) and Anaphylaxis     Swelling around mouth     Prednisone Unknown     \"every side effect listed\"     Allegra [Fexofenadine] Swelling     Amoxicillin Itching     Cipro [Quinolones] Itching     Codeine Sulfate Other (See Comments)     Felt out of it when took it yrs ago     " Hydroxyzine      Feels drunk     Iodine [Gnp Iodides Decolorized]      Lyrica [Pregabalin]      Reaction unknown     Norvasc [Amlodipine Besylate]      Mouth felt funny     Tramadol      Felt loopy     Zaditor Other (See Comments) and Swelling     Tongue swelled  Didn't feel right  Eye drops     Adhesive Tape Rash     Do not put tape over eyes during surgery anymore.  Bilateral painful rash was reaction.  Use 4x4s or ABD dressing over eyes please.     Penicillins Rash     Data   Most Recent 3 CBC's:  Recent Labs   Lab Test 05/10/19  1130 05/09/19  0511 05/08/19  0843   WBC 13.6* 13.1* 13.5*   HGB 7.3* 7.3* 8.2*   MCV 90 92 92   PLT 87* 89* 107*      Most Recent 3 BMP's:  Recent Labs   Lab Test 05/12/19  1825 05/10/19  0547 05/09/19  0511  05/08/19  0600   NA  --  139 139  --  143   POTASSIUM 4.4 3.7 3.8   < > 3.2*   CHLORIDE  --  105 108  --  111*   CO2  --  28 23  --  25   BUN  --  40* 51*  --  49*   CR  --  0.83 0.91  --  0.92   ANIONGAP  --  6 8  --  7   DINORAH  --  8.5 8.7  --  8.8   GLC  --  128* 129*  --  168*    < > = values in this interval not displayed.     Most Recent 2 LFT's:  Recent Labs   Lab Test 05/09/19  0511 05/06/19  0455   AST 13 9   ALT 13 11   ALKPHOS 51 53   BILITOTAL 0.6 0.6     Most Recent INR's and Anticoagulation Dosing History:  Anticoagulation Dose History     Recent Dosing and Labs Latest Ref Rng & Units 5/9/2012 4/12/2015 5/5/2019 5/6/2019 5/10/2019    INR 0.86 - 1.14 1.00 1.02 1.22(H) 1.19(H) 1.19(H)        Most Recent 3 Troponin's:  Recent Labs   Lab Test 03/19/19  1438 04/12/15  0640 05/08/12  1033   TROPI <0.015 <0.015  The 99th percentile for upper reference range is 0.045 ug/L.  Troponin values in   the range of 0.045 - 0.120 ug/L may be associated with risks of adverse   clinical events.   Effective 7/30/2014, the reference range for this assay has changed to reflect   new instrumentation/methodology.   <0.012     Most Recent Cholesterol Panel:  Recent Labs   Lab Test  05/09/12 1953   CHOL 198      HDL 50   TRIG 134     Most Recent 6 Bacteria Isolates From Any Culture (See EPIC Reports for Culture Details):  Recent Labs   Lab Test 05/06/19  1237 05/04/19  0800 04/24/19  1830 04/12/19  0906 04/11/19  1738 04/11/19  1733   CULT No anaerobes isolated  Moderate growth  Coagulase negative Staphylococcus  * Light growth  Normal sera   Heavy growth  Normal sera   Light growth  Normal sera    Single colony  Rahnella aquatilis  * No growth No growth     Most Recent TSH, T4 and A1c Labs:  Recent Labs   Lab Test 05/03/19  1630 04/12/18  1659  05/09/12 1953   TSH 1.22  --   --   --    T4  --  1.21   < >  --    A1C  --   --   --  5.8    < > = values in this interval not displayed.

## 2019-05-13 NOTE — PROGRESS NOTES
I spoke to Caio at [facility] and updated that patient will be discharging with  Hospice. Caio's phone number is [phone].

## 2019-05-14 LAB
INTERPRETATION ECG - MUSE: NORMAL
INTERPRETATION ECG - MUSE: NORMAL

## 2019-05-15 ENCOUNTER — DOCUMENTATION ONLY (OUTPATIENT)
Dept: OTHER | Facility: CLINIC | Age: 84
End: 2019-05-15

## 2019-05-15 NOTE — PROGRESS NOTES
This is a recent snapshot of the patient's [facility] medical record.  For current drug dose and complete information and questions, call [phone] or In [facility] pool, fv home infusion ([id number])  CSN Number:  [id number]

## 2019-05-16 ENCOUNTER — MEDICAL CORRESPONDENCE (OUTPATIENT)
Dept: HEALTH INFORMATION MANAGEMENT | Facility: CLINIC | Age: 84
End: 2019-05-16

## 2019-05-17 ENCOUNTER — MEDICAL CORRESPONDENCE (OUTPATIENT)
Dept: HEALTH INFORMATION MANAGEMENT | Facility: CLINIC | Age: 84
End: 2019-05-17

## 2019-05-20 ENCOUNTER — TELEPHONE (OUTPATIENT)
Dept: INTERNAL MEDICINE | Facility: CLINIC | Age: 84
End: 2019-05-20

## 2019-05-20 NOTE — TELEPHONE ENCOUNTER
Reason for Call:  Other note for grandson's work    Detailed comments: Juliana is currently at home for hospice where a combination of 6 family members will be on rotation to look after her. Perry Garces, her grandson, needs a note stating that Juliana is currently in hospice at home and that he is one of his care takers. He needs the note to bring to his employer. Perry's mother is the one who called in for the request.    Phone Number Patient can be reached at: Cell number on file:    Telephone Information:   Mobile [phone]       Best Time: ASAP    Can we leave a detailed message on this number? YES    Call taken on 5/20/2019 at 1:26 PM by Jovanna Mcghee

## 2019-05-24 NOTE — TELEPHONE ENCOUNTER
Spoke with pt's daughter Suzi. Hospice staff had already taken care of letter for grandson. She states that hospice is treating pt well and they are very happy with current care for pt and that she is comfortable. Told Suzi to let me know if anything else needed

## 2019-09-13 ENCOUNTER — MEDICAL CORRESPONDENCE (OUTPATIENT)
Dept: HEALTH INFORMATION MANAGEMENT | Facility: CLINIC | Age: 84
End: 2019-09-13

## 2019-09-23 PROBLEM — C85.10 LARGE B-CELL LYMPHOMA (H): Status: ACTIVE | Noted: 2019-05-06

## 2019-10-03 ENCOUNTER — HEALTH MAINTENANCE LETTER (OUTPATIENT)
Age: 84
End: 2019-10-03

## 2019-12-05 ENCOUNTER — MEDICAL CORRESPONDENCE (OUTPATIENT)
Dept: HEALTH INFORMATION MANAGEMENT | Facility: CLINIC | Age: 84
End: 2019-12-05

## 2020-02-08 ENCOUNTER — HEALTH MAINTENANCE LETTER (OUTPATIENT)
Age: 85
End: 2020-02-08

## 2021-01-15 ENCOUNTER — HEALTH MAINTENANCE LETTER (OUTPATIENT)
Age: 86
End: 2021-01-15

## 2021-03-21 ENCOUNTER — HEALTH MAINTENANCE LETTER (OUTPATIENT)
Age: 86
End: 2021-03-21

## 2021-09-05 ENCOUNTER — HEALTH MAINTENANCE LETTER (OUTPATIENT)
Age: 86
End: 2021-09-05

## 2022-03-02 NOTE — DISCHARGE INSTRUCTIONS
Vaccine record abstract, printed and mailed with IEP letter Your doctor will order tube feeds after your hospital stay.  The supplies will be provided by [facility].   If you have any questions about this service, please call them at [phone].    Your doctor has ordered home hospice to help you after your hospital stay.  They will contact you regarding your first visit.  This service will be provided by [facility].  If you have any questions, please call [phone].

## 2022-04-17 ENCOUNTER — HEALTH MAINTENANCE LETTER (OUTPATIENT)
Age: 87
End: 2022-04-17

## 2022-10-23 ENCOUNTER — HEALTH MAINTENANCE LETTER (OUTPATIENT)
Age: 87
End: 2022-10-23

## 2023-05-18 NOTE — PLAN OF CARE
"/51 (BP Location: Left arm)   Pulse 80   Temp 96.3  F (35.7  C) (Oral)   Resp 18   Ht 1.651 m (5' 5\")   Wt 54.4 kg (120 lb)   SpO2 96%   BMI 19.97 kg/m      Orientation: A&Ox4 - does not call for assistance, has set off bed alarm at times when she is wishing to use the bathroom.  Neurological: in taact  Pain status: denies  Activity: SBA via gait belt and walker  HEENT: bilateral nasal congestion, pt reported she \"feels stuffed up.\"  Resp: denies SOB, none assessed. No cough noted.  Lungs: clear on auscultation  Cardiac: WNL  GI: WNL  : incontinence, wears incontinent product  Skin: lizette skin, in tact  IVF: PIV SL  Labs/imaging: L) maxillary cx and blood cx pending (preliminary results in). Creat 1.98/1.47, WBC 13/7.8. CXR per MD shows no acute abnormality.  Diet: regular  Consults: PT, SW - see note  Plan: continue IV clindamycin Q8h, Afrin BID x 3 days. Pt is to take  Diflucan and nystatin swish and swallow for thrush.  "
"PRIMARY DIAGNOSIS: SINUSITIS  OUTPATIENT/OBSERVATION GOALS TO BE MET BEFORE DISCHARGE:  ADLs back to baseline: No     Activity and level of assistance: Up with standby assistance.     Pain status: c/o \"mouth/throat sore\".  Cepacol lozenges given     Return to near baseline physical activity: Yes     Discharge Planner Nurse   Safe discharge environment identified: Yes  Barriers to discharge: Yes, awaiting ENT consult        Please review provider order for any additional goals.   Nurse to notify provider when observation goals have been met and patient is ready for discharge.     Up in chair visiting with family.  Awaiting ENT consult.  Lozenges for mouth/throat discomfort otherwise denies pain.  Will continue to monitor.               "
"Pt alert and oriented x4. She denies pain or nausea. Complains of nose/sinus' being congested. Regular diet, tolerating well. SL. SBA w/ walker. VSS. Will go to TCU today.    /61 (BP Location: Left arm)   Pulse 76   Temp 96.9  F (36.1  C) (Oral)   Resp 16   Ht 1.651 m (5' 5\")   Wt 54.4 kg (120 lb)   SpO2 96%   BMI 19.97 kg/m      "
/68 (BP Location: Left arm)  Pulse 118  Temp 96.4  F  Resp 16  SpO2 93%      Orientation: A&Ox4   Neurological: WNL; [place], wears bilateral hearing aids  Pain status: denies  Activity: Ax1 w/ GB and walker - very weak  HEENT: Reports nasal congestion, sore throat.   Resp: WNL ex occ cough; loose, not productive  Lungs: clear on auscultation  Cardiac: WNL  GI: WNL  : WNL ex dribbling; baseline per patient  Skin: lizette skin, scattered brusies noted. Intact. Repositions self in bed.   IVF: New PIV SL  Labs/imaging: L) maxillary cx and blood cx pending (preliminary results in). Creat 1.98/1.47/1.31, WBC 8.3. CXR neg  Diet: Regular; tolerating soft foods - reports increased pain with swallowing  Consults: PT, SW - see note  Plan: continue IV clindamycin Q8h, Afrin BID x 3 days. Nystatin for thrush (no sores noted on exam). Plan to discharge to [facility] tomorrow, daughter will transport. Reports feeling very tired, cares clustered to allow for rest.       
AOx4, SBA, gait belt and walker to transfer, VSS. Lungs dim/clear bilaterally. Pt denies pain/discomfort. Pt continues Clindamycin and  miconazole swish and spit. Pt has a poor appetite but is drinking adequate amounts of fluids. Pt is voiding sufficient amounts of urine. Will continue to monitor and encourage fluids.   
Discharge Planner PT  PT julio completed a treatment provided.  Juliana Leal is a 89 year old female who came to the ED with  concerns of persistent left sinusitis despite multiple outpatient courses of antibiotics.      Pt lives alone in a Sr. Apt.  Pt was I with all ADLs and uses a 4WW at baseline.  Pt reports being less able to care for herself with recurrent sinus infections, pt also unable to take care of her home and feels unsafe showering.    Patient plan for discharge: TCU  Current status: Pt transferred sup > sit with S.  Sit <> stand to FWW with CGA and LEs buckling immediately upon standing with pt falling back onto bed.  Pt stood on 2nd attempt with Min A.  Pt amb 50' with FWW and CGA/Min A with B UEs and LEs buckling.  Slow gait with forward flexed posture. Pt with decreased UE, LE and core strength impairing safety and independence with all functional mobility.  Barriers to return to prior living situation: Lives alone, pt unable to care for herself, increased falls risk  Recommendations for discharge: TCU  Rationale for recommendations: Continued skilled PT and OT in the TCU setting to increase overall strength and endurance and progress safety and independence with all functional mobility to enable pt to return home at [facility].    Physical Therapy Discharge Summary    Reason for therapy discharge:    Discharged to transitional care facility.    Progress towards therapy goal(s). See goals on Care Plan in [facility] electronic health record for goal details.  Goals not met.  Barriers to achieving goals:   discharge on same date as initial evaluation.    Therapy recommendation(s):    Continued therapy is recommended.  Rationale/Recommendations:   .Continued skilled PT and OT in the TCU setting to increase overall strength and endurance and progress safety and independence with all functional mobility to enable pt to return home at [facility].           Entered by: Lea Oliva 03/21/2019 10:25 AM      
PRIMARY DIAGNOSIS: SINUSITIS   OUTPATIENT/OBSERVATION GOALS TO BE MET BEFORE DISCHARGE:  1. ADLs back to baseline: No    2. Activity and level of assistance: Up with standby assistance.    3. Pain status: Pain free.    4. Return to near baseline physical activity: Yes     Discharge Planner Nurse   Safe discharge environment identified: Yes  Barriers to discharge: No       Entered by: Luan Noriega 03/20/2019 12:45 AM     Please review provider order for any additional goals.   Nurse to notify provider when observation goals have been met and patient is ready for discharge.  
PRIMARY DIAGNOSIS: SINUSITIS  OUTPATIENT/OBSERVATION GOALS TO BE MET BEFORE DISCHARGE:  ADLs back to baseline: No     Activity and level of assistance: Up with standby assistance.     Pain status: Pain free.     Return to near baseline physical activity: Yes          Discharge Planner Nurse   Safe discharge environment identified: Yes  Barriers to discharge: Yes, awaiting ENT consult         Please review provider order for any additional goals.   Nurse to notify provider when observation goals have been met and patient is ready for discharge.     A&Ox4, [place], BP 98/45 VSS 93% sats on RA.  Denies pain. Tolerating regular diet.  Ambulated in [place] with staff.  NS at 75cc/hr as ordered.  ENT consult pending.  IV Cleocin as ordered.  Will continue to monitor.    
PRIMARY DIAGNOSIS: SINUSITIS  OUTPATIENT/OBSERVATION GOALS TO BE MET BEFORE DISCHARGE:  ADLs back to baseline: No    Activity and level of assistance: Up with standby assistance.    Pain status: Pain free.    Return to near baseline physical activity: Yes     Discharge Planner Nurse   Safe discharge environment identified: Yes  Barriers to discharge: Yes       Entered by: Luan Noriega 03/20/2019 4:15 AM     Please review provider order for any additional goals.   Nurse to notify provider when observation goals have been met and patient is ready for discharge.    Patient resting comfortably overnight. Vital signs stable. Alert/oriented, but hard of hearing. Ambulating with standby assist, walker and gait belt. Denies pain/discomfort. IVF infusing. Plan for IV cleocin, Afrin nasal spray BID and ENT consult.   
Vitals: Vitals are Temp: 97.2  F (36.2  C) Temp src: Oral BP: 130/62 Pulse: 118 Heart Rate: 76 Resp: 12 SpO2: 95 % RA  Pain: 6/10 headache- Tylenol given  Neuro: A/Ox4  Cardiac: WDL ex- Tachycardic   Respiratory: WDL  GI: WDL  : WDL  Skin: WDL  Consults: ENT, SW  IV: IV Cleocin   Plan: To discharge to [facility] at 1300 via family transport    Slept well during shift. Tolerating regular diet. Ambulating with standby assist with walker and gait belt.     
supervision

## 2023-06-01 ENCOUNTER — HEALTH MAINTENANCE LETTER (OUTPATIENT)
Age: 88
End: 2023-06-01

## (undated) DEVICE — SPONGE COTTONOID 1/2X3" 80-1407

## (undated) DEVICE — SUCTION CANISTER MEDIVAC LINER 3000ML W/LID 65651-530

## (undated) DEVICE — SOL NACL 0.9% IRRIG 1000ML BOTTLE 2F7124

## (undated) DEVICE — GOWN XLG DISP 9545

## (undated) DEVICE — SWAB PROCTO 16" NON-STERILE

## (undated) DEVICE — NDL 19GA 1.5"

## (undated) DEVICE — ANTIFOG SOLUTION W/FOAM PAD 31142527

## (undated) DEVICE — PACK SET-UP STD 9102

## (undated) DEVICE — SYR 10ML FINGER CONTROL W/O NDL 309695

## (undated) DEVICE — PACK NASAL SINUS SEN15NSFSF

## (undated) DEVICE — SPONGE RAY-TEC 4X4" 7317

## (undated) DEVICE — Device

## (undated) DEVICE — SOL WATER IRRIG 1000ML BOTTLE 2F7114

## (undated) DEVICE — NDL ANGIOCATH 14GA 5.25" 382269

## (undated) DEVICE — LINEN TOWEL PACK X5 5464

## (undated) DEVICE — TUBING IRR SUCTION SET GYRUS

## (undated) DEVICE — SOL NACL 0.9% INJ 1000ML BAG 2B1324X

## (undated) DEVICE — NDL SPINAL 25GA 3.5" QUINCKE 405180

## (undated) DEVICE — DRSG TELFA 3X8" 1238

## (undated) DEVICE — GLOVE PROTEXIS MICRO 6.5  2D73PM65

## (undated) RX ORDER — ONDANSETRON 2 MG/ML
INJECTION INTRAMUSCULAR; INTRAVENOUS
Status: DISPENSED
Start: 2019-05-06

## (undated) RX ORDER — NEOSTIGMINE METHYLSULFATE 1 MG/ML
VIAL (ML) INJECTION
Status: DISPENSED
Start: 2019-05-06

## (undated) RX ORDER — LIDOCAINE HYDROCHLORIDE 20 MG/ML
INJECTION, SOLUTION EPIDURAL; INFILTRATION; INTRACAUDAL; PERINEURAL
Status: DISPENSED
Start: 2019-05-06

## (undated) RX ORDER — PROPOFOL 10 MG/ML
INJECTION, EMULSION INTRAVENOUS
Status: DISPENSED
Start: 2019-05-06

## (undated) RX ORDER — GLYCOPYRROLATE 0.2 MG/ML
INJECTION, SOLUTION INTRAMUSCULAR; INTRAVENOUS
Status: DISPENSED
Start: 2019-05-06

## (undated) RX ORDER — FENTANYL CITRATE 50 UG/ML
INJECTION, SOLUTION INTRAMUSCULAR; INTRAVENOUS
Status: DISPENSED
Start: 2019-05-06

## (undated) RX ORDER — OXYMETAZOLINE HYDROCHLORIDE 0.05 G/100ML
SPRAY NASAL
Status: DISPENSED
Start: 2019-05-06